# Patient Record
Sex: FEMALE | Race: WHITE | NOT HISPANIC OR LATINO | Employment: OTHER | URBAN - METROPOLITAN AREA
[De-identification: names, ages, dates, MRNs, and addresses within clinical notes are randomized per-mention and may not be internally consistent; named-entity substitution may affect disease eponyms.]

---

## 2017-01-27 ENCOUNTER — ALLSCRIPTS OFFICE VISIT (OUTPATIENT)
Dept: OTHER | Facility: OTHER | Age: 82
End: 2017-01-27

## 2017-03-16 ENCOUNTER — ALLSCRIPTS OFFICE VISIT (OUTPATIENT)
Dept: OTHER | Facility: OTHER | Age: 82
End: 2017-03-16

## 2017-04-28 ENCOUNTER — ALLSCRIPTS OFFICE VISIT (OUTPATIENT)
Dept: OTHER | Facility: OTHER | Age: 82
End: 2017-04-28

## 2017-07-28 ENCOUNTER — ALLSCRIPTS OFFICE VISIT (OUTPATIENT)
Dept: OTHER | Facility: OTHER | Age: 82
End: 2017-07-28

## 2017-10-04 ENCOUNTER — APPOINTMENT (EMERGENCY)
Dept: RADIOLOGY | Facility: HOSPITAL | Age: 82
DRG: 281 | End: 2017-10-04
Payer: MEDICARE

## 2017-10-04 ENCOUNTER — GENERIC CONVERSION - ENCOUNTER (OUTPATIENT)
Dept: OTHER | Facility: OTHER | Age: 82
End: 2017-10-04

## 2017-10-04 ENCOUNTER — HOSPITAL ENCOUNTER (INPATIENT)
Facility: HOSPITAL | Age: 82
LOS: 5 days | Discharge: NURSING, REHAB OR LONG-TERM CARE | DRG: 281 | End: 2017-10-09
Attending: EMERGENCY MEDICINE | Admitting: INTERNAL MEDICINE
Payer: MEDICARE

## 2017-10-04 ENCOUNTER — APPOINTMENT (INPATIENT)
Dept: ULTRASOUND IMAGING | Facility: HOSPITAL | Age: 82
DRG: 281 | End: 2017-10-04
Payer: MEDICARE

## 2017-10-04 DIAGNOSIS — R74.01 TRANSAMINITIS: ICD-10-CM

## 2017-10-04 DIAGNOSIS — R10.11 RUQ PAIN: ICD-10-CM

## 2017-10-04 DIAGNOSIS — R77.8 ELEVATED TROPONIN: ICD-10-CM

## 2017-10-04 DIAGNOSIS — R79.89 ELEVATED BRAIN NATRIURETIC PEPTIDE (BNP) LEVEL: ICD-10-CM

## 2017-10-04 DIAGNOSIS — R10.9 ABDOMINAL PAIN: Primary | ICD-10-CM

## 2017-10-04 DIAGNOSIS — R53.1 WEAKNESS: ICD-10-CM

## 2017-10-04 DIAGNOSIS — I21.4 NSTEMI (NON-ST ELEVATED MYOCARDIAL INFARCTION) (HCC): ICD-10-CM

## 2017-10-04 PROBLEM — N18.4 CKD (CHRONIC KIDNEY DISEASE) STAGE 4, GFR 15-29 ML/MIN (HCC): Status: ACTIVE | Noted: 2017-10-04

## 2017-10-04 LAB
ALBUMIN SERPL BCP-MCNC: 3.8 G/DL (ref 3.5–5)
ALP SERPL-CCNC: 136 U/L (ref 46–116)
ALT SERPL W P-5'-P-CCNC: 558 U/L (ref 12–78)
ANION GAP SERPL CALCULATED.3IONS-SCNC: 7 MMOL/L (ref 4–13)
ANION GAP SERPL CALCULATED.3IONS-SCNC: 8 MMOL/L (ref 4–13)
AST SERPL W P-5'-P-CCNC: 826 U/L (ref 5–45)
BASOPHILS # BLD AUTO: 0.01 THOUSANDS/ΜL (ref 0–0.1)
BASOPHILS NFR BLD AUTO: 0 % (ref 0–1)
BILIRUB SERPL-MCNC: 0.8 MG/DL (ref 0.2–1)
BUN SERPL-MCNC: 19 MG/DL (ref 5–25)
BUN SERPL-MCNC: 21 MG/DL (ref 5–25)
CALCIUM SERPL-MCNC: 9.5 MG/DL (ref 8.3–10.1)
CALCIUM SERPL-MCNC: 9.7 MG/DL (ref 8.3–10.1)
CHLORIDE SERPL-SCNC: 104 MMOL/L (ref 100–108)
CHLORIDE SERPL-SCNC: 105 MMOL/L (ref 100–108)
CHOLEST SERPL-MCNC: 184 MG/DL (ref 50–200)
CO2 SERPL-SCNC: 28 MMOL/L (ref 21–32)
CO2 SERPL-SCNC: 28 MMOL/L (ref 21–32)
CREAT SERPL-MCNC: 1.53 MG/DL (ref 0.6–1.3)
CREAT SERPL-MCNC: 1.53 MG/DL (ref 0.6–1.3)
EOSINOPHIL # BLD AUTO: 0.02 THOUSAND/ΜL (ref 0–0.61)
EOSINOPHIL NFR BLD AUTO: 1 % (ref 0–6)
ERYTHROCYTE [DISTWIDTH] IN BLOOD BY AUTOMATED COUNT: 13.3 % (ref 11.6–15.1)
GFR SERPL CREATININE-BSD FRML MDRD: 28 ML/MIN/1.73SQ M
GFR SERPL CREATININE-BSD FRML MDRD: 28 ML/MIN/1.73SQ M
GLUCOSE SERPL-MCNC: 123 MG/DL (ref 65–140)
GLUCOSE SERPL-MCNC: 124 MG/DL (ref 65–140)
HCT VFR BLD AUTO: 35.6 % (ref 34.8–46.1)
HDLC SERPL-MCNC: 63 MG/DL (ref 40–60)
HGB BLD-MCNC: 11.3 G/DL (ref 11.5–15.4)
LDLC SERPL CALC-MCNC: 109 MG/DL (ref 0–100)
LIPASE SERPL-CCNC: 325 U/L (ref 73–393)
LYMPHOCYTES # BLD AUTO: 0.42 THOUSANDS/ΜL (ref 0.6–4.47)
LYMPHOCYTES NFR BLD AUTO: 10 % (ref 14–44)
MCH RBC QN AUTO: 30.2 PG (ref 26.8–34.3)
MCHC RBC AUTO-ENTMCNC: 31.7 G/DL (ref 31.4–37.4)
MCV RBC AUTO: 95 FL (ref 82–98)
MONOCYTES # BLD AUTO: 0.38 THOUSAND/ΜL (ref 0.17–1.22)
MONOCYTES NFR BLD AUTO: 9 % (ref 4–12)
NEUTROPHILS # BLD AUTO: 3.44 THOUSANDS/ΜL (ref 1.85–7.62)
NEUTS SEG NFR BLD AUTO: 80 % (ref 43–75)
NT-PROBNP SERPL-MCNC: 1452 PG/ML
PLATELET # BLD AUTO: 204 THOUSANDS/UL (ref 149–390)
PMV BLD AUTO: 9.1 FL (ref 8.9–12.7)
POTASSIUM SERPL-SCNC: 4.3 MMOL/L (ref 3.5–5.3)
POTASSIUM SERPL-SCNC: 4.3 MMOL/L (ref 3.5–5.3)
PROT SERPL-MCNC: 7.1 G/DL (ref 6.4–8.2)
RBC # BLD AUTO: 3.74 MILLION/UL (ref 3.81–5.12)
SODIUM SERPL-SCNC: 140 MMOL/L (ref 136–145)
SODIUM SERPL-SCNC: 140 MMOL/L (ref 136–145)
TRIGL SERPL-MCNC: 62 MG/DL
TROPONIN I SERPL-MCNC: 0.06 NG/ML
TROPONIN I SERPL-MCNC: 0.22 NG/ML
WBC # BLD AUTO: 4.27 THOUSAND/UL (ref 4.31–10.16)

## 2017-10-04 PROCEDURE — 76705 ECHO EXAM OF ABDOMEN: CPT

## 2017-10-04 PROCEDURE — 83036 HEMOGLOBIN GLYCOSYLATED A1C: CPT | Performed by: PHYSICIAN ASSISTANT

## 2017-10-04 PROCEDURE — 93005 ELECTROCARDIOGRAM TRACING: CPT | Performed by: EMERGENCY MEDICINE

## 2017-10-04 PROCEDURE — 83690 ASSAY OF LIPASE: CPT | Performed by: EMERGENCY MEDICINE

## 2017-10-04 PROCEDURE — 84484 ASSAY OF TROPONIN QUANT: CPT | Performed by: PHYSICIAN ASSISTANT

## 2017-10-04 PROCEDURE — 80053 COMPREHEN METABOLIC PANEL: CPT | Performed by: EMERGENCY MEDICINE

## 2017-10-04 PROCEDURE — 99285 EMERGENCY DEPT VISIT HI MDM: CPT

## 2017-10-04 PROCEDURE — 80061 LIPID PANEL: CPT | Performed by: PHYSICIAN ASSISTANT

## 2017-10-04 PROCEDURE — 85025 COMPLETE CBC W/AUTO DIFF WBC: CPT | Performed by: EMERGENCY MEDICINE

## 2017-10-04 PROCEDURE — 83880 ASSAY OF NATRIURETIC PEPTIDE: CPT | Performed by: EMERGENCY MEDICINE

## 2017-10-04 PROCEDURE — 84484 ASSAY OF TROPONIN QUANT: CPT | Performed by: EMERGENCY MEDICINE

## 2017-10-04 PROCEDURE — 80048 BASIC METABOLIC PNL TOTAL CA: CPT | Performed by: EMERGENCY MEDICINE

## 2017-10-04 PROCEDURE — 36415 COLL VENOUS BLD VENIPUNCTURE: CPT | Performed by: EMERGENCY MEDICINE

## 2017-10-04 PROCEDURE — 93005 ELECTROCARDIOGRAM TRACING: CPT | Performed by: PHYSICIAN ASSISTANT

## 2017-10-04 PROCEDURE — 71020 HB CHEST X-RAY 2VW FRONTAL&LATL: CPT

## 2017-10-04 PROCEDURE — 80074 ACUTE HEPATITIS PANEL: CPT | Performed by: PHYSICIAN ASSISTANT

## 2017-10-04 RX ORDER — SENNOSIDES 8.6 MG
1 TABLET ORAL DAILY
Status: DISCONTINUED | OUTPATIENT
Start: 2017-10-05 | End: 2017-10-09 | Stop reason: HOSPADM

## 2017-10-04 RX ORDER — ASPIRIN 81 MG/1
81 TABLET ORAL DAILY
Status: DISCONTINUED | OUTPATIENT
Start: 2017-10-05 | End: 2017-10-09 | Stop reason: HOSPADM

## 2017-10-04 RX ORDER — OMEPRAZOLE 20 MG/1
20 CAPSULE, DELAYED RELEASE ORAL AS NEEDED
COMMUNITY
End: 2017-11-02 | Stop reason: HOSPADM

## 2017-10-04 RX ORDER — ONDANSETRON 2 MG/ML
4 INJECTION INTRAMUSCULAR; INTRAVENOUS EVERY 6 HOURS PRN
Status: DISCONTINUED | OUTPATIENT
Start: 2017-10-04 | End: 2017-10-09 | Stop reason: HOSPADM

## 2017-10-04 RX ORDER — ACETAMINOPHEN 325 MG/1
650 TABLET ORAL EVERY 6 HOURS PRN
Status: ON HOLD | COMMUNITY
End: 2017-10-09

## 2017-10-04 RX ORDER — CALCIUM CARBONATE 200(500)MG
1000 TABLET,CHEWABLE ORAL DAILY PRN
Status: DISCONTINUED | OUTPATIENT
Start: 2017-10-04 | End: 2017-10-09 | Stop reason: HOSPADM

## 2017-10-04 NOTE — ED PROVIDER NOTES
History  Chief Complaint   Patient presents with    Shortness of Breath     Per EMS pt just "isn't feeling well " Pt states that she feels generally week  Pt c/o dizziness and SOB  Pt c/o nausea but denies dizziness  Pt c/o a headache  Patient presents to the emergency department stating that she just does not feel well  She states she has mild belly pain but always has that  She denies bianca chest pain or sob  She denies fever chills cough  Denies headache or neck pain  Denies rash  Denies fall trauma or new activity  Denies back pain denies urinary symptoms  She states she has been moving her bowels normally  None       History reviewed  No pertinent past medical history  Past Surgical History:   Procedure Laterality Date    BACK SURGERY      COLON SURGERY      HIP FRACTURE SURGERY         History reviewed  No pertinent family history  I have reviewed and agree with the history as documented  Social History   Substance Use Topics    Smoking status: Never Smoker    Smokeless tobacco: Never Used    Alcohol use No        Review of Systems   Constitutional: Positive for fatigue  Negative for activity change, appetite change, chills and diaphoresis  HENT: Negative  Negative for ear discharge, ear pain, rhinorrhea, sore throat and trouble swallowing  Eyes: Negative  Negative for photophobia and visual disturbance  Respiratory: Negative  Negative for chest tightness, shortness of breath and wheezing  Cardiovascular: Negative  Negative for chest pain, palpitations and leg swelling  Gastrointestinal: Positive for abdominal pain  Negative for blood in stool, constipation, diarrhea, nausea, rectal pain and vomiting  Endocrine: Negative  Genitourinary: Negative  Negative for dysuria, flank pain, frequency, hematuria, pelvic pain, urgency, vaginal bleeding, vaginal discharge and vaginal pain  Musculoskeletal: Negative    Negative for back pain, neck pain and neck stiffness  Skin: Negative  Allergic/Immunologic: Negative  Neurological: Positive for weakness  Negative for dizziness, syncope, facial asymmetry, speech difficulty, light-headedness, numbness and headaches  Hematological: Negative  Does not bruise/bleed easily  Psychiatric/Behavioral: Negative  Physical Exam  ED Triage Vitals [10/04/17 1850]   Temperature Pulse Respirations Blood Pressure SpO2   99 2 °F (37 3 °C) (!) 116 16 (!) 173/75 96 %      Temp Source Heart Rate Source Patient Position - Orthostatic VS BP Location FiO2 (%)   Oral Monitor Sitting Right arm --      Pain Score       --           Physical Exam   Constitutional: She is oriented to person, place, and time  She appears well-developed and well-nourished  Nontoxic appearance without respiratory distress  Patient looks comfortable sitting upright in the stretcher  HENT:   Head: Normocephalic and atraumatic  Right Ear: External ear normal    Left Ear: External ear normal    Mouth/Throat: Oropharynx is clear and moist    Eyes: Conjunctivae and EOM are normal  Pupils are equal, round, and reactive to light  Neck: Normal range of motion  Neck supple  Cardiovascular: Normal rate, regular rhythm, normal heart sounds and intact distal pulses  Pulmonary/Chest: Effort normal and breath sounds normal  No respiratory distress  She has no wheezes  She has no rales  She exhibits no tenderness  Abdominal: Soft  Bowel sounds are normal  She exhibits no distension and no mass  There is no tenderness  There is no rebound and no guarding  No hernia  No reproducible tenderness  No peritoneal signs  Musculoskeletal: Normal range of motion  She exhibits no edema or deformity  Neurological: She is alert and oriented to person, place, and time  She has normal reflexes  She displays normal reflexes  No cranial nerve deficit or sensory deficit  She exhibits normal muscle tone  Coordination normal    Skin: Skin is warm and dry   No rash noted  No erythema  No pallor  Psychiatric: She has a normal mood and affect  Her behavior is normal  Judgment and thought content normal    Nursing note and vitals reviewed  ED Medications  Medications - No data to display    Diagnostic Studies  Labs Reviewed   CBC AND DIFFERENTIAL - Abnormal        Result Value Ref Range Status    WBC 4 27 (*) 4 31 - 10 16 Thousand/uL Final    RBC 3 74 (*) 3 81 - 5 12 Million/uL Final    Hemoglobin 11 3 (*) 11 5 - 15 4 g/dL Final    Neutrophils Relative 80 (*) 43 - 75 % Final    Lymphocytes Relative 10 (*) 14 - 44 % Final    Lymphocytes Absolute 0 42 (*) 0 60 - 4 47 Thousands/µL Final    Hematocrit 35 6  34 8 - 46 1 % Final    MCV 95  82 - 98 fL Final    MCH 30 2  26 8 - 34 3 pg Final    MCHC 31 7  31 4 - 37 4 g/dL Final    RDW 13 3  11 6 - 15 1 % Final    MPV 9 1  8 9 - 12 7 fL Final    Platelets 007  877 - 390 Thousands/uL Final    Monocytes Relative 9  4 - 12 % Final    Eosinophils Relative 1  0 - 6 % Final    Basophils Relative 0  0 - 1 % Final    Neutrophils Absolute 3 44  1 85 - 7 62 Thousands/µL Final    Monocytes Absolute 0 38  0 17 - 1 22 Thousand/µL Final    Eosinophils Absolute 0 02  0 00 - 0 61 Thousand/µL Final    Basophils Absolute 0 01  0 00 - 0 10 Thousands/µL Final   BASIC METABOLIC PANEL - Abnormal     Creatinine 1 53 (*) 0 60 - 1 30 mg/dL Final    Comment: Standardized to IDMS reference method    Sodium 140  136 - 145 mmol/L Final    Potassium 4 3  3 5 - 5 3 mmol/L Final    Chloride 104  100 - 108 mmol/L Final    CO2 28  21 - 32 mmol/L Final    Anion Gap 8  4 - 13 mmol/L Final    BUN 19  5 - 25 mg/dL Final    Glucose 123  65 - 140 mg/dL Final    Comment:   If the patient is fasting, the ADA then defines impaired fasting glucose as > 100 mg/dL and diabetes as > or equal to 123 mg/dL  Specimen collection should occur prior to Sulfasalazine administration due to the potential for falsely depressed results   Specimen collection should occur prior to Sulfapyridine administration due to the potential for falsely elevated results  Calcium 9 5  8 3 - 10 1 mg/dL Final    eGFR 28  ml/min/1 73sq m Final    Narrative:     National Kidney Disease Education Program recommendations are as follows:  GFR calculation is accurate only with a steady state creatinine  Chronic Kidney disease less than 60 ml/min/1 73 sq  meters  Kidney failure less than 15 ml/min/1 73 sq  meters  TROPONIN I - Abnormal     Troponin I 0 06 (*) <=0 04 ng/mL Final    Comment:  Results indicate test should be repeated on new specimen collected within 4-6 hours of the original     Narrative:     [employer] Chemistry analyzer 99% cutoff is > 0 04 ng/mL in network labs    o cTnI 99% cutoff is useful only when applied to patients in the clinical setting of myocardial ischemia  o cTnI 99% cutoff should be interpreted in the context of clinical history, ECG findings and possibly cardiac imaging to establish correct diagnosis  o cTnI 99% cutoff may be suggestive but clearly not indicative of a coronary event without the clinical setting of myocardial ischemia  NT-BNP PRO (BRAIN NATRIURETIC PEPTIDE) - Abnormal     NT-proBNP 1,452 (*) <450 pg/mL Final   COMPREHENSIVE METABOLIC PANEL - Abnormal     Creatinine 1 53 (*) 0 60 - 1 30 mg/dL Final    Comment: Standardized to IDMS reference method     (*) 5 - 45 U/L Final    Comment:   Specimen collection should occur prior to Sulfasalazine administration due to the potential for falsely depressed results   (*) 12 - 78 U/L Final    Comment:   Specimen collection should occur prior to Sulfasalazine administration due to the potential for falsely depressed results       Alkaline Phosphatase 136 (*) 46 - 116 U/L Final    Sodium 140  136 - 145 mmol/L Final    Potassium 4 3  3 5 - 5 3 mmol/L Final    Chloride 105  100 - 108 mmol/L Final    CO2 28  21 - 32 mmol/L Final    Anion Gap 7  4 - 13 mmol/L Final    BUN 21  5 - 25 mg/dL Final    Glucose 124  65 - 140 mg/dL Final    Comment:   If the patient is fasting, the ADA then defines impaired fasting glucose as > 100 mg/dL and diabetes as > or equal to 123 mg/dL  Specimen collection should occur prior to Sulfasalazine administration due to the potential for falsely depressed results  Specimen collection should occur prior to Sulfapyridine administration due to the potential for falsely elevated results  Calcium 9 7  8 3 - 10 1 mg/dL Final    Total Protein 7 1  6 4 - 8 2 g/dL Final    Albumin 3 8  3 5 - 5 0 g/dL Final    Total Bilirubin 0 80  0 20 - 1 00 mg/dL Final    eGFR 28  ml/min/1 73sq m Final    Narrative:     National Kidney Disease Education Program recommendations are as follows:  GFR calculation is accurate only with a steady state creatinine  Chronic Kidney disease less than 60 ml/min/1 73 sq  meters  Kidney failure less than 15 ml/min/1 73 sq  meters  LIPASE - Normal    Lipase 325  73 - 393 u/L Final       XR chest 2 views   ED Interpretation   NAD          Procedures  ECG 12 Lead Documentation  Date/Time: 10/4/2017 6:58 PM  Performed by: Cecile Mc by: Rosnia Umanzor     ECG reviewed by me, the ED Provider: yes    Patient location:  ED  Previous ECG:     Previous ECG:  Compared to current    Similarity:  No change  Interpretation:     Interpretation: abnormal    Rate:     ECG rate:  115    ECG rate assessment: tachycardic    Rhythm:     Rhythm: sinus tachycardia    Ectopy:     Ectopy: none    QRS:     QRS axis:  Normal    QRS intervals:  Normal  Conduction:     Conduction: abnormal      Abnormal conduction: non-specific intraventricular conduction delay    ST segments:     ST segments:  Non-specific  T waves:     T waves: non-specific            Phone Contacts  ED Phone Contact    ED Course  ED Course as of Oct 04 2122   Wed Oct 04, 2017   2120 Patient to be admitted to the hospitalist service    The case was discussed with mildly who has accepted the patient to the hospitalist service  Her troponins elevated without ischemic change on EKG or chest pain  She has elevated LFTs without right upper quadrant tenderness and normal bilirubin and lipase  She looks comfortable and is hemodynamically stable  Her heart rate has come down closer to 100  MDM  CritCare Time    Disposition  Final diagnoses:   Abdominal pain   Weakness   Elevated troponin   Elevated brain natriuretic peptide (BNP) level     ED Disposition     ED Disposition Condition Comment    Admit  Case was discussed with Gardenia Dahl and the patient's admission status was agreed to be Admission Status: inpatient status to the service of Dr Harjinder Trevino    None       Patient's Medications    No medications on file     No discharge procedures on file      ED Provider  Electronically Signed by       Sabrina Tate MD  10/04/17 4637

## 2017-10-05 ENCOUNTER — GENERIC CONVERSION - ENCOUNTER (OUTPATIENT)
Dept: OTHER | Facility: OTHER | Age: 82
End: 2017-10-05

## 2017-10-05 LAB
ALBUMIN SERPL BCP-MCNC: 3.2 G/DL (ref 3.5–5)
ALP SERPL-CCNC: 108 U/L (ref 46–116)
ALT SERPL W P-5'-P-CCNC: 425 U/L (ref 12–78)
ANION GAP SERPL CALCULATED.3IONS-SCNC: 4 MMOL/L (ref 4–13)
APTT PPP: 28 SECONDS (ref 23–35)
AST SERPL W P-5'-P-CCNC: 353 U/L (ref 5–45)
ATRIAL RATE: 115 BPM
BILIRUB SERPL-MCNC: 0.8 MG/DL (ref 0.2–1)
BUN SERPL-MCNC: 23 MG/DL (ref 5–25)
CALCIUM SERPL-MCNC: 9.2 MG/DL (ref 8.3–10.1)
CHLORIDE SERPL-SCNC: 106 MMOL/L (ref 100–108)
CO2 SERPL-SCNC: 29 MMOL/L (ref 21–32)
CREAT SERPL-MCNC: 1.59 MG/DL (ref 0.6–1.3)
ERYTHROCYTE [DISTWIDTH] IN BLOOD BY AUTOMATED COUNT: 13.5 % (ref 11.6–15.1)
EST. AVERAGE GLUCOSE BLD GHB EST-MCNC: 111 MG/DL
GFR SERPL CREATININE-BSD FRML MDRD: 27 ML/MIN/1.73SQ M
GLUCOSE SERPL-MCNC: 106 MG/DL (ref 65–140)
HAV IGM SER QL: NORMAL
HBA1C MFR BLD: 5.5 % (ref 4.2–6.3)
HBV CORE IGM SER QL: NORMAL
HBV SURFACE AG SER QL: NORMAL
HCT VFR BLD AUTO: 31.4 % (ref 34.8–46.1)
HCV AB SER QL: NORMAL
HGB BLD-MCNC: 9.8 G/DL (ref 11.5–15.4)
INR PPP: 1 (ref 0.86–1.16)
INR PPP: 1 (ref 0.86–1.16)
MCH RBC QN AUTO: 30 PG (ref 26.8–34.3)
MCHC RBC AUTO-ENTMCNC: 31.2 G/DL (ref 31.4–37.4)
MCV RBC AUTO: 96 FL (ref 82–98)
P AXIS: 60 DEGREES
PLATELET # BLD AUTO: 184 THOUSANDS/UL (ref 149–390)
PLATELET # BLD AUTO: 197 THOUSANDS/UL (ref 149–390)
PMV BLD AUTO: 10 FL (ref 8.9–12.7)
PMV BLD AUTO: 9.6 FL (ref 8.9–12.7)
POTASSIUM SERPL-SCNC: 4.1 MMOL/L (ref 3.5–5.3)
PR INTERVAL: 144 MS
PROT SERPL-MCNC: 6.5 G/DL (ref 6.4–8.2)
PROTHROMBIN TIME: 13.5 SECONDS (ref 12.1–14.4)
PROTHROMBIN TIME: 13.5 SECONDS (ref 12.1–14.4)
QRS AXIS: -1 DEGREES
QRSD INTERVAL: 136 MS
QT INTERVAL: 362 MS
QTC INTERVAL: 500 MS
RBC # BLD AUTO: 3.27 MILLION/UL (ref 3.81–5.12)
SODIUM SERPL-SCNC: 139 MMOL/L (ref 136–145)
T WAVE AXIS: 121 DEGREES
TROPONIN I SERPL-MCNC: 0.28 NG/ML
TROPONIN I SERPL-MCNC: 0.3 NG/ML
VENTRICULAR RATE: 115 BPM
WBC # BLD AUTO: 5.14 THOUSAND/UL (ref 4.31–10.16)

## 2017-10-05 PROCEDURE — 84484 ASSAY OF TROPONIN QUANT: CPT | Performed by: PHYSICIAN ASSISTANT

## 2017-10-05 PROCEDURE — 80053 COMPREHEN METABOLIC PANEL: CPT | Performed by: PHYSICIAN ASSISTANT

## 2017-10-05 PROCEDURE — 85610 PROTHROMBIN TIME: CPT | Performed by: PHYSICIAN ASSISTANT

## 2017-10-05 PROCEDURE — 85049 AUTOMATED PLATELET COUNT: CPT | Performed by: PHYSICIAN ASSISTANT

## 2017-10-05 PROCEDURE — 85730 THROMBOPLASTIN TIME PARTIAL: CPT | Performed by: PHYSICIAN ASSISTANT

## 2017-10-05 PROCEDURE — 93005 ELECTROCARDIOGRAM TRACING: CPT

## 2017-10-05 PROCEDURE — 85027 COMPLETE CBC AUTOMATED: CPT | Performed by: PHYSICIAN ASSISTANT

## 2017-10-05 PROCEDURE — 93005 ELECTROCARDIOGRAM TRACING: CPT | Performed by: PHYSICIAN ASSISTANT

## 2017-10-05 RX ORDER — HEPARIN SODIUM 10000 [USP'U]/100ML
3-20 INJECTION, SOLUTION INTRAVENOUS
Status: DISCONTINUED | OUTPATIENT
Start: 2017-10-05 | End: 2017-10-05

## 2017-10-05 RX ADMIN — HEPARIN SODIUM AND DEXTROSE 12 UNITS/KG/HR: 10000; 5 INJECTION INTRAVENOUS at 09:15

## 2017-10-05 RX ADMIN — ASPIRIN 81 MG: 81 TABLET, COATED ORAL at 08:29

## 2017-10-05 NOTE — H&P
History and Physical - [facility]    Patient Information: Ophelia Grimaldo 80 y o  female MRN: [id number]  Unit/Bed#: ED 16 Encounter: [id number]  Admitting Physician: Noé Sanders PA-C  PCP: Shirley Delvalle MD  Date of Admission:  10/04/17    Assessment/Plan:    Hospital Problem List:     Principal Problem:    Transaminitis  Active Problems:    RUQ pain    CKD (chronic kidney disease) stage 4, GFR 15-29 ml/min ([facility])    NSTEMI (non-ST elevated myocardial infarction) ([facility] [address] )      Plan for the Primary Problem(s):  · Transaminitis   · Elevated LFTs that were previously normal approximately 1 year ago; denies acute viral illness/ETOH consumption; has complained of intermittent RUQ pain that has been ongoing for a few months; has hx of cholelithiasis w/o cholecystitis   · Admit  · Check RUQ ultrasound  · Check hepatitis panel  · Trend CMP  · GI consult, appreciate input  · May need surgical consult pending RUQ ultrasound  · NSTEMI, type I vs II  · Trop minimally elevated at 0 06; ST wave depression in lateral leads (undeterminate age), denies chest pain  · Has h/o CKD  · Maintain telemetry  · Trend troponins  · EKG PRN  · Check lipid panel  · Cardiology consult, appreciate input   · Elevated BNP  · BNP 1 5K on admission  · No rales, pitting edema, JVD-- patient appears euvolemic  · Related to CKD potentially  Plan for Additional Problems:   · CKD stage IV  · Cr has been stable at 1 5 x3 years  · Continue to monitor     VTE Prophylaxis: Enoxaparin (Lovenox)  / sequential compression device   Code Status: Level 1-- Full Code (discussed with patient at bedside)  POLST: POLST form is not discussed and not completed at this time  Anticipated Length of Stay:  Patient will be admitted on an Inpatient basis with an anticipated length of stay of  > 2 midnights  Justification for Hospital Stay: GI and cardiology evaluations     Total Time for Visit, including Counseling / Coordination of Care: 30 minutes  Greater than 50% of this total time spent on direct patient counseling and coordination of care  Chief Complaint:   "I just don't feel well"    History of Present Illness: Linda Delacruz is a 80 y o  female with no significant PMH presents to the ED for evaluation of generally feeling ill for the last few days  Patient states that her biggest concern is some RUQ abdominal pain  She states that she has had this ongoing for a few years, but it has bothered her slightly more this week  State the pain is intermittent, usually related to eating heavy meals  She denies any N/V  She denies any constipation/diarrha  She denies any CP/SOB  She reports that she has had gallstones in the past, but no h/o cholecystitis and still has gall bladder  Patient denies any fevers/chills  Per patient's son, he states that his mother often complains of vague symptoms, so he is unsure how much is 'real' or not  Review of Systems:    Review of Systems   Constitutional: Negative  HENT: Negative  Eyes: Negative  Respiratory: Negative  Cardiovascular: Negative  Gastrointestinal: Positive for abdominal pain  Genitourinary: Positive for urgency  Musculoskeletal: Negative  Skin: Negative  Neurological: Negative  Hematological: Negative  Psychiatric/Behavioral: Negative  Past Medical and Surgical History:     History reviewed  No pertinent past medical history  Past Surgical History:   Procedure Laterality Date    BACK SURGERY      COLON SURGERY      HIP FRACTURE SURGERY         Meds/Allergies:    Prior to Admission medications    Not on File     I have reviewed home medications with patient personally  Allergies: No Known Allergies    Social History:     Marital Status:     Occupation: retired  Patient Pre-hospital Living Situation: independent   Patient Pre-hospital Level of Mobility: independent  Patient Pre-hospital Diet Restrictions: none  Substance Use History:   History   Alcohol Use No     History   Smoking Status    Never Smoker   Smokeless Tobacco    Never Used     History   Drug Use No       Family History:    non-contributory    Physical Exam:     Vitals:   Blood Pressure: 142/67 (10/04/17 2143)  Pulse: 102 (10/04/17 2143)  Temperature: 99 2 °F (37 3 °C) (10/04/17 1850)  Temp Source: Oral (10/04/17 1850)  Respirations: 16 (10/04/17 2143)  Weight - Scale: 67 kg (147 lb 11 3 oz) (10/04/17 1850)  SpO2: 95 % (10/04/17 2143)    Physical Exam   Constitutional: She is oriented to person, place, and time  She appears well-developed and well-nourished  No distress  HENT:   Head: Normocephalic and atraumatic  Mouth/Throat: No oropharyngeal exudate  Eyes: Conjunctivae and EOM are normal  Pupils are equal, round, and reactive to light  No scleral icterus  Neck: No JVD present  Cardiovascular: Regular rhythm  Exam reveals no gallop and no friction rub  No murmur heard  Tachycardic       Pulmonary/Chest: Effort normal and breath sounds normal  No respiratory distress  She has no wheezes  She has no rales  Abdominal: Soft  Bowel sounds are normal  She exhibits no distension  There is tenderness (RUQ, RLQ, mild; negative davis's sign)  There is no rebound and no guarding  Musculoskeletal: She exhibits no edema or tenderness  Neurological: She is alert and oriented to person, place, and time  She displays normal reflexes  No cranial nerve deficit  She exhibits normal muscle tone  Skin: Skin is warm and dry  No rash noted  No erythema  Skin is dry   Psychiatric: She has a normal mood and affect  Her behavior is normal        Additional Data:     Lab Results: I have personally reviewed pertinent reports          Results from last 7 days  Lab Units 10/04/17  1901   WBC Thousand/uL 4 27*   HEMOGLOBIN g/dL 11 3*   HEMATOCRIT % 35 6   PLATELETS Thousands/uL 204   NEUTROS PCT % 80*   LYMPHS PCT % 10*   MONOS PCT % 9   EOS PCT % 1       Results from last 7 days  Lab Units 10/04/17  1952   SODIUM mmol/L 140   POTASSIUM mmol/L 4 3   CHLORIDE mmol/L 105   CO2 mmol/L 28   BUN mg/dL 21   CREATININE mg/dL 1 53*   CALCIUM mg/dL 9 7   TOTAL PROTEIN g/dL 7 1   BILIRUBIN TOTAL mg/dL 0 80   ALK PHOS U/L 136*   ALT U/L 558*   AST U/L 826*   GLUCOSE RANDOM mg/dL 124           Imaging: I have personally reviewed pertinent reports  No results found  EKG, Pathology, and Other Studies Reviewed on Admission:   · EKG: Sinus tach, rate 107; ST depression in lateral leads     Allscripts Records Reviewed: Yes     ** Please Note: Dragon 360 Dictation voice to text software may have been used in the creation of this document   **

## 2017-10-05 NOTE — CONSULTS
in the stool  She has a history of gallstones in the past but denies any history of cholecystitis  She denies any fevers or chills at home  She is not on any hepatotoxic medications at home  REVIEW OF SYSTEMS:    CONSTITUTIONAL: Denies any fever, chills, or rigors  Good appetite, and no recent weight loss  HEENT: No earache or tinnitus  Denies hearing loss or visual disturbances  CARDIOVASCULAR: No chest pain or palpitations  RESPIRATORY: Denies any cough, hemoptysis, shortness of breath or dyspnea on exertion  GASTROINTESTINAL: As noted in the History of Present Illness  GENITOURINARY: No problems with urination  Denies any hematuria or dysuria  NEUROLOGIC: No dizziness or vertigo, denies headaches  MUSCULOSKELETAL: Denies any muscle or joint pain  +leg pain  SKIN: Denies skin rashes or itching  ENDOCRINE: Denies excessive thirst  Denies intolerance to heat or cold  PSYCHOSOCIAL: Denies depression or anxiety  Denies any recent memory loss  Historical Information   Past Medical History:   Diagnosis Date    Cancer [facility])     "some kind of cancer when I was 80"     Past Surgical History:   Procedure Laterality Date    BACK SURGERY      COLON SURGERY      HIP FRACTURE SURGERY       Social History   History   Alcohol Use No     History   Drug Use No     History   Smoking Status    Never Smoker   Smokeless Tobacco    Never Used     History reviewed  No pertinent family history      Meds/Allergies     Prescriptions Prior to Admission   Medication    acetaminophen (TYLENOL) 325 mg tablet    omeprazole (PriLOSEC) 20 mg delayed release capsule     Current Facility-Administered Medications   Medication Dose Route Frequency    aspirin (ECOTRIN LOW STRENGTH) EC tablet 81 mg  81 mg Oral Daily    calcium carbonate (TUMS) chewable tablet 1,000 mg  1,000 mg Oral Daily PRN    ondansetron (ZOFRAN) injection 4 mg  4 mg Intravenous Q6H PRN    senna (SENOKOT) tablet 8 6 mg  1 tablet Oral Daily No Known Allergies        Objective     Blood pressure 117/54, pulse 75, temperature 98 5 °F (36 9 °C), temperature source Oral, resp  rate 18, height 5' 3 3" (1 608 m), weight 67 kg (147 lb 11 3 oz), SpO2 98 %  Intake/Output Summary (Last 24 hours) at 10/05/17 1144  Last data filed at 10/05/17 0900   Gross per 24 hour   Intake              420 ml   Output                0 ml   Net              420 ml         PHYSICAL EXAM:      General Appearance:   Alert, cooperative, no distress, appears stated age    HEENT:   Normocephalic, atraumatic, anicteric, no oropharyngeal thrush present      Neck:  Supple, symmetrical, trachea midline, no adenopathy;    thyroid: no enlargement/tenderness/nodules; no carotid  bruit or JVD    Lungs:   Clear to auscultation bilaterally; no rales, rhonchi or wheezing; respirations unlabored    Heart[de-identified]   S1 and S2 normal; regular rate and rhythm; no murmur, rub, or gallop  Abdomen:   Soft, non-tender, non-distended; normal bowel sounds; no masses, no organomegaly    Genitalia:   Deferred    Rectal:   Deferred    Extremities:  No cyanosis, clubbing or edema    Pulses:  2+ and symmetric all extremities    Skin:  Skin color, texture, turgor normal, no rashes or lesions    Lymph nodes:  No palpable cervical, axillary or inguinal lymphadenopathy        Lab Results:     Results from last 7 days  Lab Units 10/05/17  0819  10/04/17  1901   WBC Thousand/uL 5 14  --  4 27*   HEMOGLOBIN g/dL 9 8*  --  11 3*   HEMATOCRIT % 31 4*  --  35 6   PLATELETS Thousands/uL 184  < > 204   NEUTROS PCT %  --   --  80*   LYMPHS PCT %  --   --  10*   MONOS PCT %  --   --  9   EOS PCT %  --   --  1   < > = values in this interval not displayed      Results from last 7 days  Lab Units 10/05/17  0524   SODIUM mmol/L 139   POTASSIUM mmol/L 4 1   CHLORIDE mmol/L 106   CO2 mmol/L 29   BUN mg/dL 23   CREATININE mg/dL 1 59*   CALCIUM mg/dL 9 2   TOTAL PROTEIN g/dL 6 5   BILIRUBIN TOTAL mg/dL 0 80   ALK PHOS U/L 108 ALT U/L 425*   AST U/L 353*   GLUCOSE RANDOM mg/dL 106       Results from last 7 days  Lab Units 10/05/17  0819   INR  1 00       Results from last 7 days  Lab Units 10/04/17  1952   LIPASE u/L 325       Imaging Studies: I have personally reviewed pertinent imaging studies  Xr Chest 2 Views    Result Date: 10/5/2017  Impression: No active pulmonary disease  Workstation performed: [id number]     Us Abdomen Limited    Result Date: 10/5/2017  Impression: Large gallstone, present previously  Workstation performed: [id number]           Patient was seen and examined by Dr Kalli Gee  All key medical decisions were made by Dr Kalli Gee  Thank you for allowing us to participate in the care of this present patient  We will follow-up with you closely

## 2017-10-05 NOTE — PROGRESS NOTES
Pt asleep in bed  No visibile signs of distress noted at this time  Bed low and locked; side rails up; call bell within reach  Will continue to monitor

## 2017-10-05 NOTE — CONSULTS
Consultation - Cardiology   Neri Levy 80 y o  female MRN: [id number]  Unit/Bed#: -01 Encounter: [id number]    Consults      Physician Requesting Consult: April Allison MD  Reason for Consult / Principal Problem:  Elevated troponin    History of Present Illness   HPI: Neri Levy is a 80y o  year old female who overall is a poor historian but states that she has had no significant medical problems presents with a chief complaint of lower extremity weakness   She says that she has neuropathy and had numbness and tingling in both of her legs and bilateral lower extremity weakness  She denied any chest pain or discomfort shortness of breath lightheadedness dizziness or syncope  She states she has been eating well and has been in her normal state of health  She lives at home alone but has family that is close by and helps her with meals and taking care of the house  She has had no falls  She denies any vomiting but has had some nausea and right upper quadrant discomfort which seems to be a chronic issue  She was admitted to the hospital secondary to elevated liver function and transaminitis  Troponins were minimally elevated up 0 3            Review of Systems   Constitution: Positive for weakness  HENT: Negative  Eyes: Negative  Cardiovascular: Negative  Respiratory: Negative  Endocrine: Negative  Hematologic/Lymphatic: Negative  Skin: Negative  Musculoskeletal: Positive for muscle weakness  Gastrointestinal: Negative  Genitourinary: Negative  Psychiatric/Behavioral: Negative  Allergic/Immunologic: Negative        Historical Information   Past Medical History:   Diagnosis Date    Cancer [facility])     "some kind of cancer when I was 80"     Past Surgical History:   Procedure Laterality Date    BACK SURGERY      COLON SURGERY      HIP FRACTURE SURGERY       History   Alcohol Use No     History   Drug Use No     History   Smoking Status    Never Smoker   Smokeless Tobacco    Never Used     Family History: non-contributory    Meds/Allergies   all current active meds have been reviewed    heparin (porcine) 3-20 Units/kg/hr (Order-Specific) Last Rate: 12 Units/kg/hr (10/05/17 0915)       No Known Allergies    Objective   Vitals: Blood pressure 117/54, pulse 75, temperature 98 5 °F (36 9 °C), temperature source Oral, resp  rate 18, height 5' 3 3" (1 608 m), weight 67 kg (147 lb 11 3 oz), SpO2 98 %  , Body mass index is 25 92 kg/m² , Orthostatic Blood Pressures    Flowsheet Row Most Recent Value   Blood Pressure  117/54 filed at 10/05/2017 0740   Patient Position - Orthostatic VS  Lying filed at 10/05/2017 5650        Systolic (47YKA), URK:811 , Min:117 , OCQ:715     Diastolic (24OHY), XUO:67, Min:54, Max:75    No intake or output data in the 24 hours ending 10/05/17 0954    Weight (last 2 days)     Date/Time   Weight    10/04/17 1850  67 (147 71)              Invasive Devices     Peripheral Intravenous Line            Peripheral IV 10/04/17 Left Antecubital less than 1 day                  Physical Exam   Constitutional: She is oriented to person, place, and time  She appears well-nourished  No distress  HENT:   Head: Normocephalic and atraumatic  Eyes: Conjunctivae are normal  Pupils are equal, round, and reactive to light  Neck: Neck supple  Cardiovascular: Normal rate  Exam reveals no gallop and no friction rub  Murmur heard  Pulmonary/Chest: Effort normal and breath sounds normal  No respiratory distress  She has no wheezes  She has no rales  Abdominal: Soft  Bowel sounds are normal  She exhibits no distension  There is no tenderness  There is no rebound  Musculoskeletal: She exhibits no edema  Neurological: She is alert and oriented to person, place, and time  No cranial nerve deficit  Skin: Skin is warm and dry  No erythema             Laboratory Results:    Results from last 7 days  Lab Units 10/05/17  0524 10/05/17  0203 10/04/17  2311   TROPONIN I ng/mL 0 30* 0 28* 0 22*       CBC with diff:   Results from last 7 days  Lab Units 10/05/17  0819 10/05/17  0534 10/04/17  1901   WBC Thousand/uL 5 14  --  4 27*   HEMOGLOBIN g/dL 9 8*  --  11 3*   HEMATOCRIT % 31 4*  --  35 6   MCV fL 96  --  95   PLATELETS Thousands/uL 184 197 204   MCH pg 30 0  --  30 2   MCHC g/dL 31 2*  --  31 7   RDW % 13 5  --  13 3   MPV fL 9 6 10 0 9 1         CMP:  Results from last 7 days  Lab Units 10/05/17  0524 10/04/17  1952 10/04/17  1901   SODIUM mmol/L 139 140 140   POTASSIUM mmol/L 4 1 4 3 4 3   CHLORIDE mmol/L 106 105 104   CO2 mmol/L 29 28 28   ANION GAP mmol/L 4 7 8   BUN mg/dL 23 21 19   CREATININE mg/dL 1 59* 1 53* 1 53*   GLUCOSE RANDOM mg/dL 106 124 123   CALCIUM mg/dL 9 2 9 7 9 5   AST U/L 353* 826*  --    ALT U/L 425* 558*  --    ALK PHOS U/L 108 136*  --    TOTAL PROTEIN g/dL 6 5 7 1  --    ALBUMIN g/dL 3 2* 3 8  --    BILIRUBIN TOTAL mg/dL 0 80 0 80  --    EGFR ml/min/1 73sq m 27 28 28         BMP:  Results from last 7 days  Lab Units 10/05/17  0524 10/04/17  1952 10/04/17  1901   SODIUM mmol/L 139 140 140   POTASSIUM mmol/L 4 1 4 3 4 3   CHLORIDE mmol/L 106 105 104   CO2 mmol/L 29 28 28   BUN mg/dL 23 21 19   CREATININE mg/dL 1 59* 1 53* 1 53*   GLUCOSE RANDOM mg/dL 106 124 123   CALCIUM mg/dL 9 2 9 7 9 5       BNP:  No results for input(s): BNP in the last 72 hours  Magnesium:       Coags:   Results from last 7 days  Lab Units 10/05/17  0819 10/05/17  0524   PTT seconds  --  28   INR  1 00 1 00       TSH:       Invalid input(s): TSH    Hemoglobin A1C   Results from last 7 days  Lab Units 10/04/17  2311   HEMOGLOBIN A1C % 5 5       Lipid Profile:     Results from last 7 days  Lab Units 10/04/17  1952   CHOLESTEROL mg/dL 184   TRIGLYCERIDES mg/dL 62   HDL mg/dL 63*       Cardiac testing:   No results found for this or any previous visit  No results found for this or any previous visit  No results found for this or any previous visit    No results found for this or any previous visit  Imaging: I have personally reviewed pertinent reports  Xr Chest 2 Views    Result Date: 10/5/2017  Narrative: CHEST INDICATION:  Shortness of breath  Per EMS patient just isn't feeling well  Feels generally weak  Complaining of dizziness and shortness of breath  Headache  COMPARISON:  December 2, 2013 VIEWS:  Frontal and lateral projections IMAGES:  2 FINDINGS:     Heart shadow appears unremarkable  Atherosclerotic vascular calcifications are noted  The lungs are clear  No pneumothorax or pleural effusion  Visualized osseous structures appear within normal limits for the patient's age  Impression: No active pulmonary disease  Workstation performed: [id number]       EKG reviewed personally:  Sinus tach left bundle  Telemetry reviewed personally: no events    Assessment:  Principal Problem:    Transaminitis  Active Problems:    RUQ pain    CKD (chronic kidney disease) stage 4, GFR 15-29 ml/min ([facility])    NSTEMI (non-ST elevated myocardial infarction) ([facility] [address] )      Assesment/ Plan:  1  Transaminitis likely secondary to viral syndrome or GI pathology  2  Sinus tachycardia suspect dehydration  3  Left bundle branch block which is chronic  4  Non ST-elevation myocardial infarction type 2 secondary to tachycardia  5  CKD    Recommendations: The patient has no cardiac symptoms  Given advanced age would proceed with conservative medical therapy  She does not need IV heparin from a cardiac standpoint the drip can be discontinued  Continue aspirin  Watch blood pressure and heart rate may have room for low-dose of metoprolol tomorrow    Check echocardiogram             Code Status: Level 1 - Full Code

## 2017-10-05 NOTE — PLAN OF CARE
CARDIOVASCULAR - ADULT     Maintains optimal cardiac output and hemodynamic stability Progressing     Absence of cardiac dysrhythmias or at baseline rhythm Progressing        Knowledge Deficit     Patient/family/caregiver demonstrates understanding of disease process, treatment plan, medications, and discharge instructions Progressing        Potential for Falls     Patient will remain free of falls Progressing        Prexisting or High Potential for Compromised Skin Integrity     Skin integrity is maintained or improved Progressing

## 2017-10-05 NOTE — CASE MANAGEMENT
Initial Clinical Review    Admission: Date/Time/Statement: 10/4/17 @ 2122     Orders Placed This Encounter   Procedures    Inpatient Admission (expected length of stay for this patient is greater than two midnights)     Standing Status:   Standing     Number of Occurrences:   1     Order Specific Question:   Admitting Physician     Answer:   Mark Yu [[id number]]     Order Specific Question:   Level of Care     Answer:   Med Surg [[id number]]     Order Specific Question:   Estimated length of stay     Answer:   More than 2 Midnights     Order Specific Question:   Certification     Answer:   I certify that inpatient services are medically necessary for this patient for a duration of greater than two midnights  See H&P and MD Progress Notes for additional information about the patient's course of treatment  ED: Date/Time/Mode of Arrival:   ED Arrival Information     Expected Arrival Acuity Means of Arrival Escorted By Service Admission Type    - 10/4/2017 18:41 Urgent Ambulance [address] Urgent    Arrival Complaint    Abdominal Pain          Chief Complaint:   Chief Complaint   Patient presents with    Shortness of Breath     Per EMS pt just "isn't feeling well " Pt states that she feels generally week  Pt c/o dizziness and SOB  Pt c/o nausea but denies dizziness  Pt c/o a headache  History of Illness:  80 y o  female with no significant PMH presents to the ED for evaluation of generally feeling ill for the last few days  Patient states that her biggest concern is some RUQ abdominal pain  She states that she has had this ongoing for a few years, but it has bothered her slightly more this week  State the pain is intermittent, usually related to eating heavy meals  She denies any N/V  She denies any constipation/diarrha  She denies any CP/SOB  She reports that she has had gallstones in the past, but no h/o cholecystitis and still has gall bladder  Patient denies any fevers/chills   Per patient's son, he states that his mother often complains of vague symptoms, so he is unsure how much is 'real' or not  ED Vital Signs:   ED Triage Vitals [10/04/17 1850]   Temperature Pulse Respirations Blood Pressure SpO2   99 2 °F (37 3 °C) (!) 116 16 (!) 173/75 96 %      Temp Source Heart Rate Source Patient Position - Orthostatic VS BP Location FiO2 (%)   Oral Monitor Sitting Right arm --      Pain Score       --        Wt Readings from Last 1 Encounters:   10/04/17 67 kg (147 lb 11 3 oz)     Abnormal Labs/Diagnostic Test Results: Creat 1 53, , , Alk Phos 136, NT-pro BNP 1,452, Troponin 0 06, WBC 4 27, Hgb 11 3, Neutrophils PCT 80    EKG: Sinus tachycardia, LBBB    ED Treatment:   Medication Administration from 10/04/2017 1841 to 10/04/2017 2159     None        Physical Exam   Constitutional: She is oriented to person, place, and time  She appears well-developed and well-nourished  No distress  Cardiovascular: Regular rhythm  Exam reveals no gallop and no friction rub  No murmur heard  Tachycardic  Pulmonary/Chest: Effort normal and breath sounds normal  No respiratory distress  She has no wheezes  She has no rales  Abdominal: Soft  Bowel sounds are normal  She exhibits no distension  There is tenderness (RUQ, RLQ, mild; negative davis's sign)  There is no rebound and no guarding  Skin: Skin is warm and dry  No rash noted  No erythema  Skin is dry     Past Medical/Surgical History:    Active Ambulatory Problems     Diagnosis Date Noted    No Active Ambulatory Problems     Resolved Ambulatory Problems     Diagnosis Date Noted    No Resolved Ambulatory Problems     Past Medical History:   Diagnosis Date    Cancer [facility])        Admitting Diagnosis: Shortness of breath [R06 02]  Weakness [R53 1]  Abdominal pain [R10 9]  RUQ pain [R10 11]  Transaminitis [R74 0]  Elevated troponin [R74 8]  NSTEMI (non-ST elevated myocardial infarction) ([facility] [address] ) [I21 4]  Elevated brain natriuretic peptide (BNP) level [R79 86]    Age/Sex: 80 y o  female    Assessment/Plan:     Hospital Problem List:      Principal Problem:    Transaminitis  Active Problems:    RUQ pain    CKD (chronic kidney disease) stage 4, GFR 15-29 ml/min ([facility])    [facility] (non[facility]ST elevated [facility]        Plan for the Primary Problem(s):  · Transaminitis   ? Elevated LFTs that were previously normal approximately 1 year ago; denies acute viral illness/ETOH consumption; has complained of intermittent RUQ pain that has been ongoing for a few months; has hx of cholelithiasis w/o cholecystitis   ? Admit  ? Check RUQ ultrasound  ? Check hepatitis panel  ? Trend CMP  ? GI consult, appreciate input  ? May need surgical consult pending RUQ ultrasound  · NSTEMI, type I vs II  ? Trop minimally elevated at 0 06; ST wave depression in lateral leads (undeterminate age), denies chest pain  ? Has h/o CKD  ? Maintain telemetry  ? Trend troponins  ? EKG PRN  ? Check lipid panel  ? Cardiology consult, appreciate input   · Elevated BNP  ? BNP 1 5K on admission  ? No rales, pitting edema, JVD-- patient appears euvolemic  ? Related to CKD potentially  Plan for Additional Problems:   · CKD stage IV  ? Cr has been stable at 1 5 x3 years  ? Continue to monitor      VTE Prophylaxis: Enoxaparin (Lovenox)  / sequential compression device   Code Status: Level 1-- Full Code (discussed with patient at bedside)  POLST: POLST form is not discussed and not completed at this time      Anticipated Length of Stay:  Patient will be admitted on an Inpatient basis with an anticipated length of stay of  > 2 midnights     Justification for Hospital Stay: GI and cardiology evaluations      Admission Orders:  Inpatient/Tele  Continuous Cardiac Monitoring  Serial Cardiac Enzymes q6h x 3  Consult Cardiology r/e elevated troponin  Consult GI r/e RUQ pain, transaminitis   Bilateral Sequential Compression Device  Echocardiogram  IV Heparin Drip    Scheduled Meds: aspirin 81 mg Oral Daily   senna 1 tablet Oral Daily

## 2017-10-05 NOTE — PLAN OF CARE
Problem: Potential for Falls  Goal: Patient will remain free of falls  INTERVENTIONS:  - Assess patient frequently for physical needs  -  Identify cognitive and physical deficits and behaviors that affect risk of falls  -  [place] fall precautions as indicated by assessment   - Educate patient/family on patient safety including physical limitations  - Instruct patient to call for assistance with activity based on assessment  - Modify environment to reduce risk of injury  - Consider OT/PT consult to assist with strengthening/mobility   Outcome: Progressing      Problem: Prexisting or High Potential for Compromised Skin Integrity  Goal: Skin integrity is maintained or improved  INTERVENTIONS:  - Identify patients at risk for skin breakdown  - Assess and monitor skin integrity  - Assess and monitor nutrition and hydration status  - Monitor labs (i e  albumin)  - Assess for incontinence   - Turn and reposition patient  - Assist with mobility/ambulation  - Relieve pressure over bony prominences  - Avoid friction and shearing  - Provide appropriate hygiene as needed including keeping skin clean and dry  - Evaluate need for skin moisturizer/barrier cream  - Collaborate with interdisciplinary team (i e  Nutrition, Rehabilitation, etc )   - Patient/family teaching   Outcome: Progressing      Problem: Knowledge Deficit  Goal: Patient/family/caregiver demonstrates understanding of disease process, treatment plan, medications, and discharge instructions  Complete learning assessment and assess knowledge base    Interventions:  - Provide teaching at level of understanding  - Provide teaching via preferred learning methods  Outcome: Progressing      Problem: CARDIOVASCULAR - ADULT  Goal: Maintains optimal cardiac output and hemodynamic stability  INTERVENTIONS:  - Monitor I/O, vital signs and rhythm  - Monitor for S/S and trends of decreased cardiac output i e  bleeding, hypotension  - Administer and titrate ordered vasoactive medications to optimize hemodynamic stability  - Assess quality of pulses, skin color and temperature  - Assess for signs of decreased coronary artery perfusion - ex   Angina  - Instruct patient to report change in severity of symptoms  Outcome: Progressing    Goal: Absence of cardiac dysrhythmias or at baseline rhythm  INTERVENTIONS:  - Continuous cardiac monitoring, monitor vital signs, obtain 12 lead EKG if indicated  - Administer antiarrhythmic and heart rate control medications as ordered  - Monitor electrolytes and administer replacement therapy as ordered  Outcome: Progressing

## 2017-10-05 NOTE — PLAN OF CARE
Potential for Falls     Patient will remain free of falls Progressing        Prexisting or High Potential for Compromised Skin Integrity     Skin integrity is maintained or improved Progressing

## 2017-10-06 ENCOUNTER — GENERIC CONVERSION - ENCOUNTER (OUTPATIENT)
Dept: CARDIOLOGY CLINIC | Facility: CLINIC | Age: 82
End: 2017-10-06

## 2017-10-06 ENCOUNTER — APPOINTMENT (INPATIENT)
Dept: NON INVASIVE DIAGNOSTICS | Facility: HOSPITAL | Age: 82
DRG: 281 | End: 2017-10-06
Payer: MEDICARE

## 2017-10-06 PROBLEM — R53.1 WEAKNESS: Status: ACTIVE | Noted: 2017-10-06

## 2017-10-06 LAB
ALBUMIN SERPL BCP-MCNC: 3.2 G/DL (ref 3.5–5)
ALP SERPL-CCNC: 96 U/L (ref 46–116)
ALT SERPL W P-5'-P-CCNC: 260 U/L (ref 12–78)
ANION GAP SERPL CALCULATED.3IONS-SCNC: 8 MMOL/L (ref 4–13)
AST SERPL W P-5'-P-CCNC: 130 U/L (ref 5–45)
ATRIAL RATE: 49 BPM
ATRIAL RATE: 83 BPM
ATRIAL RATE: 83 BPM
ATRIAL RATE: 95 BPM
BILIRUB SERPL-MCNC: 0.6 MG/DL (ref 0.2–1)
BUN SERPL-MCNC: 32 MG/DL (ref 5–25)
CALCIUM SERPL-MCNC: 9.2 MG/DL (ref 8.3–10.1)
CHLORIDE SERPL-SCNC: 106 MMOL/L (ref 100–108)
CO2 SERPL-SCNC: 26 MMOL/L (ref 21–32)
CREAT SERPL-MCNC: 1.44 MG/DL (ref 0.6–1.3)
ERYTHROCYTE [DISTWIDTH] IN BLOOD BY AUTOMATED COUNT: 13.6 % (ref 11.6–15.1)
GFR SERPL CREATININE-BSD FRML MDRD: 30 ML/MIN/1.73SQ M
GLUCOSE SERPL-MCNC: 98 MG/DL (ref 65–140)
HCT VFR BLD AUTO: 31.9 % (ref 34.8–46.1)
HGB BLD-MCNC: 9.8 G/DL (ref 11.5–15.4)
MCH RBC QN AUTO: 29.4 PG (ref 26.8–34.3)
MCHC RBC AUTO-ENTMCNC: 30.7 G/DL (ref 31.4–37.4)
MCV RBC AUTO: 96 FL (ref 82–98)
P AXIS: 69 DEGREES
P AXIS: 72 DEGREES
P AXIS: 76 DEGREES
P AXIS: 81 DEGREES
PLATELET # BLD AUTO: 186 THOUSANDS/UL (ref 149–390)
PMV BLD AUTO: 10 FL (ref 8.9–12.7)
POTASSIUM SERPL-SCNC: 4.1 MMOL/L (ref 3.5–5.3)
PR INTERVAL: 148 MS
PR INTERVAL: 152 MS
PR INTERVAL: 154 MS
PR INTERVAL: 164 MS
PROT SERPL-MCNC: 6.5 G/DL (ref 6.4–8.2)
QRS AXIS: 10 DEGREES
QRS AXIS: 16 DEGREES
QRS AXIS: 2 DEGREES
QRS AXIS: 4 DEGREES
QRSD INTERVAL: 132 MS
QRSD INTERVAL: 136 MS
QRSD INTERVAL: 138 MS
QRSD INTERVAL: 138 MS
QT INTERVAL: 414 MS
QT INTERVAL: 434 MS
QT INTERVAL: 438 MS
QT INTERVAL: 452 MS
QTC INTERVAL: 408 MS
QTC INTERVAL: 509 MS
QTC INTERVAL: 514 MS
QTC INTERVAL: 520 MS
RBC # BLD AUTO: 3.33 MILLION/UL (ref 3.81–5.12)
SODIUM SERPL-SCNC: 140 MMOL/L (ref 136–145)
T WAVE AXIS: 133 DEGREES
T WAVE AXIS: 134 DEGREES
T WAVE AXIS: 137 DEGREES
T WAVE AXIS: 150 DEGREES
VENTRICULAR RATE: 49 BPM
VENTRICULAR RATE: 83 BPM
VENTRICULAR RATE: 83 BPM
VENTRICULAR RATE: 95 BPM
WBC # BLD AUTO: 5.19 THOUSAND/UL (ref 4.31–10.16)

## 2017-10-06 PROCEDURE — 92610 EVALUATE SWALLOWING FUNCTION: CPT

## 2017-10-06 PROCEDURE — G8987 SELF CARE CURRENT STATUS: HCPCS

## 2017-10-06 PROCEDURE — 97166 OT EVAL MOD COMPLEX 45 MIN: CPT

## 2017-10-06 PROCEDURE — G8988 SELF CARE GOAL STATUS: HCPCS

## 2017-10-06 PROCEDURE — 97163 PT EVAL HIGH COMPLEX 45 MIN: CPT

## 2017-10-06 PROCEDURE — 85027 COMPLETE CBC AUTOMATED: CPT | Performed by: INTERNAL MEDICINE

## 2017-10-06 PROCEDURE — 93306 TTE W/DOPPLER COMPLETE: CPT

## 2017-10-06 PROCEDURE — 97530 THERAPEUTIC ACTIVITIES: CPT

## 2017-10-06 PROCEDURE — 80053 COMPREHEN METABOLIC PANEL: CPT | Performed by: INTERNAL MEDICINE

## 2017-10-06 PROCEDURE — G8979 MOBILITY GOAL STATUS: HCPCS

## 2017-10-06 PROCEDURE — G8978 MOBILITY CURRENT STATUS: HCPCS

## 2017-10-06 RX ORDER — HEPARIN SODIUM 5000 [USP'U]/ML
5000 INJECTION, SOLUTION INTRAVENOUS; SUBCUTANEOUS EVERY 8 HOURS SCHEDULED
Status: DISCONTINUED | OUTPATIENT
Start: 2017-10-06 | End: 2017-10-09 | Stop reason: HOSPADM

## 2017-10-06 RX ADMIN — ASPIRIN 81 MG: 81 TABLET, COATED ORAL at 10:51

## 2017-10-06 RX ADMIN — HEPARIN SODIUM 5000 UNITS: 5000 INJECTION, SOLUTION INTRAVENOUS; SUBCUTANEOUS at 14:14

## 2017-10-06 RX ADMIN — HEPARIN SODIUM 5000 UNITS: 5000 INJECTION, SOLUTION INTRAVENOUS; SUBCUTANEOUS at 21:08

## 2017-10-06 NOTE — PROGRESS NOTES
Progress Note - Debbie Jose 80 y o  female MRN: [id number]    Unit/Bed#: -01 Encounter: [id number]        * Transaminitis   Assessment & Plan    · Elevated LFTs possilbe due to viral syndrome vs passed stone, sludge  · RUQ pain resolved, patient denies abdominal pain  · LFTs improving  · GI following, appreciate input  · Acute hepatitis panel negative  · Repeat CMP in AM   · Tolerating diet without difficulty  NSTEMI (non-ST elevated myocardial infarction) [facility])   Assessment & Plan    · NSTEMI, type 2 likely secondary to tachycardia  · Cariology consulted, appreciate input  · No need for IV heparin per cardiology  Continue aspirin  · Heart rate improved  Continue to monitor heart rate and BP  · Echo results pending  RUQ pain   Assessment & Plan    · Patient denies abdominal pain  · RUQ ultrasound showed a large gallstone  CKD (chronic kidney disease) stage 4, GFR 15-29 ml/min ([facility])   Assessment & Plan    · Cr stable at 1 44 today  · Repeat BMP in AM         Weakness   Assessment & Plan    · Ambulatory dysfunction secondary to generalized weakness  · PT/OT evaluation pending  · Patient was residing at home alone, ambulates with a walker  · May need placement  VTE Pharmacologic Prophylaxis:   Pharmacologic: Heparin  Mechanical VTE Prophylaxis in Place: Yes    Patient Centered Rounds: I have performed bedside rounds with nursing staff today  Discussions with Specialists or Other Care Team Provider: Nursing    Education and Discussions with Family / Patient: Patient and pt's son via phone  Time Spent for Care: 20 minutes  More than 50% of total time spent on counseling and coordination of care as described above  Current Length of Stay: 2 day(s)    Current Patient Status: Inpatient   Certification Statement: The patient will continue to require additional inpatient hospital stay due to PT/OT eval pending; need to monitor lab work closely      Discharge Plan: Likely d/c in 24-48 hours pending continued improvement in LFTs and pending PT/ OT eval     Code Status: Level 1 - Full Code      Subjective:   Patient sitting up in chair, no complaints at this time  Appetite good, denies abdominal pain or n/v/d  She also denies chest pain and SOB  Admits to some lightheadedness with movement as well as generalized weakness and difficulty ambulating due to her weakness  She also has noticed some numbness in her b/l lower extremities  She notes that she has a history of neuropathy  Objective:     Vitals:   Temp (24hrs), Av 2 °F (36 8 °C), Min:98 °F (36 7 °C), Max:98 3 °F (36 8 °C)    HR:  [] 66  Resp:  [18] 18  BP: (127-147)/(61-73) 147/61  SpO2:  [97 %-99 %] 97 %  Body mass index is 25 92 kg/m²  Input and Output Summary (last 24 hours): Intake/Output Summary (Last 24 hours) at 10/06/17 1243  Last data filed at 10/05/17 1825   Gross per 24 hour   Intake              740 ml   Output                0 ml   Net              740 ml       Physical Exam:     Physical Exam   Constitutional: No distress  HENT:   Head: Normocephalic and atraumatic  Eyes: Conjunctivae are normal    Neck: Neck supple  Cardiovascular: Normal rate and regular rhythm  Pulmonary/Chest: Effort normal and breath sounds normal  No respiratory distress  Abdominal: Soft  Bowel sounds are normal  There is no tenderness  Musculoskeletal: She exhibits no edema  Neurological: She is alert  No cranial nerve deficit  Oriented to person, place and month; unable to state correct year  Skin: Skin is warm and dry  She is not diaphoretic  No erythema  Psychiatric: She has a normal mood and affect  Nursing note and vitals reviewed        Additional Data:     Labs:      Results from last 7 days  Lab Units 10/06/17  0508  10/04/17  1901   WBC Thousand/uL 5 19  < > 4 27*   HEMOGLOBIN g/dL 9 8*  < > 11 3*   HEMATOCRIT % 31 9*  < > 35 6   PLATELETS Thousands/uL 186  < > 204   NEUTROS PCT %  --   --  80*   LYMPHS PCT %  --   --  10*   MONOS PCT %  --   --  9   EOS PCT %  --   --  1   < > = values in this interval not displayed  Results from last 7 days  Lab Units 10/06/17  0508   SODIUM mmol/L 140   POTASSIUM mmol/L 4 1   CHLORIDE mmol/L 106   CO2 mmol/L 26   BUN mg/dL 32*   CREATININE mg/dL 1 44*   CALCIUM mg/dL 9 2   TOTAL PROTEIN g/dL 6 5   BILIRUBIN TOTAL mg/dL 0 60   ALK PHOS U/L 96   ALT U/L 260*   AST U/L 130*   GLUCOSE RANDOM mg/dL 98       Results from last 7 days  Lab Units 10/05/17  0819   INR  1 00       * I Have Reviewed All Lab Data Listed Above  * Additional Pertinent Lab Tests Reviewed: All Labs Within Last 24 Hours Reviewed    Imaging:    Imaging Reports Reviewed Today Include: None  Imaging Personally Reviewed by Myself Includes:  None    Recent Cultures (last 7 days):           Last 24 Hours Medication List:     aspirin 81 mg Oral Daily   heparin (porcine) 5,000 Units Subcutaneous Q8H [facility]   senna 1 tablet Oral Daily        Today, Patient Was Seen By: Marlyn Reynolds PA-C    ** Please Note: Dictation voice to text software may have been used in the creation of this document   **

## 2017-10-06 NOTE — OCCUPATIONAL THERAPY NOTE
Occupational Therapy Evaluation      Katie Mohs    10/6/2017    Patient Active Problem List   Diagnosis    Transaminitis    RUQ pain    CKD (chronic kidney disease) stage 4, GFR 15-29 ml/min ([facility])    NSTEMI (non-ST elevated myocardial infarction) ([facility] [address] )    Weakness       Past Medical History:   Diagnosis Date    Cancer [facility])     "some kind of cancer when I was 80"       Past Surgical History:   Procedure Laterality Date    BACK SURGERY      COLON SURGERY      HIP FRACTURE SURGERY        10/06/17 1406   Note Type   Note type Eval only   Pain Assessment   Pain Assessment No/denies pain   Pain Score No Pain   Pain Type Acute pain;Chronic pain   Home Living   Type of Home House   Home Layout Multi-level; Able to live on main level with bedroom/bathroom;Stairs to enter with rails   Prior Function   Level of [place] Independent with ADLs and functional mobility   Lives With Alone   Receives Help From Family   ADL Assistance Independent   IADLs Independent   Vocational Retired   Lifestyle   Autonomy Patient reports I with ADLs and IADLs   Reciprocal Relationships Patient is , has family in local area    Service to Others retired     Intrinsic Gratification Patient reports gradual loss of interest in activity lately, states she spends most of her time watching TV and laying on her bed    Psychosocial   Psychosocial (WDL) WDL   Subjective   Subjective "I can't feel anything with my hands"    ADL   Eating Assistance 5  Supervision/Setup  (Mild coughing on bread )   Grooming Assistance 5  Supervision/Setup   UB Bathing Assistance 4  Minimal Assistance   LB Bathing Assistance 3  Moderate Assistance   [address] 4  Minimal Assistance   LB Dressing Assistance 4  [address]  4  [address] to Stand 4  Minimal assistance   Additional items Assist x 1   Stand to Sit 4  Minimal assistance   Additional items Assist x 1   Balance Static Sitting Good   Dynamic Sitting Fair +   Static Standing Fair   Dynamic Standing Fair -  (posterior lean )   Activity Tolerance   Activity Tolerance Patient limited by fatigue  (fears falling )   Medical Staff Made Aware OT spoke with Yo Rome PT and Nic Hence, RN    Nurse Made Aware OT spoke with nursing re: patient cough while eating/swallowing bread, ? need for ST eval    RUE Assessment   RUE Assessment WFL   RUE Strength   RUE Overall Strength (3-/5)   LUE Assessment   LUE Assessment (3-/5)   Hand Function   Gross Motor Coordination Impaired   Fine Motor Coordination Impaired   Sensation   Light Touch Partial deficits in the RUE;Partial deficits in the LUE   Vision-Basic Assessment   Current Vision (decreased acuity )   Visual History Cataracts  (had cateracts removed but reports ongoing poor vision )   Patient Visual Report (patient reports )   Cognition   Overall Cognitive Status WFL   Arousal/Participation Alert   Attention Within functional limits   Orientation Level Oriented to person;Oriented to place;Oriented to situation;Oriented to time   Memory Decreased short term memory   Following Commands Follows all commands and directions without difficulty   Comments reports decline in memory skiills    Score (immediate recall 3/5, delayed recall 2/5 )   Assessment   Limitation Decreased ADL status; Decreased Safe judgement during ADL;Decreased cognition;Decreased high-level ADLs; Decreased self-care trans;Decreased fine motor control   Prognosis Good   Assessment Patient is a 79 y/o female admitted with c/o generalized weakness , SOB and LE weakness  Patient has hx of neuropathy, reports decreased sensation in b/l UEs and LBs along with muscle weakenss  Patient is alert and oriented, with mild memory decline noted in [facility] however reports fully I with ADLs and manages lt homemaking    Patient curently presents with general weakness, decreased muscle strength, decreased activity tolerance, decreased balance impacting patient's occupational performance in areas of bathing, dressing, transfers and  functional mobility  From OT standpoint, recommend in-patient rehab before discharge home alone  Will continue to follow for OT services to progress ADL skill to highest/safest level of functiion with rehabiltiative and compensatory strategies  Goals   [facility] Time Frame 7-10   Long Term Goal #1 1  Increase UB ADL skills to Mod I level in stance at sink; 2  Patient will increase LB ADL skills to Mod I level with or without AE; 3   Patient will increase toileting to Mod I level; 4  Patient will increse ADL transfers to Mod I level with G safety awarness; 5  Patient will increase functional mobility to Mod I level with G safety awareness    Plan   Treatment Interventions ADL retraining;Functional transfer training;Patient/family training;Fine motor coordination activities; Compensatory technique education   Goal Expiration Date 10/16/17   OT Frequency 3-5x/wk   Recommendation   Recommendation PT consult   Discharge Recommendation Short Term Rehab   OT - OK to Discharge Yes   Barthel Index   Feeding 5   Bathing 0   Grooming Score 0   Dressing Score 5   Bladder Score 10   Bowels Score 10   Toilet Use Score 5   Transfers (Bed/Chair) Score 5   Mobility (Level Surface) Score 0   Stairs Score 0   Barthel Index Score 40   Modified Gem Scale   Modified Gem Scale 4   Adriana Concepcion, OT

## 2017-10-06 NOTE — PLAN OF CARE
Problem: Potential for Falls  Goal: Patient will remain free of falls  INTERVENTIONS:  - Assess patient frequently for physical needs  -  Identify cognitive and physical deficits and behaviors that affect risk of falls  -  [place] fall precautions as indicated by assessment   - Educate patient/family on patient safety including physical limitations  - Instruct patient to call for assistance with activity based on assessment  - Modify environment to reduce risk of injury  - Consider OT/PT consult to assist with strengthening/mobility   Outcome: Progressing      Problem: Prexisting or High Potential for Compromised Skin Integrity  Goal: Skin integrity is maintained or improved  INTERVENTIONS:  - Identify patients at risk for skin breakdown  - Assess and monitor skin integrity  - Assess and monitor nutrition and hydration status  - Monitor labs (i e  albumin)  - Assess for incontinence   - Turn and reposition patient  - Assist with mobility/ambulation  - Relieve pressure over bony prominences  - Avoid friction and shearing  - Provide appropriate hygiene as needed including keeping skin clean and dry  - Evaluate need for skin moisturizer/barrier cream  - Collaborate with interdisciplinary team (i e  Nutrition, Rehabilitation, etc )   - Patient/family teaching   Outcome: Progressing      Problem: Knowledge Deficit  Goal: Patient/family/caregiver demonstrates understanding of disease process, treatment plan, medications, and discharge instructions  Complete learning assessment and assess knowledge base    Interventions:  - Provide teaching at level of understanding  - Provide teaching via preferred learning methods   Outcome: Progressing      Problem: CARDIOVASCULAR - ADULT  Goal: Maintains optimal cardiac output and hemodynamic stability  INTERVENTIONS:  - Monitor I/O, vital signs and rhythm  - Monitor for S/S and trends of decreased cardiac output i e  bleeding, hypotension  - Administer and titrate ordered vasoactive medications to optimize hemodynamic stability  - Assess quality of pulses, skin color and temperature  - Assess for signs of decreased coronary artery perfusion - ex   Angina  - Instruct patient to report change in severity of symptoms   Outcome: Progressing    Goal: Absence of cardiac dysrhythmias or at baseline rhythm  INTERVENTIONS:  - Continuous cardiac monitoring, monitor vital signs, obtain 12 lead EKG if indicated  - Administer antiarrhythmic and heart rate control medications as ordered  - Monitor electrolytes and administer replacement therapy as ordered   Outcome: Progressing

## 2017-10-06 NOTE — ASSESSMENT & PLAN NOTE
· Elevated LFTs, RUQ pain possilby due to viral syndrome vs passed stone, sludge  · RUQ pain resolved, patient denies abdominal pain  · LFTs improving  · GI following, appreciate input  · Acute hepatitis panel negative  · Repeat CMP in AM   · Tolerating diet without difficulty

## 2017-10-06 NOTE — PLAN OF CARE
Problem: PHYSICAL THERAPY ADULT  Goal: Performs mobility at highest level of function for planned discharge setting  See evaluation for individualized goals  Treatment/Interventions: Elevations, LE strengthening/ROM, Functional transfer training, Therapeutic exercise, Equipment eval/education, Bed mobility, Gait training, Cognitive reorientation, Endurance training, Patient/family training, Spoke to nursing, OT  Equipment Recommended: Fariba George       See flowsheet documentation for full assessment, interventions and recommendations  Prognosis: Fair  Problem List: Decreased strength, Decreased range of motion, Decreased endurance, Impaired balance, Decreased mobility, Decreased cognition, Impaired judgement, Decreased safety awareness, Impaired vision, Pain  Assessment: Pt is a 80 y o  female seen for PT evaluation s/p admit to [facility] on 10/4/2017 w/ Transaminitis  Order placed for PT  Prior to admission, pt was lives in one floor environment, has "a coupled"  OSVALDO, used rolling walker for mobility and lives alone  Upon evaluation: Pt requires min A for transfers and A of 2 for ambulation (including chair follow)  Impairments and limitations also listed above, especially due to  weakness, impaired balance, decreased endurance, gait deviations, pain, decreased activity tolerance, decreased functional mobility tolerance, fall risk and decreased cognition  The following objective measures performed on IE also reveal limitations: Barthel Index 45/100  Pt's clinical presentation is currently unstable/unpredictable seen in pt's presentation of limited mobility compared to baseline, coupled with fall risk due to limited cognition, limited sensation and vision, plus abnormal labs and advance age   Pt to benefit from continued skilled PT tx while in hospital and upon DC to address deficits as defined above and maximize level of functional mobility   From PT/mobility standpoint, recommendation at time of d/c would be IP rehab and with walker pending progress in order to maximize pt's functional independence and consistency w/ mobility in order to facilitate return to PLOF  Recommend continued trials with walker and WC over next few sessions, plus ther ex including balance activities  Barriers to Discharge: Decreased caregiver support, Inaccessible home environment     Recommendation: Short-term skilled PT, Long-term skilled PT          See flowsheet documentation for full assessment

## 2017-10-06 NOTE — PROGRESS NOTES
Progress Note - Josy Jernigan 80 y o  female MRN: [id number]    Unit/Bed#: -Aline Encounter: [id number]    Assessment and Plan:   Principal Problem:    Transaminitis  Active Problems:    RUQ pain    CKD (chronic kidney disease) stage 4, GFR 15-29 ml/min ([facility])    NSTEMI (non-ST elevated myocardial infarction) ([facility] [address] )    #1  Elevated LFTs with RUQ pain: improving  AST improved from 353 to 130 and ALT improved from 425 to 260  No pain currently  US normal aside from large gallstone  Possibly from a passed stone or sludge    -Continue to monitor LFTs  -If they continue to be elevated or pain returns, consider MRI/MRCP for further evaluation  -Diet as tolerated    ----------------------------------------------------------------------------------------------------------------    Subjective:     Patient doing well  Eating well  No abdominal pain, nausea, or vomiting  Objective:     Vitals: Blood pressure 147/61, pulse 66, temperature 98 °F (36 7 °C), temperature source Oral, resp  rate 18, height 5' 3 3" (1 608 m), weight 67 kg (147 lb 11 3 oz), SpO2 97 %  ,Body mass index is 25 92 kg/m²        Intake/Output Summary (Last 24 hours) at 10/06/17 1016  Last data filed at 10/05/17 1825   Gross per 24 hour   Intake              740 ml   Output                0 ml   Net              740 ml       Physical Exam:     General Appearance: Alert, appears stated age and cooperative  Lungs: Clear to auscultation bilaterally, no rales or rhonchi, no labored breathing/accessory muscle use  Heart: Regular rate and rhythm, S1, S2 normal, no murmur, click, rub or gallop  Abdomen: Soft, non-tender, non-distended; bowel sounds normal; no masses or no organomegaly  Extremities: No cyanosis, clubbing, or edema    Invasive Devices     Peripheral Intravenous Line            Peripheral IV 10/04/17 Left Antecubital 1 day                Lab Results:    Results from last 7 days  Lab Units 10/06/17  0508  10/04/17  1901   WBC Thousand/uL 5 19  < > 4 27*   HEMOGLOBIN g/dL 9 8*  < > 11 3*   HEMATOCRIT % 31 9*  < > 35 6   PLATELETS Thousands/uL 186  < > 204   NEUTROS PCT %  --   --  80*   LYMPHS PCT %  --   --  10*   MONOS PCT %  --   --  9   EOS PCT %  --   --  1   < > = values in this interval not displayed  Results from last 7 days  Lab Units 10/06/17  0508   SODIUM mmol/L 140   POTASSIUM mmol/L 4 1   CHLORIDE mmol/L 106   CO2 mmol/L 26   BUN mg/dL 32*   CREATININE mg/dL 1 44*   CALCIUM mg/dL 9 2   TOTAL PROTEIN g/dL 6 5   BILIRUBIN TOTAL mg/dL 0 60   ALK PHOS U/L 96   ALT U/L 260*   AST U/L 130*   GLUCOSE RANDOM mg/dL 98       Results from last 7 days  Lab Units 10/05/17  0819   INR  1 00       Results from last 7 days  Lab Units 10/04/17  1952   LIPASE u/L 325       Imaging Studies: I have personally reviewed pertinent imaging studies  Xr Chest 2 Views    Result Date: 10/5/2017  Impression: No active pulmonary disease  Workstation performed: [id number]     Us Abdomen Limited    Result Date: 10/5/2017  Impression: Large gallstone, present previously   Workstation performed: [id number]

## 2017-10-06 NOTE — PROGRESS NOTES
Cardiology Progress Note - Josy Jernigan 80 y o  female MRN: [id number]    Unit/Bed#: -01 Encounter: [id number]        Subjective:    No significant events overnight  Feels weak  Echo demonstrates EF 40-45%, no significant valvular abnormalities  No SOB/CP  Review of Systems   Constitution: Positive for malaise/fatigue  Cardiovascular: Negative for chest pain, leg swelling and palpitations  Respiratory: Negative for shortness of breath  Objective:   Vitals: Blood pressure 134/60, pulse 93, temperature 99 1 °F (37 3 °C), temperature source Oral, resp  rate 18, height 5' 3 3" (1 608 m), weight 67 kg (147 lb 11 3 oz), SpO2 97 %  , Body mass index is 25 92 kg/m² , Orthostatic Blood Pressures    Flowsheet Row Most Recent Value   Blood Pressure  134/60 filed at 10/06/2017 1514   Patient Position - Orthostatic VS  Sitting filed at 10/06/2017 0160         Systolic (85CKK), LES:229 , Min:134 , EUB:243     Diastolic (74HJT), MMA:02, Min:60, Max:73      Intake/Output Summary (Last 24 hours) at 10/06/17 1517  Last data filed at 10/05/17 1825   Gross per 24 hour   Intake              320 ml   Output                0 ml   Net              320 ml     Weight (last 2 days)     Date/Time   Weight    10/04/17 1850  67 (147 71)                  Telemetry Review: NSR    Physical Exam   Cardiovascular: Normal rate, regular rhythm and normal heart sounds  Exam reveals no gallop and no friction rub  No murmur heard  Pulmonary/Chest: Breath sounds normal  She has no wheezes  She has no rales  Musculoskeletal: She exhibits no edema           Laboratory Results:    Results from last 7 days  Lab Units 10/05/17  0524 10/05/17  0203 10/04/17  2311   TROPONIN I ng/mL 0 30* 0 28* 0 22*       CBC with diff:   Results from last 7 days  Lab Units 10/06/17  0508 10/05/17  0819 10/05/17  0534 10/04/17  1901   WBC Thousand/uL 5 19 5 14  --  4 27*   HEMOGLOBIN g/dL 9 8* 9 8*  --  11 3*   HEMATOCRIT % 31 9* 31 4*  --  35 6   MCV fL 96 96  --  95   PLATELETS Thousands/uL 186 184 197 204   MCH pg 29 4 30 0  --  30 2   MCHC g/dL 30 7* 31 2*  --  31 7   RDW % 13 6 13 5  --  13 3   MPV fL 10 0 9 6 10 0 9 1         CMP:  Results from last 7 days  Lab Units 10/06/17  0508 10/05/17  0524 10/04/17  1952 10/04/17  1901   SODIUM mmol/L 140 139 140 140   POTASSIUM mmol/L 4 1 4 1 4 3 4 3   CHLORIDE mmol/L 106 106 105 104   CO2 mmol/L 26 29 28 28   ANION GAP mmol/L 8 4 7 8   BUN mg/dL 32* 23 21 19   CREATININE mg/dL 1 44* 1 59* 1 53* 1 53*   GLUCOSE RANDOM mg/dL 98 106 124 123   CALCIUM mg/dL 9 2 9 2 9 7 9 5   AST U/L 130* 353* 826*  --    ALT U/L 260* 425* 558*  --    ALK PHOS U/L 96 108 136*  --    TOTAL PROTEIN g/dL 6 5 6 5 7 1  --    ALBUMIN g/dL 3 2* 3 2* 3 8  --    BILIRUBIN TOTAL mg/dL 0 60 0 80 0 80  --    EGFR ml/min/1 73sq m 30 27 28 28         BMP:  Results from last 7 days  Lab Units 10/06/17  0508 10/05/17  0524 10/04/17  1952 10/04/17  1901   SODIUM mmol/L 140 139 140 140   POTASSIUM mmol/L 4 1 4 1 4 3 4 3   CHLORIDE mmol/L 106 106 105 104   CO2 mmol/L 26 29 28 28   BUN mg/dL 32* 23 21 19   CREATININE mg/dL 1 44* 1 59* 1 53* 1 53*   GLUCOSE RANDOM mg/dL 98 106 124 123   CALCIUM mg/dL 9 2 9 2 9 7 9 5     Coags:   Results from last 7 days  Lab Units 10/05/17  0819 10/05/17  0524   PTT seconds  --  28   INR  1 00 1 00     Lipid Profile:     Results from last 7 days  Lab Units 10/04/17  1952   CHOLESTEROL mg/dL 184   TRIGLYCERIDES mg/dL 62   HDL mg/dL 63*         Meds/Allergies     aspirin 81 mg Oral Daily   heparin (porcine) 5,000 Units Subcutaneous Q8H [address]   senna 1 tablet Oral Daily        Prescriptions Prior to Admission   Medication    acetaminophen (TYLENOL) 325 mg tablet    omeprazole (PriLOSEC) 20 mg delayed release capsule       Assessment:  Principal Problem:    Transaminitis  Active Problems:    RUQ pain    CKD (chronic kidney disease) stage 4, GFR 15-29 ml/min ([facility])    NSTEMI (non-ST elevated myocardial infarction) ([facility]) Weakness      Impression:  1  NSTEMI Type II - likely due to GI etiology  No suggestion of ACS  2  CMP - patient with LBBB and paradoxical septal motion - EF 40-45%, but no significant valvular abnormalities  Plan:  1  No further cardiac w/u at this time  Would not start b-blockers unless tachycardic, as may contribute to feeling of fatigue  2  Will sign off for now  Please call with questions

## 2017-10-06 NOTE — PHYSICAL THERAPY NOTE
PHYSICAL THERAPY NOTE    Patient Name: Mickey Newman  [id number] Date: 10/6/2017     10/06/17 1437   Restrictions/Precautions   Weight Bearing Precautions Per Order No   Other Precautions Cognitive; Chair Alarm; Bed Alarm; Fall Risk;Pain;O2   General   Chart Reviewed Yes   Response to Previous Treatment Patient with no complaints from previous session  Family/Caregiver Present No   Cognition   Overall Cognitive Status Impaired   Attention Within functional limits   Orientation Level Oriented X4   Memory Decreased short term memory   Following Commands Follows one step commands with increased time or repetition  (with MMT)   Subjective   Subjective Pt agreeable for stretching and ther ex   Endurance Deficit   Endurance Deficit Yes   Activity Tolerance   Activity Tolerance Patient limited by pain; Patient limited by fatigue   Medical Staff Made Aware spoke to OT   Nurse Made Aware spoke to [facility]   Exercises   Hamstring Stretch Sitting;Bilateral;PROM  (followed by ITB and piriformis stretch)   Knee AROM Long Arc Quad Sitting;10 reps;AROM; Bilateral  (followed by knee flexion w/ stretch)   Ankle Pumps Sitting;10 reps;AROM; Bilateral   Marching Sitting;5 reps;AROM; Bilateral   Assessment   Prognosis Fair   Problem List Decreased strength;Decreased range of motion;Decreased endurance; Impaired balance;Decreased mobility; Decreased cognition; Impaired judgement;Decreased safety awareness; Impaired vision;Pain   Assessment Pt reports feeling more of a stretch than pain with ther ex and stretching @ L hip where she had ORIF vs rodding several years ago, however pt reports that L hip has "pain " different than stretch during weight bearing and ambulation  Recommend further w/u in future   Barriers to Discharge Decreased caregiver support; Inaccessible home environment   Goals   Patient Goals less pain with walking   STG Expiration Date 10/16/17   Treatment Day 1   Plan   Treatment/Interventions Elevations;LE strengthening/ROM; Functional transfer training; Therapeutic exercise;Equipment eval/education; Bed mobility;Gait training;Cognitive reorientation; Endurance training;Patient/family training;Spoke to nursing;OT   PT Frequency 5x/wk   Recommendation   Recommendation Short-term skilled PT;Long-term skilled PT   Equipment Recommended Walker   Skilled PT recommended while in hospital and upon DC to progress pt toward treatment goals     Tobi Reyes, PT

## 2017-10-06 NOTE — PLAN OF CARE
Problem: OCCUPATIONAL THERAPY ADULT  Goal: Performs self-care activities at highest level of function for planned discharge setting  See evaluation for individualized goals  Treatment Interventions: ADL retraining, Functional transfer training, Patient/family training, Fine motor coordination activities, Compensatory technique education          See flowsheet documentation for full assessment, interventions and recommendations  Limitation: Decreased ADL status, Decreased Safe judgement during ADL, Decreased cognition, Decreased high-level ADLs, Decreased self-care trans, Decreased fine motor control  Prognosis: Good  Assessment: Patient is a 81 y/o female admitted with c/o generalized weakness , SOB and LE weakness  Patient has hx of neuropathy, reports decreased sensation in b/l UEs and LBs along with muscle weakenss  Patient is alert and oriented, with mild memory decline noted in [facility] however reports fully I with ADLs and manages lt homemaking  Patient curently presents with general weakness, decreased muscle strength, decreased activity tolerance, decreased balance impacting patient's occupational performance in areas of bathing, dressing, transfers and  functional mobility  From OT standpoint, recommend in-patient rehab before discharge home alone  Will continue to follow for OT services to progress ADL skill to highest/safest level of functiion with rehabiltiative and compensatory strategies  Recommendation: PT consult  Discharge Recommendation: Short Term Rehab  OT - OK to Discharge:  Yes

## 2017-10-06 NOTE — PHYSICAL THERAPY NOTE
PHYSICAL THERAPY EVALUATION  NAME:  Noe Giordano  DATE: 10/06/17    AGE:   80 y o   Mrn:   [id number]  ADMIT DX:  Shortness of breath [R06 02]  Weakness [R53 1]  Abdominal pain [R10 9]  RUQ pain [R10 11]  Transaminitis [R74 0]  Elevated troponin [R74 8]  NSTEMI (non-ST elevated myocardial infarction) ([facility] [address] ) [I21 4]  Elevated brain natriuretic peptide (BNP) level [R79 89]    Past Medical History:   Diagnosis Date    Cancer [facility])     "some kind of cancer when I was 89"     Length Of Stay: 2  PHYSICAL THERAPY EVALUATION :    10/06/17 1407   Note Type   Note type Eval/Treat   Pain Assessment   Pain Assessment 0-10   Pain Score No Pain  (@ rest ; 5/10 with actvity)   Pain Type Acute pain;Chronic pain   Pain Location Leg   Pain Orientation Left   Effect of Pain on Daily Activities limits ambulation and weight bearing   Patient's Stated Pain Goal No pain   Hospital Pain Intervention(s) Ambulation/increased activity; Emotional support;Repositioned   Home Living   Type of Home House   Home Layout Multi-level; Able to live on main level with bedroom/bathroom  (a couple stairs)   [address]   Prior Function   Level of [place] Independent with ADLs and functional mobility   Lives With Alone   ADL Assistance Independent   IADLs Independent   Vocational Retired   Restrictions/Precautions   [place] Bearing Precautions Per Order No   Other Precautions Fall Risk;O2;Bed Alarm; Chair Alarm;Cognitive;Pain   General   Additional Pertinent History Per H&P: 80 y o  female with no significant PMH presents to the ED for evaluation of generally feeling ill for the last few days  Patient states that her biggest concern is some RUQ abdominal pain  She states that she has had this ongoing for a few years, but it has bothered her slightly more this week  State the pain is intermittent, usually related to eating heavy meals      Family/Caregiver Present No   Cognition   Overall Cognitive Status Impaired   Arousal/Participation Alert Attention Within functional limits   Orientation Level Oriented X4   Memory Decreased short term memory   Following Commands Follows one step commands with increased time or repetition  (with MMT)   Comments Repeats stories during session  RUE Strength   RUE Overall Strength Deficits;Due to pain   LUE Strength   LUE Overall Strength Due to  precautions; Deficits   RLE Assessment   RLE Assessment (limited flexibility with hip Ir/ER, heel cords, hamstrings)   Strength RLE   R Hip Flexion 4-/5   R Knee Extension 4+/5   R Ankle Dorsiflexion 4+/5   LLE Assessment   LLE Assessment (limited flexibility with hip Ir/ER, heel cords, hamstrings)   Strength LLE   L Hip Flexion 4/5   L Knee Extension 4+/5   L Ankle Dorsiflexion 4+/5   Coordination   Movements are Fluid and Coordinated 0   Coordination and Movement Description bradykinesia with ambulation    Sensation (lmited vision per pt, "getting bad lately   cannot read news")   Light Touch   RLE Light Touch Impaired   RLE Light Touch Comments feet/toes   LLE Light Touch Impaired   LLE Light Touch Comments feet/toes   Transfers   Sit to Stand 4  Minimal assistance   Additional items Assist x 1; Increased time required;Verbal cues;Armrests   Stand to Sit 4  Minimal assistance   Additional items Assist x 1; Increased time required;Verbal cues;Armrests   Ambulation/Elevation   Gait pattern Step to;Excessively slow; Short stride;Decreased foot clearance;Shuffling;Retropulsion   Gait Assistance 4  Minimal assist   Additional items Assist x 1  (and Second A for chair follow  )   Assistive Device Rolling walker   Distance 10'   Balance   Static Sitting Good   Static Standing Poor +   Ambulatory Poor +   Endurance Deficit   Endurance Deficit Yes   Endurance Deficit Description limited amb distance   Activity Tolerance   Activity Tolerance Patient limited by pain; Patient limited by fatigue   Medical Staff Made Aware spoke to OT   Nurse Made Aware spoke to [facility] Prognosis Fair   Problem List Decreased strength;Decreased range of motion;Decreased endurance; Impaired balance;Decreased mobility; Decreased cognition; Impaired judgement;Decreased safety awareness; Impaired vision;Pain   Barriers to Discharge Decreased caregiver support; Inaccessible home environment   Goals   Patient Goals less pain with walking   STG Expiration Date 10/16/17   Short Term Goal #1 Pt will perform transfers modified I, amb w/rolling walker for >100 w/o uncorrected losses of balance around and over obsticles, perform 2 OSVALDO with S and railing, Indep bed mobility   Treatment Day 0   Plan   Treatment/Interventions Elevations;LE strengthening/ROM; Functional transfer training; Therapeutic exercise;Equipment eval/education; Bed mobility;Gait training;Cognitive reorientation; Endurance training;Patient/family training;Spoke to nursing;OT   PT Frequency 5x/wk   Recommendation   Recommendation Short-term skilled PT;Long-term skilled PT   Equipment Recommended Walker   Barthel Index   Feeding 5   Bathing 0   Grooming Score 0   Dressing Score 5   Bladder Score 10   Bowels Score 10   Toilet Use Score 5   Transfers (Bed/Chair) Score 10   Mobility (Level Surface) Score 0   Stairs Score 0   Barthel Index Score 45   (Please find full objective findings from PT assessment regarding body systems outlined above)  Assessment: Pt is a 80 y o  female seen for PT evaluation s/p admit to [facility] on 10/4/2017 w/ Transaminitis  Order placed for PT  Prior to admission, pt was lives in one floor environment, has "a coupled"  OSVALDO, used rolling walker for mobility and lives alone  Upon evaluation: Pt requires min A for transfers and A of 2 for ambulation (including chair follow)     Impairments and limitations also listed above, especially due to  weakness, impaired balance, decreased endurance, gait deviations, pain, decreased activity tolerance, decreased functional mobility tolerance, fall risk and decreased cognition  The following objective measures performed on IE also reveal limitations: Barthel Index 45/100  Pt's clinical presentation is currently unstable/unpredictable seen in pt's presentation of limited mobility compared to baseline, coupled with fall risk due to limited cognition, limited sensation and vision, plus abnormal labs and advance age   Pt to benefit from continued skilled PT tx while in hospital and upon DC to address deficits as defined above and maximize level of functional mobility  From PT/mobility standpoint, recommendation at time of d/c would be IP rehab and with walker pending progress in order to maximize pt's functional independence and consistency w/ mobility in order to facilitate return to PLOF  Recommend continued trials with walker and WC over next few sessions, plus ther ex including balance activities  Comorbidities affecting pt's physical performance at time of assessment include: limited vision, limited cognition and neuropathy  Personal factors affecting pt at time of IE include: steps to enter environment, environment, limited home support, advanced age, inability to perform current job functions, inability to perform IADLs, inability to perform ADLs and limited insight into impairments      Yordan Hightower, PT, DPT

## 2017-10-06 NOTE — PLAN OF CARE
Problem: SLP ADULT - SWALLOWING, IMPAIRED  Goal: Initial SLP swallow eval performed  Outcome: Completed Date Met: 10/06/17

## 2017-10-06 NOTE — ASSESSMENT & PLAN NOTE
· Ambulatory dysfunction secondary to generalized weakness  · PT/OT evaluation pending  · Patient was residing at home alone, ambulates with a walker  · May need placement

## 2017-10-06 NOTE — SPEECH THERAPY NOTE
Speech Language/Pathology  Speech/Language Pathology  Assessment    Patient Name: Noe Giordano  [id number] Date: 10/6/2017     Problem List  Patient Active Problem List   Diagnosis    Transaminitis    RUQ pain    CKD (chronic kidney disease) stage 4, GFR 15-29 ml/min ([facility])    NSTEMI (non-ST elevated myocardial infarction) ([facility] [address] )    Weakness     Past Medical History  Past Medical History:   Diagnosis Date    Cancer [facility])     "some kind of cancer when I was 80"     Past Surgical History  Past Surgical History:   Procedure Laterality Date    BACK SURGERY      COLON SURGERY      HIP FRACTURE SURGERY           10/06/17 7691   Patient Information   Current Medical Pt is a 81 y/o male admitted to [facility] on 10/4/2017 with transaminitis and overall weakness  OT was working with pt who reported she observed pt coughing during lunch meal and having difficulty chewing her egg salad sandwich  MD then requested swallowing evaluation  Special Studies CXR: No active disease   Social/Educational/Vocational Hx Home   Swallow Information   Current Risks for Dysphagia & Aspiration General debilitation   Current Symptoms/Concerns Cough;During meals   Current Diet Regular; Thin liquid   Baseline Diet Regular; Thin liquids   Baseline Assessment   Behavior/Cognition Alert; Cooperative;Confused   Speech/Language Status Mild hoarse vocal quality   Patient Positioning Upright in chair   Swallow Mechanism Exam   Labial Symmetry WFL   Labial Strength WFL   Labial ROM WFL   Facial Symmetry WFL   Facial Strength WFL   Facial ROM WFL   Lingual Symmetry WFL   Lingual Strength WFL   Lingual ROM WFL   Velum WFL   Mandible WFL   Dentition Dentures top;Dentures bottom   Volitional Cough Weak   Consistencies Assessed and Performance   Materials Adminstered Comment Trialed  4 oz applesauce, jesus manuel cracker, hard cookie, hard pretzel, toast with butter (1/2 slice), 4 oz thin water by straw   Oral Stage Mild impaired   Oral Stage Comment Bolus retrieval was [facility]  Mastication was slow with inconsistent rotary chew pattern  Breakdown was [facility]  Pt would intermittently say "This is hard" and then take a drink to help breakdown the foods (with hard cookie, toast, pretzel)  She did this independently  She expressed she felt that the pretzel was too hard to chew  She stated she also felt the lettuce on her sandwich was too much to chew  Phargngeal Stage Mild impaired   Pharyngeal Stage Comment Swallows were mildly delayed with mildly reduced hyolaryngeal rise  No s/s of pharyngeal dysphagia or aspiration were noted  Voice was clear after all swallows  Swallow Mechanics Mild delayed;Weak larygneal rise   Esophageal Concerns No s/s reported   Summary   Swallow Summary Pt presents with mild oropharyngeal dysphagia with today's assessment  Mastication was slow with solid textures requiring increased time for bolus breakdown  Pt independently took drinks while chewing to help break down food items  She expressed she felt that the pretezel was too hard to chew as well as the lettuce she got on her sandwich today  Swallows were mildly delayed and mildly weak  No s/s of pharyngeal dysphagia or aspiration noted with solids or thin liquids  At this time, SLP recommends a dysphagia level 3 diet with thin liquids (avoid hard dry foods, thick breads, lettuce/tomato on sandwiches)  Speech to f/u to assure diet tolerance  Recommendations   Risk for Aspiration Mild   Recommendations Consider oral diet; Dysphagia treatment   Diet Solid Recommendation Level 3 Dysphagia/ advanced/ soft to chew  (Avoid thick breads, lettuce/tomato on sandwiches, dry foods)   Diet Liquid Recommendation Thin liquid   Recommended Form of Meds As desired; As tolerated   General Precautions Aspiration precautions; Feed only when alert;Minimize distractions;Upright as possible for all oral intake;Remain upright for 45 mins after meals; Supervision with meals;Assist with feeding   Compensatory Swallowing Strategies Alternate solids and liquids; External pacing   Results Reviewed with RN;PT/Family/Caregiver   Treatment Recommendations   Follow up treatments Strategy training;Continue clinical assessment; Assure diet tolerance; Patient/family education   Dysphagia Goals Patient will tolerate recommended diet without observed clinical signs of oral/pharngeal dysphagia; Patient will tolerate advanced diet with no signs of oral or pharngeal dysphagia   Speech Therapy Prognosis   Prognosis Good   Prognosis Considerations Patient Participation Level; Availability of Services; Potential;Previous Level of Function;Severity of Impairments

## 2017-10-06 NOTE — ASSESSMENT & PLAN NOTE
· NSTEMI, type 2 likely secondary to tachycardia  · Cariology consulted, appreciate input  · No need for IV heparin per cardiology  Continue aspirin  · Heart rate improved  Continue to monitor heart rate and BP  · Echo results pending

## 2017-10-07 LAB
ALBUMIN SERPL BCP-MCNC: 3.3 G/DL (ref 3.5–5)
ALP SERPL-CCNC: 91 U/L (ref 46–116)
ALT SERPL W P-5'-P-CCNC: 173 U/L (ref 12–78)
ANION GAP SERPL CALCULATED.3IONS-SCNC: 8 MMOL/L (ref 4–13)
AST SERPL W P-5'-P-CCNC: 58 U/L (ref 5–45)
BILIRUB SERPL-MCNC: 0.6 MG/DL (ref 0.2–1)
BUN SERPL-MCNC: 28 MG/DL (ref 5–25)
CALCIUM SERPL-MCNC: 9.1 MG/DL (ref 8.3–10.1)
CHLORIDE SERPL-SCNC: 104 MMOL/L (ref 100–108)
CO2 SERPL-SCNC: 25 MMOL/L (ref 21–32)
CREAT SERPL-MCNC: 1.49 MG/DL (ref 0.6–1.3)
GFR SERPL CREATININE-BSD FRML MDRD: 29 ML/MIN/1.73SQ M
GLUCOSE SERPL-MCNC: 131 MG/DL (ref 65–140)
POTASSIUM SERPL-SCNC: 3.8 MMOL/L (ref 3.5–5.3)
PROT SERPL-MCNC: 6.9 G/DL (ref 6.4–8.2)
SODIUM SERPL-SCNC: 137 MMOL/L (ref 136–145)

## 2017-10-07 PROCEDURE — 80053 COMPREHEN METABOLIC PANEL: CPT | Performed by: PHYSICIAN ASSISTANT

## 2017-10-07 RX ADMIN — HEPARIN SODIUM 5000 UNITS: 5000 INJECTION, SOLUTION INTRAVENOUS; SUBCUTANEOUS at 14:56

## 2017-10-07 RX ADMIN — HEPARIN SODIUM 5000 UNITS: 5000 INJECTION, SOLUTION INTRAVENOUS; SUBCUTANEOUS at 06:19

## 2017-10-07 RX ADMIN — ASPIRIN 81 MG: 81 TABLET, COATED ORAL at 08:38

## 2017-10-07 RX ADMIN — HEPARIN SODIUM 5000 UNITS: 5000 INJECTION, SOLUTION INTRAVENOUS; SUBCUTANEOUS at 23:02

## 2017-10-07 RX ADMIN — SENNOSIDES 8.6 MG: 8.6 TABLET, FILM COATED ORAL at 08:38

## 2017-10-07 NOTE — PLAN OF CARE
Problem: DISCHARGE PLANNING - CARE MANAGEMENT  Goal: Discharge to post-acute care or home with appropriate resources  INTERVENTIONS:  - Conduct assessment to determine patient/family and health care team treatment goals, and need for post-acute services based on payer coverage, community resources, and patient preferences, and barriers to discharge  - Address psychosocial, clinical, and financial barriers to discharge as identified in assessment in conjunction with the patient/family and health care team  - Arrange appropriate level of post-acute services according to patient's   needs and preference and payer coverage in collaboration with the physician and health care team  - Communicate with and update the patient/family, physician, and health care team regarding progress on the discharge plan  - Arrange appropriate transportation to post-acute venues  Outcome: [address]  AMI bundle; Not a readmit  Cm met with pt as she is in need of rehab  She states she is aware of this and believes she wants to go to [facility], [facility] or [facility]  Referrals have been made  Cm s/w pt's son regarding rehab and choices pt made  He states she has been to [facility] and did not like it there  She has been to [address] [place] [address] [place] in the past and was happy there  He was surprised she wanted a referral made to [facility] as well  Cm explained a list of facilities has been left in his mother's room that he could review with her if he chose to do so  Left contact information for this weekend  First choice is [facility], Second choice is [facility], Third choice is [facility]  Pt lives alone in her home where she is independent and has a RW and shower chair  She does not drive and she does not have a POA  She does not have MH/D&A hx   Cm will continue to follow to assist with needs and discharge planning

## 2017-10-07 NOTE — SOCIAL WORK
[place] [address]  AMI bundle; Not a readmit  Dante met with pt as she is in need of rehab  She states she is aware of this and believes she wants to go to [facility], [facility] or [facility]  Referrals have been made  Cm s/w pt's son regarding rehab and choices pt made  He states she has been to  and did not like it there  She has been to [facility] in the past and was happy there  He was surprised she wanted a referral made to [facility] as well  Cm explained a list of facilities has been left in his mother's room that he could review with her if he chose to do so  Left contact information for this weekend  First choice is [facility], Second choice is [facility], Third choice is [facility]  Pt lives alone in her home where she is independent and has a RW and shower chair  She does not drive and she does not have a POA  She does not have MH/D&A hx   Cm will continue to follow to assist with needs and discharge planning

## 2017-10-07 NOTE — PROGRESS NOTES
Progress Note - Marisa Peterson 80 y o  female MRN: [id number]    Unit/Bed#: -01 Encounter: [id number]        * Transaminitis   Assessment & Plan    · Elevated LFTs, RUQ pain possilby due to viral syndrome vs passed stone, sludge  · RUQ pain resolved, patient denies abdominal pain  · LFTs improving  · GI following, appreciate input  · Acute hepatitis panel negative  · Repeat CMP in AM   · Tolerating diet  NSTEMI (non-ST elevated myocardial infarction) [facility])   Assessment & Plan    · NSTEMI, type 2 likely secondary to tachycardia  · Cariology consulted, appreciate input  · No need for IV heparin per cardiology  Continue aspirin  · Heart rate improved  Continue to monitor heart rate and BP  · Echo: EF 98%, grade 1 diastolic dysfunction  RUQ pain   Assessment & Plan    · Pain resolved  · RUQ ultrasound showed a large gallstone  CKD (chronic kidney disease) stage 4, GFR 15-29 ml/min ([facility])   Assessment & Plan    · Cr stable at 1 49 today  · Repeat BMP in AM         Weakness   Assessment & Plan    · Ambulatory dysfunction secondary to generalized weakness  · PT/OT evaluation recommending rehab; awaiting placement  VTE Pharmacologic Prophylaxis:   Pharmacologic: Heparin  Mechanical VTE Prophylaxis in Place: Yes    Patient Centered Rounds: I have performed bedside rounds with nursing staff today  Discussions with Specialists or Other Care Team Provider:   Nursing    Education and Discussions with Family / Patient:  Patient    Time Spent for Care: 20 minutes  More than 50% of total time spent on counseling and coordination of care as described above  Current Length of Stay: 3 day(s)    Current Patient Status: Inpatient   Certification Statement: The patient will continue to require additional inpatient hospital stay due to Awaiting placement    Discharge Plan: awaiting placement    Code Status: Level 1 - Full Code      Subjective:   Patient sitting up in chair  Continues to feel generally weak and continues to have some numbness in her fingers and feet  She denies chest pain, shortness of breath, abdominal pain, nausea/vomiting or diarrhea  Objective:     Vitals:   Temp (24hrs), Av °F (37 2 °C), Min:98 8 °F (37 1 °C), Max:99 1 °F (37 3 °C)    HR:  [] 80  Resp:  [18] 18  BP: (122-169)/(57-75) 141/65  SpO2:  [95 %-97 %] 95 %  Body mass index is 25 92 kg/m²  Input and Output Summary (last 24 hours): Intake/Output Summary (Last 24 hours) at 10/07/17 1146  Last data filed at 10/06/17 1832   Gross per 24 hour   Intake              340 ml   Output                0 ml   Net              340 ml       Physical Exam:     Physical Exam   Constitutional: She is oriented to person, place, and time  No distress  HENT:   Head: Normocephalic and atraumatic  Eyes: Conjunctivae are normal    Neck: Normal range of motion  Cardiovascular: Normal rate and regular rhythm  Pulmonary/Chest: Effort normal and breath sounds normal  No respiratory distress  Abdominal: Soft  Bowel sounds are normal  There is no tenderness  Musculoskeletal: Normal range of motion  She exhibits no edema  Neurological: She is alert and oriented to person, place, and time  Skin: Skin is warm and dry  She is not diaphoretic  Psychiatric: She has a normal mood and affect  Nursing note and vitals reviewed  Additional Data:     Labs:      Results from last 7 days  Lab Units 10/06/17  0508  10/04/17  1901   WBC Thousand/uL 5 19  < > 4 27*   HEMOGLOBIN g/dL 9 8*  < > 11 3*   HEMATOCRIT % 31 9*  < > 35 6   PLATELETS Thousands/uL 186  < > 204   NEUTROS PCT %  --   --  80*   LYMPHS PCT %  --   --  10*   MONOS PCT %  --   --  9   EOS PCT %  --   --  1   < > = values in this interval not displayed      Results from last 7 days  Lab Units 10/07/17  0917   SODIUM mmol/L 137   POTASSIUM mmol/L 3 8   CHLORIDE mmol/L 104   CO2 mmol/L 25   BUN mg/dL 28*   CREATININE mg/dL 1 49*   CALCIUM mg/dL 9 1   TOTAL PROTEIN g/dL 6 9   BILIRUBIN TOTAL mg/dL 0 60   ALK PHOS U/L 91   ALT U/L 173*   AST U/L 58*   GLUCOSE RANDOM mg/dL 131       Results from last 7 days  Lab Units 10/05/17  0819   INR  1 00       * I Have Reviewed All Lab Data Listed Above  * Additional Pertinent Lab Tests Reviewed: All Labs Within Last 24 Hours Reviewed    Imaging:    Imaging Reports Reviewed Today Include:  Echo  Imaging Personally Reviewed by Myself Includes:  None    Recent Cultures (last 7 days):           Last 24 Hours Medication List:     aspirin 81 mg Oral Daily   heparin (porcine) 5,000 Units Subcutaneous Q8H [facility]   senna 1 tablet Oral Daily        Today, Patient Was Seen By: Raudel Mcclure PA-C    ** Please Note: Dictation voice to text software may have been used in the creation of this document   **

## 2017-10-07 NOTE — ASSESSMENT & PLAN NOTE
· NSTEMI, type 2 likely secondary to tachycardia  · Cariology consulted, appreciate input  · No need for IV heparin per cardiology  Continue aspirin  · Heart rate improved  Continue to monitor heart rate and BP  · Echo: EF 36%, grade 1 diastolic dysfunction

## 2017-10-07 NOTE — ASSESSMENT & PLAN NOTE
· Elevated LFTs, RUQ pain possilby due to viral syndrome vs passed stone, sludge  · RUQ pain resolved, patient denies abdominal pain  · LFTs improving  · GI following, appreciate input  · Acute hepatitis panel negative  · Repeat CMP in AM   · Tolerating diet

## 2017-10-07 NOTE — ASSESSMENT & PLAN NOTE
· Ambulatory dysfunction secondary to generalized weakness  · PT/OT evaluation recommending rehab; awaiting placement

## 2017-10-07 NOTE — PLAN OF CARE
Problem: Potential for Falls  Goal: Patient will remain free of falls  INTERVENTIONS:  - Assess patient frequently for physical needs  -  Identify cognitive and physical deficits and behaviors that affect risk of falls  -  [place] fall precautions as indicated by assessment   - Educate patient/family on patient safety including physical limitations  - Instruct patient to call for assistance with activity based on assessment  - Modify environment to reduce risk of injury  - Consider OT/PT consult to assist with strengthening/mobility   Outcome: Progressing      Problem: Prexisting or High Potential for Compromised Skin Integrity  Goal: Skin integrity is maintained or improved  INTERVENTIONS:  - Identify patients at risk for skin breakdown  - Assess and monitor skin integrity  - Assess and monitor nutrition and hydration status  - Monitor labs (i e  albumin)  - Assess for incontinence   - Turn and reposition patient  - Assist with mobility/ambulation  - Relieve pressure over bony prominences  - Avoid friction and shearing  - Provide appropriate hygiene as needed including keeping skin clean and dry  - Evaluate need for skin moisturizer/barrier cream  - Collaborate with interdisciplinary team (i e  Nutrition, Rehabilitation, etc )   - Patient/family teaching   Outcome: Progressing      Problem: Knowledge Deficit  Goal: Patient/family/caregiver demonstrates understanding of disease process, treatment plan, medications, and discharge instructions  Complete learning assessment and assess knowledge base    Interventions:  - Provide teaching at level of understanding  - Provide teaching via preferred learning methods   Outcome: Progressing      Problem: CARDIOVASCULAR - ADULT  Goal: Maintains optimal cardiac output and hemodynamic stability  INTERVENTIONS:  - Monitor I/O, vital signs and rhythm  - Monitor for S/S and trends of decreased cardiac output i e  bleeding, hypotension  - Administer and titrate ordered vasoactive medications to optimize hemodynamic stability  - Assess quality of pulses, skin color and temperature  - Assess for signs of decreased coronary artery perfusion - ex   Angina  - Instruct patient to report change in severity of symptoms   Outcome: Progressing    Goal: Absence of cardiac dysrhythmias or at baseline rhythm  INTERVENTIONS:  - Continuous cardiac monitoring, monitor vital signs, obtain 12 lead EKG if indicated  - Administer antiarrhythmic and heart rate control medications as ordered  - Monitor electrolytes and administer replacement therapy as ordered   Outcome: Progressing

## 2017-10-08 PROBLEM — R10.11 RUQ PAIN: Status: RESOLVED | Noted: 2017-10-04 | Resolved: 2017-10-08

## 2017-10-08 LAB
ALBUMIN SERPL BCP-MCNC: 3.3 G/DL (ref 3.5–5)
ALP SERPL-CCNC: 89 U/L (ref 46–116)
ALT SERPL W P-5'-P-CCNC: 136 U/L (ref 12–78)
ANION GAP SERPL CALCULATED.3IONS-SCNC: 9 MMOL/L (ref 4–13)
AST SERPL W P-5'-P-CCNC: 41 U/L (ref 5–45)
BASOPHILS # BLD AUTO: 0.02 THOUSANDS/ΜL (ref 0–0.1)
BASOPHILS NFR BLD AUTO: 0 % (ref 0–1)
BILIRUB SERPL-MCNC: 0.5 MG/DL (ref 0.2–1)
BUN SERPL-MCNC: 34 MG/DL (ref 5–25)
CALCIUM SERPL-MCNC: 9.2 MG/DL (ref 8.3–10.1)
CHLORIDE SERPL-SCNC: 104 MMOL/L (ref 100–108)
CO2 SERPL-SCNC: 24 MMOL/L (ref 21–32)
CREAT SERPL-MCNC: 1.35 MG/DL (ref 0.6–1.3)
EOSINOPHIL # BLD AUTO: 0.19 THOUSAND/ΜL (ref 0–0.61)
EOSINOPHIL NFR BLD AUTO: 3 % (ref 0–6)
ERYTHROCYTE [DISTWIDTH] IN BLOOD BY AUTOMATED COUNT: 13.5 % (ref 11.6–15.1)
GFR SERPL CREATININE-BSD FRML MDRD: 32 ML/MIN/1.73SQ M
GLUCOSE SERPL-MCNC: 98 MG/DL (ref 65–140)
HCT VFR BLD AUTO: 32.8 % (ref 34.8–46.1)
HGB BLD-MCNC: 10.3 G/DL (ref 11.5–15.4)
LYMPHOCYTES # BLD AUTO: 1.78 THOUSANDS/ΜL (ref 0.6–4.47)
LYMPHOCYTES NFR BLD AUTO: 28 % (ref 14–44)
MCH RBC QN AUTO: 29.9 PG (ref 26.8–34.3)
MCHC RBC AUTO-ENTMCNC: 31.4 G/DL (ref 31.4–37.4)
MCV RBC AUTO: 95 FL (ref 82–98)
MONOCYTES # BLD AUTO: 0.73 THOUSAND/ΜL (ref 0.17–1.22)
MONOCYTES NFR BLD AUTO: 12 % (ref 4–12)
NEUTROPHILS # BLD AUTO: 3.58 THOUSANDS/ΜL (ref 1.85–7.62)
NEUTS SEG NFR BLD AUTO: 57 % (ref 43–75)
PLATELET # BLD AUTO: 215 THOUSANDS/UL (ref 149–390)
PMV BLD AUTO: 10.3 FL (ref 8.9–12.7)
POTASSIUM SERPL-SCNC: 3.8 MMOL/L (ref 3.5–5.3)
PROT SERPL-MCNC: 7 G/DL (ref 6.4–8.2)
RBC # BLD AUTO: 3.44 MILLION/UL (ref 3.81–5.12)
SODIUM SERPL-SCNC: 137 MMOL/L (ref 136–145)
WBC # BLD AUTO: 6.3 THOUSAND/UL (ref 4.31–10.16)

## 2017-10-08 PROCEDURE — 80053 COMPREHEN METABOLIC PANEL: CPT | Performed by: PHYSICIAN ASSISTANT

## 2017-10-08 PROCEDURE — 85025 COMPLETE CBC W/AUTO DIFF WBC: CPT | Performed by: PHYSICIAN ASSISTANT

## 2017-10-08 RX ADMIN — SENNOSIDES 8.6 MG: 8.6 TABLET, FILM COATED ORAL at 08:54

## 2017-10-08 RX ADMIN — ASPIRIN 81 MG: 81 TABLET, COATED ORAL at 08:54

## 2017-10-08 RX ADMIN — HEPARIN SODIUM 5000 UNITS: 5000 INJECTION, SOLUTION INTRAVENOUS; SUBCUTANEOUS at 21:55

## 2017-10-08 RX ADMIN — HEPARIN SODIUM 5000 UNITS: 5000 INJECTION, SOLUTION INTRAVENOUS; SUBCUTANEOUS at 05:36

## 2017-10-08 RX ADMIN — HEPARIN SODIUM 5000 UNITS: 5000 INJECTION, SOLUTION INTRAVENOUS; SUBCUTANEOUS at 13:25

## 2017-10-08 NOTE — ASSESSMENT & PLAN NOTE
· NSTEMI, type 2 likely secondary to tachycardia  · Cariology consulted, appreciate input  · No need for IV heparin per cardiology  Continue aspirin  · Heart rate improved  Continue to monitor vital signs  · Echo: EF 28%, grade 1 diastolic dysfunction

## 2017-10-08 NOTE — ASSESSMENT & PLAN NOTE
· Ambulatory dysfunction secondary to generalized weakness and neuropathy  · Supportive care  Ambulate with assistance  · PT/OT evaluation recommending rehab; awaiting placement

## 2017-10-08 NOTE — ASSESSMENT & PLAN NOTE
· Elevated LFTs, RUQ pain possilby due to viral syndrome vs passed stone, sludge  · RUQ pain resolved, patient denies abdominal pain  · LFTs improving  AST within normal limits today  · GI consulted, appreciate input  · Acute hepatitis panel negative  · Repeat CMP in AM   · Tolerating diet

## 2017-10-08 NOTE — PLAN OF CARE
Problem: DISCHARGE PLANNING - CARE MANAGEMENT  Goal: Discharge to post-acute care or home with appropriate resources  INTERVENTIONS:  - Conduct assessment to determine patient/family and health care team treatment goals, and need for post-acute services based on payer coverage, community resources, and patient preferences, and barriers to discharge  - Address psychosocial, clinical, and financial barriers to discharge as identified in assessment in conjunction with the patient/family and health care team  - Arrange appropriate level of post-acute services according to patient's   needs and preference and payer coverage in collaboration with the physician and health care team  - Communicate with and update the patient/family, physician, and health care team regarding progress on the discharge plan  - Arrange appropriate transportation to post-acute venues   Outcome: Progressing  CM spoke to Pt's son Zaria Farris who reported his sister was on her way to the hospital to see Pt and he would like me to talk to her  CM met with Pt and Pt's daughter Jaime Camejo at bedside  Per Jaime Camejo she wants Pt to go to [facility] as 1st choice and [facility] as 2nd choice  She does not want Pt to go to [facility] as she pulled Pt out after a week last time  Per Jaime Camejo she would like w/c [employer] transport set up, CM explained cost and Jaime Camejo was agreeable  CM discussed BPCI program with Pt's daughter  CM dept will follow Pt until discharge

## 2017-10-08 NOTE — PROGRESS NOTES
Progress Note - Mago Taveras 80 y o  female MRN: [id number]    Unit/Bed#: -01 Encounter: [id number]        * Transaminitis   Assessment & Plan    · Elevated LFTs, RUQ pain possilby due to viral syndrome vs passed stone, sludge  · RUQ pain resolved, patient denies abdominal pain  · LFTs improving  AST within normal limits today  · GI consulted, appreciate input  · Acute hepatitis panel negative  · Repeat CMP in AM   · Tolerating diet  NSTEMI (non-ST elevated myocardial infarction) [facility])   Assessment & Plan    · NSTEMI, type 2 likely secondary to tachycardia  · Cariology consulted, appreciate input  · No need for IV heparin per cardiology  Continue aspirin  · Heart rate improved  Continue to monitor vital signs  · Echo: EF 10%, grade 1 diastolic dysfunction  Weakness   Assessment & Plan    · Ambulatory dysfunction secondary to generalized weakness and neuropathy  · Supportive care  Ambulate with assistance  · PT/OT evaluation recommending rehab; awaiting placement  CKD (chronic kidney disease) stage 4, GFR 15-29 ml/min ([facility])   Assessment & Plan    · Cr stable at 1 35 today  VTE Pharmacologic Prophylaxis:   Pharmacologic: Heparin  Mechanical VTE Prophylaxis in Place: Yes    Patient Centered Rounds: I have performed bedside rounds with nursing staff today  Discussions with Specialists or Other Care Team Provider: Nursing    Education and Discussions with Family / Patient: Patient; offered to call patient's family, patient declined  Time Spent for Care: 20 minutes  More than 50% of total time spent on counseling and coordination of care as described above      Current Length of Stay: 4 day(s)    Current Patient Status: Inpatient   Certification Statement: The patient will continue to require additional inpatient hospital stay due to awaiting placement    Discharge Plan: Awaiting placement    Code Status: Level 1 - Full Code      Subjective:   Patient sitting up in chair, eating breakfast  No new complaints at this time  She continues to have weakness, discomfort in her feet, legs, arms and hands which she attributes to her neuropathy  She describes her discomfort as a "pulling " She denies chest pain, SOB, abdominal pain or n/v     Objective:     Vitals:   Temp (24hrs), Av 7 °F (37 1 °C), Min:98 1 °F (36 7 °C), Max:99 5 °F (37 5 °C)    HR:  [78-85] 78  Resp:  [17-18] 17  BP: (128-147)/(61-66) 147/66  SpO2:  [95 %-97 %] 95 %  Body mass index is 25 92 kg/m²  Input and Output Summary (last 24 hours): Intake/Output Summary (Last 24 hours) at 10/08/17 0856  Last data filed at 10/07/17 1821   Gross per 24 hour   Intake              580 ml   Output                0 ml   Net              580 ml       Physical Exam:     Physical Exam   Constitutional: She is oriented to person, place, and time  No distress  HENT:   Head: Normocephalic and atraumatic  Eyes: Conjunctivae are normal    Neck: Normal range of motion  Cardiovascular: Normal rate and regular rhythm  Pulmonary/Chest: Effort normal and breath sounds normal  No respiratory distress  Abdominal: Soft  Bowel sounds are normal  There is no tenderness  Musculoskeletal: Normal range of motion  She exhibits no edema  Neurological: She is alert and oriented to person, place, and time  Skin: Skin is warm and dry  She is not diaphoretic  No erythema  Psychiatric: She has a normal mood and affect  Nursing note and vitals reviewed        Additional Data:     Labs:      Results from last 7 days  Lab Units 10/08/17  0542   WBC Thousand/uL 6 30   HEMOGLOBIN g/dL 10 3*   HEMATOCRIT % 32 8*   PLATELETS Thousands/uL 215   NEUTROS PCT % 57   LYMPHS PCT % 28   MONOS PCT % 12   EOS PCT % 3       Results from last 7 days  Lab Units 10/08/17  0542   SODIUM mmol/L 137   POTASSIUM mmol/L 3 8   CHLORIDE mmol/L 104   CO2 mmol/L 24   BUN mg/dL 34*   CREATININE mg/dL 1 35*   CALCIUM mg/dL 9 2   TOTAL PROTEIN g/dL 7 0 BILIRUBIN TOTAL mg/dL 0 50   ALK PHOS U/L 89   ALT U/L 136*   AST U/L 41   GLUCOSE RANDOM mg/dL 98       Results from last 7 days  Lab Units 10/05/17  0819   INR  1 00       * I Have Reviewed All Lab Data Listed Above  * Additional Pertinent Lab Tests Reviewed: All Labs Within Last 24 Hours Reviewed    Imaging:    Imaging Reports Reviewed Today Include: None  Imaging Personally Reviewed by Myself Includes:  None    Recent Cultures (last 7 days):           Last 24 Hours Medication List:     aspirin 81 mg Oral Daily   heparin (porcine) 5,000 Units Subcutaneous Q8H [address]   senna 1 tablet Oral Daily        Today, Patient Was Seen By: Marlyn Reynolds PA-C    ** Please Note: Dictation voice to text software may have been used in the creation of this document   **

## 2017-10-08 NOTE — SOCIAL WORK
CM spoke to Pt's son Elaine Carroll who reported his sister was on her way to the hospital to see Pt and he would like me to talk to her  CM met with Pt and Pt's daughter Mala Bone at bedside  Per Columbia Netter she wants Pt to go to [facility] as 1st choice and [place] as 2nd choice  She does not want Pt to go to [facility] as she pulled Pt out after a week last time  Per Columbia Netter she would like w/c [employer] transport set up, CM explained cost and Columbia Netter was agreeable  CM discussed BPCI program with Pt's daughter  CM dept will follow Pt until discharge

## 2017-10-09 VITALS
WEIGHT: 147.71 LBS | DIASTOLIC BLOOD PRESSURE: 67 MMHG | OXYGEN SATURATION: 97 % | BODY MASS INDEX: 26.17 KG/M2 | TEMPERATURE: 97.9 F | RESPIRATION RATE: 16 BRPM | SYSTOLIC BLOOD PRESSURE: 151 MMHG | HEIGHT: 63 IN | HEART RATE: 73 BPM

## 2017-10-09 LAB
ALBUMIN SERPL BCP-MCNC: 3.1 G/DL (ref 3.5–5)
ALP SERPL-CCNC: 78 U/L (ref 46–116)
ALT SERPL W P-5'-P-CCNC: 94 U/L (ref 12–78)
ANION GAP SERPL CALCULATED.3IONS-SCNC: 9 MMOL/L (ref 4–13)
AST SERPL W P-5'-P-CCNC: 23 U/L (ref 5–45)
BILIRUB SERPL-MCNC: 0.4 MG/DL (ref 0.2–1)
BUN SERPL-MCNC: 28 MG/DL (ref 5–25)
CALCIUM SERPL-MCNC: 9 MG/DL (ref 8.3–10.1)
CHLORIDE SERPL-SCNC: 106 MMOL/L (ref 100–108)
CO2 SERPL-SCNC: 26 MMOL/L (ref 21–32)
CREAT SERPL-MCNC: 1.39 MG/DL (ref 0.6–1.3)
GFR SERPL CREATININE-BSD FRML MDRD: 31 ML/MIN/1.73SQ M
GLUCOSE SERPL-MCNC: 101 MG/DL (ref 65–140)
POTASSIUM SERPL-SCNC: 3.8 MMOL/L (ref 3.5–5.3)
PROT SERPL-MCNC: 6.5 G/DL (ref 6.4–8.2)
SODIUM SERPL-SCNC: 141 MMOL/L (ref 136–145)

## 2017-10-09 PROCEDURE — 80053 COMPREHEN METABOLIC PANEL: CPT | Performed by: PHYSICIAN ASSISTANT

## 2017-10-09 PROCEDURE — 90686 IIV4 VACC NO PRSV 0.5 ML IM: CPT | Performed by: PHYSICIAN ASSISTANT

## 2017-10-09 PROCEDURE — G0008 ADMIN INFLUENZA VIRUS VAC: HCPCS | Performed by: PHYSICIAN ASSISTANT

## 2017-10-09 PROCEDURE — 92526 ORAL FUNCTION THERAPY: CPT

## 2017-10-09 RX ORDER — SENNOSIDES 8.6 MG
1 TABLET ORAL DAILY
Qty: 120 EACH | Refills: 0
Start: 2017-10-10

## 2017-10-09 RX ORDER — ACETAMINOPHEN 325 MG/1
325 TABLET ORAL EVERY 8 HOURS PRN
Qty: 30 TABLET | Refills: 0
Start: 2017-10-09 | End: 2017-11-02 | Stop reason: HOSPADM

## 2017-10-09 RX ORDER — POLYETHYLENE GLYCOL 3350 17 G/17G
17 POWDER, FOR SOLUTION ORAL DAILY
Status: DISCONTINUED | OUTPATIENT
Start: 2017-10-09 | End: 2017-10-09 | Stop reason: HOSPADM

## 2017-10-09 RX ORDER — DOCUSATE SODIUM 100 MG/1
100 CAPSULE, LIQUID FILLED ORAL 2 TIMES DAILY
Status: DISCONTINUED | OUTPATIENT
Start: 2017-10-09 | End: 2017-10-09 | Stop reason: HOSPADM

## 2017-10-09 RX ORDER — ASPIRIN 81 MG/1
81 TABLET ORAL DAILY
Refills: 0
Start: 2017-10-10 | End: 2018-05-14 | Stop reason: SDUPTHER

## 2017-10-09 RX ORDER — DOCUSATE SODIUM 100 MG/1
100 CAPSULE, LIQUID FILLED ORAL 2 TIMES DAILY
Qty: 10 CAPSULE | Refills: 0
Start: 2017-10-09

## 2017-10-09 RX ADMIN — HEPARIN SODIUM 5000 UNITS: 5000 INJECTION, SOLUTION INTRAVENOUS; SUBCUTANEOUS at 05:26

## 2017-10-09 RX ADMIN — POLYETHYLENE GLYCOL 3350 17 G: 17 POWDER, FOR SOLUTION ORAL at 10:12

## 2017-10-09 RX ADMIN — DOCUSATE SODIUM 100 MG: 100 CAPSULE, LIQUID FILLED ORAL at 10:11

## 2017-10-09 RX ADMIN — SENNOSIDES 8.6 MG: 8.6 TABLET, FILM COATED ORAL at 10:11

## 2017-10-09 RX ADMIN — ASPIRIN 81 MG: 81 TABLET, COATED ORAL at 10:11

## 2017-10-09 RX ADMIN — INFLUENZA VIRUS VACCINE 0.5 ML: 15; 15; 15; 15 SUSPENSION INTRAMUSCULAR at 14:38

## 2017-10-09 NOTE — DISCHARGE SUMMARY
Discharge Summary - [address] [facility]    Patient Information: Yunier Ford 80 y o  female MRN: [id number]  Unit/Bed#: -01 Encounter: [id number]    Discharging Physician / Practitioner: Mj Robledo PA-C  PCP: Carlitos Tamez MD  Admission Date: 10/4/2017  Discharge Date: 10/09/17    Reason for Admission:  Right upper quadrant abdominal pain and weakness    Discharge Diagnoses:     Principal Problem:    Transaminitis  Active Problems:    NSTEMI (non-ST elevated myocardial infarction) ([address] )    CKD (chronic kidney disease) stage 4, GFR 15-29 ml/min ([facility])    Weakness  Resolved Problems:    RUQ pain      Consultations During Hospital Stay:  · Gastroenterology  · Cardiology  · PT/OT  Procedures Performed:     · None    Significant Findings / Test Results:     · Echo: EF 40%, grade 1 diastolic dysfunction  · RUQ ultrasound: large gallstone, present previously  · Chest x-ray: no active pulmonary disease  · Troponin: 0 06, 0 22, 0 28, 0 30  · BNP: 1,452  · Acute hepatitis panel: negative  Incidental Findings:   · Elevated LFTs  · Elevated troponin  Test Results Pending at Discharge (will require follow up): · None     Outpatient Tests Requested:  · Outpatient follow-up with PCP  Complications:  None    Hospital Course: Yunier Ford is a 80 y o  female patient with a history of GERD, CKD, stage IV and neuropathy who originally presented to the hospital on 10/4/2017 due to feeling ill for a few days prior to admission  She also reported some right upper quadrant abdominal pain on admission  She noted that her abdominal pain had been an ongoing issue for the past few years but that it was worse the week prior to admission  She reported that her pain worsened after eating heavy meals  She denied any chest pain or shortness of breath on admission  She admitted to feeling generally weak    On arrival to the hospital, she was noted to have elevated LFTs that were previously noted to be normal on prior blood work  Right upper quadrant ultrasound showed a large gallstone which was present previously and acute hepatitis panel was negative  GI consult was obtained and her elevated liver enzymes and abdominal pain were thought to possibly be related to passage of a stone or sludge  Her abdominal pain has since resolved and her LFTs improved during her hospitalization  Patient was also noted to have elevated troponins on admission  Cardiology was consulted and her NSTEMI type 2 was felt to be secondary to sinus tachycardia on admission  Cardiology recommended conservative medical therapy given her advanced age  She was started on aspirin 81 mg daily and echocardiogram was obtained, which showed an EF of 35%, grade 1 diastolic dysfunction  Her heart rate improved without medication and has remained stable  Patient had been residing at home alone prior to admission and ambulates with a walker  PT/OT evaluation was obtained given the patient's ambulatory dysfunction secondary to neuropathy and generalized weakness and short-term rehab was recommended  She is being discharged to [facility] for short-term rehab  Condition at Discharge: stable     Discharge Day Visit / Exam:     * Please refer to separate progress note for these details *    Discussion with Family: Discussed with patient's daughter, John Bueno, via phone  Discharge instructions/Information to patient and family:   See after visit summary for information provided to patient and family  Provisions for Follow-Up Care:  See after visit summary for information related to follow-up care and any pertinent home health orders  Disposition:     Brandyn Romero (see below)    For Discharges to [facility] SNF:   · [facility] - Unable to speak to physician, no message left      Planned Readmission: None    Discharge Statement:  I spent 35 minutes discharging the patient  This time was spent on the day of discharge  I had direct contact with the patient on the day of discharge  Greater than 50% of the total time was spent examining patient, answering all patient questions, arranging and discussing plan of care with patient as well as directly providing post-discharge instructions  Additional time then spent on discharge activities  Discharge Medications:  See after visit summary for reconciled discharge medications provided to patient and family  ** Please Note: This note has been constructed using a voice recognition system   **

## 2017-10-09 NOTE — PROGRESS NOTES
Progress Note - Araseli Waite 80 y o  female MRN: [id number]    Unit/Bed#: -01 Encounter: [id number]        * Transaminitis   Assessment & Plan    · Elevated LFTs, RUQ pain possilby due to viral syndrome vs passed stone, sludge  · RUQ pain resolved, patient denies abdominal pain  · LFTs improving  AST improved, within normal limits  · GI consulted  Acute hepatitis panel negative  · Tolerating diet  NSTEMI (non-ST elevated myocardial infarction) [facility])   Assessment & Plan    · NSTEMI, type 2 likely secondary to tachycardia  · Cariology consulted, appreciate input  · No need for IV heparin per cardiology  Continue aspirin  · Heart rate stable  Continue to monitor vital signs  · Echo: EF 08%, grade 1 diastolic dysfunction  Weakness   Assessment & Plan    · Ambulatory dysfunction secondary to generalized weakness and neuropathy  · Supportive care  Ambulate with assistance  · PT/OT evaluation recommending rehab; awaiting placement  CKD (chronic kidney disease) stage 4, GFR 15-29 ml/min ([facility])   Assessment & Plan    · Cr stable at 1 39  VTE Pharmacologic Prophylaxis:   Pharmacologic: Heparin  Mechanical VTE Prophylaxis in Place: Yes    Patient Centered Rounds: I have performed bedside rounds with nursing staff today  Discussions with Specialists or Other Care Team Provider: Nursing    Education and Discussions with Family / Patient: Patient    Time Spent for Care: 20 minutes  More than 50% of total time spent on counseling and coordination of care as described above  Current Length of Stay: 5 day(s)    Current Patient Status: Inpatient   Certification Statement: The patient will continue to require additional inpatient hospital stay due to awaiting placement    Discharge Plan: Awaiting placement  Code Status: Level 1 - Full Code      Subjective:   Patient sitting up in bed, no new complaints today   She continues to have numbness/ tingling and discomfort in her extremities  She denies abdominal pain, n/v/d, chest pain or SOB  She reports that she has not had a bowel movement since admission but notes that she doesn't usually go every day  Objective:     Vitals:   Temp (24hrs), Av 3 °F (36 8 °C), Min:97 9 °F (36 6 °C), Max:98 6 °F (37 °C)    HR:  [73-90] 73  Resp:  [16-18] 16  BP: (120-151)/(58-67) 151/67  SpO2:  [96 %-97 %] 97 %  Body mass index is 25 92 kg/m²  Input and Output Summary (last 24 hours): Intake/Output Summary (Last 24 hours) at 10/09/17 0824  Last data filed at 10/09/17 0717   Gross per 24 hour   Intake              780 ml   Output                0 ml   Net              780 ml       Physical Exam:     Physical Exam   Constitutional: She is oriented to person, place, and time  No distress  HENT:   Head: Normocephalic and atraumatic  Neck: Neck supple  Cardiovascular: Normal rate and regular rhythm  Pulmonary/Chest: Effort normal and breath sounds normal  No respiratory distress  Abdominal: Soft  Bowel sounds are normal  There is no tenderness  Musculoskeletal: Normal range of motion  She exhibits no edema  Neurological: She is alert and oriented to person, place, and time  No cranial nerve deficit  Skin: Skin is warm and dry  She is not diaphoretic  Psychiatric: She has a normal mood and affect  Nursing note and vitals reviewed        Additional Data:     Labs:      Results from last 7 days  Lab Units 10/08/17  0542   WBC Thousand/uL 6 30   HEMOGLOBIN g/dL 10 3*   HEMATOCRIT % 32 8*   PLATELETS Thousands/uL 215   NEUTROS PCT % 57   LYMPHS PCT % 28   MONOS PCT % 12   EOS PCT % 3       Results from last 7 days  Lab Units 10/09/17  0621   SODIUM mmol/L 141   POTASSIUM mmol/L 3 8   CHLORIDE mmol/L 106   CO2 mmol/L 26   BUN mg/dL 28*   CREATININE mg/dL 1 39*   CALCIUM mg/dL 9 0   TOTAL PROTEIN g/dL 6 5   BILIRUBIN TOTAL mg/dL 0 40   ALK PHOS U/L 78   ALT U/L 94*   AST U/L 23   GLUCOSE RANDOM mg/dL 101       Results from last 7 days  Lab Units 10/05/17  0819   INR  1 00       * I Have Reviewed All Lab Data Listed Above  * Additional Pertinent Lab Tests Reviewed: All Labs Within Last 24 Hours Reviewed    Imaging:    Imaging Reports Reviewed Today Include: n/a  Imaging Personally Reviewed by Myself Includes:  n/a    Recent Cultures (last 7 days):           Last 24 Hours Medication List:     aspirin 81 mg Oral Daily   docusate sodium 100 mg Oral BID   heparin (porcine) 5,000 Units Subcutaneous Q8H [address]   polyethylene glycol 17 g Oral Daily   senna 1 tablet Oral Daily        Today, Patient Was Seen By: Emre Thompson PA-C    ** Please Note: Dictation voice to text software may have been used in the creation of this document   **

## 2017-10-09 NOTE — ASSESSMENT & PLAN NOTE
· Elevated LFTs, RUQ pain possilby due to viral syndrome vs passed stone, sludge  · RUQ pain resolved, patient denies abdominal pain  · LFTs improving  AST improved, within normal limits  · GI consulted  Acute hepatitis panel negative  · Tolerating diet

## 2017-10-09 NOTE — SPEECH THERAPY NOTE
Speech Language/Pathology                             SLP  Note  Patient Name: Boogie Butler  LCUUW'L Date: 10/9/2017       Subjective:  Pt seen for dysphagia tx  Sitting OOB in chair  Objective:  Chart reviewed, pt eating % of meals, pt denied difficulty chewing and swallowing  Observed pt w/ several sips of water  By straw, good oral control, timely swallow initiation  No s/s aspiration  Assessment:  Pt tolerating dysphagia 3 diet w/ thin liquids w/ good po intake  Plan/Recommendations:  No further tx needed

## 2017-10-09 NOTE — SOCIAL WORK
CM made aware that [address] [place] [address] accepted Pt  CM arranged [facility] transport for 3:30pm via  EMS  CM made Pt, Pt's son Ramy Don, nurse MARA McclainS, and LV Patton aware

## 2017-10-09 NOTE — CASE MANAGEMENT
Continued Stay Review    Date: 10/8/2017     Vital Signs: /67   Pulse 73   Temp 97 9 °F (36 6 °C) (Oral)   Resp 16   Ht 5' 3 3" (1 608 m)   Wt 67 kg (147 lb 11 3 oz)   LMP  (LMP Unknown)   SpO2 97%   BMI 25 92 kg/m²     Medications:   Scheduled Meds:   aspirin 81 mg Oral Daily   docusate sodium 100 mg Oral BID   heparin (porcine) 5,000 Units Subcutaneous Q8H [address]   polyethylene glycol 17 g Oral Daily   senna 1 tablet Oral Daily     Continuous Infusions:    PRN Meds: calcium carbonate    influenza vaccine    ondansetron    Abnormal Labs/Diagnostic Results:   hgb 10 3, hct 32 8  Bun 34  Creatinine 1 35  Age/Sex: 80 y o  female     Assessment/Plan:   Transaminitis   Assessment & Plan     · Elevated LFTs, RUQ pain possilby due to viral syndrome vs passed stone, sludge  ? RUQ pain resolved, patient denies abdominal pain  ? LFTs improving  AST within normal limits today  ? GI consulted, appreciate input  ? Acute hepatitis panel negative  ? Repeat CMP in AM   ? Tolerating diet        NSTEMI (non-ST elevated myocardial infarction) [facility])   Assessment & Plan     · NSTEMI, type 2 likely secondary to tachycardia  ? Cariology consulted, appreciate input  § No need for IV heparin per cardiology  Continue aspirin  ? Heart rate improved  Continue to monitor vital signs  ? Echo: EF 86%, grade 1 diastolic dysfunction        Weakness   Assessment & Plan     · Ambulatory dysfunction secondary to generalized weakness and neuropathy  ? Supportive care  Ambulate with assistance    ? PT/OT evaluation recommending rehab; awaiting placement        CKD (chronic kidney disease) stage 4, GFR 15-29 ml/min ([facility])   Assessment & Plan     · Cr stable at 1 35 today              Discharge Plan: rehab

## 2017-10-09 NOTE — PLAN OF CARE
Problem: DISCHARGE PLANNING - CARE MANAGEMENT  Goal: Discharge to post-acute care or home with appropriate resources  INTERVENTIONS:  - Conduct assessment to determine patient/family and health care team treatment goals, and need for post-acute services based on payer coverage, community resources, and patient preferences, and barriers to discharge  - Address psychosocial, clinical, and financial barriers to discharge as identified in assessment in conjunction with the patient/family and health care team  - Arrange appropriate level of post-acute services according to patient's   needs and preference and payer coverage in collaboration with the physician and health care team  - Communicate with and update the patient/family, physician, and health care team regarding progress on the discharge plan  - Arrange appropriate transportation to post-acute venues   Outcome: Completed Date Met: 10/09/17  CM made aware that [facility] [address] accepted Pt  CM arranged [facility] transport for 3:30pm via  EMS  CM made Pt, Pt's son Krys Lazaro, nurse Chrissie Keenan, [facility], and PAPHYLICIA Patton aware

## 2017-10-09 NOTE — PHYSICAL THERAPY NOTE
Physical Therapy Cancellation Note      Spoke c allen brink pt set up for d/c this pm to rehab  cx PT today

## 2017-10-09 NOTE — ASSESSMENT & PLAN NOTE
· NSTEMI, type 2 likely secondary to tachycardia  · Cariology consulted, appreciate input  · No need for IV heparin per cardiology  Continue aspirin  · Heart rate stable  Continue to monitor vital signs  · Echo: EF 95%, grade 1 diastolic dysfunction

## 2017-10-10 ENCOUNTER — GENERIC CONVERSION - ENCOUNTER (OUTPATIENT)
Dept: OTHER | Facility: OTHER | Age: 82
End: 2017-10-10

## 2017-10-25 ENCOUNTER — GENERIC CONVERSION - ENCOUNTER (OUTPATIENT)
Dept: OTHER | Facility: OTHER | Age: 82
End: 2017-10-25

## 2017-10-29 ENCOUNTER — HOSPITAL ENCOUNTER (INPATIENT)
Facility: HOSPITAL | Age: 82
LOS: 3 days | Discharge: NURSING, REHAB OR LONG-TERM CARE | DRG: 683 | End: 2017-11-02
Attending: EMERGENCY MEDICINE | Admitting: INTERNAL MEDICINE
Payer: MEDICARE

## 2017-10-29 ENCOUNTER — GENERIC CONVERSION - ENCOUNTER (OUTPATIENT)
Dept: OTHER | Facility: OTHER | Age: 82
End: 2017-10-29

## 2017-10-29 DIAGNOSIS — R55 SYNCOPE: ICD-10-CM

## 2017-10-29 DIAGNOSIS — N17.9 AKI (ACUTE KIDNEY INJURY) (HCC): Primary | ICD-10-CM

## 2017-10-29 PROBLEM — D64.9 ANEMIA: Status: ACTIVE | Noted: 2017-10-29

## 2017-10-29 PROBLEM — I50.20 SYSTOLIC HEART FAILURE (HCC): Status: ACTIVE | Noted: 2017-10-29

## 2017-10-29 LAB
ALBUMIN SERPL BCP-MCNC: 3.8 G/DL (ref 3.5–5)
ALP SERPL-CCNC: 69 U/L (ref 46–116)
ALT SERPL W P-5'-P-CCNC: 25 U/L (ref 12–78)
ANION GAP SERPL CALCULATED.3IONS-SCNC: 11 MMOL/L (ref 4–13)
APTT PPP: 25 SECONDS (ref 23–35)
AST SERPL W P-5'-P-CCNC: 23 U/L (ref 5–45)
BASOPHILS # BLD AUTO: 0.01 THOUSANDS/ΜL (ref 0–0.1)
BASOPHILS NFR BLD AUTO: 0 % (ref 0–1)
BILIRUB DIRECT SERPL-MCNC: 0.11 MG/DL (ref 0–0.2)
BILIRUB SERPL-MCNC: 0.4 MG/DL (ref 0.2–1)
BUN SERPL-MCNC: 32 MG/DL (ref 5–25)
CALCIUM SERPL-MCNC: 9.6 MG/DL (ref 8.3–10.1)
CHLORIDE SERPL-SCNC: 106 MMOL/L (ref 100–108)
CO2 SERPL-SCNC: 26 MMOL/L (ref 21–32)
CREAT SERPL-MCNC: 2.12 MG/DL (ref 0.6–1.3)
EOSINOPHIL # BLD AUTO: 0.14 THOUSAND/ΜL (ref 0–0.61)
EOSINOPHIL NFR BLD AUTO: 3 % (ref 0–6)
ERYTHROCYTE [DISTWIDTH] IN BLOOD BY AUTOMATED COUNT: 13.6 % (ref 11.6–15.1)
EST. AVERAGE GLUCOSE BLD GHB EST-MCNC: 114 MG/DL
GFR SERPL CREATININE-BSD FRML MDRD: 19 ML/MIN/1.73SQ M
GLUCOSE SERPL-MCNC: 122 MG/DL (ref 65–140)
HBA1C MFR BLD: 5.6 % (ref 4.2–6.3)
HCT VFR BLD AUTO: 35.1 % (ref 34.8–46.1)
HGB BLD-MCNC: 11 G/DL (ref 11.5–15.4)
INR PPP: 0.94 (ref 0.86–1.16)
LYMPHOCYTES # BLD AUTO: 1.04 THOUSANDS/ΜL (ref 0.6–4.47)
LYMPHOCYTES NFR BLD AUTO: 19 % (ref 14–44)
MCH RBC QN AUTO: 30.1 PG (ref 26.8–34.3)
MCHC RBC AUTO-ENTMCNC: 31.3 G/DL (ref 31.4–37.4)
MCV RBC AUTO: 96 FL (ref 82–98)
MONOCYTES # BLD AUTO: 0.49 THOUSAND/ΜL (ref 0.17–1.22)
MONOCYTES NFR BLD AUTO: 9 % (ref 4–12)
NEUTROPHILS # BLD AUTO: 3.92 THOUSANDS/ΜL (ref 1.85–7.62)
NEUTS SEG NFR BLD AUTO: 69 % (ref 43–75)
PLATELET # BLD AUTO: 247 THOUSANDS/UL (ref 149–390)
PMV BLD AUTO: 10.1 FL (ref 8.9–12.7)
POTASSIUM SERPL-SCNC: 4.3 MMOL/L (ref 3.5–5.3)
PROT SERPL-MCNC: 7.7 G/DL (ref 6.4–8.2)
PROTHROMBIN TIME: 12.8 SECONDS (ref 12.1–14.4)
RBC # BLD AUTO: 3.66 MILLION/UL (ref 3.81–5.12)
SODIUM SERPL-SCNC: 143 MMOL/L (ref 136–145)
TROPONIN I SERPL-MCNC: <0.02 NG/ML
TSH SERPL DL<=0.05 MIU/L-ACNC: 2.42 UIU/ML (ref 0.36–3.74)
VIT B12 SERPL-MCNC: 213 PG/ML (ref 100–900)
WBC # BLD AUTO: 5.6 THOUSAND/UL (ref 4.31–10.16)

## 2017-10-29 PROCEDURE — 84443 ASSAY THYROID STIM HORMONE: CPT | Performed by: INTERNAL MEDICINE

## 2017-10-29 PROCEDURE — 99285 EMERGENCY DEPT VISIT HI MDM: CPT

## 2017-10-29 PROCEDURE — 80048 BASIC METABOLIC PNL TOTAL CA: CPT | Performed by: EMERGENCY MEDICINE

## 2017-10-29 PROCEDURE — 85025 COMPLETE CBC W/AUTO DIFF WBC: CPT | Performed by: EMERGENCY MEDICINE

## 2017-10-29 PROCEDURE — 36415 COLL VENOUS BLD VENIPUNCTURE: CPT | Performed by: EMERGENCY MEDICINE

## 2017-10-29 PROCEDURE — 87040 BLOOD CULTURE FOR BACTERIA: CPT | Performed by: INTERNAL MEDICINE

## 2017-10-29 PROCEDURE — 84484 ASSAY OF TROPONIN QUANT: CPT | Performed by: EMERGENCY MEDICINE

## 2017-10-29 PROCEDURE — 83036 HEMOGLOBIN GLYCOSYLATED A1C: CPT | Performed by: INTERNAL MEDICINE

## 2017-10-29 PROCEDURE — 93005 ELECTROCARDIOGRAM TRACING: CPT | Performed by: EMERGENCY MEDICINE

## 2017-10-29 PROCEDURE — 80076 HEPATIC FUNCTION PANEL: CPT | Performed by: EMERGENCY MEDICINE

## 2017-10-29 PROCEDURE — 82607 VITAMIN B-12: CPT | Performed by: INTERNAL MEDICINE

## 2017-10-29 PROCEDURE — 85610 PROTHROMBIN TIME: CPT | Performed by: EMERGENCY MEDICINE

## 2017-10-29 PROCEDURE — 85730 THROMBOPLASTIN TIME PARTIAL: CPT | Performed by: EMERGENCY MEDICINE

## 2017-10-29 RX ORDER — PANTOPRAZOLE SODIUM 40 MG/1
40 TABLET, DELAYED RELEASE ORAL DAILY
COMMUNITY
Start: 2016-08-17 | End: 2018-10-26

## 2017-10-29 RX ORDER — MULTIVIT-MIN/IRON/FOLIC ACID/K 18-600-40
2000 CAPSULE ORAL DAILY
COMMUNITY

## 2017-10-29 RX ORDER — DOCUSATE SODIUM 100 MG/1
100 CAPSULE, LIQUID FILLED ORAL 2 TIMES DAILY
Status: DISCONTINUED | OUTPATIENT
Start: 2017-10-29 | End: 2017-11-02 | Stop reason: HOSPADM

## 2017-10-29 RX ORDER — TRAVOPROST OPHTHALMIC SOLUTION 0.04 MG/ML
1 SOLUTION OPHTHALMIC DAILY
COMMUNITY
Start: 2014-10-30 | End: 2018-10-26

## 2017-10-29 RX ORDER — HEPARIN SODIUM 5000 [USP'U]/ML
5000 INJECTION, SOLUTION INTRAVENOUS; SUBCUTANEOUS EVERY 8 HOURS SCHEDULED
Status: DISCONTINUED | OUTPATIENT
Start: 2017-10-29 | End: 2017-11-02 | Stop reason: HOSPADM

## 2017-10-29 RX ORDER — SUCRALFATE 1 G/1
1 TABLET ORAL EVERY 12 HOURS
COMMUNITY
Start: 2016-08-17 | End: 2018-05-14

## 2017-10-29 RX ORDER — ASPIRIN 81 MG/1
81 TABLET ORAL DAILY
Status: DISCONTINUED | OUTPATIENT
Start: 2017-10-29 | End: 2017-11-02 | Stop reason: HOSPADM

## 2017-10-29 RX ORDER — SODIUM CHLORIDE 9 MG/ML
50 INJECTION, SOLUTION INTRAVENOUS CONTINUOUS
Status: DISCONTINUED | OUTPATIENT
Start: 2017-10-29 | End: 2017-10-29

## 2017-10-29 RX ORDER — SODIUM CHLORIDE 9 MG/ML
50 INJECTION, SOLUTION INTRAVENOUS CONTINUOUS
Status: DISCONTINUED | OUTPATIENT
Start: 2017-10-29 | End: 2017-10-30

## 2017-10-29 RX ORDER — ACETAMINOPHEN 325 MG/1
650 TABLET ORAL EVERY 6 HOURS PRN
Status: DISCONTINUED | OUTPATIENT
Start: 2017-10-29 | End: 2017-11-02 | Stop reason: HOSPADM

## 2017-10-29 RX ORDER — OMEPRAZOLE 40 MG/1
40 CAPSULE, DELAYED RELEASE ORAL DAILY
COMMUNITY
End: 2017-11-02 | Stop reason: HOSPADM

## 2017-10-29 RX ORDER — SENNOSIDES 8.6 MG
1 TABLET ORAL DAILY
Status: DISCONTINUED | OUTPATIENT
Start: 2017-10-29 | End: 2017-11-02 | Stop reason: HOSPADM

## 2017-10-29 RX ADMIN — SODIUM CHLORIDE 50 ML/HR: 0.9 INJECTION, SOLUTION INTRAVENOUS at 14:38

## 2017-10-29 RX ADMIN — HEPARIN SODIUM 5000 UNITS: 5000 INJECTION, SOLUTION INTRAVENOUS; SUBCUTANEOUS at 14:38

## 2017-10-29 RX ADMIN — SODIUM CHLORIDE 125 ML/HR: 0.9 INJECTION, SOLUTION INTRAVENOUS at 18:33

## 2017-10-29 RX ADMIN — ACETAMINOPHEN 650 MG: 325 TABLET ORAL at 22:58

## 2017-10-29 RX ADMIN — DOCUSATE SODIUM 100 MG: 100 CAPSULE, LIQUID FILLED ORAL at 18:32

## 2017-10-29 RX ADMIN — HEPARIN SODIUM 5000 UNITS: 5000 INJECTION, SOLUTION INTRAVENOUS; SUBCUTANEOUS at 22:54

## 2017-10-29 RX ADMIN — SODIUM CHLORIDE 500 ML: 0.9 INJECTION, SOLUTION INTRAVENOUS at 12:36

## 2017-10-29 NOTE — ED PROVIDER NOTES
History  Chief Complaint   Patient presents with    Syncope     Pt presents to the ED for witnessed syncopal episode  Event witnessed by personal care aide and family  Pt started to note that she started to feel weak and dizzy, they lowered her down to the ground and then she passed out momentarily  After coming to note pt notes dizziness  History provided by:  Patient and relative  Syncope   Episode history:  Single  Duration:  15 seconds  Timing:  Constant  Progression:  Partially resolved  Chronicity:  New  Context comment:  Patient was at home actually had a home health aide it only as well as her daughter said she felt weak, tired, dizzy vision faded to black, daughter and home health [de-identified] to the ground she was unconscious for 15 seconds and woke up, since then feels weak  Witnessed: yes    Relieved by:  None tried  Worsened by:  Nothing  Ineffective treatments:  None tried  Associated symptoms: dizziness, visual change and weakness    Associated symptoms: no anxiety, no chest pain, no diaphoresis, no fever, no headaches, no nausea, no palpitations, no shortness of breath and no vomiting        Prior to Admission Medications   Prescriptions Last Dose Informant Patient Reported?  Taking?   acetaminophen (TYLENOL) 325 mg tablet   No No   Sig: Take 1 tablet by mouth every 8 (eight) hours as needed for mild pain   aspirin (ECOTRIN LOW STRENGTH) 81 mg EC tablet   No No   Sig: Take 1 tablet by mouth daily   docusate sodium (COLACE) 100 mg capsule   No No   Sig: Take 1 capsule by mouth 2 (two) times a day   omeprazole (PriLOSEC) 20 mg delayed release capsule   Yes No   Sig: Take 20 mg by mouth as needed (Once every few months)   senna (SENOKOT) 8 6 mg   No No   Sig: Take 1 tablet by mouth daily      Facility-Administered Medications: None       Past Medical History:   Diagnosis Date    Cancer [facility])     "some kind of cancer when I was 80"       Past Surgical History:   Procedure Laterality Date    BACK SURGERY      COLON SURGERY      HIP FRACTURE SURGERY         History reviewed  No pertinent family history  I have reviewed and agree with the history as documented  Social History   Substance Use Topics    Smoking status: Never Smoker    Smokeless tobacco: Never Used    Alcohol use No        Review of Systems   Constitutional: Negative for activity change, chills, diaphoresis and fever  HENT: Negative for congestion, sinus pressure and sore throat  Eyes: Negative for pain and visual disturbance  Respiratory: Negative for cough, chest tightness, shortness of breath, wheezing and stridor  Cardiovascular: Positive for syncope  Negative for chest pain and palpitations  Gastrointestinal: Negative for abdominal distention, abdominal pain, constipation, diarrhea, nausea and vomiting  Genitourinary: Negative for dysuria and frequency  Musculoskeletal: Negative for neck pain and neck stiffness  Skin: Negative for rash  Neurological: Positive for dizziness and weakness  Negative for speech difficulty, light-headedness, numbness and headaches  Physical Exam  ED Triage Vitals [10/29/17 1200]   Temperature Pulse Respirations Blood Pressure SpO2   97 7 °F (36 5 °C) 81 16 132/60 98 %      Temp Source Heart Rate Source Patient Position - Orthostatic VS BP Location FiO2 (%)   Oral Monitor Sitting Right arm --      Pain Score       No Pain           Orthostatic Vital Signs  Vitals:    10/29/17 1200   BP: 132/60   Pulse: 81   Patient Position - Orthostatic VS: Sitting       Physical Exam   Constitutional: She is oriented to person, place, and time  She appears well-developed  No distress  HENT:   Head: Normocephalic and atraumatic  Eyes: Pupils are equal, round, and reactive to light  Neck: Normal range of motion  Neck supple  No tracheal deviation present  Cardiovascular: Normal rate, regular rhythm and intact distal pulses  Murmur heard    Pulmonary/Chest: Effort normal and breath sounds normal  No stridor  No respiratory distress  Abdominal: Soft  She exhibits no distension  There is no tenderness  There is no rebound and no guarding  Musculoskeletal: Normal range of motion  Neurological: She is alert and oriented to person, place, and time  Skin: Skin is warm and dry  She is not diaphoretic  No erythema  No pallor  Psychiatric: She has a normal mood and affect  Vitals reviewed  ED Medications  Medications   sodium chloride 0 9 % bolus 500 mL (500 mL Intravenous New Bag 10/29/17 1236)       Diagnostic Studies  Results Reviewed     Procedure Component Value Units Date/Time    Troponin I [[id number]] Collected:  10/29/17 1236    Lab Status: In process Specimen:  Blood from Arm, Left Updated:  10/29/17 0413    Basic metabolic panel [[id number]]  (Abnormal) Collected:  10/29/17 1202    Lab Status:  Final result Specimen:  Blood from Arm, Right Updated:  10/29/17 1226     Sodium 143 mmol/L      Potassium 4 3 mmol/L      Chloride 106 mmol/L      CO2 26 mmol/L      Anion Gap 11 mmol/L      BUN 32 (H) mg/dL      Creatinine 2 12 (H) mg/dL      Glucose 122 mg/dL      Calcium 9 6 mg/dL      eGFR 19 ml/min/1 73sq m     Narrative:         National Kidney Disease Education Program recommendations are as follows:  GFR calculation is accurate only with a steady state creatinine  Chronic Kidney disease less than 60 ml/min/1 73 sq  meters  Kidney failure less than 15 ml/min/1 73 sq  meters      Hepatic function panel [[id number]]  (Normal) Collected:  10/29/17 1202    Lab Status:  Final result Specimen:  Blood from Arm, Right Updated:  10/29/17 1226     Total Bilirubin 0 40 mg/dL      Bilirubin, Direct 0 11 mg/dL      Alkaline Phosphatase 69 U/L      AST 23 U/L      ALT 25 U/L      Total Protein 7 7 g/dL      Albumin 3 8 g/dL     Protime-INR [[id number]]  (Normal) Collected:  10/29/17 1202    Lab Status:  Final result Specimen:  Blood from Arm, Right Updated:  10/29/17 1219     Protime 12 8 seconds      INR 0  94    APTT [[id number]]  (Normal) Collected:  10/29/17 1202    Lab Status:  Final result Specimen:  Blood from Arm, Right Updated:  10/29/17 1219     PTT 25 seconds     Narrative: Therapeutic Heparin Range = 60-90 seconds    CBC and differential [[id number]]  (Abnormal) Collected:  10/29/17 1202    Lab Status:  Final result Specimen:  Blood from Arm, Right Updated:  10/29/17 1209     WBC 5 60 Thousand/uL      RBC 3 66 (L) Million/uL      Hemoglobin 11 0 (L) g/dL      Hematocrit 35 1 %      MCV 96 fL      MCH 30 1 pg      MCHC 31 3 (L) g/dL      RDW 13 6 %      MPV 10 1 fL      Platelets 449 Thousands/uL      Neutrophils Relative 69 %      Lymphocytes Relative 19 %      Monocytes Relative 9 %      Eosinophils Relative 3 %      Basophils Relative 0 %      Neutrophils Absolute 3 92 Thousands/µL      Lymphocytes Absolute 1 04 Thousands/µL      Monocytes Absolute 0 49 Thousand/µL      Eosinophils Absolute 0 14 Thousand/µL      Basophils Absolute 0 01 Thousands/µL                  No orders to display              Procedures  ECG 12 Lead Documentation  Date/Time: 10/29/2017 12:02 PM  Performed by: Eleno Echols by: Thomas Howard     ECG reviewed by me, the ED Provider: yes    Patient location:  ED  Previous ECG:     Previous ECG:  Compared to current    Comparison ECG info:  10 29 2017    Similarity:  No change    Comparison to cardiac monitor: Yes    Interpretation:     Interpretation: abnormal    Rate:     ECG rate:  49    ECG rate assessment: bradycardic    Rhythm:     Rhythm: sinus bradycardia    Ectopy:     Ectopy: none    QRS:     QRS axis:  Left    QRS intervals:   Wide  Conduction:     Conduction: abnormal      Abnormal conduction: complete LBBB    ST segments:     ST segments:  Non-specific  T waves:     T waves: non-specific             Phone Contacts  ED Phone Contact    ED Course  ED Course       patient with syncopal episode, acute kidney injury on blood work, patient states she still does not feel back to 100%, lives alone, is 89, patient unsafe for discharge  MDM  Number of Diagnoses or Management Options  RALPH (acute kidney injury) [facility]): new and requires workup  Syncope: new and requires workup     Amount and/or Complexity of Data Reviewed  Clinical lab tests: ordered and reviewed  Decide to obtain previous medical records or to obtain history from someone other than the patient: yes  Obtain history from someone other than the patient: yes  Review and summarize past medical records: yes  Discuss the patient with other providers: yes  Independent visualization of images, tracings, or specimens: yes      CritCare Time    Disposition  Final diagnoses:   RALPH (acute kidney injury) ([facility] )   Syncope     Time reflects when diagnosis was documented in both MDM as applicable and the Disposition within this note     Time User Action Codes Description Comment    10/29/2017 12:40 PM Marco CAST Add [N17 9] RALPH (acute kidney injury) ([facility] )     10/29/2017 12:40 PM Igor Horn Add [R55] Syncope       ED Disposition     ED Disposition Condition Comment    Admit  Case was discussed with Dr Jose Jorgensen and the patient's admission status was agreed to be Admission Status: observation status to the service of Dr Jose Jorgensen   Follow-up Information    None       Patient's Medications   Discharge Prescriptions    No medications on file     No discharge procedures on file      ED Provider  Electronically Signed by           Noa Ramirez DO  10/29/17 0340

## 2017-10-29 NOTE — CASE MANAGEMENT
Initial Clinical Review    Admission: Date/Time/Statement: 10/29/2017 @ 1242  Orders Placed This Encounter   Procedures    Place in Observation (expected length of stay for this patient is less than two midnights)     Standing Status:   Standing     Number of Occurrences:   1     Order Specific Question:   Admitting Physician     Answer:   Jameson Sargent     Order Specific Question:   Level of Care     Answer:   Med Surg [16]     ED: Date/Time/Mode of Arrival:   ED Arrival Information     Expected Arrival Acuity Means of Arrival Escorted By Service Admission Type    - 10/29/2017 11:27 Urgent Ambulance [address] Urgent    Arrival Complaint    Syncope        Chief Complaint:   Chief Complaint   Patient presents with    Syncope     Pt presents to the ED for witnessed syncopal episode  Event witnessed by personal care aide and family  Pt started to note that she started to feel weak and dizzy, they lowered her down to the ground and then she passed out momentarily  After coming to note pt notes dizziness  History of Illness:      Josy Jernigan is a 80 y o  female who presents with after a syncopal episode earlier today  She was being helped into bed by her visiting nurse and daughter, because she was feeling her legs were weak and then subsequently collapsed onto the floor  Her daughter and nurse held her up so she did not hit her head       She notes that her legs have been weak for "quite a while"  She was just recently in [facility] and came home to her own home on Wednesday (4 days ago)  Since then she has been eating poorly and not really had an appetite  She has been feeling "weak" and tired   Visiting nurses were supposed to be in place, however they were not until today       ED Vital Signs:   ED Triage Vitals [10/29/17 1200]   Temperature Pulse Respirations Blood Pressure SpO2   97 7 °F (36 5 °C) 81 16 132/60 98 %      Temp Source Heart Rate Source Patient Position - Orthostatic VS BP Location FiO2 (%)   Oral Monitor Sitting Right arm --      Pain Score       No Pain        Wt Readings from Last 1 Encounters:   10/29/17 66 kg (145 lb 8 1 oz)     Abnormal Labs/Diagnostic Test Results:   WBC Thousand/uL 5 60   HEMOGLOBIN g/dL 11 0*   HEMATOCRIT % 35 1   PLATELETS Thousands/uL 247     SODIUM mmol/L 143   POTASSIUM mmol/L 4 3   CHLORIDE mmol/L 106   CO2 mmol/L 26   BUN mg/dL 32*   CREATININE mg/dL 2 12*   CALCIUM mg/dL 9 6   TOTAL PROTEIN g/dL 7 7   BILIRUBIN TOTAL mg/dL 0 40   ALK PHOS U/L 69   ALT U/L 25   AST U/L 23   GLUCOSE RANDOM mg/dL 122     Chest X: no active pulmonary disease    US ABD:  Large gallstone, present previously    ECG:   sinus rhythm  LBBB, No acute ishcemia  ED Treatment:   Medication Administration from 10/29/2017 1127 to 10/29/2017 1348    Date/Time Order Dose Route Action Action by Comments   10/29/2017 1236 sodium chloride 0 9 % bolus 500 mL 500 mL Intravenous Given Katey Ty RN         Past Medical/Surgical History: Active Ambulatory Problems     Diagnosis Date Noted    Transaminitis 10/04/2017    CKD (chronic kidney disease) stage 4, GFR 15-29 ml/min ([facility]) 10/04/2017    NSTEMI (non-ST elevated myocardial infarction) ([facility] ) 10/04/2017    Weakness 10/06/2017     Resolved Ambulatory Problems     Diagnosis Date Noted    RUQ pain 10/04/2017     Past Medical History:   Diagnosis Date    Cancer [facility])        Admitting Diagnosis: Syncope [R55]  RALPH (acute kidney injury) ([facility] ) [N17 9]    Age/Sex: 80 y o  female    Assessment/Plan:   Syncope  Active Problems:    RALPH (acute kidney injury) ([facility] )    Anemia    Systolic heart failure ([facility] )        Plan for the Primary Problem(s):  Syncope  Check orthostatics  Echo  Monitor on telemetry  Will trend troponins       RALPH POA on CKD  Baseline creatinine is 1 3  Will give IVF       Anemia  Last hemoglobin was 10 3  Today is 11     Normocytic anemia       Plan for Additional Problems:   Systolic heart failure  No acute exacerbation  Appears dry  EF of 40 % on most recent Echo       Neuropathy  Patient states she has neuropathy  No diagnosis of diabetes  Will check A1C, B12 and TSH       VTE Prophylaxis: Heparin  / linden sequential compression device   Code Status: Full Code  Amaris Sole POLST: There is no POLST form on file for this patient (pre-hospital)     Anticipated Length of Stay:  Patient will be admitted on an Observation basis with an anticipated length of stay of  Less than 2 midnights  Justification for Hospital Stay: Syncope work up       Admission Orders:  Scheduled Meds:   aspirin 81 mg Oral Daily   docusate sodium 100 mg Oral BID   heparin (porcine) 5,000 Units Subcutaneous Q8H [address]   senna 1 tablet Oral Daily     Continuous Infusions:   sodium chloride 125 mL/hr    sodium chloride 50 mL/hr Last Rate: 50 mL/hr (10/29/17 1438)     PRN Meds:     ECHO:  pend  TELM

## 2017-10-29 NOTE — H&P
History and Physical - [facility] Internal Medicine    Patient Information: Hola Piña 80 y o  female MRN: [id number]  Unit/Bed#: CARMELLA Encounter: [id number]  Admitting Physician: Skinny Restrepo MD  PCP: Kelvin Diehl MD  Date of Admission:  10/29/17    Assessment/Plan:    Hospital Problem List:     Principal Problem:    Syncope  Active Problems:    RALPH (acute kidney injury) ([facility] )    Anemia    Systolic heart failure ([facility] )      Plan for the Primary Problem(s):  Syncope  Check orthostatics  Echo  Monitor on telemetry  Will trend troponins  RALPH POA on CKD  Baseline creatinine is 1 3  Will give IVF  Anemia  Last hemoglobin was 10 3  Today is 11  Normocytic anemia  Plan for Additional Problems:   Systolic heart failure  No acute exacerbation  Appears dry  EF of 40 % on most recent Echo  Neuropathy  Patient states she has neuropathy  No diagnosis of diabetes  Will check A1C, B12 and TSH  VTE Prophylaxis: Heparin  / sequential compression device   Code Status: Full Code  Tabby Arrington POLST: There is no POLST form on file for this patient (pre-hospital)    Anticipated Length of Stay:  Patient will be admitted on an Observation basis with an anticipated length of stay of  Less than 2 midnights  Justification for Hospital Stay: Syncope work up  Total Time for Visit, including Counseling / Coordination of Care: 45 minutes  Greater than 50% of this total time spent on direct patient counseling and coordination of care  Chief Complaint:     "I passed out"    History of Present Illness: Hola Piña is a 80 y o  female who presents with after a syncopal episode earlier today  She was being helped into bed by her visiting nurse and daughter, because she was feeling her legs were weak and then subsequently collapsed onto the floor  Her daughter and nurse held her up so she did not hit her head  She notes that her legs have been weak for "quite a while"   She was just recently in [facility] and came home to her own home on Wednesday (4 days ago)  Since then she has been eating poorly and not really had an appetite  She has been feeling "weak" and tired  Visiting nurses were supposed to be in place, however they were not until today  Prior to rehab she was admitted to [facility] for elevated LFTs and discharged on October 4th to rehab facility  She complains of burning pain in both lower extremities which has been ongoing for weeks, and she says these symptoms have been present for several weeks  She lives alone, but her daughter and sons visit at least once a day  Prior to her syncopal event she did not notice any CP or SOB, she says she felt "dizzy"  No tremor/movement of extremities  No urinary incontinence  Review of Systems:    Review of Systems   Constitutional: Positive for activity change, appetite change and fatigue  Negative for chills, diaphoresis, fever and unexpected weight change  HENT: Negative for congestion, dental problem, drooling, ear discharge, ear pain, facial swelling, hearing loss, mouth sores, nosebleeds, postnasal drip and rhinorrhea  Respiratory: Negative for apnea, cough, choking, chest tightness, shortness of breath, wheezing and stridor  Cardiovascular: Negative for chest pain, palpitations and leg swelling  Gastrointestinal: Negative for abdominal distention, abdominal pain, anal bleeding, constipation, diarrhea, nausea and vomiting  Endocrine: Negative for cold intolerance, heat intolerance, polydipsia, polyphagia and polyuria  Genitourinary: Negative for decreased urine volume, difficulty urinating and dyspareunia  Musculoskeletal: Positive for gait problem  Negative for arthralgias, back pain, joint swelling, myalgias, neck pain and neck stiffness  Neurological: Positive for dizziness, weakness, light-headedness and numbness  Negative for tremors, seizures, syncope, facial asymmetry, speech difficulty and headaches  Psychiatric/Behavioral: Negative for agitation, behavioral problems and confusion  Past Medical and Surgical History:     Past Medical History:   Diagnosis Date    Cancer [facility])     "some kind of cancer when I was 80"       Past Surgical History:   Procedure Laterality Date    BACK SURGERY      COLON SURGERY      HIP FRACTURE SURGERY         Meds/Allergies:    Prior to Admission medications    Medication Sig Start Date End Date Taking? Authorizing Provider   acetaminophen (TYLENOL) 325 mg tablet Take 1 tablet by mouth every 8 (eight) hours as needed for mild pain 10/9/17   Mirlande Linder MD   aspirin (ECOTRIN LOW STRENGTH) 81 mg EC tablet Take 1 tablet by mouth daily 10/10/17   Mirlande Linder MD   docusate sodium (COLACE) 100 mg capsule Take 1 capsule by mouth 2 (two) times a day 10/9/17   Mirlande Linder MD   omeprazole (PriLOSEC) 20 mg delayed release capsule Take 20 mg by mouth as needed (Once every few months)    Historical Provider, MD   senna (SENOKOT) 8 6 mg Take 1 tablet by mouth daily 10/10/17   Don Trammell MD     I have reviewed home medications with patient personally  Allergies: No Known Allergies    Social History:     Marital Status:    Occupation: Used to be a , retired in [address]   Patient Pre-hospital Living Situation: Lives alone  Patient Pre-hospital Level of Mobility: Mobilizes with walker  Patient Pre-hospital Diet Restrictions: none  Substance Use History:   History   Alcohol Use No     History   Smoking Status    Never Smoker   Smokeless Tobacco    Never Used     History   Drug Use No       Family History:    mother MI, sister colon cancer       Physical Exam:     Vitals:   Blood Pressure: 132/60 (10/29/17 1200)  Pulse: 81 (10/29/17 1200)  Temperature: 97 7 °F (36 5 °C) (10/29/17 1200)  Temp Source: Oral (10/29/17 1200)  Respirations: 16 (10/29/17 1200)  Weight - Scale: 66 4 kg (146 lb 6 2 oz) (10/29/17 1200)  SpO2: 98 % (10/29/17 1200)    Physical Exam   Constitutional: She is oriented to person, place, and time  She appears well-developed and well-nourished  HENT:   Head: Normocephalic and atraumatic  Eyes: Right eye exhibits no discharge  Left eye exhibits no discharge  No scleral icterus  Neck: No JVD present  No tracheal deviation present  No thyromegaly present  Cardiovascular: Normal rate  Exam reveals no gallop and no friction rub  Murmur heard  Pulmonary/Chest: No respiratory distress  She has no wheezes  She has no rales  She exhibits no tenderness  Abdominal: She exhibits no distension and no mass  There is no tenderness  There is no rebound and no guarding  Musculoskeletal: Normal range of motion  She exhibits no edema, tenderness or deformity  Lymphadenopathy:     She has no cervical adenopathy  Neurological: She is alert and oriented to person, place, and time  No cranial nerve deficit  Coordination normal    Power is 4/5 on both lower extremities  Skin: No rash noted  No erythema  There is pallor  Psychiatric: She has a normal mood and affect  Additional Data:     Lab Results: I have personally reviewed pertinent reports  Results from last 7 days  Lab Units 10/29/17  1202   WBC Thousand/uL 5 60   HEMOGLOBIN g/dL 11 0*   HEMATOCRIT % 35 1   PLATELETS Thousands/uL 247   NEUTROS PCT % 69   LYMPHS PCT % 19   MONOS PCT % 9   EOS PCT % 3       Results from last 7 days  Lab Units 10/29/17  1202   SODIUM mmol/L 143   POTASSIUM mmol/L 4 3   CHLORIDE mmol/L 106   CO2 mmol/L 26   BUN mg/dL 32*   CREATININE mg/dL 2 12*   CALCIUM mg/dL 9 6   TOTAL PROTEIN g/dL 7 7   BILIRUBIN TOTAL mg/dL 0 40   ALK PHOS U/L 69   ALT U/L 25   AST U/L 23   GLUCOSE RANDOM mg/dL 122       Results from last 7 days  Lab Units 10/29/17  1202   INR  0 94       Imaging: I have personally reviewed pertinent reports        Xr Chest 2 Views    Result Date: 10/5/2017  Narrative: CHEST INDICATION:      Impression: No active pulmonary disease  Workstation performed: [id number]     Us Abdomen Limited    Result Date: 10/5/2017  Narrative: RIGHT UPPER QUADRANT ULTRASOUND INDICATION:  Elevated LFTs ,    Impression: Large gallstone, present previously  Workstation performed: [id number]       EKG, Pathology, and Other Studies Reviewed on Admission:   · EKG: LBBB, sinus rhythm  No acute ishcemia  Allscripts / Epic Records Reviewed: Yes     ** Please Note: This note has been constructed using a voice recognition system   **

## 2017-10-30 ENCOUNTER — APPOINTMENT (OUTPATIENT)
Dept: NON INVASIVE DIAGNOSTICS | Facility: HOSPITAL | Age: 82
DRG: 683 | End: 2017-10-30
Payer: MEDICARE

## 2017-10-30 ENCOUNTER — GENERIC CONVERSION - ENCOUNTER (OUTPATIENT)
Dept: OTHER | Facility: OTHER | Age: 82
End: 2017-10-30

## 2017-10-30 PROBLEM — G62.9 NEUROPATHY: Status: ACTIVE | Noted: 2017-10-30

## 2017-10-30 LAB
ALBUMIN SERPL BCP-MCNC: 3 G/DL (ref 3.5–5)
ALP SERPL-CCNC: 50 U/L (ref 46–116)
ALT SERPL W P-5'-P-CCNC: 19 U/L (ref 12–78)
ANION GAP SERPL CALCULATED.3IONS-SCNC: 8 MMOL/L (ref 4–13)
AST SERPL W P-5'-P-CCNC: 15 U/L (ref 5–45)
ATRIAL RATE: 83 BPM
BILIRUB SERPL-MCNC: 0.4 MG/DL (ref 0.2–1)
BUN SERPL-MCNC: 33 MG/DL (ref 5–25)
CALCIUM SERPL-MCNC: 8.5 MG/DL (ref 8.3–10.1)
CHLORIDE SERPL-SCNC: 110 MMOL/L (ref 100–108)
CO2 SERPL-SCNC: 24 MMOL/L (ref 21–32)
CREAT SERPL-MCNC: 1.55 MG/DL (ref 0.6–1.3)
ERYTHROCYTE [DISTWIDTH] IN BLOOD BY AUTOMATED COUNT: 13.7 % (ref 11.6–15.1)
FERRITIN SERPL-MCNC: 114 NG/ML (ref 8–388)
GFR SERPL CREATININE-BSD FRML MDRD: 27 ML/MIN/1.73SQ M
GLUCOSE P FAST SERPL-MCNC: 99 MG/DL (ref 65–99)
GLUCOSE SERPL-MCNC: 99 MG/DL (ref 65–140)
HCT VFR BLD AUTO: 28.1 % (ref 34.8–46.1)
HCT VFR BLD AUTO: 28.5 % (ref 34.8–46.1)
HGB BLD-MCNC: 8.6 G/DL (ref 11.5–15.4)
HGB BLD-MCNC: 8.8 G/DL (ref 11.5–15.4)
IRON SATN MFR SERPL: 15 %
IRON SERPL-MCNC: 30 UG/DL (ref 50–170)
MAGNESIUM SERPL-MCNC: 2 MG/DL (ref 1.6–2.6)
MCH RBC QN AUTO: 29.5 PG (ref 26.8–34.3)
MCHC RBC AUTO-ENTMCNC: 30.9 G/DL (ref 31.4–37.4)
MCV RBC AUTO: 96 FL (ref 82–98)
P AXIS: 68 DEGREES
PLATELET # BLD AUTO: 187 THOUSANDS/UL (ref 149–390)
PMV BLD AUTO: 9.8 FL (ref 8.9–12.7)
POTASSIUM SERPL-SCNC: 3.9 MMOL/L (ref 3.5–5.3)
PR INTERVAL: 154 MS
PROT SERPL-MCNC: 6.1 G/DL (ref 6.4–8.2)
QRS AXIS: -21 DEGREES
QRSD INTERVAL: 142 MS
QT INTERVAL: 410 MS
QTC INTERVAL: 481 MS
RBC # BLD AUTO: 2.98 MILLION/UL (ref 3.81–5.12)
SODIUM SERPL-SCNC: 142 MMOL/L (ref 136–145)
T WAVE AXIS: 130 DEGREES
TIBC SERPL-MCNC: 200 UG/DL (ref 250–450)
TROPONIN I SERPL-MCNC: 0.02 NG/ML
VENTRICULAR RATE: 83 BPM
WBC # BLD AUTO: 5.25 THOUSAND/UL (ref 4.31–10.16)

## 2017-10-30 PROCEDURE — 80053 COMPREHEN METABOLIC PANEL: CPT | Performed by: INTERNAL MEDICINE

## 2017-10-30 PROCEDURE — 93308 TTE F-UP OR LMTD: CPT

## 2017-10-30 PROCEDURE — 82728 ASSAY OF FERRITIN: CPT | Performed by: PHYSICIAN ASSISTANT

## 2017-10-30 PROCEDURE — 85014 HEMATOCRIT: CPT | Performed by: PHYSICIAN ASSISTANT

## 2017-10-30 PROCEDURE — 83550 IRON BINDING TEST: CPT | Performed by: PHYSICIAN ASSISTANT

## 2017-10-30 PROCEDURE — 85018 HEMOGLOBIN: CPT | Performed by: PHYSICIAN ASSISTANT

## 2017-10-30 PROCEDURE — 85027 COMPLETE CBC AUTOMATED: CPT | Performed by: INTERNAL MEDICINE

## 2017-10-30 PROCEDURE — 84484 ASSAY OF TROPONIN QUANT: CPT | Performed by: PHYSICIAN ASSISTANT

## 2017-10-30 PROCEDURE — 83540 ASSAY OF IRON: CPT | Performed by: PHYSICIAN ASSISTANT

## 2017-10-30 PROCEDURE — 83735 ASSAY OF MAGNESIUM: CPT | Performed by: INTERNAL MEDICINE

## 2017-10-30 RX ORDER — SODIUM CHLORIDE 9 MG/ML
50 INJECTION, SOLUTION INTRAVENOUS ONCE
Status: COMPLETED | OUTPATIENT
Start: 2017-10-30 | End: 2017-10-30

## 2017-10-30 RX ORDER — CYANOCOBALAMIN 1000 UG/ML
1000 INJECTION INTRAMUSCULAR; SUBCUTANEOUS ONCE
Status: COMPLETED | OUTPATIENT
Start: 2017-10-30 | End: 2017-10-30

## 2017-10-30 RX ADMIN — HEPARIN SODIUM 5000 UNITS: 5000 INJECTION, SOLUTION INTRAVENOUS; SUBCUTANEOUS at 06:14

## 2017-10-30 RX ADMIN — SODIUM CHLORIDE 50 ML/HR: 0.9 INJECTION, SOLUTION INTRAVENOUS at 13:08

## 2017-10-30 RX ADMIN — ASPIRIN 81 MG: 81 TABLET, COATED ORAL at 09:01

## 2017-10-30 RX ADMIN — CYANOCOBALAMIN 1000 MCG: 1000 INJECTION, SOLUTION INTRAMUSCULAR at 11:15

## 2017-10-30 RX ADMIN — HEPARIN SODIUM 5000 UNITS: 5000 INJECTION, SOLUTION INTRAVENOUS; SUBCUTANEOUS at 13:54

## 2017-10-30 RX ADMIN — HEPARIN SODIUM 5000 UNITS: 5000 INJECTION, SOLUTION INTRAVENOUS; SUBCUTANEOUS at 21:41

## 2017-10-30 NOTE — ASSESSMENT & PLAN NOTE
· RALPH, POA, on CKD, stage IV  · Cr improved to 1 55 today from 2 12 on admission  · Baseline Cr 1 35-1  55    · Patient appears dry still, will continue IV fluids at 50 mL/ hr for 1 more liter  · Encourage PO intake

## 2017-10-30 NOTE — SOCIAL WORK
OBS   AMI bundle;  Readmit  Pt lives alone in her home where she is independent and has a RW and shower chair  She has been to [facility],  and [facility] in the past for rehab  She does not drive and she does not have a POA  She does not have MH/D&A hx  Pt was currently receiving VNA services from [facility]  She was recently discharged from [facility] 4 days prior to returning to the ED  CM explained we could wait until PT OT saw her to discuss rehab however pt would like referrals made to CM as [facility] is too expensive  She states she believes she will need rehab  Referral has been made  Pt will not need 3 over nights to go to rehab as she is within her 30 day window and Medicare will cover rehab  CM will continue to assist with needs and DCP

## 2017-10-30 NOTE — ASSESSMENT & PLAN NOTE
· No evidence of acute exacerbation; patient appears dry on exam   · Most recent echo earlier this month showed and EF of 40% and grade 1 diastolic dysfunction  · Repeat echo pending

## 2017-10-30 NOTE — ASSESSMENT & PLAN NOTE
· Syncope possibly secondary to dehydration  · Patient cannot recall the details prior to her syncopal episode  · Obtain orthostatic BPs  · Trend troponins  · Continue to monitor on telemetry  · Echo pending  · Last echo 10/6/17 EF 79%, grade 1 diastolic dysfunction  · PT/OT eval pending  Patient may need placement at long term care facility  · TSH within normal limits

## 2017-10-30 NOTE — PLAN OF CARE
Problem: DISCHARGE PLANNING - CARE MANAGEMENT  Goal: Discharge to post-acute care or home with appropriate resources  INTERVENTIONS:  - Conduct assessment to determine patient/family and health care team treatment goals, and need for post-acute services based on payer coverage, community resources, and patient preferences, and barriers to discharge  - Address psychosocial, clinical, and financial barriers to discharge as identified in assessment in conjunction with the patient/family and health care team  - Arrange appropriate level of post-acute services according to patient's   needs and preference and payer coverage in collaboration with the physician and health care team  - Communicate with and update the patient/family, physician, and health care team regarding progress on the discharge plan  - Arrange appropriate transportation to post-acute venues  Outcome: Progressing  OBS  AMI bundle;  Readmit  Pt lives alone in her home where she is independent and has a RW and shower chair  She has been to [address] [place] [address] [facility],  and [facility] in the past for rehab  She does not drive and she does not have a POA  She does not have MH/D&A hx  Pt was currently receiving VNA services from [facility]  She was recently discharged from [facility] 4 days prior to returning to the ED  CM explained we could wait until PT OT saw her to discuss rehab however pt would like referrals made to CM as [facility] is too expensive  She states she believes she will need rehab  Referral has been made  Pt will not need 3 over nights to go to rehab as she is within her 30 day window and Medicare will cover rehab  CM will continue to assist with needs and DCP

## 2017-10-30 NOTE — ASSESSMENT & PLAN NOTE
· Hg dropped from 11 0 on admission to 8 8 today  Possibly related to dehydration, dilutional?  · Obtain repeat CBC this evening and in AM   · Obtain fecal occult blood and iron panel

## 2017-10-30 NOTE — CASE MANAGEMENT
ED: Date/Time/Mode of Arrival:             ED Arrival Information      Expected Arrival Acuity Means of Arrival Escorted By Service Admission Type     - 10/29/2017 11:27 Urgent Ambulance [address] Urgent     Arrival Complaint     Syncope          Chief Complaint:        Chief Complaint   Patient presents with    Syncope       Pt presents to the ED for witnessed syncopal episode  Event witnessed by personal care aide and family  Pt started to note that she started to feel weak and dizzy, they lowered her down to the ground and then she passed out momentarily  After coming to note pt notes dizziness        History of Illness:      Jesus Manuel Vincent is a 80 y o  female who presents with after a syncopal episode earlier today  She was being helped into bed by her visiting nurse and daughter, because she was feeling her legs were weak and then subsequently collapsed onto the floor  Her daughter and nurse held her up so she did not hit her head       She notes that her legs have been weak for "quite a while"  She was just recently in [facility] and came home to her own home on Wednesday (4 days ago)  Since then she has been eating poorly and not really had an appetite  She has been feeling "weak" and tired   Visiting nurses were supposed to be in place, however they were not until today       ED Vital Signs:            ED Triage Vitals [10/29/17 1200]   Temperature Pulse Respirations Blood Pressure SpO2   97 7 °F (36 5 °C) 81 16 132/60 98 %       Temp Source Heart Rate Source Patient Position - Orthostatic VS BP Location FiO2 (%)   Oral Monitor Sitting Right arm --       Pain Score           No Pain                Wt Readings from Last 1 Encounters:   10/29/17 66 kg (145 lb 8 1 oz)      Abnormal Labs/Diagnostic Test Results:   WBC Thousand/uL 5 60   HEMOGLOBIN g/dL 11 0*   HEMATOCRIT % 35 1   PLATELETS Thousands/uL 247      SODIUM mmol/L 143   POTASSIUM mmol/L 4 3   CHLORIDE mmol/L 106   CO2 mmol/L 26   BUN mg/dL 32*   CREATININE mg/dL 2 12*   CALCIUM mg/dL 9 6   TOTAL PROTEIN g/dL 7 7   BILIRUBIN TOTAL mg/dL 0 40   ALK PHOS U/L 69   ALT U/L 25   AST U/L 23   GLUCOSE RANDOM mg/dL 122      Chest X: no active pulmonary disease     US ABD:  Large gallstone, present previously     ECG:   sinus rhythm  LBBB, No acute ishcemia          ED Treatment:            Medication Administration from 10/29/2017 1127 to 10/29/2017 1348    Date/Time Order Dose Route Action Action by Comments   10/29/2017 1236 sodium chloride 0 9 % bolus 500 mL 500 mL Intravenous Given Abby Oseguera RN            Past Medical/Surgical History: Active Ambulatory Problems     Diagnosis Date Noted    Transaminitis 10/04/2017    CKD (chronic kidney disease) stage 4, GFR 15-29 ml/min ([facility]) 10/04/2017    NSTEMI (non-ST elevated myocardial infarction) ([facility] ) 10/04/2017    Weakness 10/06/2017           Resolved Ambulatory Problems     Diagnosis Date Noted    RUQ pain 10/04/2017           Past Medical History:   Diagnosis Date    Cancer [facility])           Admitting Diagnosis: Syncope [R55]  RALPH (acute kidney injury) ([facility] ) [N17 9]     Age/Sex: 80 y o  female     Assessment/Plan:   Syncope  Active Problems:    RALPH (acute kidney injury) ([facility] )    Anemia    Systolic heart failure ([facility] )        Plan for the Primary Problem(s):  Syncope  Check orthostatics  Echo  Monitor on telemetry  Will trend troponins       RALPH POA on CKD  Baseline creatinine is 1 3  Will give IVF       Anemia  Last hemoglobin was 10 3  Today is 11  Normocytic anemia       Plan for Additional Problems:   Systolic heart failure  No acute exacerbation  Appears dry  EF of 40 % on most recent Echo       Neuropathy  Patient states she has neuropathy  No diagnosis of diabetes  Will check A1C, B12 and TSH       VTE Prophylaxis: Heparin  / linden sequential compression device   Code Status: Full Code  Maxine Marie   POLST: There is no POLST form on file for this patient (pre-hospital)     Anticipated Length of Stay: Mayelin Cannon will be admitted on an Observation basis with an anticipated length of stay of  Less than 2 midnights    Justification for Hospital Stay: Syncope work up       Admission Orders:  Scheduled Meds:   aspirin 81 mg Oral Daily   docusate sodium 100 mg Oral BID   heparin (porcine) 5,000 Units Subcutaneous Q8H [address]   senna 1 tablet Oral Daily      Continuous Infusions:   sodium chloride 125 mL/hr     sodium chloride 50 mL/hr Last Rate: 50 mL/hr (10/29/17 1438)      PRN Meds:      ECHO:  pend  TELM

## 2017-10-30 NOTE — PROGRESS NOTES
Progress Note - Sorin Blair 80 y o  female MRN: [id number]    Unit/Bed#: -01 Encounter: [id number]        * Syncope   Assessment & Plan    · Syncope possibly secondary to dehydration  · Patient cannot recall the details prior to her syncopal episode  · Obtain orthostatic BPs  · Trend troponins  · Continue to monitor on telemetry  · Echo pending  · Last echo 10/6/17 EF 82%, grade 1 diastolic dysfunction  · PT/OT eval pending  Patient may need placement at long term care facility  · TSH within normal limits  Neuropathy   Assessment & Plan    · Patient reports ongoing issue with neuropathy in her upper and lower extremities, which limits her ADLs  · B12 level noted to be low at 213  · Give cyanocobalamin 1000 mcg IM today  · HgA1c and TSH within normal limits  Anemia   Assessment & Plan    · Hg dropped from 11 0 on admission to 8 8 today  Possibly related to dehydration, dilutional?  · Obtain repeat CBC this evening and in AM   · Obtain fecal occult blood and iron panel  RALPH (acute kidney injury) ([facility] )   Assessment & Plan    · RALPH, POA, on CKD, stage IV  · Cr improved to 1 55 today from 2 12 on admission  · Baseline Cr 1 35-1  55    · Patient appears dry still, will continue IV fluids at 50 mL/ hr for 1 more liter  · Encourage PO intake  Systolic heart failure (HCC)   Assessment & Plan    · No evidence of acute exacerbation; patient appears dry on exam   · Most recent echo earlier this month showed and EF of 40% and grade 1 diastolic dysfunction  · Repeat echo pending  VTE Pharmacologic Prophylaxis:   Pharmacologic: Heparin  Mechanical VTE Prophylaxis in Place: Yes    Patient Centered Rounds: I have performed bedside rounds with nursing staff today  Discussions with Specialists or Other Care Team Provider: Nursing    Education and Discussions with Family / Patient: Patient    Time Spent for Care: 20 minutes    More than 50% of total time spent on counseling and coordination of care as described above  Current Length of Stay: 0 day(s)    Current Patient Status: Observation   Certification Statement: The patient will continue to require additional inpatient hospital stay due to anemia, need to monitor Hg closely  PT/OT eval pending, possible need for placement    Discharge Plan: Awaiting PT/OT eval, possible long-term placement  Code Status: Level 1 - Full Code      Subjective:   Patient states that she is feeling fine, her only complaint today is her ongoing issue with neuropathy in her upper and lower extremities  She cannot recall how she felt prior to her syncopal episode yesterday  She denies headache, dizziness/ lightheadedness, chest pain or SOB at this time  She has difficulty with some of her ADLs at home due to her neuropathy  Objective:     Vitals:   Temp (24hrs), Av 5 °F (36 9 °C), Min:98 °F (36 7 °C), Max:99 5 °F (37 5 °C)    HR:  [] 72  Resp:  [16-18] 18  BP: (135-143)/(62-63) 135/62  SpO2:  [97 %-99 %] 97 %  Body mass index is 23 48 kg/m²  Input and Output Summary (last 24 hours): Intake/Output Summary (Last 24 hours) at 10/30/17 1243  Last data filed at 10/30/17 0151   Gross per 24 hour   Intake           885 83 ml   Output              400 ml   Net           485 83 ml       Physical Exam:     Physical Exam   Constitutional: She is oriented to person, place, and time  No distress  HENT:   Head: Normocephalic and atraumatic  Mouth/Throat: Mucous membranes are dry  Eyes: Conjunctivae are normal    Neck: Normal range of motion  Cardiovascular: Normal rate and regular rhythm  Pulmonary/Chest: Effort normal and breath sounds normal  No respiratory distress  Abdominal: Soft  Bowel sounds are normal  There is no tenderness  Musculoskeletal: Normal range of motion  She exhibits no edema  Neurological: She is alert and oriented to person, place, and time  No cranial nerve deficit     Skin: Skin is warm and dry  She is not diaphoretic  Psychiatric: She has a normal mood and affect  Nursing note and vitals reviewed  Additional Data:     Labs:      Results from last 7 days  Lab Units 10/30/17  0506 10/29/17  1202   WBC Thousand/uL 5 25 5 60   HEMOGLOBIN g/dL 8 8* 11 0*   HEMATOCRIT % 28 5* 35 1   PLATELETS Thousands/uL 187 247   NEUTROS PCT %  --  69   LYMPHS PCT %  --  19   MONOS PCT %  --  9   EOS PCT %  --  3       Results from last 7 days  Lab Units 10/30/17  0506   SODIUM mmol/L 142   POTASSIUM mmol/L 3 9   CHLORIDE mmol/L 110*   CO2 mmol/L 24   BUN mg/dL 33*   CREATININE mg/dL 1 55*   CALCIUM mg/dL 8 5   TOTAL PROTEIN g/dL 6 1*   BILIRUBIN TOTAL mg/dL 0 40   ALK PHOS U/L 50   ALT U/L 19   AST U/L 15   GLUCOSE RANDOM mg/dL 99       Results from last 7 days  Lab Units 10/29/17  1202   INR  0 94       * I Have Reviewed All Lab Data Listed Above  * Additional Pertinent Lab Tests Reviewed: All Labs Within Last 24 Hours Reviewed    Imaging:    Imaging Reports Reviewed Today Include: Echo from 10/6/17  Imaging Personally Reviewed by Myself Includes:  none    Recent Cultures (last 7 days):           Last 24 Hours Medication List:     aspirin 81 mg Oral Daily   docusate sodium 100 mg Oral BID   heparin (porcine) 5,000 Units Subcutaneous Q8H [address]   senna 1 tablet Oral Daily   sodium chloride 50 mL/hr Intravenous Once        Today, Patient Was Seen By: Nica Tariq PA-C    ** Please Note: Dictation voice to text software may have been used in the creation of this document   **

## 2017-10-30 NOTE — ASSESSMENT & PLAN NOTE
· Patient reports ongoing issue with neuropathy in her upper and lower extremities, which limits her ADLs  · B12 level noted to be low at 213  · Give cyanocobalamin 1000 mcg IM today  · HgA1c and TSH within normal limits

## 2017-10-31 PROBLEM — I50.42 CHRONIC COMBINED SYSTOLIC AND DIASTOLIC HEART FAILURE (HCC): Status: ACTIVE | Noted: 2017-10-29

## 2017-10-31 PROBLEM — N18.2 STAGE 2 CHRONIC KIDNEY DISEASE: Status: ACTIVE | Noted: 2017-10-31

## 2017-10-31 PROBLEM — N18.30 STAGE 3 CHRONIC KIDNEY DISEASE (HCC): Status: ACTIVE | Noted: 2017-10-31

## 2017-10-31 PROBLEM — N17.9 AKI (ACUTE KIDNEY INJURY) (HCC): Status: RESOLVED | Noted: 2017-10-29 | Resolved: 2017-10-31

## 2017-10-31 PROBLEM — I50.22 CHRONIC SYSTOLIC HEART FAILURE (HCC): Status: ACTIVE | Noted: 2017-10-29

## 2017-10-31 PROBLEM — E53.8 VITAMIN B12 DEFICIENCY: Status: ACTIVE | Noted: 2017-10-31

## 2017-10-31 PROBLEM — E61.1 IRON DEFICIENCY: Status: ACTIVE | Noted: 2017-10-31

## 2017-10-31 LAB
ANION GAP SERPL CALCULATED.3IONS-SCNC: 8 MMOL/L (ref 4–13)
BASOPHILS # BLD AUTO: 0.01 THOUSANDS/ΜL (ref 0–0.1)
BASOPHILS NFR BLD AUTO: 0 % (ref 0–1)
BUN SERPL-MCNC: 28 MG/DL (ref 5–25)
CALCIUM SERPL-MCNC: 8.8 MG/DL (ref 8.3–10.1)
CHLORIDE SERPL-SCNC: 108 MMOL/L (ref 100–108)
CO2 SERPL-SCNC: 24 MMOL/L (ref 21–32)
CREAT SERPL-MCNC: 1.19 MG/DL (ref 0.6–1.3)
EOSINOPHIL # BLD AUTO: 0.29 THOUSAND/ΜL (ref 0–0.61)
EOSINOPHIL NFR BLD AUTO: 5 % (ref 0–6)
ERYTHROCYTE [DISTWIDTH] IN BLOOD BY AUTOMATED COUNT: 13.6 % (ref 11.6–15.1)
GFR SERPL CREATININE-BSD FRML MDRD: 38 ML/MIN/1.73SQ M
GLUCOSE SERPL-MCNC: 90 MG/DL (ref 65–140)
HCT VFR BLD AUTO: 29.8 % (ref 34.8–46.1)
HCT VFR BLD AUTO: 30.3 % (ref 34.8–46.1)
HGB BLD-MCNC: 9.3 G/DL (ref 11.5–15.4)
HGB BLD-MCNC: 9.5 G/DL (ref 11.5–15.4)
LYMPHOCYTES # BLD AUTO: 1.32 THOUSANDS/ΜL (ref 0.6–4.47)
LYMPHOCYTES NFR BLD AUTO: 23 % (ref 14–44)
MCH RBC QN AUTO: 29.8 PG (ref 26.8–34.3)
MCHC RBC AUTO-ENTMCNC: 31.4 G/DL (ref 31.4–37.4)
MCV RBC AUTO: 95 FL (ref 82–98)
MONOCYTES # BLD AUTO: 0.42 THOUSAND/ΜL (ref 0.17–1.22)
MONOCYTES NFR BLD AUTO: 7 % (ref 4–12)
NEUTROPHILS # BLD AUTO: 3.8 THOUSANDS/ΜL (ref 1.85–7.62)
NEUTS SEG NFR BLD AUTO: 65 % (ref 43–75)
PLATELET # BLD AUTO: 189 THOUSANDS/UL (ref 149–390)
PMV BLD AUTO: 10.2 FL (ref 8.9–12.7)
POTASSIUM SERPL-SCNC: 4.2 MMOL/L (ref 3.5–5.3)
RBC # BLD AUTO: 3.19 MILLION/UL (ref 3.81–5.12)
SODIUM SERPL-SCNC: 140 MMOL/L (ref 136–145)
WBC # BLD AUTO: 5.84 THOUSAND/UL (ref 4.31–10.16)

## 2017-10-31 PROCEDURE — 85018 HEMOGLOBIN: CPT | Performed by: PHYSICIAN ASSISTANT

## 2017-10-31 PROCEDURE — 85014 HEMATOCRIT: CPT | Performed by: PHYSICIAN ASSISTANT

## 2017-10-31 PROCEDURE — 85025 COMPLETE CBC W/AUTO DIFF WBC: CPT | Performed by: PHYSICIAN ASSISTANT

## 2017-10-31 PROCEDURE — 80048 BASIC METABOLIC PNL TOTAL CA: CPT | Performed by: PHYSICIAN ASSISTANT

## 2017-10-31 RX ORDER — ASCORBIC ACID 500 MG
500 TABLET ORAL
Status: DISCONTINUED | OUTPATIENT
Start: 2017-10-31 | End: 2017-11-02 | Stop reason: HOSPADM

## 2017-10-31 RX ORDER — FERROUS SULFATE 325(65) MG
325 TABLET ORAL
Status: DISCONTINUED | OUTPATIENT
Start: 2017-10-31 | End: 2017-11-02 | Stop reason: HOSPADM

## 2017-10-31 RX ORDER — CYANOCOBALAMIN 1000 UG/ML
1000 INJECTION INTRAMUSCULAR; SUBCUTANEOUS
Status: DISCONTINUED | OUTPATIENT
Start: 2017-10-31 | End: 2017-11-02 | Stop reason: HOSPADM

## 2017-10-31 RX ORDER — CHOLECALCIFEROL (VITAMIN D3) 125 MCG
1000 CAPSULE ORAL DAILY
Status: DISCONTINUED | OUTPATIENT
Start: 2017-11-01 | End: 2017-11-02 | Stop reason: HOSPADM

## 2017-10-31 RX ADMIN — HEPARIN SODIUM 5000 UNITS: 5000 INJECTION, SOLUTION INTRAVENOUS; SUBCUTANEOUS at 21:25

## 2017-10-31 RX ADMIN — HEPARIN SODIUM 5000 UNITS: 5000 INJECTION, SOLUTION INTRAVENOUS; SUBCUTANEOUS at 05:50

## 2017-10-31 RX ADMIN — OXYCODONE HYDROCHLORIDE AND ACETAMINOPHEN 500 MG: 500 TABLET ORAL at 21:25

## 2017-10-31 RX ADMIN — FERROUS SULFATE TAB 325 MG (65 MG ELEMENTAL FE) 325 MG: 325 (65 FE) TAB at 21:25

## 2017-10-31 RX ADMIN — HEPARIN SODIUM 5000 UNITS: 5000 INJECTION, SOLUTION INTRAVENOUS; SUBCUTANEOUS at 13:30

## 2017-10-31 RX ADMIN — ASPIRIN 81 MG: 81 TABLET, COATED ORAL at 08:57

## 2017-10-31 NOTE — ASSESSMENT & PLAN NOTE
· No evidence of acute decompensation  · Echocardiogram demonstrates EF 40% with grade 1 diastolic dysfunction  She has mild-to-moderate mitral regurgitation and mild pulmonary hypertension  All findings stable since earlier this month

## 2017-10-31 NOTE — ASSESSMENT & PLAN NOTE
· Vitamin B12 level:  213  · Will give a 1 time dose of vitamin B12 supplement SC then start on oral supplementation

## 2017-10-31 NOTE — PHYSICIAN ADVISOR
This patient is a 80 y o  y/o female who is admitted to the hospital for Syncope (Pt presents to the ED for witnessed syncopal episode  Event witnessed by personal care aide and family  Pt started to note that she started to feel weak and dizzy, they lowered her down to the ground and then she passed out momentarily  After coming to note pt notes dizziness  )   The patient presented to the ED on 10/29/17 at 1127 and was admitted to the hospital on 10/29/2017 1127  History of Present Illness includes: the patient had a prior admission form 10/4-10/9 for increased lfts  This may have been related to passing of a gallstone  The patient was treated and discharged in stable condition  The patient presented on this admission with loss of consciousness  She denied any dizziness  Vital signs in the ER are as follows   ED Triage Vitals [10/29/17 1200]   Temperature Pulse Respirations Blood Pressure SpO2   97 7 °F (36 5 °C) 81 16 132/60 98 %      Temp Source Heart Rate Source Patient Position - Orthostatic VS BP Location FiO2 (%)   Oral Monitor Sitting Right arm --      Pain Score       No Pain         On exam the lungs are clear and there is no edema  Hgb is 11 and Cr is 2 1    CXR showed no active pulmonary disease  The patient is being admitted with syncope, RALPH on CKD III  The plan of care includes IVF, telemetry monitoring, serial cardiac enzymes, echocardiogram  This patient is appropriate for INPATIENT admission as her length of stay is expected to be at least 2 midnights  Continued hospitalization is necessary to ensure stabilization of her clinical status  This admission is UNRELATED to her prior admission for increaesd LFTS as she was treated and discharged in stable condition

## 2017-10-31 NOTE — ASSESSMENT & PLAN NOTE
· Syncope likely secondary to mild dehydration  · Patient cannot recall the details prior to her syncopal episode  · Orthostatic BPs are negative  · Troponins are negative  · Discontinue telemetry  · Echo stable  · PT/OT eval pending  Patient may need placement at long term care facility  · TSH within normal limits

## 2017-10-31 NOTE — PROGRESS NOTES
Progress Note - Loli Serrano 80 y o  female MRN: [id number]  Unit/Bed#: -01 Encounter: [id number]    * Syncope   Assessment & Plan    · Syncope likely secondary to mild dehydration  · Patient cannot recall the details prior to her syncopal episode  · Orthostatic BPs are negative  · Troponins are negative  · Discontinue telemetry  · Echo stable  · PT/OT eval pending  Patient may need placement at long term care facility  · TSH within normal limits  Vitamin B12 deficiency   Assessment & Plan    · Vitamin B12 level:  213  · Will give a 1 time dose of vitamin B12 supplement SC then start on oral supplementation  Iron deficiency   Assessment & Plan    · Iron:  30, ferritin:  114, iron sat:  15%, TIBC:  200  · Will start patient on vitamin C5 500 mg plus ferrous sulfate at bedtime  Anemia   Assessment & Plan    · Patient has both iron deficiency and vitamin B12 deficiency  · Hemoglobin has remained stable  Discontinue Q 8 hours hemoglobin checks  · No evidence of bleeding  Stage 3 chronic kidney disease   Assessment & Plan    · Baseline creatinine:  1 3-1 5  Currently at baseline  Chronic combined systolic and diastolic heart failure (HCC)   Assessment & Plan    · No evidence of acute decompensation  · Echocardiogram demonstrates EF 40% with grade 1 diastolic dysfunction  She has mild-to-moderate mitral regurgitation and mild pulmonary hypertension  All findings stable since earlier this month  VTE Pharmacologic Prophylaxis:   Pharmacologic: Heparin  Mechanical: Mechanical VTE prophylaxis in place  Patient Centered Rounds: I have performed bedside rounds with nursing staff today  Discussions with Specialists or Other Care Team Provider: NOne  Education and Discussions with Family / Patient: All patient questions answered to the best of my ability  Son is at the bedside  Time Spent for Care: 20 minutes    More than 50% of total time spent on counseling and coordination of care as described above  Current Length of Stay: 1 day(s)  Current Patient Status: Inpatient   Certification Statement: The patient will continue to require additional inpatient hospital stay due to pending PT evaluation  Discharge Plan:  Await PT evaluation for discharge recommendations  Patient likely will need rehab  Code Status: Level 1 - Full Code    Subjective:   Patient is doing well overall with no current complaints  Objective:   Vitals:   Temp (24hrs), Av 7 °F (37 1 °C), Min:98 3 °F (36 8 °C), Max:99 1 °F (37 3 °C)    HR:  [79-91] 91  Resp:  [18-20] 18  BP: (131-164)/(62-74) 150/67  SpO2:  [94 %-97 %] 94 %  Body mass index is 23 48 kg/m²  Input and Output Summary (last 24 hours): Intake/Output Summary (Last 24 hours) at 10/31/17 1348  Last data filed at 10/31/17 1101   Gross per 24 hour   Intake               90 ml   Output             1300 ml   Net            -1210 ml       Physical Exam:     Physical Exam   HENT:   Head: Normocephalic and atraumatic  Mouth/Throat: Oropharynx is clear and moist and mucous membranes are normal    Eyes: No scleral icterus  Cardiovascular: Normal rate and regular rhythm  Murmur heard  Systolic murmur is present with a grade of 3/6   Pulmonary/Chest: Breath sounds normal  She has no wheezes  She has no rales  She exhibits no tenderness  Abdominal: Soft  Bowel sounds are normal  She exhibits no distension  There is no tenderness  Musculoskeletal: Normal range of motion  She exhibits no edema  Skin: Skin is warm and dry  No rash noted  Psychiatric: She has a normal mood and affect  Vitals reviewed      Additional Data:   Labs:    Results from last 7 days  Lab Units 10/31/17  0554   WBC Thousand/uL 5 84   HEMOGLOBIN g/dL 9 5*   HEMATOCRIT % 30 3*   PLATELETS Thousands/uL 189   NEUTROS PCT % 65   LYMPHS PCT % 23   MONOS PCT % 7   EOS PCT % 5       Results from last 7 days  Lab Units 10/31/17  0554 10/30/17  4867 SODIUM mmol/L 140 142   POTASSIUM mmol/L 4 2 3 9   CHLORIDE mmol/L 108 110*   CO2 mmol/L 24 24   BUN mg/dL 28* 33*   CREATININE mg/dL 1 19 1 55*   CALCIUM mg/dL 8 8 8 5   TOTAL PROTEIN g/dL  --  6 1*   BILIRUBIN TOTAL mg/dL  --  0 40   ALK PHOS U/L  --  50   ALT U/L  --  19   AST U/L  --  15   GLUCOSE RANDOM mg/dL 90 99       Results from last 7 days  Lab Units 10/29/17  1202   INR  0 94       * I Have Reviewed All Lab Data Listed Above  * Additional Pertinent Lab Tests Reviewed: All Labs Within Last 24 Hours Reviewed    Imaging:  Imaging Reports Reviewed Today Include:  None new    Cultures:   Blood Culture:   Lab Results   Component Value Date    BLOODCX No Growth at 24 hrs  10/29/2017    BLOODCX No Growth at 24 hrs  10/29/2017     Urine Culture: No results found for: URINECX  Sputum Culture: No components found for: SPUTUMCX  Wound Culture: No results found for: WOUNDCULT    Last 24 Hours Medication List:     aspirin 81 mg Oral Daily   docusate sodium 100 mg Oral BID   heparin (porcine) 5,000 Units Subcutaneous Q8H [facility]   senna 1 tablet Oral Daily        Today, Patient Was Seen By: Sergio Muro PA-C    ** Please Note: Dragon 360 Dictation voice to text software may have been used in the creation of this document   **

## 2017-10-31 NOTE — ASSESSMENT & PLAN NOTE
· Iron:  30, ferritin:  114, iron sat:  15%, TIBC:  200  · Will start patient on vitamin C5 500 mg plus ferrous sulfate at bedtime

## 2017-10-31 NOTE — ASSESSMENT & PLAN NOTE
· Patient has both iron deficiency and vitamin B12 deficiency  · Hemoglobin has remained stable  Discontinue Q 8 hours hemoglobin checks  · No evidence of bleeding

## 2017-11-01 PROCEDURE — 97116 GAIT TRAINING THERAPY: CPT

## 2017-11-01 PROCEDURE — G8987 SELF CARE CURRENT STATUS: HCPCS

## 2017-11-01 PROCEDURE — 97166 OT EVAL MOD COMPLEX 45 MIN: CPT

## 2017-11-01 PROCEDURE — G8978 MOBILITY CURRENT STATUS: HCPCS

## 2017-11-01 PROCEDURE — G8979 MOBILITY GOAL STATUS: HCPCS

## 2017-11-01 PROCEDURE — G8988 SELF CARE GOAL STATUS: HCPCS

## 2017-11-01 PROCEDURE — 97163 PT EVAL HIGH COMPLEX 45 MIN: CPT

## 2017-11-01 RX ORDER — OXYMETAZOLINE HYDROCHLORIDE 0.05 G/100ML
2 SPRAY NASAL EVERY 12 HOURS PRN
Status: DISCONTINUED | OUTPATIENT
Start: 2017-11-01 | End: 2017-11-02 | Stop reason: HOSPADM

## 2017-11-01 RX ADMIN — OXYCODONE HYDROCHLORIDE AND ACETAMINOPHEN 500 MG: 500 TABLET ORAL at 21:58

## 2017-11-01 RX ADMIN — CYANOCOBALAMIN TAB 500 MCG 1000 MCG: 500 TAB at 08:27

## 2017-11-01 RX ADMIN — HEPARIN SODIUM 5000 UNITS: 5000 INJECTION, SOLUTION INTRAVENOUS; SUBCUTANEOUS at 21:58

## 2017-11-01 RX ADMIN — HEPARIN SODIUM 5000 UNITS: 5000 INJECTION, SOLUTION INTRAVENOUS; SUBCUTANEOUS at 05:12

## 2017-11-01 RX ADMIN — HEPARIN SODIUM 5000 UNITS: 5000 INJECTION, SOLUTION INTRAVENOUS; SUBCUTANEOUS at 13:53

## 2017-11-01 RX ADMIN — ASPIRIN 81 MG: 81 TABLET, COATED ORAL at 08:27

## 2017-11-01 RX ADMIN — FERROUS SULFATE TAB 325 MG (65 MG ELEMENTAL FE) 325 MG: 325 (65 FE) TAB at 21:58

## 2017-11-01 RX ADMIN — DOCUSATE SODIUM 100 MG: 100 CAPSULE, LIQUID FILLED ORAL at 17:41

## 2017-11-01 NOTE — ASSESSMENT & PLAN NOTE
· Iron:  30, ferritin:  114, iron sat:  15%, TIBC:  200  · Started patient on vitamin C5 500 mg plus ferrous sulfate at bedtime

## 2017-11-01 NOTE — PLAN OF CARE
Problem: DISCHARGE PLANNING - CARE MANAGEMENT  Goal: Discharge to post-acute care or home with appropriate resources  INTERVENTIONS:  - Conduct assessment to determine patient/family and health care team treatment goals, and need for post-acute services based on payer coverage, community resources, and patient preferences, and barriers to discharge  - Address psychosocial, clinical, and financial barriers to discharge as identified in assessment in conjunction with the patient/family and health care team  - Arrange appropriate level of post-acute services according to patient's   needs and preference and payer coverage in collaboration with the physician and health care team  - Communicate with and update the patient/family, physician, and health care team regarding progress on the discharge plan  - Arrange appropriate transportation to post-acute venues   Outcome: Progressing  LOS 2  AMI bundle;  Readmission  Cm met with pt and family who are requesting to discuss DCP  Cm explained when meeting with pt she requested a referral be made to [facility] as [facility] is too expensive  Per family and pt, they believe pt will be in need of LTC after her rehab whether it be in personal care or assisted living  Daughter s/w pt and it has been determined that since pt and family have lived in [place] all their lives and pt has really enjoyed [facility], no matter what the facility costs after rehab, she would return there if a bed is available  Family requested a referral be made to [facility] as she knows people there as well  CM contacted [facility] Admissions to see if they had any beds available for tomorrow and explained the situation as pt was released from them last Wed and is in need of rehab again  [facility] has a bed available  This information has been relayed to family and Cm explained they would like for family to meet with Admissions at 10:00am tomorrow morning    Transportation was arranged for 10:00am tomorrow for pt to go to [facility] will transport via [facility] as pt does not qualify for stretcher  Family, pt, facility, SL[facility] and nursing aware of discharge arrangements

## 2017-11-01 NOTE — PROGRESS NOTES
Pt had a nosebleed episode, pt encouraged  to apply pressure to the nose and notify nurse if nosebleed continues,SLIM notified,prn afrin ordered,call bell within reach,will continue to monitor

## 2017-11-01 NOTE — PHYSICAL THERAPY NOTE
PHYSICAL THERAPY TREATMENT NOTE    Patient Name: Alaina Boone  WVNVY'F Date: 11/1/2017 11/01/17 0681   Pain Assessment   Pain Assessment No/denies pain   Pain Score No Pain   Restrictions/Precautions   Weight Bearing Precautions Per Order No   Other Precautions Chair Alarm; Bed Alarm; Fall Risk   General   Chart Reviewed Yes   Response to Previous Treatment Patient with no complaints from previous session  Family/Caregiver Present No   Cognition   Overall Cognitive Status WFL   Arousal/Participation Cooperative   Attention Attends with cues to redirect   Subjective   Subjective Pt agrees to PT treatment  Transfers   Sit to Stand 2  Maximal assistance   Additional items Assist x 1; Increased time required;Verbal cues   Stand to Sit 3  Moderate assistance   Additional items Assist x 1; Increased time required;Verbal cues   Stand pivot 3  Moderate assistance   Additional items Assist x 1; Increased time required;Verbal cues   Ambulation/Elevation   Gait pattern Improper Weight shift;Narrow OSCAR; Forward Flexion;Decreased foot clearance;Shuffling; Short stride; Excessively slow   Gait Assistance 3  Moderate assist   Additional items Assist x 1;Verbal cues   Assistive Device Rolling walker   Distance 4` x1   Balance   Static Sitting Fair +   Dynamic Sitting Fair   Static Standing Poor +   Dynamic Standing Poor   Ambulatory Poor   Endurance Deficit   Endurance Deficit Yes   Endurance Deficit Description limited standing/ambulation tolerance   Activity Tolerance   Activity Tolerance Patient limited by fatigue   Exercises   Knee AROM Long Arc Quad Sitting;10 reps;AROM; Bilateral   Ankle Pumps Sitting;10 reps;AROM; Bilateral   Marching Sitting;10 reps;AROM; Bilateral   Assessment   Prognosis Fair   Problem List Decreased strength;Decreased endurance; Impaired balance;Decreased mobility; Impaired judgement;Decreased safety awareness   Assessment Pt tolerated treatment following evaluation well, however limited due to weakness and fatigue  Able to sit EOB and participates well in LE exercises with verbal cues for technique  Ambulates with mod A x1 back to chair with RW  Increased shuffling/difficulty advancing LEs when ambulating backwards  Chair had to be brought up behind pt due to inability to ambulate backwards  x1 (+) knee buckle during static stance prior to ambulation  All needs met and chair alarm intact  Will continue to benefit from ongoing skilled PT to maximize her functional mobility and increase her level of independence  Recommending rehab with possible long term placement when medically appropriate for discharge  Goals   Patient Goals none stated   STG Expiration Date 11/10/17   Treatment Day 1   Plan   Treatment/Interventions Functional transfer training;LE strengthening/ROM; Therapeutic exercise; Endurance training;Patient/family training;Equipment eval/education; Bed mobility;Gait training   Progress Slow progress, decreased activity tolerance   PT Frequency 5x/wk   Recommendation   Recommendation Post acute IP rehab;Long-term skilled nursing home placement   Equipment Recommended Walker   PT - OK to Discharge Yes  (if to rehab when medically appropriate)   Destiny Juan, PT,DPT

## 2017-11-01 NOTE — OCCUPATIONAL THERAPY NOTE
[address]  Evaluation     Patient Name: Meredeth Mohs  PIDJR'Q Date: 11/1/2017  Problem List  Patient Active Problem List   Diagnosis    Transaminitis    NSTEMI (non-ST elevated myocardial infarction) ([facility] [address] [facility]    Weakness    Syncope    Anemia    Chronic combined systolic and diastolic heart failure ([facility] [address] )    Neuropathy    Stage 3 chronic kidney disease    Iron deficiency    Vitamin B12 deficiency     Past Medical History  Past Medical History:   Diagnosis Date    Cancer [facility])     "some kind of cancer when I was 80"    Iron deficiency 10/31/2017    Stage 2 chronic kidney disease 10/31/2017    Vitamin B12 deficiency 10/31/2017     Past Surgical History  Past Surgical History:   Procedure Laterality Date    BACK SURGERY      COLON SURGERY      HIP FRACTURE SURGERY        11/01/17 0921   Note Type   Note type Eval/Treat   Restrictions/Precautions   Other Precautions Bed Alarm; Chair Alarm; Fall Risk;Pain   Pain Assessment   Pain Assessment No/denies pain   Pain Score No Pain   Home Living   Type of [address] [place] [address] Two level; Able to live on main level with bedroom/bathroom; Performs ADLs on one level;Stairs to enter with rails; Other (Comment)  (3 OSVALDO)   Bathroom Shower/Tub Tub/shower unit   Bathroom Toilet Standard   Bathroom Equipment Grab bars in shower;Grab bars around toilet; Other (Comment)  (pt reports sponge bathing standing at sink PTA)   [address] [facility] Walker;Grab bars   Additional Comments Pt reports living alone in [address] [facility] w/ 3 OSVALDO PTA w/ first floor set- up  Pt reports that her daughter assists w/ shopping, meal prep, and laundry  Prior Function   Level of [place] Needs assistance with IADLs   Lives With Alone   Receives Help From Family;Home health; Other (Comment)  (HHA had not started)   ADL Assistance Independent   IADLs Needs assistance   Falls in the last 6 months 1 to 4   Vocational Retired   Comments Pt reports using RW for functional mobility PTA, and I w/ ADL's  Pt required assistance w/ IADL's  Pt reports having someone that helps w/ cleaning, and added that her daughter stops by daily and assists w/ meal prep, laundry, transportation, and shopping  Pt reports that she does not drive   Lifestyle   Autonomy Pt reports I w/ ADL and functional mobility using RW  Pt added that she was having a hard time at home since recent discharge  Pt requires assistance w/ IADL, and reports sponge bathing standing at sink using rab bars and RW PTA  Reciprocal Relationships Pt reports living alone, and supportive / involved daughter that stops by and assists w/ IADL   Service to Others Pt reports retired    Intrinsic Gratification Pt reports limited leisure interests / hobbies  ADL   Eating Assistance 6  Modified independent   Eating Deficit Setup   Grooming Assistance 5  Supervision/Setup   Grooming Deficit Setup; Increased time to complete; Other (Comment)  (seated in chair w/ tray table)   UB Bathing Assistance 3  Moderate Assistance   LB Bathing Assistance 3  Moderate Assistance   [address] 4  Minimal Assistance   UB Dressing Deficit Setup;Verbal cueing; Increased time to complete   LB Dressing Assistance 3  Moderate Assistance   LB Dressing Deficit Setup; Increased time to complete   Bed Mobility   Supine to Sit Unable to assess   Sit to Supine Unable to assess   Additional Comments Pt seated at EOB w/ Nadeen LI upon therapist arrival and in chair post eval w/ call bell in reach and chair alarm activated   Transfers   Sit to Stand 3  Moderate assistance   Additional items Assist x 1; Increased time required;Verbal cues   Stand to Sit 3  Moderate assistance   Additional items Assist x 1; Increased time required;Armrests; Verbal cues   Stand pivot 3  Moderate assistance   Additional items Assist x 1; Increased time required;Verbal cues;Armrests   Balance   Static Sitting Fair   Dynamic Sitting Poor +   Static Standing Poor + Activity Tolerance   Activity Tolerance Patient limited by fatigue   Medical Staff Made Aware spoke to PT, [address]   Nurse Made Aware Per RN, Courtney Alford ok to see   RUE Assessment   RUE Assessment X  (limited AROM at shoulder beyon 100*, able to complete ADL)   RUE Strength   RUE Overall Strength Deficits; Other (Comment)  (grasp 3/5, shoulder 3-/5)   LUE Assessment   LUE Assessment X  (limited AROM AG shoulder beyonf 100*; able to complete ADL)   LUE Strength   LUE Overall Strength Deficits; Other (Comment)  (shoulder 3-/5, grasp 3/5)   Hand Function   Fine Motor Coordination Impaired  (R handed, noted tremors L hand w/ activity)   Sensation   Light Touch No apparent deficits  (R/L UE)   Cognition   Overall Cognitive Status WFL   Arousal/Participation Alert; Cooperative   Attention Attends with cues to redirect   Orientation Level Oriented X4   Memory Decreased short term memory   Following Commands Follows one step commands with increased time or repetition   Comments Pt agreed to participate in OT eval  Pt able to provide limited social history and PLOF  Pt cooperative, and oriented X 4 (off by one day on date)  Pt demonstrated appropraite long term memory, cues for short term recall   Assessment   Limitation Decreased ADL status; Decreased UE strength;Decreased endurance;Decreased fine motor control;Decreased self-care trans;Decreased high-level ADLs   Assessment Pt is a 81 yo female re[facility]ad[facility]  Pt presents w/ syncope  Pt recently discharged on 10/9/2017 to post acute rehab  Pt reports home 4 days  Pt reports living alone in [address] [place] [address] w/ 3 OSVALDO PTA  Pt reprots first floor set- up w/ bed / bath, and using RW for functional mobility  Pt reports I w/ ADL, and requiring assistance w/ IADL's  Pt does not drive, and reports that her daughter stops by daily  Pt reports decline in activity engagement and decreased appetitie  Pt added that her family assists w/ meal prep, but pt has little appetite   Pt completed functional transfer bed <> chair w/ mod A and increased time using RW  Pt benefits from cues for hand placement  Pt requires assistance w/ ADL's  Pt presents w/ decreased standing tolerance, decreased balance, generalized weakness, decreased activity engagement impacting her I w/ dressing, bathing, oral hygiene, clothing mgmt, food prep / clean up, functional mobility, and functional transfers  Pt would benefit from OT while in acute care to address deficits  From an OT perspective, pt would benefit from post acute rehab when medically stable for discharge from acute care to minimze caregiver burden  Anticipate that pt will require consistent supervision and physical assistance to return to PLOF following post acute rehab  Will continue to follow pt in acute care to assist in safe discharge planning  Goals   Patient Goals none stated   Plan   Treatment Interventions ADL retraining;Functional transfer training;UE strengthening/ROM; Equipment evaluation/education;Patient/family training; Compensatory technique education; Activityengagement   Goal Expiration Date 11/08/17   OT Frequency 3-5x/wk   Recommendation   OT Discharge Recommendation Other (Comment)  (post acute rehab)   Equipment Recommended Bedside commode   OT - OK to Discharge Yes  (to post acute rehab when medically stable)   Barthel Index   Feeding 10   Bathing 0   Grooming Score 5   Dressing Score 5   Bladder Score 5   Bowels Score 10   Toilet Use Score 5   Transfers (Bed/Chair) Score 10   Mobility (Level Surface) Score 0   Stairs Score 0   Barthel Index Score 50   Pt goals to be met in 7 days:  1  Pt will complete UBD w/ mod I after set- up while seated in chair  2  Pt will complete LBD w/ mod I after set- up   3  Pt will complete functional transfers to bed, chair, and commode w/ min A x1 using AD  4  Pt will complete grooming w/ mod I after set- up seated in chair  5   Pt will consistently demonstrate good recall and carryover w/ two step directions during ADL performance  6  Pt will demonstrate good attention and recall of compensatory strategies to increase I w/ ADL performance  7  Pt will complete bed mobility supine <> sit w/ S  8  Pt will demonstrate good attention and participation in continued evaluation to assess functional cognitive skills and DME needs upon discharge from acute care    Ok Reyes, OT

## 2017-11-01 NOTE — SOCIAL WORK
LOS 2  AMI bundle;  Readmission  Cm met with pt and family who are requesting to discuss DCP  Cm explained when meeting with pt she requested a referral be made to [facility] as [facility] is too expensive  Per family and pt, they believe pt will be in need of LTC after her rehab whether it be in personal care or assisted living  Daughter s/w pt and it has been determined that since pt and family have lived in [place] all their lives and pt has really enjoyed [facility], no matter what the facility costs after rehab, she would return there if a bed is available  Family requested a referral be made to [facility] as she knows people there as well  CM contacted [facility] Admissions to see if they had any beds available for tomorrow and explained the situation as pt was released from them last Wed and is in need of rehab again  [facility] has a bed available  This information has been relayed to family and Cm explained they would like for family to meet with Admissions at 10:00am tomorrow morning  Transportation was arranged for 10:00am tomorrow for pt to go to [address] transport [facility] as pt does not qualify for stretcher  Family, pt, facility, [facility] and nursing aware of discharge arrangements

## 2017-11-01 NOTE — ASSESSMENT & PLAN NOTE
· Syncope likely secondary to mild dehydration  · Patient cannot recall the details prior to her syncopal episode  · Orthostatic BPs are negative  · Troponins are negative  · Discontinue telemetry  · Echo stable  · PT/OT recommends rehab  Patient will be discharged to [facility] on Thursday (needs to complete 3 midnights)  · TSH within normal limits

## 2017-11-01 NOTE — PLAN OF CARE
Problem: PHYSICAL THERAPY ADULT  Goal: Performs mobility at highest level of function for planned discharge setting  See evaluation for individualized goals  Treatment/Interventions: Functional transfer training, LE strengthening/ROM, Therapeutic exercise, Endurance training, Patient/family training, Equipment eval/education, Bed mobility, Gait training  Equipment Recommended: Clarisa Dunbar       See flowsheet documentation for full assessment, interventions and recommendations  Outcome: Progressing  Prognosis: Fair  Problem List: Decreased strength, Decreased endurance, Impaired balance, Decreased mobility, Impaired judgement, Decreased safety awareness  Assessment: Pt tolerated treatment following evaluation well, however limited due to weakness and fatigue  Able to sit EOB and participates well in LE exercises with verbal cues for technique  Ambulates with mod A x1 back to chair with RW  Increased shuffling/difficulty advancing LEs when ambulating backwards  Chair had to be brought up behind pt due to inability to ambulate backwards  x1 (+) knee buckle during static stance prior to ambulation  All needs met and chair alarm intact  Will continue to benefit from ongoing skilled PT to maximize her functional mobility and increase her level of independence  Recommending rehab with possible long term placement when medically appropriate for discharge  Recommendation: Post acute IP rehab, Long-term skilled nursing home placement     PT - OK to Discharge: Yes (if to rehab when medically appropriate)    See flowsheet documentation for full assessment

## 2017-11-01 NOTE — DISCHARGE SUMMARY
Discharge Summary - [facility] Internal Medicine    Patient Information: Linda Delacruz 80 y o  female MRN: [id number]  Unit/Bed#: -01 Encounter: [id number]    Discharging Physician / Practitioner: Lukasz Norris PA-C  PCP: Benny James MD  Admission Date: 10/29/2017  Discharge Date: 11/02/17    Reason for Admission: Syncope    Discharge Diagnoses:   Principal Problem:    Syncope  Active Problems:    Anemia    Chronic combined systolic and diastolic heart failure (HCC)    Neuropathy    Stage 3 chronic kidney disease    Iron deficiency    Vitamin B12 deficiency  Resolved Problems:    RALPH (acute kidney injury) ([address] )    Consultations During Hospital Stay:  · None    Procedures Performed:   · Echocardiogram:  Ejection fraction 40% with grade 1 diastolic dysfunction  Mild to moderate mitral regurgitation  Mild pulmonary hypertension  Significant Findings:   · RALPH    Incidental Findings:   · Vitamin B12 deficiency  · Iron deficiency    Test Results Pending at Discharge (will require follow up): · None     Outpatient Tests Requested:  · BMP    Complications:  None      Subjective:  Patient says she is feeling fine overall  She says she does not know why she passed out  She offers no specific complaints of pain or other concerns at this time  all vital signs and labs and diagnostic tests were reviewed  Physical Exam   Constitutional: She appears well-developed and well-nourished  No distress  Appears much younger than stated age   HENT:   Head: Normocephalic and atraumatic  Eyes: Conjunctivae are normal  Right eye exhibits no discharge  Left eye exhibits no discharge  No scleral icterus  Neck: Neck supple  Cardiovascular: Normal rate, regular rhythm and normal heart sounds  No murmur heard  Pulmonary/Chest: Effort normal  No respiratory distress  She has no wheezes  Abdominal: Soft  She exhibits no distension  There is no tenderness     Musculoskeletal: She exhibits edema (1+ bilateral lower extremity edema)  She exhibits no tenderness or deformity  Neurological: She is alert  Awake alert and interactive she is a good historian   Skin: Skin is warm and dry  No rash noted  She is not diaphoretic  No erythema  No pallor  Psychiatric: She has a normal mood and affect  Her behavior is normal  Thought content normal    Very pleasant and cooperative   Nursing note and vitals reviewed  Hospital Course: Linda Delacruz is a 80 y o  female patient who originally presented to the hospital on 10/29/2017 due to syncope and collapse  She was at home being helped into her bed by visiting nurses and her daughter when her legs became weak and then she subsequently collapsed to the floor  She was recently at [facility] for rehab but came home only a few days before her hospitalization here  She has significant lower extremity peripheral neuropathy and feels this is progressively worsening  Of note, on admission her creatinine was significantly elevated at 2 12 consistent with acute kidney injury superimposed upon chronic kidney disease  She briefly received IV fluids  She was also noted to have B12 deficiency which was repleted  She was monitored closely on telemetry with no evidence of arrhythmias  She had echocardiogram with mildly low ejection fraction of 40%  She had orthostatic blood pressure checks which were negative  I suspect she needs close monitoring of her fluid balance which certainly will be a challenge at her advanced age  She was seen in consultation by Physical therapy who recommended once again returning to rehab  She will return to [facility]  She is feeling better today  Condition at Discharge: stable     Discharge Day Visit / Exam:   * Please refer to separate progress for these details *    Discharge instructions/Information to patient and family:   See after visit summary for information provided to patient and family        Provisions for Follow-Up Care:  See after visit summary for information related to follow-up care and any pertinent home health orders  Disposition:   Brandyn Romero (see below)    For Discharges to   [address] SNF:   · [facility] [place] - Spoke with the physician listed here  Planned Readmission:  No    Discharge Statement:  I spent 45 minutes discharging the patient  This time was spent on the day of discharge  I had direct contact with the patient on the day of discharge  Greater than 50% of the total time was spent examining patient, answering all patient questions, arranging and discussing plan of care with patient as well as directly providing post-discharge instructions  Additional time then spent on discharge activities  Discharge Medications:  See after visit summary for reconciled discharge medications provided to patient and family  ** Please Note: Dragon 360 Dictation voice to text software may have been used in the creation of this document   **

## 2017-11-01 NOTE — PLAN OF CARE
Problem: PHYSICAL THERAPY ADULT  Goal: Performs mobility at highest level of function for planned discharge setting  See evaluation for individualized goals  Treatment/Interventions: Functional transfer training, LE strengthening/ROM, Therapeutic exercise, Endurance training, Patient/family training, Equipment eval/education, Bed mobility, Gait training  Equipment Recommended: Grayson Friedman       See flowsheet documentation for full assessment, interventions and recommendations  Prognosis: Fair  Problem List: Decreased strength, Decreased endurance, Impaired balance, Decreased mobility, Impaired judgement, Decreased safety awareness  Assessment: Pt is a 80 y o  female seen for PT evaluation s/p admit to [facility] on 10/29/2017 w/ Syncope  Order placed for PT  Comorbidities affecting pt's physical performance listed above  Personal factors affecting pt at time of IE include: steps to enter environment, limited home support, advanced age, past experience, behavioral pattern, inability to perform IADLs, inability to perform ADLs and inability to ambulate household distances  Prior to admission, pt was independent w/ all functional mobility w/ RW, lives in one floor environment, has 3 OSVALDO and lives alone  Pt reports that in the 4 days she was home post rehab, she was not managing very well  She reports not eating enough and not moving enough  Upon evaluation: Pt requires max A x1 for sit to stand, mod A x1 for SPT, and mod A x1 for ambulation with RW  Significant shuffling gait noted  (Please find full objective findings from PT assessment regarding body systems outlined above)  Impairments and limitations also listed above, especially due to  weakness, impaired balance, decreased endurance, gait deviations, decreased activity tolerance, decreased safety awareness and fall risk  The following objective measures performed on IE also reveal limitations: Barthel Index 50/100   Pt's clinical presentation is currently unstable/unpredictable seen in pt's presentation of decreased safety awareness, significant decline in functional mobility, abnormal lab values, and fall risk  Pt to benefit from continued skilled PT tx while in hospital and upon DC to address deficits as defined above and maximize level of functional mobility  From PT/mobility standpoint, recommendation at time of d/c would be Short term rehab and possible long term placement pending progress in order to maximize pt's functional independence and consistency w/ mobility in order to facilitate return to PLOF  Recommend progression of ambulation, initiation of HEP, and dynamic balance activities as appropriate  Recommendation: Post acute IP rehab, Long-term skilled nursing home placement     PT - OK to Discharge: Yes (if to rehab when medically appropriate)    See flowsheet documentation for full assessment

## 2017-11-01 NOTE — PROGRESS NOTES
Progress Note - Boogie Butler 80 y o  female MRN: [id number]  Unit/Bed#: -01 Encounter: [id number]    * Syncope   Assessment & Plan    · Syncope likely secondary to mild dehydration  · Patient cannot recall the details prior to her syncopal episode  · Orthostatic BPs are negative  · Troponins are negative  · Discontinue telemetry  · Echo stable  · PT/OT recommends rehab  Patient will be discharged to [address] [place] [address] on Thursday (needs to complete 3 midnights)  · TSH within normal limits  Vitamin B12 deficiency   Assessment & Plan    · Vitamin B12 level:  213  · Started on oral supplementation  Iron deficiency   Assessment & Plan    · Iron:  30, ferritin:  114, iron sat:  15%, TIBC:  200  · Started patient on vitamin C5 500 mg plus ferrous sulfate at bedtime  Anemia   Assessment & Plan    · Patient has both iron deficiency and vitamin B12 deficiency  · Hemoglobin has remained stable  Discontinue Q 8 hours hemoglobin checks  · No evidence of bleeding  Stage 3 chronic kidney disease   Assessment & Plan    · Baseline creatinine:  1 3-1 5  Currently at baseline  Neuropathy   Assessment & Plan    · Patient reports ongoing issue with neuropathy in her upper and lower extremities, which limits her ADLs  · B12 level noted to be low at 213  · Give cyanocobalamin 1000 mcg IM today  · HgA1c and TSH within normal limits  Chronic combined systolic and diastolic heart failure (HCC)   Assessment & Plan    · No evidence of acute decompensation  · Echocardiogram demonstrates EF 40% with grade 1 diastolic dysfunction  She has mild-to-moderate mitral regurgitation and mild pulmonary hypertension  All findings stable since earlier this month  VTE Pharmacologic Prophylaxis:   Pharmacologic: Heparin  Mechanical: Mechanical VTE prophylaxis in place  Patient Centered Rounds: I have performed bedside rounds with nursing staff today    Discussions with Specialists or Other Care Team Provider: None  Education and Discussions with Family / Patient: All patient questions answered to the best of my ability  Time Spent for Care: 20 minutes  More than 50% of total time spent on counseling and coordination of care as described above  Current Length of Stay: 2 day(s)  Current Patient Status: Inpatient   Certification Statement: The patient will continue to require additional inpatient hospital stay due to completion of 3 midnights  Discharge Plan: Discharge patient to [facility] tomorrow  Code Status: Level 1 - Full Code    Subjective:   Patient feels tired and weak  She is complaining that her feet hurt and her "legs don't work"  Objective:   Vitals:   Temp (24hrs), Av 4 °F (36 9 °C), Min:97 8 °F (36 6 °C), Max:98 8 °F (37 1 °C)    HR:  [70-91] 70  Resp:  [18] 18  BP: (140-177)/(60-76) 140/60  SpO2:  [94 %-98 %] 94 %  Body mass index is 23 48 kg/m²  Input and Output Summary (last 24 hours): Intake/Output Summary (Last 24 hours) at 17 1113  Last data filed at 17 0913   Gross per 24 hour   Intake              680 ml   Output              150 ml   Net              530 ml       Physical Exam:     Physical Exam   HENT:   Head: Normocephalic and atraumatic  Mouth/Throat: Oropharynx is clear and moist and mucous membranes are normal    Eyes: No scleral icterus  Cardiovascular: Normal rate and regular rhythm  Murmur heard  Systolic murmur is present with a grade of 3/6   Pulmonary/Chest: Breath sounds normal  She has no wheezes  She has no rales  She exhibits no tenderness  Abdominal: Soft  Bowel sounds are normal  She exhibits no distension  There is no tenderness  Musculoskeletal: Normal range of motion  She exhibits no edema  Skin: Skin is warm and dry  No rash noted  Psychiatric: She has a normal mood and affect  Vitals reviewed      Additional Data:   Labs:    Results from last 7 days  Lab Units 10/31/17  0554   WBC Thousand/uL 5 84   HEMOGLOBIN g/dL 9 5*   HEMATOCRIT % 30 3*   PLATELETS Thousands/uL 189   NEUTROS PCT % 65   LYMPHS PCT % 23   MONOS PCT % 7   EOS PCT % 5       Results from last 7 days  Lab Units 10/31/17  0554 10/30/17  0506   SODIUM mmol/L 140 142   POTASSIUM mmol/L 4 2 3 9   CHLORIDE mmol/L 108 110*   CO2 mmol/L 24 24   BUN mg/dL 28* 33*   CREATININE mg/dL 1 19 1 55*   CALCIUM mg/dL 8 8 8 5   TOTAL PROTEIN g/dL  --  6 1*   BILIRUBIN TOTAL mg/dL  --  0 40   ALK PHOS U/L  --  50   ALT U/L  --  19   AST U/L  --  15   GLUCOSE RANDOM mg/dL 90 99       Results from last 7 days  Lab Units 10/29/17  1202   INR  0 94       * I Have Reviewed All Lab Data Listed Above  * Additional Pertinent Lab Tests Reviewed: No New Labs Available For Today    Imaging:    Imaging Reports Reviewed Today Include: None    Cultures:   Blood Culture:   Lab Results   Component Value Date    BLOODCX No Growth at 48 hrs  10/29/2017    BLOODCX No Growth at 48 hrs  10/29/2017     Urine Culture: No results found for: URINECX  Sputum Culture: No components found for: SPUTUMCX  Wound Culture: No results found for: WOUNDCULT    Last 24 Hours Medication List:     ascorbic acid 500 mg Oral HS   aspirin 81 mg Oral Daily   cyanocobalamin 1,000 mcg Oral Daily   cyanocobalamin 1,000 mcg Intramuscular Q30 Days   docusate sodium 100 mg Oral BID   ferrous sulfate 325 mg Oral HS   heparin (porcine) 5,000 Units Subcutaneous Q8H [address]   senna 1 tablet Oral Daily        Today, Patient Was Seen By: Ronit Richards PA-C    ** Please Note: Dragon 360 Dictation voice to text software may have been used in the creation of this document   **

## 2017-11-01 NOTE — PLAN OF CARE
Problem: OCCUPATIONAL THERAPY ADULT  Goal: Performs self-care activities at highest level of function for planned discharge setting  See evaluation for individualized goals  Treatment Interventions: ADL retraining, Functional transfer training, UE strengthening/ROM, Equipment evaluation/education, Patient/family training, Compensatory technique education, Activityengagement  Equipment Recommended: Bedside commode       See flowsheet documentation for full assessment, interventions and recommendations  Limitation: Decreased ADL status, Decreased UE strength, Decreased endurance, Decreased fine motor control, Decreased self-care trans, Decreased high-level ADLs     Assessment: Pt is a 81 yo female re[facility]ad[facility] 10/29/2017  Pt presents w/ syncope  Pt recently discharged on 10/9/2017 to post acute rehab  Pt reports home 4 days  Pt reports living alone in [address] [place] [address] w/ 3 OSVALDO PTA  Pt reprots first floor set- up w/ bed / bath, and using RW for functional mobility  Pt reports I w/ ADL, and requiring assistance w/ IADL's  Pt does not drive, and reports that her daughter stops by daily  Pt reports decline in activity engagement and decreased appetitie  Pt added that her family assists w/ meal prep, but pt has little appetite  Pt completed functional transfer bed <> chair w/ mod A and increased time using RW  Pt benefits from cues for hand placement  Pt requires assistance w/ ADL's  Pt presents w/ decreased standing tolerance, decreased balance, generalized weakness, decreased activity engagement impacting her I w/ dressing, bathing, oral hygiene, clothing mgmt, food prep / clean up, functional mobility, and functional transfers  Pt would benefit from OT while in acute care to address deficits  From an OT perspective, pt would benefit from post acute rehab when medically stable for discharge from acute care to minimze caregiver burden   Anticipate that pt will require consistent supervision and physical assistance to return to PLOF following post acute rehab  Will continue to follow pt in acute care to assist in safe discharge planning       OT Discharge Recommendation: Other (Comment) (post acute rehab)  OT - OK to Discharge: Yes (to post acute rehab when medically stable)

## 2017-11-01 NOTE — PHYSICAL THERAPY NOTE
PHYSICAL THERAPY EVALUATION  NAME: Chele Solis  AGE:   80 y o  MRN:  [id number]  ADMIT DX: Syncope [R55]  RALPH (acute kidney injury) ([facility] [address] ) [N17 [id number]]    PMH:   Past Medical History:   Diagnosis Date    Cancer [facility])     "some kind of cancer when I was 80"    Iron deficiency 10/31/2017    Stage 2 chronic kidney disease 10/31/2017    Vitamin B12 deficiency 10/31/2017     LENGTH OF STAY: 2     11/01/17 0900   Pain Assessment   Pain Assessment No/denies pain   Pain Score No Pain   Home Living   Type of [address] [place] [address] Two level; Able to live on main level with bedroom/bathroom;Stairs to enter with rails  (3 OSVALDO)   Bathroom Shower/Tub Tub/shower unit  (pt reports she sponge bathes at baseline )   Home Equipment Walker   Additional Comments Ambulates with mod I and RW at baseline  Prior Function   Level of [place] Independent with ADLs and functional mobility   Lives With Alone   Receives Help From Family  (daughter usually visits daily)   ADL Assistance Independent   IADLs Needs assistance  (daughter assists with meals)   Falls in the last 6 months (syncopal assisted fall prior to admission)   Vocational Retired   Comments Pt was recently discharged home from [facility] rehab  She was home 4 days when syncopal episode occurred  Restrictions/Precautions   Weight Bearing Precautions Per Order No   Other Precautions Chair Alarm; Bed Alarm; Fall Risk   General   Family/Caregiver Present No   Cognition   Overall Cognitive Status WFL   Arousal/Participation Cooperative   Orientation Level Oriented X4   Memory Decreased short term memory;Decreased recall of recent events   Following Commands Follows one step commands with increased time or repetition   RLE Assessment   RLE Assessment X   Strength RLE   RLE Overall Strength 3+/5  (grossly)   LLE Assessment   LLE Assessment X   Strength LLE   LLE Overall Strength 3+/5  (grossly)   Bed Mobility   Supine to Sit Unable to assess  (OOB in chair pre/post evaluation with alarm intact)   Transfers   Sit to Stand 2  Maximal assistance   Additional items Assist x 1; Increased time required;Verbal cues   Stand to Sit 3  Moderate assistance   Additional items Assist x 1; Increased time required;Verbal cues   Stand pivot 3  Moderate assistance   Additional items Assist x 1; Increased time required;Verbal cues  (with RW)   Ambulation/Elevation   Gait pattern Improper Weight shift;Narrow OSCAR; Forward Flexion;Decreased foot clearance;Shuffling; Short stride; Excessively slow   Gait Assistance 3  Moderate assist   Additional items Assist x 1;Verbal cues   Assistive Device Rolling walker   Distance 6` x1    Balance   Static Sitting Fair +   Dynamic Sitting Fair   Static Standing Poor +   Dynamic Standing Poor   Ambulatory Poor   Endurance Deficit   Endurance Deficit Yes   Endurance Deficit Description limited standing/ambulation distance   Activity Tolerance   Activity Tolerance Patient limited by fatigue   Assessment   Prognosis Fair   Problem List Decreased strength;Decreased endurance; Impaired balance;Decreased mobility; Impaired judgement;Decreased safety awareness   Goals   Patient Goals none stated   STG Expiration Date 11/10/17   Short Term Goal #1 Pt will be able to: (1) perform bed mobility with min A (2) perform sit to stand with min A (3) ambulate 100` with SBA and RW (4) assess stair negotiation as appropriate (5)initiate HEP (6) increase standing balance by 1 grade    Treatment Day 0   Plan   Treatment/Interventions Functional transfer training;LE strengthening/ROM; Therapeutic exercise; Endurance training;Patient/family training;Equipment eval/education; Bed mobility;Gait training   PT Frequency 5x/wk   Recommendation   Recommendation Post acute IP rehab;Long-term skilled nursing home placement   Equipment Recommended Walker   PT - OK to Discharge Yes  (if to rehab when medically appropriate)   Barthel Index   Feeding 10   Bathing 0   Grooming Score 5   Dressing Score 5   Bladder Score 5 Bowels Score 10   Toilet Use Score 5   Transfers (Bed/Chair) Score 10   Mobility (Level Surface) Score 0   Stairs Score 0   Barthel Index Score 50       Assessment: Pt is a 80 y o  female seen for PT evaluation s/p admit to [facility] on 10/29/2017 w/ Syncope  Order placed for PT  Comorbidities affecting pt's physical performance listed above  Personal factors affecting pt at time of IE include: steps to enter environment, limited home support, advanced age, past experience, behavioral pattern, inability to perform IADLs, inability to perform ADLs and inability to ambulate household distances  Prior to admission, pt was independent w/ all functional mobility w/ RW, lives in one floor environment, has 3 OSVALDO and lives alone  Pt reports that in the 4 days she was home post rehab, she was not managing very well  She reports not eating enough and not moving enough  Upon evaluation: Pt requires max A x1 for sit to stand, mod A x1 for SPT, and mod A x1 for ambulation with RW  Significant shuffling gait noted  (Please find full objective findings from PT assessment regarding body systems outlined above)  Impairments and limitations also listed above, especially due to  weakness, impaired balance, decreased endurance, gait deviations, decreased activity tolerance, decreased safety awareness and fall risk  The following objective measures performed on IE also reveal limitations: Barthel Index 50/100  Pt's clinical presentation is currently unstable/unpredictable seen in pt's presentation of decreased safety awareness, significant decline in functional mobility, abnormal lab values, and fall risk  Pt to benefit from continued skilled PT tx while in hospital and upon DC to address deficits as defined above and maximize level of functional mobility   From PT/mobility standpoint, recommendation at time of d/c would be Short term rehab and possible long term placement pending progress in order to maximize pt's functional independence and consistency w/ mobility in order to facilitate return to PLOF  Recommend progression of ambulation, initiation of HEP, and dynamic balance activities as appropriate        Kiersten Stratton, PT,DPT

## 2017-11-02 VITALS
HEART RATE: 68 BPM | DIASTOLIC BLOOD PRESSURE: 71 MMHG | TEMPERATURE: 98.4 F | BODY MASS INDEX: 23.38 KG/M2 | SYSTOLIC BLOOD PRESSURE: 156 MMHG | RESPIRATION RATE: 18 BRPM | HEIGHT: 66 IN | WEIGHT: 145.5 LBS | OXYGEN SATURATION: 98 %

## 2017-11-02 RX ORDER — FERROUS SULFATE 325(65) MG
325 TABLET ORAL 2 TIMES DAILY WITH MEALS
Refills: 0
Start: 2017-11-02 | End: 2018-05-14

## 2017-11-02 RX ORDER — CYANOCOBALAMIN 1000 UG/ML
1000 INJECTION INTRAMUSCULAR; SUBCUTANEOUS
Qty: 1 ML | Refills: 0
Start: 2017-11-30 | End: 2017-11-15

## 2017-11-02 RX ORDER — ACETAMINOPHEN 325 MG/1
650 TABLET ORAL EVERY 6 HOURS PRN
Qty: 30 TABLET | Refills: 0
Start: 2017-11-02 | End: 2018-05-14 | Stop reason: ALTCHOICE

## 2017-11-02 RX ORDER — ASCORBIC ACID 500 MG
250 TABLET ORAL 2 TIMES DAILY
Refills: 0
Start: 2017-11-02

## 2017-11-02 RX ADMIN — HEPARIN SODIUM 5000 UNITS: 5000 INJECTION, SOLUTION INTRAVENOUS; SUBCUTANEOUS at 05:41

## 2017-11-02 RX ADMIN — ASPIRIN 81 MG: 81 TABLET, COATED ORAL at 09:01

## 2017-11-02 RX ADMIN — DOCUSATE SODIUM 100 MG: 100 CAPSULE, LIQUID FILLED ORAL at 09:01

## 2017-11-02 RX ADMIN — SENNOSIDES 8.6 MG: 8.6 TABLET, FILM COATED ORAL at 09:01

## 2017-11-02 RX ADMIN — CYANOCOBALAMIN TAB 500 MCG 1000 MCG: 500 TAB at 09:01

## 2017-11-02 NOTE — PLAN OF CARE
CARDIOVASCULAR - ADULT     Maintains optimal cardiac output and hemodynamic stability Progressing        DISCHARGE PLANNING     Discharge to home or other facility with appropriate resources Progressing        DISCHARGE PLANNING - CARE MANAGEMENT     Discharge to post-acute care or home with appropriate resources Progressing        Knowledge Deficit     Patient/family/caregiver demonstrates understanding of disease process, treatment plan, medications, and discharge instructions Progressing        METABOLIC, FLUID AND ELECTROLYTES - ADULT     Electrolytes maintained within normal limits Progressing     Fluid balance maintained Progressing        Potential for Falls     Patient will remain free of falls Progressing        Prexisting or High Potential for Compromised Skin Integrity     Skin integrity is maintained or improved Progressing        SAFETY ADULT     Maintain or return to baseline ADL function Progressing     Maintain or return mobility status to optimal level Progressing

## 2017-11-02 NOTE — PLAN OF CARE
CARDIOVASCULAR - ADULT     Maintains optimal cardiac output and hemodynamic stability Adequate for Discharge     Absence of cardiac dysrhythmias or at baseline rhythm Adequate for Discharge        DISCHARGE PLANNING     Discharge to home or other facility with appropriate resources Adequate for Discharge        DISCHARGE PLANNING - CARE MANAGEMENT     Discharge to post-acute care or home with appropriate resources Adequate for Discharge        Knowledge Deficit     Patient/family/caregiver demonstrates understanding of disease process, treatment plan, medications, and discharge instructions Adequate for Discharge        METABOLIC, FLUID AND ELECTROLYTES - ADULT     Electrolytes maintained within normal limits Adequate for Discharge     Fluid balance maintained Adequate for Discharge        Potential for Falls     Patient will remain free of falls Adequate for Discharge        Prexisting or High Potential for Compromised Skin Integrity     Skin integrity is maintained or improved Adequate for Discharge        SAFETY ADULT     Maintain or return to baseline ADL function Adequate for Discharge     Maintain or return mobility status to optimal level Adequate for Discharge

## 2017-11-03 ENCOUNTER — GENERIC CONVERSION - ENCOUNTER (OUTPATIENT)
Dept: OTHER | Facility: OTHER | Age: 82
End: 2017-11-03

## 2017-11-04 LAB
BACTERIA BLD CULT: NORMAL
BACTERIA BLD CULT: NORMAL

## 2017-11-15 ENCOUNTER — APPOINTMENT (EMERGENCY)
Dept: CT IMAGING | Facility: HOSPITAL | Age: 82
DRG: 871 | End: 2017-11-15
Payer: MEDICARE

## 2017-11-15 ENCOUNTER — HOSPITAL ENCOUNTER (INPATIENT)
Facility: HOSPITAL | Age: 82
LOS: 9 days | Discharge: NURSING, REHAB OR LONG-TERM CARE | DRG: 871 | End: 2017-11-24
Attending: EMERGENCY MEDICINE | Admitting: FAMILY MEDICINE
Payer: MEDICARE

## 2017-11-15 ENCOUNTER — APPOINTMENT (INPATIENT)
Dept: ULTRASOUND IMAGING | Facility: HOSPITAL | Age: 82
DRG: 871 | End: 2017-11-15
Payer: MEDICARE

## 2017-11-15 ENCOUNTER — APPOINTMENT (INPATIENT)
Dept: RADIOLOGY | Facility: HOSPITAL | Age: 82
DRG: 871 | End: 2017-11-15
Payer: MEDICARE

## 2017-11-15 ENCOUNTER — GENERIC CONVERSION - ENCOUNTER (OUTPATIENT)
Dept: OTHER | Facility: OTHER | Age: 82
End: 2017-11-15

## 2017-11-15 DIAGNOSIS — A49.9 POLYMICROBIAL BACTERIAL INFECTION: ICD-10-CM

## 2017-11-15 DIAGNOSIS — N39.0 URINARY TRACT INFECTION: Primary | ICD-10-CM

## 2017-11-15 DIAGNOSIS — K83.09 CHOLANGITIS: ICD-10-CM

## 2017-11-15 DIAGNOSIS — R50.9 FEVER: ICD-10-CM

## 2017-11-15 DIAGNOSIS — A41.9 SEPSIS (HCC): ICD-10-CM

## 2017-11-15 DIAGNOSIS — R74.01 TRANSAMINITIS: ICD-10-CM

## 2017-11-15 DIAGNOSIS — K80.50 CHOLEDOCHOLITHIASIS: ICD-10-CM

## 2017-11-15 PROBLEM — M25.559 HIP PAIN: Status: ACTIVE | Noted: 2017-11-15

## 2017-11-15 PROBLEM — I47.2 NON-SUSTAINED VENTRICULAR TACHYCARDIA (HCC): Status: ACTIVE | Noted: 2017-11-15

## 2017-11-15 PROBLEM — G93.40 ENCEPHALOPATHY: Status: ACTIVE | Noted: 2017-11-15

## 2017-11-15 PROBLEM — E86.0 DEHYDRATION: Status: ACTIVE | Noted: 2017-11-15

## 2017-11-15 LAB
ALBUMIN SERPL BCP-MCNC: 2.9 G/DL (ref 3.5–5)
ALP SERPL-CCNC: 558 U/L (ref 46–116)
ALT SERPL W P-5'-P-CCNC: 431 U/L (ref 12–78)
ANION GAP SERPL CALCULATED.3IONS-SCNC: 9 MMOL/L (ref 4–13)
APTT PPP: 28 SECONDS (ref 23–35)
AST SERPL W P-5'-P-CCNC: 308 U/L (ref 5–45)
BACTERIA UR QL AUTO: ABNORMAL /HPF
BASOPHILS # BLD MANUAL: 0 THOUSAND/UL (ref 0–0.1)
BASOPHILS NFR MAR MANUAL: 0 % (ref 0–1)
BILIRUB DIRECT SERPL-MCNC: 1.47 MG/DL (ref 0–0.2)
BILIRUB SERPL-MCNC: 1.8 MG/DL (ref 0.2–1)
BILIRUB UR QL STRIP: NEGATIVE
BUN SERPL-MCNC: 29 MG/DL (ref 5–25)
CALCIUM SERPL-MCNC: 9 MG/DL (ref 8.3–10.1)
CHLORIDE SERPL-SCNC: 107 MMOL/L (ref 100–108)
CLARITY UR: CLEAR
CO2 SERPL-SCNC: 26 MMOL/L (ref 21–32)
COLOR UR: YELLOW
CREAT SERPL-MCNC: 1.43 MG/DL (ref 0.6–1.3)
EOSINOPHIL # BLD MANUAL: 0 THOUSAND/UL (ref 0–0.4)
EOSINOPHIL NFR BLD MANUAL: 0 % (ref 0–6)
ERYTHROCYTE [DISTWIDTH] IN BLOOD BY AUTOMATED COUNT: 14.1 % (ref 11.6–15.1)
FLUAV AG SPEC QL IA: NEGATIVE
FLUBV AG SPEC QL IA: NEGATIVE
GFR SERPL CREATININE-BSD FRML MDRD: 30 ML/MIN/1.73SQ M
GLUCOSE SERPL-MCNC: 108 MG/DL (ref 65–140)
GLUCOSE UR STRIP-MCNC: NEGATIVE MG/DL
HCT VFR BLD AUTO: 29.6 % (ref 34.8–46.1)
HGB BLD-MCNC: 9.4 G/DL (ref 11.5–15.4)
HGB UR QL STRIP.AUTO: ABNORMAL
INR PPP: 0.94 (ref 0.86–1.16)
KETONES UR STRIP-MCNC: NEGATIVE MG/DL
LACTATE SERPL-SCNC: 1.4 MMOL/L (ref 0.5–2)
LEUKOCYTE ESTERASE UR QL STRIP: ABNORMAL
LIPASE SERPL-CCNC: 686 U/L (ref 73–393)
LYMPHOCYTES # BLD AUTO: 0.48 THOUSAND/UL (ref 0.6–4.47)
LYMPHOCYTES # BLD AUTO: 5 % (ref 14–44)
MCH RBC QN AUTO: 30.2 PG (ref 26.8–34.3)
MCHC RBC AUTO-ENTMCNC: 31.8 G/DL (ref 31.4–37.4)
MCV RBC AUTO: 95 FL (ref 82–98)
MONOCYTES # BLD AUTO: 0.39 THOUSAND/UL (ref 0–1.22)
MONOCYTES NFR BLD: 4 % (ref 4–12)
NEUTROPHILS # BLD MANUAL: 8.67 THOUSAND/UL (ref 1.85–7.62)
NEUTS SEG NFR BLD AUTO: 90 % (ref 43–75)
NITRITE UR QL STRIP: NEGATIVE
NON-SQ EPI CELLS URNS QL MICRO: ABNORMAL /HPF
OTHER STN SPEC: ABNORMAL
PH UR STRIP.AUTO: 8 [PH] (ref 4.5–8)
PLATELET # BLD AUTO: 301 THOUSANDS/UL (ref 149–390)
PLATELET BLD QL SMEAR: ADEQUATE
PMV BLD AUTO: 9.7 FL (ref 8.9–12.7)
POTASSIUM SERPL-SCNC: 4.9 MMOL/L (ref 3.5–5.3)
PROT SERPL-MCNC: 6.8 G/DL (ref 6.4–8.2)
PROT UR STRIP-MCNC: ABNORMAL MG/DL
PROTHROMBIN TIME: 12.9 SECONDS (ref 12.1–14.4)
RBC # BLD AUTO: 3.11 MILLION/UL (ref 3.81–5.12)
RBC #/AREA URNS AUTO: ABNORMAL /HPF
SODIUM SERPL-SCNC: 142 MMOL/L (ref 136–145)
SP GR UR STRIP.AUTO: 1.01 (ref 1–1.03)
TOTAL CELLS COUNTED SPEC: 100
TROPONIN I SERPL-MCNC: 0.03 NG/ML
UROBILINOGEN UR QL STRIP.AUTO: 1 E.U./DL
VARIANT LYMPHS # BLD AUTO: 1 %
WBC # BLD AUTO: 9.63 THOUSAND/UL (ref 4.31–10.16)
WBC #/AREA URNS AUTO: ABNORMAL /HPF

## 2017-11-15 PROCEDURE — 96365 THER/PROPH/DIAG IV INF INIT: CPT

## 2017-11-15 PROCEDURE — 87400 INFLUENZA A/B EACH AG IA: CPT | Performed by: PHYSICIAN ASSISTANT

## 2017-11-15 PROCEDURE — 83605 ASSAY OF LACTIC ACID: CPT

## 2017-11-15 PROCEDURE — 84484 ASSAY OF TROPONIN QUANT: CPT | Performed by: PHYSICIAN ASSISTANT

## 2017-11-15 PROCEDURE — 87040 BLOOD CULTURE FOR BACTERIA: CPT

## 2017-11-15 PROCEDURE — 81001 URINALYSIS AUTO W/SCOPE: CPT

## 2017-11-15 PROCEDURE — 85007 BL SMEAR W/DIFF WBC COUNT: CPT

## 2017-11-15 PROCEDURE — 87186 SC STD MICRODIL/AGAR DIL: CPT

## 2017-11-15 PROCEDURE — 74176 CT ABD & PELVIS W/O CONTRAST: CPT

## 2017-11-15 PROCEDURE — 87086 URINE CULTURE/COLONY COUNT: CPT

## 2017-11-15 PROCEDURE — 82248 BILIRUBIN DIRECT: CPT | Performed by: PHYSICIAN ASSISTANT

## 2017-11-15 PROCEDURE — 85610 PROTHROMBIN TIME: CPT

## 2017-11-15 PROCEDURE — 76705 ECHO EXAM OF ABDOMEN: CPT

## 2017-11-15 PROCEDURE — 87077 CULTURE AEROBIC IDENTIFY: CPT

## 2017-11-15 PROCEDURE — 70450 CT HEAD/BRAIN W/O DYE: CPT

## 2017-11-15 PROCEDURE — 85027 COMPLETE CBC AUTOMATED: CPT

## 2017-11-15 PROCEDURE — 83690 ASSAY OF LIPASE: CPT | Performed by: PHYSICIAN ASSISTANT

## 2017-11-15 PROCEDURE — 73502 X-RAY EXAM HIP UNI 2-3 VIEWS: CPT

## 2017-11-15 PROCEDURE — 80053 COMPREHEN METABOLIC PANEL: CPT

## 2017-11-15 PROCEDURE — 71250 CT THORAX DX C-: CPT

## 2017-11-15 PROCEDURE — 87798 DETECT AGENT NOS DNA AMP: CPT | Performed by: PHYSICIAN ASSISTANT

## 2017-11-15 PROCEDURE — 36415 COLL VENOUS BLD VENIPUNCTURE: CPT

## 2017-11-15 PROCEDURE — 99285 EMERGENCY DEPT VISIT HI MDM: CPT

## 2017-11-15 PROCEDURE — 85730 THROMBOPLASTIN TIME PARTIAL: CPT

## 2017-11-15 PROCEDURE — 93005 ELECTROCARDIOGRAM TRACING: CPT

## 2017-11-15 PROCEDURE — 96361 HYDRATE IV INFUSION ADD-ON: CPT

## 2017-11-15 RX ORDER — CALCIUM CARBONATE 200(500)MG
1000 TABLET,CHEWABLE ORAL DAILY PRN
Status: DISCONTINUED | OUTPATIENT
Start: 2017-11-15 | End: 2017-11-24 | Stop reason: HOSPADM

## 2017-11-15 RX ORDER — MELATONIN
1000 DAILY
Status: DISCONTINUED | OUTPATIENT
Start: 2017-11-16 | End: 2017-11-24 | Stop reason: HOSPADM

## 2017-11-15 RX ORDER — MINERAL OIL 100 G/100G
1 OIL RECTAL ONCE
Status: COMPLETED | OUTPATIENT
Start: 2017-11-15 | End: 2017-11-15

## 2017-11-15 RX ORDER — ACETAMINOPHEN 325 MG/1
650 TABLET ORAL EVERY 6 HOURS PRN
Status: DISCONTINUED | OUTPATIENT
Start: 2017-11-15 | End: 2017-11-24 | Stop reason: HOSPADM

## 2017-11-15 RX ORDER — GABAPENTIN 100 MG/1
100 CAPSULE ORAL 3 TIMES DAILY
Status: DISCONTINUED | OUTPATIENT
Start: 2017-11-15 | End: 2017-11-16

## 2017-11-15 RX ORDER — CYCLOSPORINE 0.5 MG/ML
1 EMULSION OPHTHALMIC 2 TIMES DAILY
COMMUNITY
End: 2018-10-26

## 2017-11-15 RX ORDER — ONDANSETRON 2 MG/ML
4 INJECTION INTRAMUSCULAR; INTRAVENOUS EVERY 6 HOURS PRN
Status: DISCONTINUED | OUTPATIENT
Start: 2017-11-15 | End: 2017-11-24 | Stop reason: HOSPADM

## 2017-11-15 RX ORDER — ASPIRIN 81 MG/1
81 TABLET ORAL DAILY
Status: DISCONTINUED | OUTPATIENT
Start: 2017-11-16 | End: 2017-11-16

## 2017-11-15 RX ORDER — LATANOPROST 50 UG/ML
1 SOLUTION/ DROPS OPHTHALMIC
COMMUNITY

## 2017-11-15 RX ORDER — MINERAL OIL 100 G/100G
1 OIL RECTAL ONCE
COMMUNITY
End: 2017-11-24 | Stop reason: HOSPADM

## 2017-11-15 RX ORDER — LATANOPROST 50 UG/ML
1 SOLUTION/ DROPS OPHTHALMIC
Status: DISCONTINUED | OUTPATIENT
Start: 2017-11-15 | End: 2017-11-15 | Stop reason: ALTCHOICE

## 2017-11-15 RX ORDER — PANTOPRAZOLE SODIUM 40 MG/1
40 TABLET, DELAYED RELEASE ORAL
Status: DISCONTINUED | OUTPATIENT
Start: 2017-11-16 | End: 2017-11-24 | Stop reason: HOSPADM

## 2017-11-15 RX ORDER — SENNOSIDES 8.6 MG
1 TABLET ORAL DAILY
Status: DISCONTINUED | OUTPATIENT
Start: 2017-11-16 | End: 2017-11-21

## 2017-11-15 RX ORDER — FERROUS SULFATE 325(65) MG
325 TABLET ORAL 2 TIMES DAILY WITH MEALS
Status: DISCONTINUED | OUTPATIENT
Start: 2017-11-16 | End: 2017-11-24 | Stop reason: HOSPADM

## 2017-11-15 RX ORDER — SUCRALFATE 1 G/1
1 TABLET ORAL EVERY 12 HOURS
Status: DISCONTINUED | OUTPATIENT
Start: 2017-11-15 | End: 2017-11-24 | Stop reason: HOSPADM

## 2017-11-15 RX ORDER — SODIUM CHLORIDE 9 MG/ML
75 INJECTION, SOLUTION INTRAVENOUS CONTINUOUS
Status: DISCONTINUED | OUTPATIENT
Start: 2017-11-15 | End: 2017-11-18

## 2017-11-15 RX ORDER — HEPARIN SODIUM 5000 [USP'U]/ML
5000 INJECTION, SOLUTION INTRAVENOUS; SUBCUTANEOUS EVERY 8 HOURS SCHEDULED
Status: DISCONTINUED | OUTPATIENT
Start: 2017-11-15 | End: 2017-11-24 | Stop reason: HOSPADM

## 2017-11-15 RX ORDER — CYCLOSPORINE 0.5 MG/ML
1 EMULSION OPHTHALMIC 2 TIMES DAILY
Status: DISCONTINUED | OUTPATIENT
Start: 2017-11-15 | End: 2017-11-22

## 2017-11-15 RX ORDER — ASCORBIC ACID 500 MG
250 TABLET ORAL DAILY
Status: DISCONTINUED | OUTPATIENT
Start: 2017-11-16 | End: 2017-11-24 | Stop reason: HOSPADM

## 2017-11-15 RX ORDER — DOCUSATE SODIUM 100 MG/1
100 CAPSULE, LIQUID FILLED ORAL 2 TIMES DAILY
Status: DISCONTINUED | OUTPATIENT
Start: 2017-11-15 | End: 2017-11-24 | Stop reason: HOSPADM

## 2017-11-15 RX ORDER — TRAVOPROST OPHTHALMIC SOLUTION 0.04 MG/ML
1 SOLUTION OPHTHALMIC
Status: DISCONTINUED | OUTPATIENT
Start: 2017-11-15 | End: 2017-11-24 | Stop reason: HOSPADM

## 2017-11-15 RX ORDER — IBUPROFEN 400 MG/1
400 TABLET ORAL ONCE
Status: COMPLETED | OUTPATIENT
Start: 2017-11-15 | End: 2017-11-15

## 2017-11-15 RX ORDER — GABAPENTIN 300 MG/1
300 CAPSULE ORAL 3 TIMES DAILY
COMMUNITY
End: 2018-05-14 | Stop reason: ALTCHOICE

## 2017-11-15 RX ADMIN — HEPARIN SODIUM 5000 UNITS: 5000 INJECTION, SOLUTION INTRAVENOUS; SUBCUTANEOUS at 21:40

## 2017-11-15 RX ADMIN — IBUPROFEN 400 MG: 400 TABLET, FILM COATED ORAL at 15:26

## 2017-11-15 RX ADMIN — MINERAL OIL 1 ENEMA: 100 ENEMA RECTAL at 22:29

## 2017-11-15 RX ADMIN — TRAVOPROST 1 DROP: 0.04 SOLUTION/ DROPS OPHTHALMIC at 21:41

## 2017-11-15 RX ADMIN — CEFTRIAXONE SODIUM 1000 MG: 10 INJECTION, POWDER, FOR SOLUTION INTRAVENOUS at 18:10

## 2017-11-15 RX ADMIN — SODIUM CHLORIDE 1000 ML: 0.9 INJECTION, SOLUTION INTRAVENOUS at 16:02

## 2017-11-15 RX ADMIN — SODIUM CHLORIDE 75 ML/HR: 0.9 INJECTION, SOLUTION INTRAVENOUS at 21:30

## 2017-11-15 NOTE — Clinical Note
Case was discussed with MILAD and the patient's admission status was agreed to be Admission Status: inpatient status to the service of Dr Neo Taylor

## 2017-11-15 NOTE — SEPSIS NOTE
Sepsis Note   Sorin Blair 80 y o  female MRN: [id number]  Unit/Bed#: ED 19 Encounter: [id number]            Initial Sepsis Screening     [address] Name 11/15/17 0140                Is the patient's history suggestive of a new or worsening infection? (!)  Yes (Proceed)  -GR        Suspected source of infection suspect infection, source unknown  -GR        Are two or more of the following signs & symptoms of infection both present and new to the patient? (!)  Yes (Proceed)  -GR        Indicate SIRS criteria Hyperthemia > 38 3C (100 9F); Tachycardia > 90 bpm  -GR        If the answer is yes to both questions, suspicion of sepsis is present          If severe sepsis is present AND tissue hypoperfusion perists in the hour after fluid resuscitation or lactate > 4, the patient meets criteria for SEPTIC SHOCK          Are any of the following organ dysfunction criteria present within 6 hours of suspected infection and SIRS criteria that are NOT considered to be chronic conditions?         Organ dysfunction          Date of presentation of severe sepsis          Time of presentation of severe sepsis          Tissue hypoperfusion persists in the hour after crystalloid fluid administration, evidenced, by either:          Was hypotension present within one hour of the conclusion of crystalloid fluid administration?           Date of presentation of septic shock          Time of presentation of septic shock            User Key  (r) = Recorded By, (t) = Taken By, (c) = Cosigned By    Initials Name Provider Type    Darci Lorenz PA-C Physician Assistant

## 2017-11-15 NOTE — ED PROVIDER NOTES
History  Chief Complaint   Patient presents with    Fever - 76 years or older     Pt presents to the ED for evaluation of Nausea, vomitting and fever that started at 12pm  Per staff at nursing facility pt had temp of 104, did get rectal tylenol suppository prior to arrival     49-year-old female presents to the emergency department with complaints of fever and a change in mental status  Nursing home staff states that she started to develop a fever earlier today and had 1 episode of vomiting  States that she was given rectal Tylenol prior to arrival to the emergency department  Presently patient denies any recent upper respiratory symptoms  She denies nausea or abdominal pain presently  No history of similar symptoms  History provided by:  Patient and nursing home   used: No        Prior to Admission Medications   Prescriptions Last Dose Informant Patient Reported? Taking?    Cholecalciferol (VITAMIN D) 2000 units CAPS   Yes Yes   Sig: Take 2,000 Units by mouth daily   Magnesium Hydroxide (MILK OF MAGNESIA PO)   Yes Yes   Sig: Take 30 mL by mouth as needed   Multiple Vitamins-Minerals (PRESERVISION AREDS 2 PO)   Yes Yes   Sig: Take by mouth 2 (two) times a day   acetaminophen (TYLENOL) 325 mg tablet   No Yes   Sig: Take 2 tablets by mouth every 6 (six) hours as needed for mild pain, headaches or fever   Patient taking differently: Take 650 mg by mouth every 4 (four) hours as needed for mild pain, headaches or fever     ascorbic acid (VITAMIN C) 500 MG tablet   No Yes   Sig: Take 0 5 tablets by mouth 2 (two) times a day   aspirin (ECOTRIN LOW STRENGTH) 81 mg EC tablet   No Yes   Sig: Take 1 tablet by mouth daily   cycloSPORINE (RESTASIS) 0 05 % ophthalmic emulsion   Yes Yes   Sig: Administer 1 drop to both eyes 2 (two) times a day   docusate sodium (COLACE) 100 mg capsule   No Yes   Sig: Take 1 capsule by mouth 2 (two) times a day   ferrous sulfate 325 (65 Fe) mg tablet   No Yes   Sig: Take 1 tablet by mouth 2 (two) times a day with meals   gabapentin (NEURONTIN) 300 mg capsule   Yes Yes   Sig: Take 100 mg by mouth 3 (three) times a day   latanoprost (XALATAN) 0 005 % ophthalmic solution   Yes Yes   Si drop daily at bedtime   mineral oil enema   Yes Yes   Sig: Insert 1 enema into the rectum once   pantoprazole (PROTONIX) 40 mg tablet   Yes Yes   Sig: Take 40 mg by mouth daily   senna (SENOKOT) 8 6 mg   No Yes   Sig: Take 1 tablet by mouth daily   sucralfate (CARAFATE) 1 g tablet   Yes Yes   Sig: Take 1 g by mouth every 12 (twelve) hours   travoprost (TRAVATAN Z) 0 004 % ophthalmic solution   Yes No   Sig: Apply 1 drop to eye daily      Facility-Administered Medications: None       Past Medical History:   Diagnosis Date    Cancer [facility])     "some kind of cancer when I was 80"    Iron deficiency 10/31/2017    Stage 2 chronic kidney disease 10/31/2017    Vitamin B12 deficiency 10/31/2017       Past Surgical History:   Procedure Laterality Date    BACK SURGERY      COLON SURGERY      HIP FRACTURE SURGERY         History reviewed  No pertinent family history  I have reviewed and agree with the history as documented  Social History   Substance Use Topics    Smoking status: Never Smoker    Smokeless tobacco: Never Used    Alcohol use No        Review of Systems   Constitutional: Positive for fever  Negative for activity change, appetite change and chills  HENT: Negative for congestion, dental problem, drooling, ear discharge, ear pain, mouth sores, nosebleeds, rhinorrhea, sore throat and trouble swallowing  Eyes: Negative for pain, discharge and itching  Respiratory: Negative for cough, chest tightness, shortness of breath and wheezing  Cardiovascular: Negative for chest pain and palpitations  Gastrointestinal: Positive for nausea and vomiting  Negative for abdominal pain, blood in stool, constipation and diarrhea     Endocrine: Negative for cold intolerance and heat intolerance  Genitourinary: Negative for difficulty urinating, dysuria, flank pain, frequency and urgency  Skin: Negative for rash and wound  Allergic/Immunologic: Negative for food allergies and immunocompromised state  Neurological: Negative for dizziness, seizures, syncope, weakness, numbness and headaches  Psychiatric/Behavioral: Negative for agitation, behavioral problems and confusion  Physical Exam  ED Triage Vitals   Temperature Pulse Respirations Blood Pressure SpO2   11/15/17 1448 11/15/17 1448 11/15/17 1448 11/15/17 1448 11/15/17 1448   (!) 102 8 °F (39 3 °C) (!) 114 20 122/58 91 %      Temp Source Heart Rate Source Patient Position - Orthostatic VS BP Location FiO2 (%)   11/15/17 1448 11/15/17 1448 11/15/17 1448 11/15/17 1448 --   Oral Monitor Lying Left arm       Pain Score       11/15/17 2153       No Pain           Orthostatic Vital Signs  Vitals:    11/15/17 1745 11/15/17 1800 11/15/17 1846 11/15/17 2042   BP: 99/51 97/52 106/80 90/53   Pulse: 100 96 89 75   Patient Position - Orthostatic VS: Lying Sitting Lying Lying       Physical Exam   Constitutional: She is oriented to person, place, and time  She appears well-developed and well-nourished  No distress  HENT:   Head: Normocephalic and atraumatic  Right Ear: External ear normal    Left Ear: External ear normal    Mouth/Throat: Oropharynx is clear and moist  No oropharyngeal exudate  Eyes: Conjunctivae are normal    Neck: No JVD present  No tracheal deviation present  Cardiovascular: Normal rate and regular rhythm  Exam reveals no gallop and no friction rub  Murmur heard  Pulmonary/Chest: Effort normal and breath sounds normal  No respiratory distress  She has no wheezes  She has no rales  She exhibits no tenderness  Abdominal: Soft  Bowel sounds are normal  She exhibits no distension  There is no tenderness  There is no guarding  Previous surgical scar from colon resection     Musculoskeletal: Normal range of motion  She exhibits no edema, tenderness or deformity  Lymphadenopathy:     She has no cervical adenopathy  Neurological: She is alert and oriented to person, place, and time  Skin: Skin is warm and dry  No rash noted  She is not diaphoretic  No erythema  There is pallor  Psychiatric: She has a normal mood and affect  Her behavior is normal    Nursing note and vitals reviewed        ED Medications  Medications   aspirin (ECOTRIN LOW STRENGTH) EC tablet 81 mg (not administered)   docusate sodium (COLACE) capsule 100 mg (100 mg Oral Not Given 11/15/17 2140)   senna (SENOKOT) tablet 8 6 mg (not administered)   cholecalciferol (VITAMIN D3) tablet 1,000 Units (not administered)   travoprost (TRAVATAN-Z) 0 004 % ophthalmic solution 1 drop (1 drop Both Eyes Given 11/15/17 2141)   sucralfate (CARAFATE) tablet 1 g (1 g Oral Not Given 11/15/17 2140)   pantoprazole (PROTONIX) EC tablet 40 mg (not administered)   acetaminophen (TYLENOL) tablet 650 mg (not administered)   ascorbic acid (VITAMIN C) tablet 250 mg (not administered)   ferrous sulfate tablet 325 mg (not administered)   gabapentin (NEURONTIN) capsule 100 mg (100 mg Oral Not Given 11/15/17 2140)   cycloSPORINE (RESTASIS) 0 05 % ophthalmic emulsion 1 drop (1 drop Both Eyes Not Given 11/15/17 2140)   multivitamin-minerals (CENTRUM) tablet 1 tablet (not administered)   sodium chloride 0 9 % infusion (75 mL/hr Intravenous New Bag 11/15/17 2130)   ondansetron (ZOFRAN) injection 4 mg (not administered)   calcium carbonate (TUMS) chewable tablet 1,000 mg (not administered)   heparin (porcine) subcutaneous injection 5,000 Units (5,000 Units Subcutaneous Given 11/15/17 2140)   cefTRIAXone (ROCEPHIN) 1,000 mg in dextrose 5 % 50 mL IVPB (not administered)    EMS REPLENISHMENT MED ( Does not apply Given to EMS 11/15/17 1555)   ibuprofen (MOTRIN) tablet 400 mg (400 mg Oral Given 11/15/17 1526)   sodium chloride 0 9 % bolus 1,000 mL (0 mL Intravenous Stopped 11/15/17 1810) ceftriaxone (ROCEPHIN) 1 g/50 mL in dextrose IVPB (0 mg Intravenous Stopped 11/15/17 1844)   mineral oil enema 1 enema (1 enema Rectal Given 11/15/17 2229)       Diagnostic Studies  Results Reviewed     Procedure Component Value Units Date/Time    Lipase [[id number]]  (Abnormal) Collected:  11/15/17 1508    Lab Status:  Final result Specimen:  Blood from Arm, Left Updated:  11/15/17 1618     Lipase 686 (H) u/L     Bilirubin, direct [[id number]]  (Abnormal) Collected:  11/15/17 1508    Lab Status:  Final result Specimen:  Blood from Arm, Left Updated:  11/15/17 1618     Bilirubin, Direct 1 47 (H) mg/dL     Urine Microscopic [[id number]]  (Abnormal) Collected:  11/15/17 1532    Lab Status:  Final result Specimen:  Urine from Urine, Straight Cath Updated:  11/15/17 1613     RBC, UA 1-2 (A) /hpf      WBC, UA Innumerable (A) /hpf      Epithelial Cells Occasional /hpf      Bacteria, UA Innumerable (A) /hpf      OTHER OBSERVATIONS Transitional Epithelial Cells    Urine culture [[id number]] Collected:  11/15/17 1532    Lab Status: In process Specimen:  Urine from Urine, Straight Cath Updated:  11/15/17 1613    CBC and differential [[id number]]  (Abnormal) Collected:  11/15/17 1508    Lab Status:  Final result Specimen:  Blood from Arm, Left Updated:  11/15/17 1605     WBC 9 63 Thousand/uL      RBC 3 11 (L) Million/uL      Hemoglobin 9 4 (L) g/dL      Hematocrit 29 6 (L) %      MCV 95 fL      MCH 30 2 pg      MCHC 31 8 g/dL      RDW 14 1 %      MPV 9 7 fL      Platelets 506 Thousands/uL     Narrative: This is an appended report  These results have been appended to a previously verified report      Rapid Influenza Screen with Reflex PCR (indicated for patients <2 mo of age) [[id number]]  (Normal) Collected:  11/15/17 1524    Lab Status:  Final result Specimen:  Nasopharyngeal from Nasopharyngeal Swab Updated:  11/15/17 1559     Rapid Influenza A Ag Negative     Rapid Influenza B Ag Negative    Influenza A/B and RSV by PCR (Indicated for patients > 2 mo of age) [[id number]] Collected:  11/15/17 1524    Lab Status: In process Specimen:  Nasopharyngeal from Nasopharyngeal Swab Updated:  11/15/17 1559    Troponin I [[id number]]  (Normal) Collected:  11/15/17 1524    Lab Status:  Final result Specimen:  Blood from Arm, Right Updated:  11/15/17 1558     Troponin I 0 03 ng/mL     Narrative:         [employer] Chemistry analyzer 99% cutoff is > 0 04 ng/mL in network labs    o cTnI 99% cutoff is useful only when applied to patients in the clinical setting of myocardial ischemia  o cTnI 99% cutoff should be interpreted in the context of clinical history, ECG findings and possibly cardiac imaging to establish correct diagnosis  o cTnI 99% cutoff may be suggestive but clearly not indicative of a coronary event without the clinical setting of myocardial ischemia  UA w Reflex to Microscopic w Reflex to Culture [[id number]]  (Abnormal) Collected:  11/15/17 1532    Lab Status:  Final result Specimen:  Urine from Urine, Straight Cath Updated:  11/15/17 1544     Color, UA Yellow     Clarity, UA Clear     Specific Gravity, UA 1 015     pH, UA 8 0     Leukocytes, UA Moderate (A)     Nitrite, UA Negative     Protein, UA Trace (A) mg/dl      Glucose, UA Negative mg/dl      Ketones, UA Negative mg/dl      Urobilinogen, UA 1 0 E U /dl      Bilirubin, UA Negative     Blood, UA Trace-Intact (A)    Lactic Acid x2 [[id number]]  (Normal) Collected:  11/15/17 1508    Lab Status:  Final result Specimen:  Blood from Arm, Left Updated:  11/15/17 1540     LACTIC ACID 1 4 mmol/L     Narrative:         Result may be elevated if tourniquet was used during collection      Comprehensive metabolic panel [[id number]]  (Abnormal) Collected:  11/15/17 1508    Lab Status:  Final result Specimen:  Blood from Arm, Left Updated:  11/15/17 1535     Sodium 142 mmol/L      Potassium 4 9 mmol/L      Chloride 107 mmol/L      CO2 26 mmol/L      Anion Gap 9 mmol/L      BUN 29 (H) mg/dL Creatinine 1 43 (H) mg/dL      Glucose 108 mg/dL      Calcium 9 0 mg/dL       (H) U/L       (H) U/L      Alkaline Phosphatase 558 (H) U/L      Total Protein 6 8 g/dL      Albumin 2 9 (L) g/dL      Total Bilirubin 1 80 (H) mg/dL      eGFR 30 ml/min/1 73sq m     Narrative:         National Kidney Disease Education Program recommendations are as follows:  GFR calculation is accurate only with a steady state creatinine  Chronic Kidney disease less than 60 ml/min/1 73 sq  meters  Kidney failure less than 15 ml/min/1 73 sq  meters  APTT [[id number]]  (Normal) Collected:  11/15/17 1508    Lab Status:  Final result Specimen:  Blood from Arm, Left Updated:  11/15/17 1529     PTT 28 seconds     Narrative: Therapeutic Heparin Range = 60-90 seconds    Protime-INR [[id number]]  (Normal) Collected:  11/15/17 1508    Lab Status:  Final result Specimen:  Blood from Arm, Left Updated:  11/15/17 1529     Protime 12 9 seconds      INR 0 94    Blood culture #1 [[id number]] Collected:  11/15/17 1508    Lab Status: In process Specimen:  Blood from Arm, Left Updated:  11/15/17 1520    Blood culture #2 [[id number]] Collected:  11/15/17 1508    Lab Status: In process Specimen:  Blood from Arm, Right Updated:  11/15/17 1520    Lactic Acid x2 [[id number]]     Lab Status:  No result Specimen:  Blood                  US abdomen limited   Final Result by Nilam Taylor MD (11/15 2139)   Distended gallbladder containing a large 3 x 2 cm gallstone in the gallbladder neck  No pericholecystic fluid or significant gallbladder wall thickening  Negative Childers sign  Workstation performed: [id number]         CT chest abdomen pelvis wo contrast   Final Result by Jonathon Timmons MD (11/15 1737)      1  No acute thoracic or abdominopelvic findings  2   Mild anasarca  Diffuse strandy appearance of the mesentery without bianca ascites  3   Cholelithiasis without evidence for cholecystitis        4   Moderate sized excess stool burden  Workstation performed: [id number]         CT head without contrast   Final Result by Carine Marks MD (11/15 6479)      No acute intracranial abnormality  Workstation performed: [id number]         XR hip/pelv 2-3 vws left if performed    (Results Pending)              Procedures  ECG 12 Lead Documentation  Date/Time: 11/15/2017 3:15 PM  Performed by: Velma Caban  Authorized by: Odilon Gabriel     Indications / Diagnosis:  Altered MS  ECG reviewed by me, the ED Provider: yes    Patient location:  ED  Previous ECG:     Previous ECG:  Compared to current    Comparison ECG info:  10/29/2017    Similarity:  No change  Interpretation:     Interpretation: abnormal    Rate:     ECG rate:  108    ECG rate assessment: tachycardic    Rhythm:     Rhythm: sinus tachycardia    Ectopy:     Ectopy: none    QRS:     QRS axis:  Normal    QRS intervals: Wide  Conduction:     Conduction: abnormal      Abnormal conduction: complete LBBB    T waves:     T waves: normal               Phone Contacts  ED Phone Contact    ED Course  ED Course                          Initial Sepsis Screening     [address] Name 11/15/17 8144                Is the patient's history suggestive of a new or worsening infection? (!)  Yes (Proceed)  -GR        Suspected source of infection suspect infection, source unknown  -GR        Are two or more of the following signs & symptoms of infection both present and new to the patient? (!)  Yes (Proceed)  -GR        Indicate SIRS criteria Hyperthemia > 38 3C (100 9F); Tachycardia > 90 bpm  -GR        If the answer is yes to both questions, suspicion of sepsis is present          If severe sepsis is present AND tissue hypoperfusion perists in the hour after fluid resuscitation or lactate > 4, the patient meets criteria for SEPTIC SHOCK          Are any of the following organ dysfunction criteria present within 6 hours of suspected infection and SIRS criteria that are NOT considered to be chronic conditions?         Organ dysfunction          Date of presentation of severe sepsis          Time of presentation of severe sepsis          Tissue hypoperfusion persists in the hour after crystalloid fluid administration, evidenced, by either:          Was hypotension present within one hour of the conclusion of crystalloid fluid administration?         Date of presentation of septic shock          Time of presentation of septic shock            User Key  (r) = Recorded By, (t) = Taken By, (c) = Cosigned By    Initials Name Provider Type    Shakila Trejo PA-C Physician Assistant                  MDM  Number of Diagnoses or Management Options  Fever:   Transaminitis:   Urinary tract infection:   Diagnosis management comments: Differential diagnosis includes but not limited to:  Cholelithiasis, cholecystitis, gallstone pancreatitis, colitis pneumonia, urinary tract infection, sepsis  Amount and/or Complexity of Data Reviewed  Clinical lab tests: ordered and reviewed  Tests in the radiology section of CPT®: ordered and reviewed  Review and summarize past medical records: yes  Independent visualization of images, tracings, or specimens: yes      CritCare Time    Disposition  Final diagnoses:   Urinary tract infection   Fever   Transaminitis     Time reflects when diagnosis was documented in both MDM as applicable and the Disposition within this note     Time User Action Codes Description Comment    11/15/2017  6:50 PM Fredis Gobble Add [N39 0] Urinary tract infection     11/15/2017  6:50 PM Fredis Gobble Add [R50 9] Fever     11/15/2017  6:50 PM Fredis Gobble Add [R74 0] Transaminitis       ED Disposition     ED Disposition Condition Comment    Admit  Case was discussed with MILAD and the patient's admission status was agreed to be Admission Status: inpatient status to the service of Dr Anthony Officer           Follow-up Information    None       Current Discharge Medication List      CONTINUE these medications which have NOT CHANGED    Details   acetaminophen (TYLENOL) 325 mg tablet Take 2 tablets by mouth every 6 (six) hours as needed for mild pain, headaches or fever  Qty: 30 tablet, Refills: 0      ascorbic acid (VITAMIN C) 500 MG tablet Take 0 5 tablets by mouth 2 (two) times a day  Refills: 0      aspirin (ECOTRIN LOW STRENGTH) 81 mg EC tablet Take 1 tablet by mouth daily  Refills: 0      Cholecalciferol (VITAMIN D) 2000 units CAPS Take 2,000 Units by mouth daily      cycloSPORINE (RESTASIS) 0 05 % ophthalmic emulsion Administer 1 drop to both eyes 2 (two) times a day      docusate sodium (COLACE) 100 mg capsule Take 1 capsule by mouth 2 (two) times a day  Qty: 10 capsule, Refills: 0      ferrous sulfate 325 (65 Fe) mg tablet Take 1 tablet by mouth 2 (two) times a day with meals  Refills: 0      gabapentin (NEURONTIN) 300 mg capsule Take 100 mg by mouth 3 (three) times a day      latanoprost (XALATAN) 0 005 % ophthalmic solution 1 drop daily at bedtime      Magnesium Hydroxide (MILK OF MAGNESIA PO) Take 30 mL by mouth as needed      mineral oil enema Insert 1 enema into the rectum once      Multiple Vitamins-Minerals (PRESERVISION AREDS 2 PO) Take by mouth 2 (two) times a day      pantoprazole (PROTONIX) 40 mg tablet Take 40 mg by mouth daily      senna (SENOKOT) 8 6 mg Take 1 tablet by mouth daily  Qty: 120 each, Refills: 0      sucralfate (CARAFATE) 1 g tablet Take 1 g by mouth every 12 (twelve) hours      travoprost (TRAVATAN Z) 0 004 % ophthalmic solution Apply 1 drop to eye daily           No discharge procedures on file      ED Provider  Electronically Signed by           Sandhya Graves PA-C  11/15/17 0614

## 2017-11-16 ENCOUNTER — GENERIC CONVERSION - ENCOUNTER (OUTPATIENT)
Dept: OTHER | Facility: OTHER | Age: 82
End: 2017-11-16

## 2017-11-16 LAB
ALBUMIN SERPL BCP-MCNC: 2.5 G/DL (ref 3.5–5)
ALP SERPL-CCNC: 469 U/L (ref 46–116)
ALT SERPL W P-5'-P-CCNC: 343 U/L (ref 12–78)
ANION GAP SERPL CALCULATED.3IONS-SCNC: 7 MMOL/L (ref 4–13)
AST SERPL W P-5'-P-CCNC: 206 U/L (ref 5–45)
ATRIAL RATE: 107 BPM
BILIRUB SERPL-MCNC: 2 MG/DL (ref 0.2–1)
BUN SERPL-MCNC: 29 MG/DL (ref 5–25)
CALCIUM SERPL-MCNC: 8.8 MG/DL (ref 8.3–10.1)
CHLORIDE SERPL-SCNC: 108 MMOL/L (ref 100–108)
CO2 SERPL-SCNC: 27 MMOL/L (ref 21–32)
CREAT SERPL-MCNC: 1.56 MG/DL (ref 0.6–1.3)
ERYTHROCYTE [DISTWIDTH] IN BLOOD BY AUTOMATED COUNT: 14.3 % (ref 11.6–15.1)
FLUAV AG SPEC QL: NORMAL
FLUBV AG SPEC QL: NORMAL
GFR SERPL CREATININE-BSD FRML MDRD: 27 ML/MIN/1.73SQ M
GLUCOSE SERPL-MCNC: 86 MG/DL (ref 65–140)
HCT VFR BLD AUTO: 27.4 % (ref 34.8–46.1)
HGB BLD-MCNC: 8.3 G/DL (ref 11.5–15.4)
INR PPP: 1.01 (ref 0.86–1.16)
MAGNESIUM SERPL-MCNC: 2.1 MG/DL (ref 1.6–2.6)
MCH RBC QN AUTO: 29.4 PG (ref 26.8–34.3)
MCHC RBC AUTO-ENTMCNC: 30.3 G/DL (ref 31.4–37.4)
MCV RBC AUTO: 97 FL (ref 82–98)
P AXIS: 70 DEGREES
PLATELET # BLD AUTO: 271 THOUSANDS/UL (ref 149–390)
PMV BLD AUTO: 9.3 FL (ref 8.9–12.7)
POTASSIUM SERPL-SCNC: 4.9 MMOL/L (ref 3.5–5.3)
PR INTERVAL: 148 MS
PROT SERPL-MCNC: 6.1 G/DL (ref 6.4–8.2)
PROTHROMBIN TIME: 13.6 SECONDS (ref 12.1–14.4)
QRS AXIS: 11 DEGREES
QRSD INTERVAL: 134 MS
QT INTERVAL: 358 MS
QTC INTERVAL: 477 MS
RBC # BLD AUTO: 2.82 MILLION/UL (ref 3.81–5.12)
RSV B RNA SPEC QL NAA+PROBE: NORMAL
SODIUM SERPL-SCNC: 142 MMOL/L (ref 136–145)
T WAVE AXIS: 153 DEGREES
VENTRICULAR RATE: 107 BPM
WBC # BLD AUTO: 9.35 THOUSAND/UL (ref 4.31–10.16)

## 2017-11-16 PROCEDURE — 83735 ASSAY OF MAGNESIUM: CPT | Performed by: INTERNAL MEDICINE

## 2017-11-16 PROCEDURE — 93005 ELECTROCARDIOGRAM TRACING: CPT | Performed by: FAMILY MEDICINE

## 2017-11-16 PROCEDURE — 80053 COMPREHEN METABOLIC PANEL: CPT | Performed by: INTERNAL MEDICINE

## 2017-11-16 PROCEDURE — 85610 PROTHROMBIN TIME: CPT | Performed by: INTERNAL MEDICINE

## 2017-11-16 PROCEDURE — 85027 COMPLETE CBC AUTOMATED: CPT | Performed by: INTERNAL MEDICINE

## 2017-11-16 RX ORDER — GABAPENTIN 100 MG/1
100 CAPSULE ORAL 2 TIMES DAILY
Status: DISCONTINUED | OUTPATIENT
Start: 2017-11-17 | End: 2017-11-16

## 2017-11-16 RX ORDER — GABAPENTIN 100 MG/1
100 CAPSULE ORAL
Status: DISCONTINUED | OUTPATIENT
Start: 2017-11-16 | End: 2017-11-24 | Stop reason: HOSPADM

## 2017-11-16 RX ADMIN — FERROUS SULFATE TAB 325 MG (65 MG ELEMENTAL FE) 325 MG: 325 (65 FE) TAB at 09:03

## 2017-11-16 RX ADMIN — ONDANSETRON 4 MG: 2 INJECTION INTRAMUSCULAR; INTRAVENOUS at 14:51

## 2017-11-16 RX ADMIN — DOCUSATE SODIUM 100 MG: 100 CAPSULE, LIQUID FILLED ORAL at 09:03

## 2017-11-16 RX ADMIN — HEPARIN SODIUM 5000 UNITS: 5000 INJECTION, SOLUTION INTRAVENOUS; SUBCUTANEOUS at 21:23

## 2017-11-16 RX ADMIN — GABAPENTIN 100 MG: 100 CAPSULE ORAL at 09:02

## 2017-11-16 RX ADMIN — TRAVOPROST 1 DROP: 0.04 SOLUTION/ DROPS OPHTHALMIC at 22:53

## 2017-11-16 RX ADMIN — ANTACID TABLETS 1000 MG: 500 TABLET, CHEWABLE ORAL at 13:35

## 2017-11-16 RX ADMIN — GABAPENTIN 100 MG: 100 CAPSULE ORAL at 21:23

## 2017-11-16 RX ADMIN — PANTOPRAZOLE SODIUM 40 MG: 40 TABLET, DELAYED RELEASE ORAL at 09:03

## 2017-11-16 RX ADMIN — CEFTRIAXONE SODIUM 1000 MG: 10 INJECTION, POWDER, FOR SOLUTION INTRAVENOUS at 18:09

## 2017-11-16 RX ADMIN — HEPARIN SODIUM 5000 UNITS: 5000 INJECTION, SOLUTION INTRAVENOUS; SUBCUTANEOUS at 05:25

## 2017-11-16 RX ADMIN — SUCRALFATE 1 G: 1 TABLET ORAL at 09:02

## 2017-11-16 RX ADMIN — OXYCODONE HYDROCHLORIDE AND ACETAMINOPHEN 250 MG: 500 TABLET ORAL at 09:03

## 2017-11-16 RX ADMIN — ASPIRIN 81 MG: 81 TABLET, COATED ORAL at 09:03

## 2017-11-16 RX ADMIN — Medication 1 TABLET: at 09:02

## 2017-11-16 RX ADMIN — SENNOSIDES 8.6 MG: 8.6 TABLET, FILM COATED ORAL at 09:02

## 2017-11-16 RX ADMIN — CHOLECALCIFEROL TAB 25 MCG (1000 UNIT) 1000 UNITS: 25 TAB at 09:03

## 2017-11-16 RX ADMIN — HEPARIN SODIUM 5000 UNITS: 5000 INJECTION, SOLUTION INTRAVENOUS; SUBCUTANEOUS at 13:28

## 2017-11-16 RX ADMIN — SUCRALFATE 1 G: 1 TABLET ORAL at 21:23

## 2017-11-16 NOTE — ED NOTES
Pts daughter arrived, reports pts left hip has "kaylen and two screws" that are now pressing and causing pain       Primo Arroyo RN  11/15/17 1942

## 2017-11-16 NOTE — H&P
History and Physical - [facility] Internal Medicine    Patient Information: Mirella Carter 80 y o  female MRN: [id number]  Unit/Bed#: CARMELLA Encounter: [id number]  Admitting Physician: Desmond Aparicio MD  PCP: Carlos Manuel Stephenson MD  Date of Admission:  11/15/17    Assessment/Plan:    Hospital Problem List:     Principal Problem:    Sepsis [facility])  Active Problems:    Transaminitis    Weakness    Acute kidney injury ([address] )    Chronic combined systolic and diastolic heart failure (HCC)    Neuropathy    Stage 3 chronic kidney disease    Dehydration    Non-sustained ventricular tachycardia (HCC)    Encephalopathy    Hip pain      Plan for the Primary Problem(s):    · Sepsis given her temperature and tachycardia, source of sepsis is likely urinary, follow up on urine cultures, blood cultures sent from the ED, continue IV ceftriaxone  · Transaminitis will check right upper quadrant ultrasound, her liver functions may be elevated due to sepsis, monitor LFTs  · Encephalopathy likely toxin mediated/sepsis related continue to monitor  · Nonsustained ventricular tachycardia noted in the ED monitor  · Left hip pain follow-up on hip x-ray  · Acute kidney injury likely pre renal continue IV fluids and monitor kidney function closely avoid nephrotoxins, monitor postvoid residual      Plan for Additional Problems:     · History of chronic systolic and diastolic congestive heart failure monitor  · Generalized weakness asthenia  · Likely cognitive impairment monitor for delirium        Called and updated son over phone, he is agreeable with ongoing management as outlined        VTE Prophylaxis: Heparin  / sequential compression device   Code Status:  Full code discussed with son  POLST: There is no POLST form on file for this patient (pre-hospital)    Anticipated Length of Stay:  Patient will be admitted on an Inpatient basis with an anticipated length of stay of  More than 2 midnights     Justification for Hospital Stay:  Sepsis, dehydration requiring IV medications close monitoring as outlined      Chief Complaint:       Confusion  Not feeling well  Fever  Nausea vomiting    History of Present Illness: Tanya Saleh is a 80 y o  female who presents with confusion, not feeling well fever nausea and vomiting  Patient is a poor historian due to her confusion, history is obtained from the son over phone as well as the ED physician and review of medical records  The son reports that patient was not doing well she had fever abdominal pain nausea and vomiting as well as poor p o  intake  She also was noticed to have confusion and does not being herself  Patient reports lethargic generalized weakness and fatigue  She also reports left hip pain  Review of Systems:    Review of Systems   Unable to perform ROS: Mental status change       Past Medical and Surgical History:     Past Medical History:   Diagnosis Date    Cancer [facility])     "some kind of cancer when I was 80"    Iron deficiency 10/31/2017    Stage 2 chronic kidney disease 10/31/2017    Vitamin B12 deficiency 10/31/2017       Past Surgical History:   Procedure Laterality Date    BACK SURGERY      COLON SURGERY      HIP FRACTURE SURGERY         Meds/Allergies:    Prior to Admission medications    Medication Sig Start Date End Date Taking?  Authorizing Provider   acetaminophen (TYLENOL) 325 mg tablet Take 2 tablets by mouth every 6 (six) hours as needed for mild pain, headaches or fever  Patient taking differently: Take 650 mg by mouth every 4 (four) hours as needed for mild pain, headaches or fever   11/2/17  Yes Sue Felix MD   ascorbic acid (VITAMIN C) 500 MG tablet Take 0 5 tablets by mouth 2 (two) times a day 11/2/17  Yes Sue Felix MD   aspirin (ECOTRIN LOW STRENGTH) 81 mg EC tablet Take 1 tablet by mouth daily 10/10/17  Yes Krys Smith MD   Cholecalciferol (VITAMIN D) 2000 units CAPS Take 2,000 Units by mouth daily   Yes Historical Provider, MD   cycloSPORINE (RESTASIS) 0 05 % ophthalmic emulsion Administer 1 drop to both eyes 2 (two) times a day   Yes Historical Provider, MD   docusate sodium (COLACE) 100 mg capsule Take 1 capsule by mouth 2 (two) times a day 10/9/17  Yes Derek De Leon MD   ferrous sulfate 325 (65 Fe) mg tablet Take 1 tablet by mouth 2 (two) times a day with meals 17  Yes Kayla Colmenares MD   gabapentin (NEURONTIN) 300 mg capsule Take 100 mg by mouth 3 (three) times a day   Yes Historical Provider, MD   latanoprost (XALATAN) 0 005 % ophthalmic solution 1 drop daily at bedtime   Yes Historical Provider, MD   Magnesium Hydroxide (MILK OF MAGNESIA PO) Take 30 mL by mouth as needed   Yes Historical Provider, MD   mineral oil enema Insert 1 enema into the rectum once   Yes Historical Provider, MD   Multiple Vitamins-Minerals (PRESERVISION AREDS 2 PO) Take by mouth 2 (two) times a day   Yes Historical Provider, MD   pantoprazole (PROTONIX) 40 mg tablet Take 40 mg by mouth daily 16  Yes Historical Provider, MD   senna (SENOKOT) 8 6 mg Take 1 tablet by mouth daily 10/10/17  Yes Derek De Leon MD   sucralfate (CARAFATE) 1 g tablet Take 1 g by mouth every 12 (twelve) hours 16  Yes Historical Provider, MD   travoprost (TRAVATAN Z) 0 004 % ophthalmic solution Apply 1 drop to eye daily 10/30/14   Historical Provider, MD   cyanocobalamin 1,000 mcg/mL Inject 1 mL into the shoulder, thigh, or buttocks every 30 (thirty) days 11/30/17 11/15/17  Kayla Colmenares MD   cyanocobalamin 1000 MCG tablet Take 1 tablet by mouth daily 11/2/17 11/15/17  Kayla Colmenares MD     I have reveiwed home medications using records provided by [facility]  Allergies: No Known Allergies    Social History:     Marital Status:     Occupation:   Patient Pre-hospital Living Situation:   Patient Pre-hospital Level of Mobility:  Ambulatory dysfunction  Patient Pre-hospital Diet Restrictions: no  Substance Use History:   History   Alcohol Use No     History   Smoking Status    Never Smoker   Smokeless Tobacco    Never Used     History   Drug Use No       Family History:    History reviewed  No pertinent family history  Physical Exam:     Vitals:   Blood Pressure: 106/80 (11/15/17 1846)  Pulse: 89 (11/15/17 1846)  Temperature: 98 4 °F (36 9 °C) (11/15/17 1648)  Temp Source: Oral (11/15/17 1648)  Respirations: 18 (11/15/17 1846)  Weight - Scale: 69 5 kg (153 lb 3 5 oz) (11/15/17 1457)  SpO2: 91 % (11/15/17 1846)    Physical Exam   Constitutional:   Awake  Confused  Mucous membranes are dry   HENT:   Head: Normocephalic  Mouth/Throat: No oropharyngeal exudate  Eyes: Pupils are equal, round, and reactive to light  Right eye exhibits no discharge  Left eye exhibits no discharge  Neck: Normal range of motion  No JVD present  No tracheal deviation present  No thyromegaly present  Cardiovascular:   Heart sounds are distant  Tachycardia noted  No murmurs appreciable   Pulmonary/Chest: Effort normal  She exhibits no tenderness  Diminished breath sounds bilaterally  No additional sounds   Abdominal: Soft  There is no tenderness  There is no rebound and no guarding  Musculoskeletal: Normal range of motion  She exhibits no edema  Neurological:   Confused  Awake   Skin: Skin is warm  No rash noted  She is not diaphoretic  Vitals reviewed  Additional Data:     Lab Results: I have personally reviewed pertinent reports          Results from last 7 days  Lab Units 11/15/17  1508   WBC Thousand/uL 9 63   HEMOGLOBIN g/dL 9 4*   HEMATOCRIT % 29 6*   PLATELETS Thousands/uL 301   LYMPHO PCT % 5*   MONO PCT MAN % 4   EOSINO PCT MANUAL % 0       Results from last 7 days  Lab Units 11/15/17  1508   SODIUM mmol/L 142   POTASSIUM mmol/L 4 9   CHLORIDE mmol/L 107   CO2 mmol/L 26   BUN mg/dL 29*   CREATININE mg/dL 1 43*   CALCIUM mg/dL 9 0   TOTAL PROTEIN g/dL 6 8   BILIRUBIN TOTAL mg/dL 1 80*   ALK PHOS U/L 558*   ALT U/L 431*   AST U/L 308*   GLUCOSE RANDOM mg/dL 108       Results from last 7 days  Lab Units 11/15/17  1508   INR  0 94       Imaging: I have personally reviewed pertinent reports  Ct Chest Abdomen Pelvis Wo Contrast    Result Date: 11/15/2017  Narrative: CT CHEST, ABDOMEN AND PELVIS WITHOUT IV CONTRAST INDICATION:  Fever and vomiting  COMPARISON: Abdomen/pelvis CT dated 8/1/2013  TECHNIQUE: CT examination of the chest, abdomen and pelvis was performed without intravenous contrast   Reformatted images were created in axial, sagittal, and coronal planes  Radiation dose length product (DLP) for this visit:  643 mGy-cm   This examination, like all CT scans performed in the [facility], was performed utilizing techniques to minimize radiation dose exposure, including the use of iterative reconstruction and automated exposure control  Enteric contrast was not administered  FINDINGS: CHEST LUNGS:  No acute consolidation  No interstitial thickening  Minimal scarring or atelectasis at both lung bases  No suspicious pulmonary nodules  No endobronchial lesions  PLEURA:  Unremarkable  HEART/GREAT VESSELS:  Heart size normal   Valvular and coronary calcifications  No pericardial effusion or thickening  Thoracic aortic atherosclerosis without aneurysm  MEDIASTINUM AND VIRGIL:  Unremarkable  CHEST WALL AND LOWER NECK:   Thyroid heterogeneity without discrete nodules warranting further imaging  Mild anasarca  No axillary lymphadenopathy  ABDOMEN LIVER/BILIARY TREE:  Unremarkable  GALLBLADDER:  29 mm lamellated stone in the gallbladder body  No gallbladder wall thickening  SPLEEN:  Unremarkable  PANCREAS:  Unremarkable  ADRENAL GLANDS:  Unremarkable  KIDNEYS/URETERS:  Kidneys appear mildly atrophic  No suspicious contour distorting masses  No urolithiasis  Collecting systems are decompressed  STOMACH AND BOWEL:  Stomach is normal for level nondistention  No dilated loops of small bowel    Moderate excess stool burden in the colon  No colonic wall thickening  APPENDIX:  A normal appendix was visualized  ABDOMINOPELVIC CAVITY:  Generalized haziness of the mesentery without bianca ascites  No encapsulated fluid collections  No free air  No mesenteric, retroperitoneal, or pelvic sidewall lymphadenopathy  VESSELS:  Atherosclerotic changes are present  No evidence of aneurysm  PELVIS REPRODUCTIVE ORGANS:  Unremarkable for patient's age  URINARY BLADDER:  Normal for level of nondistention  ABDOMINAL WALL/INGUINAL REGIONS:  Post surgical changes in the ventral subcutaneous tissues  Mild anasarca  OSSEOUS STRUCTURES:  Diffuse osteopenia  Prior left femoral neck fixation without evidence for hardware malfunction  No acute fractures or focally destructive osseous lesions  Impression: 1  No acute thoracic or abdominopelvic findings  2   Mild anasarca  Diffuse strandy appearance of the mesentery without bianca ascites  3   Cholelithiasis without evidence for cholecystitis  4   Moderate sized excess stool burden  Workstation performed: [id number]     Ct Head Without Contrast    Result Date: 11/15/2017  Narrative: CT BRAIN - WITHOUT CONTRAST INDICATION:  Altered mental status  COMPARISON:  None  TECHNIQUE:  CT examination of the brain was performed  In addition to axial images, coronal reformatted images were created and submitted for interpretation  Radiation dose length product (DLP) for this visit:  1273 mGy-cm   This examination, like all CT scans performed in the [facility], was performed utilizing techniques to minimize radiation dose exposure, including the use of iterative reconstruction and automated exposure control  IMAGE QUALITY:  Diagnostic  FINDINGS:  PARENCHYMA:  Decreased attenuation is noted in the supratentorial white matter demonstrating an appearance most consistent with mild microangiopathic change  No intracranial mass, mass effect or midline shift    No CT signs of acute infarction  There is no parenchymal hemorrhage  VENTRICLES AND EXTRA-AXIAL SPACES:  Mild involutional volume loss  No hydrocephalus, herniation, or mass effect  No extra-axial hemorrhage  VISUALIZED ORBITS AND PARANASAL SINUSES:  Bilateral cataract surgeries  Orbits are, otherwise, unremarkable  Paranasal sinuses are clear  CALVARIUM AND EXTRACRANIAL SOFT TISSUES:  Angulation of the nasal bones is chronic  No acute fractures demonstrated  Moderate cavernous carotid atherosclerosis  No acute scalp swelling  Impression: No acute intracranial abnormality  Workstation performed: [id number]       EKG, Pathology, and Other Studies Reviewed on Admission:   · EKG:  Nonsustained ventricular tachycardia noted on tele, sinus tachycardia, no ST T wave changes    Allscripts / Epic Records Reviewed: Yes     ** Please Note: This note has been constructed using a voice recognition system   **

## 2017-11-16 NOTE — ASSESSMENT & PLAN NOTE
recently diagnose  Patient has an increase of dose which it causing her more side effect like numbness and sleepy    We will decrease the dose to prevent withdrawal

## 2017-11-16 NOTE — CASE MANAGEMENT
Initial Clinical Review    Admission: Date/Time/Statement: 11/15/17 @ 1852   Admit 10/29/2017 - 11/2/2017  Syncope  Admit 10/4/2017- 10/9/2017 Transaminitis    Orders Placed This Encounter   Procedures    Inpatient Admission (expected length of stay for this patient is greater than two midnights)     Standing Status:   Standing     Number of Occurrences:   1     Order Specific Question:   Admitting Physician     Answer:   August Rasheed [[id number]]     Order Specific Question:   Level of Care     Answer:   Med Surg [[id number]]     Order Specific Question:   Estimated length of stay     Answer:   More than 2 Midnights     Order Specific Question:   Certification     Answer:   I certify that inpatient services are medically necessary for this patient for a duration of greater than two midnights  See H&P and MD Progress Notes for additional information about the patient's course of treatment  ED: Date/Time/Mode of Arrival:   ED Arrival Information     Expected Arrival Acuity Means of Arrival Escorted By Service Admission Type    - 11/15/2017 14:44 Emergent Ambulance [address] Emergency    Arrival Complaint    -          Chief Complaint:   Chief Complaint   Patient presents with    Fever - 76 years or older     Pt presents to the ED for evaluation of Nausea, vomitting and fever that started at 12pm  Per staff at nursing facility pt had temp of 104, did get rectal tylenol suppository prior to arrival       History of Illness: 80 y o  female who presents with confusion, not feeling well fever nausea and vomiting  Patient is a poor historian due to her confusion, history is obtained from the son over phone as well as the ED physician and review of medical records  The son reports that patient was not doing well she had fever abdominal pain nausea and vomiting as well as poor p o  intake  She also was noticed to have confusion and does not being herself    Patient reports lethargic generalized weakness and fatigue  She also reports left hip pain  ED Vital Signs:   ED Triage Vitals   Temperature Pulse Respirations Blood Pressure SpO2   11/15/17 1448 11/15/17 1448 11/15/17 1448 11/15/17 1448 11/15/17 1448   (!) 102 8 °F (39 3 °C) (!) 114 20 122/58 91 %      Temp Source Heart Rate Source Patient Position - Orthostatic VS BP Location FiO2 (%)   11/15/17 1448 11/15/17 1448 11/15/17 1448 11/15/17 1448 --   Oral Monitor Lying Left arm       Pain Score       11/15/17 2042       No Pain        Wt Readings from Last 1 Encounters:   11/15/17 69 5 kg (153 lb 3 5 oz)       Vital Signs (abnormal):  Sustained pulse 100 - 114  Maximum temperature 102 8  Low sat of 91%    Abnormal Labs/Diagnostic Test Results:   Blood cultures- gram stain - gram negative rods  Lipase 686  Direct bilirubin 1 47    hgb 9 4, hct 29 6  UA moderate leukocytes  Trace protein  Trace blood  Bun 29  Creatinine 1 43   ast 308  Alt 431  Alkaline phosphatase 558  Albumin 2 9  Total bilirubin 1 80  Xray left hip - Intact appearing left hip ORIF  Degenerative arthritis  No acute osseous abnormality  Ct chest/abdomen/pelvis -    No acute thoracic or abdominopelvic findings  2   Mild anasarca   Diffuse strandy appearance of the mesentery without bianca ascites  3   Cholelithiasis without evidence for cholecystitis  4   Moderate sized excess stool burden      ED Treatment:   Medication Administration from 11/15/2017 1444 to 11/15/2017 2020       Date/Time Order Dose Route Action Action by Comments     11/15/2017 1555  EMS REPLENISHMENT MED 0  Does not apply Given to EMS Shanta Arroyo RN      11/15/2017 1526 ibuprofen (MOTRIN) tablet 400 mg 400 mg Oral Given Shanta Arroyo RN      11/15/2017 1810 sodium chloride 0 9 % bolus 1,000 mL 0 mL Intravenous Stopped Shanta Arroyo RN      11/15/2017 1602 sodium chloride 0 9 % bolus 1,000 mL 1,000 mL Intravenous New Bag Olesya Arroyo RN      11/15/2017 8398 ceftriaxone (ROCEPHIN) 1 g/50 mL in dextrose IVPB 0 mg Intravenous Stopped Shanta Arroyo RN      11/15/2017 1810 ceftriaxone (ROCEPHIN) 1 g/50 mL in dextrose IVPB 1,000 mg Intravenous New Bag Shanta Arroyo RN           Past Medical/Surgical History:    Active Ambulatory Problems     Diagnosis Date Noted    Transaminitis 10/04/2017    NSTEMI (non-ST elevated myocardial infarction) ([facility] ) 10/04/2017    Weakness 10/06/2017    Syncope 10/29/2017    Acute kidney injury ([facility] ) 10/29/2017    Anemia 10/29/2017    Chronic combined systolic and diastolic heart failure (HCC) 10/29/2017    Neuropathy 10/30/2017    Stage 3 chronic kidney disease 10/31/2017    Iron deficiency 10/31/2017    Vitamin B12 deficiency 10/31/2017     Resolved Ambulatory Problems     Diagnosis Date Noted    RUQ pain 10/04/2017     Past Medical History:   Diagnosis Date    Cancer ([place] [id number] )     Iron deficiency 10/31/2017    Stage 2 chronic kidney disease 10/31/2017    Vitamin B12 deficiency 10/31/2017       Admitting Diagnosis: Urinary tract infection [N39 0]  Transaminitis [R74 0]  Fever [R50 9]    Age/Sex: 80 y o  female  Assessment/Plan: Sepsis given her temperature and tachycardia, source of sepsis is likely urinary, follow up on urine cultures, blood cultures sent from the ED, continue IV ceftriaxone  · Transaminitis will check right upper quadrant ultrasound, her liver functions may be elevated due to sepsis, monitor LFTs  · Encephalopathy likely toxin mediated/sepsis related continue to monitor  · Nonsustained ventricular tachycardia noted in the ED monitor  · Left hip pain follow-up on hip x-ray  · Acute kidney injury likely pre renal continue IV fluids and monitor kidney function closely avoid nephrotoxins, monitor postvoid residual        Plan for Additional Problems:      · History of chronic systolic and diastolic congestive heart failure monitor  · Generalized weakness asthenia  Likely cognitive impairment monitor for delirium    Admission Orders:  11/15/2017  1852 INPATIENT   Scheduled Meds:   ascorbic acid 250 mg Oral Daily   aspirin 81 mg Oral Daily   cefTRIAXone 1,000 mg Intravenous Q24H   cholecalciferol 1,000 Units Oral Daily   cycloSPORINE 1 drop Both Eyes BID   docusate sodium 100 mg Oral BID   ferrous sulfate 325 mg Oral BID With Meals   gabapentin 100 mg Oral TID   heparin (porcine) 5,000 Units Subcutaneous Q8H [address]   multivitamin-minerals 1 tablet Oral Daily   pantoprazole 40 mg Oral Daily Before Breakfast   senna 1 tablet Oral Daily   sucralfate 1 g Oral Q12H   travoprost 1 drop Both Eyes HS     Continuous Infusions:   sodium chloride 75 mL/hr Last Rate: 75 mL/hr (11/15/17 2130)     PRN Meds: not used:    acetaminophen    calcium carbonate    ondansetron    OTHER ORDERS: scds  Cardiac monitoring   Urine culture  PT

## 2017-11-16 NOTE — SUBJECTIVE & OBJECTIVE
VTE Pharmacologic Prophylaxis:   Pharmacologic: Heparin  Mechanical: Mechanical VTE prophylaxis in place  Patient Centered Rounds: I have performed bedside rounds with nursing staff today  Discussions with Specialists or Other Care Team Provider:   Education and Discussions with Family / Patient: daughter   Time Spent for Care: 20 minutes  More than 50% of total time spent on counseling and coordination of care as described above  Current Length of Stay: 1 day(s)  Current Patient Status: Inpatient   Certification Statement: The patient will continue to require additional inpatient hospital stay due to sepsis secondary to UTI    Discharge Plan: DEPEND ON CLINICAL COURSE  Code Status: Level 1 - Full Code    Subjective:   She reported being tired, she feel sick, reported some nausea     Objective:   Vitals:   Temp (24hrs), Av 1 °F (36 7 °C), Min:97 4 °F (36 3 °C), Max:98 8 °F (37 1 °C)    HR:  [] 76  Resp:  [16-20] 16  BP: ()/(51-82) 120/66  SpO2:  [91 %-100 %] 96 %  Body mass index is 24 73 kg/m²  Input and Output Summary (last 24 hours):        Intake/Output Summary (Last 24 hours) at 17 1606  Last data filed at 17 1407   Gross per 24 hour   Intake             1890 ml   Output              575 ml   Net             1315 ml       Physical Exam:     Physical Exam    Additional Data:   Labs:    Results from last 7 days  Lab Units 17  0438 11/15/17  1508   WBC Thousand/uL 9 35 9 63   HEMOGLOBIN g/dL 8 3* 9 4*   HEMATOCRIT % 27 4* 29 6*   PLATELETS Thousands/uL 271 301   LYMPHO PCT %  --  5*   MONO PCT MAN %  --  4   EOSINO PCT MANUAL %  --  0       Results from last 7 days  Lab Units 17  0438   SODIUM mmol/L 142   POTASSIUM mmol/L 4 9   CHLORIDE mmol/L 108   CO2 mmol/L 27   BUN mg/dL 29*   CREATININE mg/dL 1 56*   CALCIUM mg/dL 8 8   TOTAL PROTEIN g/dL 6 1*   BILIRUBIN TOTAL mg/dL 2 00*   ALK PHOS U/L 469*   ALT U/L 343*   AST U/L 206*   GLUCOSE RANDOM mg/dL 86 Results from last 7 days  Lab Units 11/16/17  0438   INR  1 01       * I Have Reviewed All Lab Data Listed Above  * Additional Pertinent Lab Tests Reviewed: All Labs Within Last 24 Hours Reviewed    Imaging:    Imaging Reports Reviewed Today Include:     Cultures:   Blood Culture:   Lab Results   Component Value Date    BLOODCX No Growth After 5 Days  10/29/2017    BLOODCX No Growth After 5 Days  10/29/2017     Urine Culture:   Lab Results   Component Value Date    URINECX (A) 11/15/2017     >100,000 cfu/ml Non lactose fermenting gram negative kaylen     Sputum Culture: No components found for: SPUTUMCX  Wound Culture: No results found for: WOUNDCULT    Last 24 Hours Medication List:     ascorbic acid 250 mg Oral Daily   aspirin 81 mg Oral Daily   cefTRIAXone 1,000 mg Intravenous Q24H   cholecalciferol 1,000 Units Oral Daily   cycloSPORINE 1 drop Both Eyes BID   docusate sodium 100 mg Oral BID   ferrous sulfate 325 mg Oral BID With Meals   gabapentin 100 mg Oral TID   heparin (porcine) 5,000 Units Subcutaneous Q8H [facility]   multivitamin-minerals 1 tablet Oral Daily   pantoprazole 40 mg Oral Daily Before Breakfast   senna 1 tablet Oral Daily   sucralfate 1 g Oral Q12H   travoprost 1 drop Both Eyes HS        Today, Patient Was Seen By: Deann Rosa MD    ** Please Note: Dragon 360 Dictation voice to text software may have been used in the creation of this document   **

## 2017-11-16 NOTE — PROGRESS NOTES
Progress Note - Lynsey London 80 y o  female MRN: [id number]    Unit/Bed#: -01 Encounter: [id number]        * Sepsis [facility])   Assessment & Plan    Most likely secondary to urinary tract infection  Fever, tachycardia has been improved  U/C >100,000 cfu/ml Non lactose fermenting gram negative kaylen   -keep on IV antibiotic  -Pending urine culture sensitivity  -Closely monitor        Stage 3 chronic kidney disease   Assessment & Plan    Stable  Non sign of worsening or overload  F/u bmp am        Acute kidney injury ([address] )   Assessment & Plan    Mildly elevated over CKD  Likely secondary to acute infection  -keep on carefully IVF  Will follow tomorrow with BMP        Neuropathy   Assessment & Plan    recently diagnose  Patient has an increase of dose which it causing her more side effect like numbness and sleepy  We will decrease the dose to prevent withdrawal         Weakness   Assessment & Plan    Most likely related to acute infection  We will closely monitor        Chronic combined systolic and diastolic heart failure (HCC)   Assessment & Plan    Stable  VTE Pharmacologic Prophylaxis:   Pharmacologic: Heparin  Mechanical: Mechanical VTE prophylaxis in place  Patient Centered Rounds: I have performed bedside rounds with nursing staff today  Discussions with Specialists or Other Care Team Provider:   Education and Discussions with Family / Patient: daughter   Time Spent for Care: 20 minutes  More than 50% of total time spent on counseling and coordination of care as described above      Current Length of Stay: 1 day(s)  Current Patient Status: Inpatient   Certification Statement: The patient will continue to require additional inpatient hospital stay due to sepsis secondary to UTI    Discharge Plan: DEPEND ON CLINICAL COURSE  Code Status: Level 1 - Full Code    Subjective:   She reported being tired, she feel sick, reported some nausea     Objective:   Vitals:   Temp (24hrs), Av 1 °F (36 7 °C), Min:97 4 °F (36 3 °C), Max:98 8 °F (37 1 °C)    HR:  [] 76  Resp:  [16-20] 16  BP: ()/(51-82) 120/66  SpO2:  [91 %-100 %] 96 %  Body mass index is 24 73 kg/m²  Input and Output Summary (last 24 hours): Intake/Output Summary (Last 24 hours) at 11/16/17 1606  Last data filed at 11/16/17 1407   Gross per 24 hour   Intake             1890 ml   Output              575 ml   Net             1315 ml       Physical Exam:     Physical Exam    Additional Data:   Labs:    Results from last 7 days  Lab Units 11/16/17  0438 11/15/17  1508   WBC Thousand/uL 9 35 9 63   HEMOGLOBIN g/dL 8 3* 9 4*   HEMATOCRIT % 27 4* 29 6*   PLATELETS Thousands/uL 271 301   LYMPHO PCT %  --  5*   MONO PCT MAN %  --  4   EOSINO PCT MANUAL %  --  0       Results from last 7 days  Lab Units 11/16/17  0438   SODIUM mmol/L 142   POTASSIUM mmol/L 4 9   CHLORIDE mmol/L 108   CO2 mmol/L 27   BUN mg/dL 29*   CREATININE mg/dL 1 56*   CALCIUM mg/dL 8 8   TOTAL PROTEIN g/dL 6 1*   BILIRUBIN TOTAL mg/dL 2 00*   ALK PHOS U/L 469*   ALT U/L 343*   AST U/L 206*   GLUCOSE RANDOM mg/dL 86       Results from last 7 days  Lab Units 11/16/17  0438   INR  1 01       * I Have Reviewed All Lab Data Listed Above  * Additional Pertinent Lab Tests Reviewed: All Labs Within Last 24 Hours Reviewed    Imaging:    Imaging Reports Reviewed Today Include:     Cultures:   Blood Culture:   Lab Results   Component Value Date    BLOODCX No Growth After 5 Days  10/29/2017    BLOODCX No Growth After 5 Days   10/29/2017     Urine Culture:   Lab Results   Component Value Date    URINECX (A) 11/15/2017     >100,000 cfu/ml Non lactose fermenting gram negative kaylen     Sputum Culture: No components found for: SPUTUMCX  Wound Culture: No results found for: WOUNDCULT    Last 24 Hours Medication List:     ascorbic acid 250 mg Oral Daily   aspirin 81 mg Oral Daily   cefTRIAXone 1,000 mg Intravenous Q24H   cholecalciferol 1,000 Units Oral Daily   cycloSPORINE 1 drop Both Eyes BID   docusate sodium 100 mg Oral BID   ferrous sulfate 325 mg Oral BID With Meals   gabapentin 100 mg Oral TID   heparin (porcine) 5,000 Units Subcutaneous Q8H [address]   multivitamin-minerals 1 tablet Oral Daily   pantoprazole 40 mg Oral Daily Before Breakfast   senna 1 tablet Oral Daily   sucralfate 1 g Oral Q12H   travoprost 1 drop Both Eyes HS        Today, Patient Was Seen By: Mike Grigsby MD    ** Please Note: Dragon 360 Dictation voice to text software may have been used in the creation of this document   **

## 2017-11-16 NOTE — ASSESSMENT & PLAN NOTE
Mildly elevated over CKD    Likely secondary to acute infection  -keep on carefully IVF  Will follow tomorrow with BMP

## 2017-11-16 NOTE — ASSESSMENT & PLAN NOTE
Most likely secondary to urinary tract infection  Fever, tachycardia has been improved   U/C >100,000 cfu/ml Non lactose fermenting gram negative kaylen   -keep on IV antibiotic  -Pending urine culture sensitivity  -Closely monitor

## 2017-11-17 PROBLEM — B96.1 BACTEREMIA DUE TO KLEBSIELLA PNEUMONIAE: Status: ACTIVE | Noted: 2017-11-17

## 2017-11-17 PROBLEM — R78.81 BACTEREMIA DUE TO KLEBSIELLA PNEUMONIAE: Status: ACTIVE | Noted: 2017-11-17

## 2017-11-17 LAB
ANION GAP SERPL CALCULATED.3IONS-SCNC: 9 MMOL/L (ref 4–13)
ATRIAL RATE: 88 BPM
BACTERIA BLD CULT: ABNORMAL
BACTERIA UR CULT: ABNORMAL
BASOPHILS # BLD AUTO: 0.01 THOUSANDS/ΜL (ref 0–0.1)
BASOPHILS NFR BLD AUTO: 0 % (ref 0–1)
BUN SERPL-MCNC: 25 MG/DL (ref 5–25)
CALCIUM SERPL-MCNC: 8.8 MG/DL (ref 8.3–10.1)
CHLORIDE SERPL-SCNC: 109 MMOL/L (ref 100–108)
CO2 SERPL-SCNC: 23 MMOL/L (ref 21–32)
CREAT SERPL-MCNC: 1.28 MG/DL (ref 0.6–1.3)
EOSINOPHIL # BLD AUTO: 0.02 THOUSAND/ΜL (ref 0–0.61)
EOSINOPHIL NFR BLD AUTO: 0 % (ref 0–6)
ERYTHROCYTE [DISTWIDTH] IN BLOOD BY AUTOMATED COUNT: 14.3 % (ref 11.6–15.1)
GFR SERPL CREATININE-BSD FRML MDRD: 35 ML/MIN/1.73SQ M
GLUCOSE SERPL-MCNC: 108 MG/DL (ref 65–140)
GRAM STN SPEC: ABNORMAL
HCT VFR BLD AUTO: 25.3 % (ref 34.8–46.1)
HGB BLD-MCNC: 8.1 G/DL (ref 11.5–15.4)
LYMPHOCYTES # BLD AUTO: 1.01 THOUSANDS/ΜL (ref 0.6–4.47)
LYMPHOCYTES NFR BLD AUTO: 14 % (ref 14–44)
MCH RBC QN AUTO: 30.6 PG (ref 26.8–34.3)
MCHC RBC AUTO-ENTMCNC: 32 G/DL (ref 31.4–37.4)
MCV RBC AUTO: 96 FL (ref 82–98)
MONOCYTES # BLD AUTO: 0.91 THOUSAND/ΜL (ref 0.17–1.22)
MONOCYTES NFR BLD AUTO: 13 % (ref 4–12)
NEUTROPHILS # BLD AUTO: 5.18 THOUSANDS/ΜL (ref 1.85–7.62)
NEUTS SEG NFR BLD AUTO: 73 % (ref 43–75)
P AXIS: 79 DEGREES
PLATELET # BLD AUTO: 261 THOUSANDS/UL (ref 149–390)
PMV BLD AUTO: 10.3 FL (ref 8.9–12.7)
POTASSIUM SERPL-SCNC: 4.1 MMOL/L (ref 3.5–5.3)
PR INTERVAL: 176 MS
QRS AXIS: -5 DEGREES
QRSD INTERVAL: 142 MS
QT INTERVAL: 420 MS
QTC INTERVAL: 508 MS
RBC # BLD AUTO: 2.65 MILLION/UL (ref 3.81–5.12)
SODIUM SERPL-SCNC: 141 MMOL/L (ref 136–145)
T WAVE AXIS: 127 DEGREES
VENTRICULAR RATE: 88 BPM
WBC # BLD AUTO: 7.13 THOUSAND/UL (ref 4.31–10.16)

## 2017-11-17 PROCEDURE — G8979 MOBILITY GOAL STATUS: HCPCS

## 2017-11-17 PROCEDURE — 97110 THERAPEUTIC EXERCISES: CPT

## 2017-11-17 PROCEDURE — G8978 MOBILITY CURRENT STATUS: HCPCS

## 2017-11-17 PROCEDURE — 97163 PT EVAL HIGH COMPLEX 45 MIN: CPT

## 2017-11-17 PROCEDURE — 85025 COMPLETE CBC W/AUTO DIFF WBC: CPT | Performed by: FAMILY MEDICINE

## 2017-11-17 PROCEDURE — 80048 BASIC METABOLIC PNL TOTAL CA: CPT | Performed by: FAMILY MEDICINE

## 2017-11-17 RX ORDER — METRONIDAZOLE 500 MG/1
500 TABLET ORAL EVERY 8 HOURS SCHEDULED
Status: DISCONTINUED | OUTPATIENT
Start: 2017-11-17 | End: 2017-11-24 | Stop reason: HOSPADM

## 2017-11-17 RX ADMIN — HEPARIN SODIUM 5000 UNITS: 5000 INJECTION, SOLUTION INTRAVENOUS; SUBCUTANEOUS at 21:47

## 2017-11-17 RX ADMIN — METRONIDAZOLE 500 MG: 500 TABLET ORAL at 21:47

## 2017-11-17 RX ADMIN — OXYCODONE HYDROCHLORIDE AND ACETAMINOPHEN 250 MG: 500 TABLET ORAL at 09:00

## 2017-11-17 RX ADMIN — Medication 1 TABLET: at 08:59

## 2017-11-17 RX ADMIN — FERROUS SULFATE TAB 325 MG (65 MG ELEMENTAL FE) 325 MG: 325 (65 FE) TAB at 17:04

## 2017-11-17 RX ADMIN — CHOLECALCIFEROL TAB 25 MCG (1000 UNIT) 1000 UNITS: 25 TAB at 09:00

## 2017-11-17 RX ADMIN — METRONIDAZOLE 500 MG: 500 TABLET ORAL at 17:04

## 2017-11-17 RX ADMIN — SUCRALFATE 1 G: 1 TABLET ORAL at 21:47

## 2017-11-17 RX ADMIN — PANTOPRAZOLE SODIUM 40 MG: 40 TABLET, DELAYED RELEASE ORAL at 06:14

## 2017-11-17 RX ADMIN — TRAVOPROST 1 DROP: 0.04 SOLUTION/ DROPS OPHTHALMIC at 21:51

## 2017-11-17 RX ADMIN — DOCUSATE SODIUM 100 MG: 100 CAPSULE, LIQUID FILLED ORAL at 17:04

## 2017-11-17 RX ADMIN — SENNOSIDES 8.6 MG: 8.6 TABLET, FILM COATED ORAL at 08:59

## 2017-11-17 RX ADMIN — DOCUSATE SODIUM 100 MG: 100 CAPSULE, LIQUID FILLED ORAL at 08:59

## 2017-11-17 RX ADMIN — FERROUS SULFATE TAB 325 MG (65 MG ELEMENTAL FE) 325 MG: 325 (65 FE) TAB at 08:59

## 2017-11-17 RX ADMIN — CEFTRIAXONE SODIUM 1000 MG: 10 INJECTION, POWDER, FOR SOLUTION INTRAVENOUS at 17:06

## 2017-11-17 RX ADMIN — HEPARIN SODIUM 5000 UNITS: 5000 INJECTION, SOLUTION INTRAVENOUS; SUBCUTANEOUS at 06:13

## 2017-11-17 RX ADMIN — SUCRALFATE 1 G: 1 TABLET ORAL at 09:01

## 2017-11-17 RX ADMIN — HEPARIN SODIUM 5000 UNITS: 5000 INJECTION, SOLUTION INTRAVENOUS; SUBCUTANEOUS at 14:05

## 2017-11-17 RX ADMIN — GABAPENTIN 100 MG: 100 CAPSULE ORAL at 21:47

## 2017-11-17 NOTE — CONSULTS
Consultation - Infectious Disease   Kat Carter 80 y o  female MRN: [id number]  Unit/Bed#: -01 Encounter: [id number]      IMPRESSION & RECOMMENDATIONS:   Impression/Recommendations: This is a 80 y o  female, who otherwise healthy, with history of cholelithiasis and colon cancer, admitted 2 days ago with sepsis  She is clinically much improved on IV antibiotic  Blood cultures are growing Klebsiella and a different GNR  1   Sepsis, POA, presented with fever and tachycardia  Source of sepsis is unclear  Consider biliary source  Consider colonic source  Patient's abdominal exam is benign  She is clinically much improved on IV antibiotic  Antibiotic plan as in above  Monitor temperature/WBC  Monitor hemodynamics  2   Polymicrobic bacteremia  Thus far, blood cultures are growing Klebsiella and a different GNR  Doubt urinary source, given a different GNR in the urine culture and patient not having urinary symptoms  Consider biliary source, given elevated LFTs and presence of cholelithiasis  However, patient has no abdominal pain  Consider colonic lesion with translocation from gut as source of bacteremia  Continue ceftriaxone for now  Follow up on ID and susceptibility of the 2nd GNR  Add Flagyl for anaerobic coverage  Recheck blood cultures in a m  3   Elevated LFTs, including bilirubin  This may be related to sepsis/bacteremia  Consider biliary source of bacteremia/sepsis also, although, as stated above, patient has no abdominal pain  Consider MRCP  Consider GI evaluation  MRCP, when creatinine is improved  Consider GI evaluation  4   Cholelithiasis  If patient's sepsis/bacteremia is off biliary source, given recurrent problem, she may need cholecystectomy  Consider surgery evaluation for elective cholecystectomy  5   History of colon cancer, status post colon resection  Consider colonic lesion as source of bacteremia above    If workup of biliary tract is negative, consider colonoscopy  Consider colonoscopy, especially if biliary workup negative for source of bacteremia  6   RALPH, superimposed on CKD  This is most likely secondary to sepsis  Creatinine is improved  Antibiotic dosages adjusted accordingly  Monitor creatinine  7   Asymptomatic bacteriuria  This is most likely colonization  No antibiotic needed for this  Previous hospitalization records reviewed in detail  Discussed with the patient and her family in detail regarding the above plan  Discussed with Olena Estrada PA-C from [facility] service  Thank you for this consultation  We will follow along with you  HISTORY OF PRESENT ILLNESS:  Reason for Consult:  Sepsis  Bacteremia  HPI: Dl Newell is a 80 y o  female, relatively healthy for age, was brought to the ER 2 days ago by her family for fever, confusion, malaise and nausea/vomiting  Patient may have had abdominal pain also, although she denies now  On presentation, patient had low-grade fever with normal WBC  LFTs were elevated  UA was abnormal   Patient was admitted  She was started on ceftriaxone for sepsis of presumed urinary source  Today, her blood cultures from admission are becoming positive  For these reasons, we asked to evaluate the patient  At present, patient feels well  She has no confusion but does not recall events around her admission  She denies abdominal or flank pain  She denies further nausea/vomiting  She denies urinary symptoms  In early October this year, patient was admitted with abdominal pain and malaise  She had elevated LFTs and gallstones on ultrasound  Her LFTs did improve  She was felt to have passed a gallstone  Also, patient has history of colon cancer  She is status post colon resection 8 years ago  She was not treated with chemotherapy or XRT  She had a colonoscopy since but does not recall the results      REVIEW OF SYSTEMS:  A complete 12 point system-based review of systems is otherwise negative  PAST MEDICAL HISTORY:  Past Medical History:   Diagnosis Date    Cancer [facility])     "some kind of cancer when I was 80"    Iron deficiency 10/31/2017    Stage 2 chronic kidney disease 10/31/2017    Vitamin B12 deficiency 10/31/2017     Past Surgical History:   Procedure Laterality Date    BACK SURGERY      COLON SURGERY      HIP FRACTURE SURGERY       Problem list reviewed  FAMILY HISTORY:  Non-contributory    SOCIAL HISTORY:  History   Alcohol Use No     History   Drug Use No     History   Smoking Status    Never Smoker   Smokeless Tobacco    Never Used       ALLERGIES:  No Known Allergies    MEDICATIONS:  All current active medications have been reviewed  Patient is currently on ceftriaxone  PHYSICAL EXAM:  Vitals:  HR:  [71-85] 71  Resp:  [16-18] 18  BP: (128-141)/(61-64) 128/61  SpO2:  [92 %-98 %] 98 %  Temp (24hrs), Av 8 °F (37 1 °C), Min:98 1 °F (36 7 °C), Max:99 4 °F (37 4 °C)  Current: Temperature: 98 1 °F (36 7 °C)     Physical Exam:  General:  Well-nourished, well-developed, in no acute distress  Awake, alert and oriented x 3  Patient appear extremely good for her age  Eyes:  Conjunctive clear with no hemorrhages or effusions  Oropharynx:  No ulcers, no lesions, pharynx benign, no tonsillitis  Neck:  Supple, no lymphadenopathy, no mass, nontender  Lungs:  Expansion symmetric, no rales, no wheezing, no accessory muscle use  Cardiac:  Regular rate and rhythm, normal S1, normal S2, no murmurs  Abdomen:  Soft, nondistended, non-tender, no HSM  Extremities:  Trace edema, no erythema, nontender  No ulcers  Skin:  No rashes, no ulcers  Neurological:  Moves all four extremities spontaneously, sensation grossly intact    LABS, IMAGING, & OTHER STUDIES:  Lab Results:  I have personally reviewed pertinent labs      Results from last 7 days  Lab Units 17  0440 17  0438 11/15/17  1508   SODIUM mmol/L 141 142 142   POTASSIUM mmol/L 4 1 4 9 4 9   CHLORIDE mmol/L 109* 108 107   CO2 mmol/L 23 27 26   ANION GAP mmol/L 9 7 9   BUN mg/dL 25 29* 29*   CREATININE mg/dL 1 28 1 56* 1 43*   EGFR ml/min/1 73sq m 35 27 30   GLUCOSE RANDOM mg/dL 108 86 108   CALCIUM mg/dL 8 8 8 8 9 0   AST U/L  --  206* 308*   ALT U/L  --  343* 431*   ALK PHOS U/L  --  469* 558*   TOTAL PROTEIN g/dL  --  6 1* 6 8   ALBUMIN g/dL  --  2 5* 2 9*   BILIRUBIN TOTAL mg/dL  --  2 00* 1 80*       Results from last 7 days  Lab Units 11/17/17  0440 11/16/17  0438 11/15/17  1508   WBC Thousand/uL 7 13 9 35 9 63   HEMOGLOBIN g/dL 8 1* 8 3* 9 4*   PLATELETS Thousands/uL 261 271 301       Results from last 7 days  Lab Units 11/15/17  1532 11/15/17  1524 11/15/17  1508   BLOOD CULTURE   --   --    Gram Negative Herrera resembling Escherichia coli*  Klebsiella pneumoniae*  Klebsiella pneumoniae*   GRAM STAIN RESULT   --   --  Gram negative rods  Gram negative rods   URINE CULTURE  >100,000 cfu/ml Proteus mirabilis*  --   --    INFLUENZA A PCR   --  None Detected  --    INFLUENZA B PCR   --  None Detected  --    RSV PCR   --  None Detected  --        Imaging Studies:   I have personally reviewed pertinent imaging study reports and images in PACS  Abdomen/pelvis CT reviewed personally  There is cholelithiasis without cholecystitis  CBD not dilated  Abdominal ultrasound reviewed  There is again cholelithiasis without cholecystitis  Head CT reviewed personally  No acute changes  EKG, Pathology, and Other Studies:   I have personally reviewed pertinent reports

## 2017-11-17 NOTE — ASSESSMENT & PLAN NOTE
· Encephalopathy likely related to sepsis  · CT head on admission: no acute findings  · Monitor neuro checks  · Patient alert and oriented today, but does have some confusion

## 2017-11-17 NOTE — PROGRESS NOTES
Progress Note - Kat Carter 80 y o  female MRN: [id number]    Unit/Bed#: -01 Encounter: [id number]        * Sepsis [facility])   Assessment & Plan    Sepsis, POA, as evidenced by fever, tachycardia, likely secondary to UTI  · Patient afebrile since admission  No leukocytosis  · ID consulted given results of blood cultures and urine culture  · Blood cultures: 1/2 positive for klebsiella pneumoniae and 2/2 positive for klebsiella pneumonia and gram negative kaylen resembling E coli  · Urine culture (preliminary): > 100, 000 cfu/ml proteus mirabilis  Bacteremia due to Klebsiella pneumoniae   Assessment & Plan    · Blood culture 1/2 positive for Klebsiella pneumoniae and 2/2 positive for Klebsiella pneumoniae  · On IV ceftriaxone  · ID consulted, appreciate input  Case discussed with ID, possible colon etiology to bacteremia  · Patient with history of colon cancer and reports having had surgery at the age of 80  · Consider colonoscopy as an outpatient; patient's son would like to discuss with his siblings first    · CT chest, abdomen and pelvis on admission showed no acute thoracic or abdominopelvic findings  Encephalopathy   Assessment & Plan    · Encephalopathy likely related to sepsis  · CT head on admission: no acute findings  · Monitor neuro checks  · Patient alert and oriented today, but does have some confusion  Neuropathy   Assessment & Plan    · Patient recently started on gabapentin, reportedly developed side effects with increased dose but patient does not recall side effects  · Dose decreased to gabapentin 100 mg daily from 100 mg TID yesterday  Taper off  Acute kidney injury [facility])   Assessment & Plan    RALPH, POA, on CKD  · Cr improved to 1 28, at baseline  · Discontinue IV fluids  Encourage PO intake  Transaminitis   Assessment & Plan    · Likely secondary to sepsis    · Obtain repeat CMP in AM          Chronic combined systolic and diastolic heart failure [facility])   Assessment & Plan    · Patient will mild bilateral lower extremity edema  · Discontinue IV fluids  · Monitor daily weights and intake/output  Weakness   Assessment & Plan    · Likely multifactorial given infection and age  · Obtain PT/OT evaluation  VTE Pharmacologic Prophylaxis:   Pharmacologic: Heparin  Mechanical VTE Prophylaxis in Place: Yes    Patient Centered Rounds: I have performed bedside rounds with nursing staff today  Discussions with Specialists or Other Care Team Provider: Nursing and ID    Education and Discussions with Family / Patient: Patient and patient's son, Ed, at bedside    Time Spent for Care: 20 minutes  More than 50% of total time spent on counseling and coordination of care as described above  Current Length of Stay: 2 day(s)    Current Patient Status: Inpatient   Certification Statement: The patient will continue to require additional inpatient hospital stay due to sepsis, bacteremia, need for IV antibiotics    Discharge Plan: When medically stable  Code Status: Level 1 - Full Code      Subjective:   Patient in bed, admits to weakness in her legs  She denies chest pain, SOB, urinary symptoms  Patient afebrile  Objective:     Vitals:   Temp (24hrs), Av 8 °F (37 1 °C), Min:98 1 °F (36 7 °C), Max:99 4 °F (37 4 °C)    HR:  [71-85] 71  Resp:  [16-18] 18  BP: (128-141)/(61-64) 128/61  SpO2:  [92 %-98 %] 98 %  Body mass index is 24 73 kg/m²  Input and Output Summary (last 24 hours): Intake/Output Summary (Last 24 hours) at 17 1525  Last data filed at 17 1300   Gross per 24 hour   Intake             1880 ml   Output              225 ml   Net             1655 ml       Physical Exam:     Physical Exam   Constitutional: No distress  HENT:   Head: Normocephalic and atraumatic  Eyes: Conjunctivae are normal    Neck: Neck supple  Cardiovascular: Normal rate and regular rhythm      Pulmonary/Chest: Effort normal and breath sounds normal  No respiratory distress  Abdominal: Soft  Bowel sounds are normal  There is no tenderness  Neurological: She is alert  Oriented to person and place  Skin: Skin is warm and dry  She is not diaphoretic  No erythema  There is pallor  Psychiatric: She has a normal mood and affect  Nursing note and vitals reviewed  Additional Data:     Labs:      Results from last 7 days  Lab Units 11/17/17  0440   WBC Thousand/uL 7 13   HEMOGLOBIN g/dL 8 1*   HEMATOCRIT % 25 3*   PLATELETS Thousands/uL 261   NEUTROS PCT % 73   LYMPHS PCT % 14   MONOS PCT % 13*   EOS PCT % 0       Results from last 7 days  Lab Units 11/17/17  0440 11/16/17  0438   SODIUM mmol/L 141 142   POTASSIUM mmol/L 4 1 4 9   CHLORIDE mmol/L 109* 108   CO2 mmol/L 23 27   BUN mg/dL 25 29*   CREATININE mg/dL 1 28 1 56*   CALCIUM mg/dL 8 8 8 8   TOTAL PROTEIN g/dL  --  6 1*   BILIRUBIN TOTAL mg/dL  --  2 00*   ALK PHOS U/L  --  469*   ALT U/L  --  343*   AST U/L  --  206*   GLUCOSE RANDOM mg/dL 108 86       Results from last 7 days  Lab Units 11/16/17  0438   INR  1 01       * I Have Reviewed All Lab Data Listed Above  * Additional Pertinent Lab Tests Reviewed:  All Labs Within Last 24 Hours Reviewed    Imaging:    Imaging Reports Reviewed Today Include: CT C/A/P  Imaging Personally Reviewed by Myself Includes:  none    Recent Cultures (last 7 days):       Results from last 7 days  Lab Units 11/15/17  1532 11/15/17  1524 11/15/17  1508   BLOOD CULTURE   --   --    Gram Negative Herrera resembling Escherichia coli*  Klebsiella pneumoniae*  Klebsiella pneumoniae*   GRAM STAIN RESULT   --   --  Gram negative rods  Gram negative rods   URINE CULTURE  >100,000 cfu/ml Proteus mirabilis*  --   --    INFLUENZA A PCR   --  None Detected  --    INFLUENZA B PCR   --  None Detected  --    RSV PCR   --  None Detected  --        Last 24 Hours Medication List:     ascorbic acid 250 mg Oral Daily   cefTRIAXone 1,000 mg Intravenous Q24H cholecalciferol 1,000 Units Oral Daily   cycloSPORINE 1 drop Both Eyes BID   docusate sodium 100 mg Oral BID   ferrous sulfate 325 mg Oral BID With Meals   gabapentin 100 mg Oral HS   heparin (porcine) 5,000 Units Subcutaneous Q8H [facility]   multivitamin-minerals 1 tablet Oral Daily   pantoprazole 40 mg Oral Daily Before Breakfast   senna 1 tablet Oral Daily   sucralfate 1 g Oral Q12H   travoprost 1 drop Both Eyes HS        Today, Patient Was Seen By: Audelia Chew PA-C    ** Please Note: Dictation voice to text software may have been used in the creation of this document   **

## 2017-11-17 NOTE — PHYSICAL THERAPY NOTE
PHYSICAL THERAPY NOTE          Patient Name: Mago Taveras  CBQSX'L Date: 11/17/2017 11/17/17 7149   Pain Assessment   Pain Assessment No/denies pain   Pain Score No Pain   Restrictions/Precautions   Other Precautions Bed Alarm; Chair Alarm;Telemetry; Fall Risk;Visual impairment   General   Chart Reviewed Yes   Family/Caregiver Present No   Cognition   Overall Cognitive Status Impaired   Orientation Level Oriented to person;Oriented to place   Memory Decreased short term memory;Decreased recall of recent events   Following Commands Follows one step commands inconsistently   Subjective   Subjective Pt agreeable to PT    Balance   Static Sitting Fair -   Dynamic Sitting Poor +   Endurance Deficit   Endurance Deficit Yes   Endurance Deficit Description Required rest breaks    Activity Tolerance   Activity Tolerance Patient limited by fatigue   Nurse Made Aware Spoke to [facility]     Exercises   Hip Abduction Sitting;10 reps;AROM; Bilateral   Knee AROM Long Arc Quad Sitting;10 reps;Bilateral;AROM   Ankle Pumps Sitting;10 reps;Bilateral;AROM   Assessment   Prognosis Fair   Problem List Decreased strength;Decreased range of motion;Decreased endurance; Impaired balance;Decreased mobility; Decreased coordination;Decreased cognition; Impaired judgement;Decreased safety awareness; Impaired vision  (gait deviations )   Barriers to Discharge Inaccessible home environment;Decreased caregiver support   Goals   Patient Goals to go home   STG Expiration Date 11/24/17   Short Term Goal #1 Pt with slow and inconsistent progress toward goals  Performed LE therapeutic exercise for LE strengthening required redirection and verbal/tactile cueing for technique  Provided handout  Pt recommended for PT to improve bed mobility and transfers to promote increased mobility and progress with ambulation with rolling walker   Next visit, continue with transfer training and LE therapeutic exercise  Progress ambulation as tolerated  Treatment Day 1   Plan   Treatment/Interventions Functional transfer training;LE strengthening/ROM; Therapeutic exercise;Elevations; Endurance training;Gait training;Spoke to nursing;Equipment eval/education; Bed mobility   Progress Slow progress, decreased activity tolerance   PT Frequency 5x/wk   Recommendation   Recommendation Post acute IP rehab   Equipment Recommended Alireza MOORE

## 2017-11-17 NOTE — PLAN OF CARE
Problem: DISCHARGE PLANNING - CARE MANAGEMENT  Goal: Discharge to post-acute care or home with appropriate resources  INTERVENTIONS:  - Conduct assessment to determine patient/family and health care team treatment goals, and need for post-acute services based on payer coverage, community resources, and patient preferences, and barriers to discharge  - Address psychosocial, clinical, and financial barriers to discharge as identified in assessment in conjunction with the patient/family and health care team  - Arrange appropriate level of post-acute services according to patients   needs and preference and payer coverage in collaboration with the physician and health care team  - Communicate with and update the patient/family, physician, and health care team regarding progress on the discharge plan  - Arrange appropriate transportation to post-acute venues  Outcome: Progressing  CM met with Pt and Pt's son Ed and Ed's wife at bedside  Pt came from [employer] at [address] and would like to return at d/c from hospital  Pt's family is holding her bed there and have an application in for the apartments there when pt finishes STR  CM dept will follow Pt until discharge

## 2017-11-17 NOTE — ASSESSMENT & PLAN NOTE
· Patient recently started on gabapentin, reportedly developed side effects with increased dose but patient does not recall side effects  · Dose decreased to gabapentin 100 mg daily from 100 mg TID yesterday  Taper off

## 2017-11-17 NOTE — ASSESSMENT & PLAN NOTE
· Patient will mild bilateral lower extremity edema  · Discontinue IV fluids  · Monitor daily weights and intake/output

## 2017-11-17 NOTE — ASSESSMENT & PLAN NOTE
RALPH, POA, on CKD  · Cr improved to 1 28, at baseline  · Discontinue IV fluids  Encourage PO intake

## 2017-11-17 NOTE — PHYSICAL THERAPY NOTE
PHYSICAL THERAPY NOTE          Patient Name: Dl Newell  [id number] Date: 11/17/2017 11/17/17 1328   Note Type   Note type Eval/Treat   Pain Assessment   Pain Assessment No/denies pain   Pain Score No Pain   Home Living   Type of [address] Two level   Home Equipment Walker   Additional Comments Prior to admission pt was at [facility] for rehab  Prior to rehab pt lived in a two story home with 1st floor setup, 3 OSVALDO, lives alone  Prior Function   Level of [place] Independent with ADLs and functional mobility   Lives With Alone   Receives Help From Family   ADL Assistance Independent   IADLs Independent   Falls in the last 6 months 1 to 4   Comments Pt reports I with ADL's and functional mobility with rolling walker  Pt reports no falls, per documentation on 11/1/17 pt had 1 fall  Restrictions/Precautions   Other Precautions Bed Alarm; Chair Alarm;Telemetry; Fall Risk;Visual impairment   General   Additional Pertinent History Per documentation: pt with history of cancer, iron deficiency 10/31, Stage 2 chronic kidney disease 10/31, NSTEMI 10/4, L hip fracture surgery, and back surgery  Labs: Hemoglobin 8 1 and hemtocrit 25 3 % on 11/17/17 at 0440  Family/Caregiver Present No   Cognition   Overall Cognitive Status Impaired   Arousal/Participation Cooperative   Orientation Level Oriented to person;Oriented to place   Memory Decreased short term memory;Decreased recall of recent events   Following Commands Follows one step commands inconsistently   Comments Repetitive speech, delayed reponse time with questioning      RUE Assessment   RUE Assessment X  (Grossly 4-/5)   LUE Assessment   LUE Assessment X  (Grossly 4-/5)   RLE Assessment   RLE Assessment X   RLE Overall AROM   R Hip Flexion 4/5   R Knee Flexion 4-/5   R Knee Extension 4-/5   R Ankle Dorsiflexion 4+/5   R Ankle Plantar Flexion 4+/5   LLE Overall AROM   L Hip Flexion 4-/5   L Knee Flexion 4-/5   L Ankle Dorsiflexion 4/5   L Knee Extension 4-/5   L Ankle Plantar Flexion 4/5   Coordination   Movements are Fluid and Coordinated 0   Coordination and Movement Description decreased speed with movement  Sensation X  (intact hearing, vision impaired )   Light Touch   RLE Light Touch Grossly intact   LLE Light Touch Grossly intact   Bed Mobility   Rolling R 3  Moderate assistance   Additional items Assist x 1;Bedrails; Increased time required;Verbal cues;LE management  (instructed to reach for rail for A )   Supine to Sit 2  Maximal assistance   Additional items Assist x 1;Verbal cues;LE management; Increased time required   Additional Comments Pt able to intiate leg movement to EOB  Required placement off side of bed and A for handplacement on bed rail for rolling  Transfers   Sit to Stand 2  Maximal assistance  (2 trials  First max A x1  Second mod A x 2 )   Additional items Assist x 1;Assist x 2; Increased time required;Verbal cues;Armrests   Stand to Sit 3  Moderate assistance   Additional items Assist x 1; Increased time required;Verbal cues;Armrests  (Chair placement behind pt  Unable to navigate to chair )   Ambulation/Elevation   Gait pattern Poor UE support;Narrow OSCAR; Antalgic;Decreased foot clearance;Shuffling; Inconsistent mango; Foward flexed; Short stride; Excessively slow; Step to  (PT assist required for advancement of RW )   Gait Assistance 3  Moderate assist   Additional items Assist x 2;Verbal cues; Tactile cues  (cues for sequence )   Assistive Device Rolling walker   Distance 4 ft    Stair Management Assistance Not tested   Balance   Static Sitting Fair -   Dynamic Sitting Poor +   Static Standing Poor   Dynamic Standing Poor -   Ambulatory Poor -   Endurance Deficit   Endurance Deficit Yes   Endurance Deficit Description Decreased ambulation distance  Post ambulation SpO2: 87 %  30 second return to 94%      Activity Tolerance   Activity Tolerance Patient limited by fatigue   Nurse Made Aware Spoke to [facility]     Assessment   Prognosis Fair   Problem List Decreased strength;Decreased range of motion;Decreased endurance; Impaired balance;Decreased mobility; Decreased coordination;Decreased cognition; Impaired judgement;Decreased safety awareness; Impaired vision  (gait deviations )   Assessment Pt is a 80year old female admitted for Sepsis on 11/15/17  PT ordered for eval and treat with activity restrictions of up with A  Prior to admission pt was at [facility] for rehab however live alone in a 2  with a 1st floor setup and 3 OSVALDO prior to rehab  Pt reports she was I with ADL's and mod I with ambulation, transfers, and stair negotiation with rolling walker  Pt is currently mod to max A for bed mobility, transfers, and ambulation with rolling walker  Pt presents as unstable/unpredictable given the following functional mobility deficits of impaired balance, decreased mobility, decrease strength, decreased endurance with physical activity assessed by a SpO2 of 87% post ambulation, with the addition of cognitive deficits, impaired vision, abnormal labs,  and fall risk due to recent history of falls  Pt recommended for PT to improve the above listed deficits and return to baseline functional mobility  Recommend post acute rehab upon discharge  Comorbidities adding to the complexity of the pt presentation consist of anemia and  stage 2 chronic kidney injury  Personal factors consist of steps to enter environment, limited home support, advanced age, inability to perform IADLs and inability to perform ADLs  Barriers to Discharge Inaccessible home environment;Decreased caregiver support   Goals   Patient Goals to go home    STG Expiration Date 11/24/17   Short Term Goal #1 Pt will be min A for bed mobility and transfers  Pt will be min A for ambulation x 50 ft with rolling walker  Perform and assess 3 steps and set goals  Pt will improve barthel index to > 50/100      Treatment Day 0   Plan Treatment/Interventions Functional transfer training;LE strengthening/ROM; Therapeutic exercise;Elevations; Endurance training;Gait training;Spoke to nursing;Equipment eval/education; Bed mobility   PT Frequency 5x/wk   Recommendation   Recommendation Post acute IP rehab   Equipment Recommended Walker   Barthel Index   Feeding 10   Bathing 0   Grooming Score 0   Dressing Score 5   Bladder Score 0   Bowels Score 10   Toilet Use Score 5   Transfers (Bed/Chair) Score 5   Mobility (Level Surface) Score 0   Stairs Score 0   Barthel Index Score 35   Fernandez MOORE

## 2017-11-17 NOTE — SOCIAL WORK
CM met with Pt and Pt's son Ed and Ed's wife at bedside  Pt came from [facility] at [address] and would like to return at d/c from hospital  Pt's family is holding her bed there and have an application in for the apartments there when pt finishes STR  CM dept will follow Pt until discharge

## 2017-11-17 NOTE — PLAN OF CARE
Problem: PHYSICAL THERAPY ADULT  Goal: Performs mobility at highest level of function for planned discharge setting  See evaluation for individualized goals  Treatment/Interventions: Functional transfer training, LE strengthening/ROM, Therapeutic exercise, Elevations, Endurance training, Gait training, Spoke to nursing, Equipment eval/education, Bed mobility  Equipment Recommended: Elizabeth Mix       See flowsheet documentation for full assessment, interventions and recommendations  Prognosis: Fair  Problem List: Decreased strength, Decreased range of motion, Decreased endurance, Impaired balance, Decreased mobility, Decreased coordination, Decreased cognition, Impaired judgement, Decreased safety awareness, Impaired vision (gait deviations )  Barriers to Discharge: Inaccessible home environment, Decreased caregiver support     Recommendation: Post acute IP rehab          See flowsheet documentation for full assessment

## 2017-11-17 NOTE — ASSESSMENT & PLAN NOTE
Sepsis, POA, as evidenced by fever, tachycardia, likely secondary to UTI  · Patient afebrile since admission  No leukocytosis  · ID consulted given results of blood cultures and urine culture  · Blood cultures: 1/2 positive for klebsiella pneumoniae and 2/2 positive for klebsiella pneumonia and gram negative kaylen resembling E coli  · Urine culture (preliminary): > 100, 000 cfu/ml proteus mirabilis

## 2017-11-17 NOTE — ASSESSMENT & PLAN NOTE
· Blood culture 1/2 positive for Klebsiella pneumoniae and 2/2 positive for Klebsiella pneumoniae  · On IV ceftriaxone  · ID consulted, appreciate input  Case discussed with ID, possible colon etiology to bacteremia  · Patient with history of colon cancer and reports having had surgery at the age of 80  · Consider colonoscopy as an outpatient; patient's son would like to discuss with his siblings first    · CT chest, abdomen and pelvis on admission showed no acute thoracic or abdominopelvic findings

## 2017-11-17 NOTE — PLAN OF CARE
Problem: PHYSICAL THERAPY ADULT  Goal: Performs mobility at highest level of function for planned discharge setting  See evaluation for individualized goals  Treatment/Interventions: Functional transfer training, LE strengthening/ROM, Therapeutic exercise, Elevations, Endurance training, Gait training, Spoke to nursing, Equipment eval/education, Bed mobility  Equipment Recommended: Mary Breath       See flowsheet documentation for full assessment, interventions and recommendations  Prognosis: Fair  Problem List: Decreased strength, Decreased range of motion, Decreased endurance, Impaired balance, Decreased mobility, Decreased coordination, Decreased cognition, Impaired judgement, Decreased safety awareness, Impaired vision (gait deviations )  Assessment: Pt is a 80year old female admitted for Sepsis on 11/15/17  PT ordered for eval and treat with activity restrictions of up with A  Prior to admission pt was at [facility] for rehab however live alone in a 2  with a 1st floor setup and 3 OSVALDO prior to rehab  Pt reports she was I with ADL's and mod I with ambulation, transfers, and stair negotiation with rolling walker  Pt is currently mod to max A for bed mobility, transfers, and ambulation with rolling walker  Pt presents as unstable/unpredictable given the following functional mobility deficits of impaired balance, decreased mobility, decrease strength, decreased endurance with physical activity assessed by a SpO2 of 87% post ambulation, with the addition of cognitive deficits, impaired vision, abnormal labs,  and fall risk due to recent history of falls  Pt recommended for PT to improve the above listed deficits and return to baseline functional mobility  Recommend post acute rehab upon discharge  Comorbidities adding to the complexity of the pt presentation consist of anemia and  stage 2 chronic kidney injury   Personal factors consist of steps to enter environment, limited home support, advanced age, inability to perform IADLs and inability to perform ADLs  Barriers to Discharge: Inaccessible home environment, Decreased caregiver support     Recommendation: Post acute IP rehab          See flowsheet documentation for full assessment

## 2017-11-18 PROBLEM — N17.9 ACUTE KIDNEY INJURY (HCC): Status: RESOLVED | Noted: 2017-10-29 | Resolved: 2017-11-18

## 2017-11-18 PROBLEM — Z85.038 HISTORY OF COLON CANCER: Status: ACTIVE | Noted: 2017-11-18

## 2017-11-18 PROBLEM — R82.71 ASYMPTOMATIC BACTERIURIA: Status: ACTIVE | Noted: 2017-11-18

## 2017-11-18 PROBLEM — A49.9 POLYMICROBIAL BACTERIAL INFECTION: Status: ACTIVE | Noted: 2017-11-17

## 2017-11-18 LAB
ALBUMIN SERPL BCP-MCNC: 2.4 G/DL (ref 3.5–5)
ALP SERPL-CCNC: 374 U/L (ref 46–116)
ALT SERPL W P-5'-P-CCNC: 225 U/L (ref 12–78)
ANION GAP SERPL CALCULATED.3IONS-SCNC: 9 MMOL/L (ref 4–13)
AST SERPL W P-5'-P-CCNC: 109 U/L (ref 5–45)
BACTERIA BLD CULT: ABNORMAL
BACTERIA BLD CULT: ABNORMAL
BASOPHILS # BLD AUTO: 0.02 THOUSANDS/ΜL (ref 0–0.1)
BASOPHILS NFR BLD AUTO: 0 % (ref 0–1)
BILIRUB SERPL-MCNC: 2.7 MG/DL (ref 0.2–1)
BUN SERPL-MCNC: 20 MG/DL (ref 5–25)
CALCIUM SERPL-MCNC: 8.9 MG/DL (ref 8.3–10.1)
CHLORIDE SERPL-SCNC: 109 MMOL/L (ref 100–108)
CO2 SERPL-SCNC: 24 MMOL/L (ref 21–32)
CREAT SERPL-MCNC: 1.27 MG/DL (ref 0.6–1.3)
EOSINOPHIL # BLD AUTO: 0.16 THOUSAND/ΜL (ref 0–0.61)
EOSINOPHIL NFR BLD AUTO: 3 % (ref 0–6)
ERYTHROCYTE [DISTWIDTH] IN BLOOD BY AUTOMATED COUNT: 14.4 % (ref 11.6–15.1)
GFR SERPL CREATININE-BSD FRML MDRD: 35 ML/MIN/1.73SQ M
GLUCOSE SERPL-MCNC: 93 MG/DL (ref 65–140)
GRAM STN SPEC: ABNORMAL
HCT VFR BLD AUTO: 25.3 % (ref 34.8–46.1)
HGB BLD-MCNC: 7.8 G/DL (ref 11.5–15.4)
LYMPHOCYTES # BLD AUTO: 1.15 THOUSANDS/ΜL (ref 0.6–4.47)
LYMPHOCYTES NFR BLD AUTO: 24 % (ref 14–44)
MCH RBC QN AUTO: 29.3 PG (ref 26.8–34.3)
MCHC RBC AUTO-ENTMCNC: 30.8 G/DL (ref 31.4–37.4)
MCV RBC AUTO: 95 FL (ref 82–98)
MONOCYTES # BLD AUTO: 0.61 THOUSAND/ΜL (ref 0.17–1.22)
MONOCYTES NFR BLD AUTO: 13 % (ref 4–12)
NEUTROPHILS # BLD AUTO: 2.93 THOUSANDS/ΜL (ref 1.85–7.62)
NEUTS SEG NFR BLD AUTO: 60 % (ref 43–75)
PLATELET # BLD AUTO: 272 THOUSANDS/UL (ref 149–390)
PMV BLD AUTO: 10 FL (ref 8.9–12.7)
POTASSIUM SERPL-SCNC: 3.9 MMOL/L (ref 3.5–5.3)
PROT SERPL-MCNC: 6 G/DL (ref 6.4–8.2)
RBC # BLD AUTO: 2.66 MILLION/UL (ref 3.81–5.12)
SODIUM SERPL-SCNC: 142 MMOL/L (ref 136–145)
WBC # BLD AUTO: 4.87 THOUSAND/UL (ref 4.31–10.16)

## 2017-11-18 PROCEDURE — 87040 BLOOD CULTURE FOR BACTERIA: CPT | Performed by: INTERNAL MEDICINE

## 2017-11-18 PROCEDURE — 85025 COMPLETE CBC W/AUTO DIFF WBC: CPT | Performed by: PHYSICIAN ASSISTANT

## 2017-11-18 PROCEDURE — 80053 COMPREHEN METABOLIC PANEL: CPT | Performed by: PHYSICIAN ASSISTANT

## 2017-11-18 RX ORDER — MAGNESIUM HYDROXIDE/ALUMINUM HYDROXICE/SIMETHICONE 120; 1200; 1200 MG/30ML; MG/30ML; MG/30ML
30 SUSPENSION ORAL EVERY 4 HOURS PRN
Status: DISCONTINUED | OUTPATIENT
Start: 2017-11-18 | End: 2017-11-24 | Stop reason: HOSPADM

## 2017-11-18 RX ORDER — POLYETHYLENE GLYCOL 3350 17 G/17G
17 POWDER, FOR SOLUTION ORAL DAILY
Status: DISCONTINUED | OUTPATIENT
Start: 2017-11-18 | End: 2017-11-24 | Stop reason: HOSPADM

## 2017-11-18 RX ADMIN — FERROUS SULFATE TAB 325 MG (65 MG ELEMENTAL FE) 325 MG: 325 (65 FE) TAB at 15:51

## 2017-11-18 RX ADMIN — DOCUSATE SODIUM 100 MG: 100 CAPSULE, LIQUID FILLED ORAL at 09:10

## 2017-11-18 RX ADMIN — HEPARIN SODIUM 5000 UNITS: 5000 INJECTION, SOLUTION INTRAVENOUS; SUBCUTANEOUS at 06:21

## 2017-11-18 RX ADMIN — OXYCODONE HYDROCHLORIDE AND ACETAMINOPHEN 250 MG: 500 TABLET ORAL at 09:10

## 2017-11-18 RX ADMIN — HEPARIN SODIUM 5000 UNITS: 5000 INJECTION, SOLUTION INTRAVENOUS; SUBCUTANEOUS at 21:06

## 2017-11-18 RX ADMIN — CEFAZOLIN SODIUM 1000 MG: 1 SOLUTION INTRAVENOUS at 15:51

## 2017-11-18 RX ADMIN — TRAVOPROST 1 DROP: 0.04 SOLUTION/ DROPS OPHTHALMIC at 21:06

## 2017-11-18 RX ADMIN — SUCRALFATE 1 G: 1 TABLET ORAL at 21:05

## 2017-11-18 RX ADMIN — CHOLECALCIFEROL TAB 25 MCG (1000 UNIT) 1000 UNITS: 25 TAB at 09:10

## 2017-11-18 RX ADMIN — DOCUSATE SODIUM 100 MG: 100 CAPSULE, LIQUID FILLED ORAL at 18:14

## 2017-11-18 RX ADMIN — FERROUS SULFATE TAB 325 MG (65 MG ELEMENTAL FE) 325 MG: 325 (65 FE) TAB at 09:10

## 2017-11-18 RX ADMIN — POLYETHYLENE GLYCOL 3350 17 G: 17 POWDER, FOR SOLUTION ORAL at 12:25

## 2017-11-18 RX ADMIN — METRONIDAZOLE 500 MG: 500 TABLET ORAL at 13:42

## 2017-11-18 RX ADMIN — HEPARIN SODIUM 5000 UNITS: 5000 INJECTION, SOLUTION INTRAVENOUS; SUBCUTANEOUS at 13:42

## 2017-11-18 RX ADMIN — GABAPENTIN 100 MG: 100 CAPSULE ORAL at 21:06

## 2017-11-18 RX ADMIN — SUCRALFATE 1 G: 1 TABLET ORAL at 09:10

## 2017-11-18 RX ADMIN — ALUMINUM HYDROXIDE, MAGNESIUM HYDROXIDE, AND SIMETHICONE 30 ML: 200; 200; 20 SUSPENSION ORAL at 12:28

## 2017-11-18 RX ADMIN — METRONIDAZOLE 500 MG: 500 TABLET ORAL at 06:21

## 2017-11-18 RX ADMIN — METRONIDAZOLE 500 MG: 500 TABLET ORAL at 21:06

## 2017-11-18 RX ADMIN — SENNOSIDES 8.6 MG: 8.6 TABLET, FILM COATED ORAL at 09:10

## 2017-11-18 RX ADMIN — PANTOPRAZOLE SODIUM 40 MG: 40 TABLET, DELAYED RELEASE ORAL at 06:21

## 2017-11-18 RX ADMIN — Medication 1 TABLET: at 09:11

## 2017-11-18 NOTE — PROGRESS NOTES
Progress Note - Neptali Corcoran 80 y o  female MRN: [id number]    Unit/Bed#: -01 Encounter: [id number]        * Sepsis [facility])   Assessment & Plan    Sepsis, POA, as evidenced by fever, tachycardia, uncertain source, possible biliary or colonic source  · Patient afebrile since admission  No leukocytosis  · ID consulted given results of blood cultures and urine culture  · Blood cultures: 1/2 positive for klebsiella pneumoniae and 2/2 positive for klebsiella pneumonia and E coli  · Urine culture (preliminary): > 100, 000 cfu/ml proteus mirabilis  Likely asymptomatic bacteruria per ID  · Continue IV ceftriaxone and IV Flagyl per ID's recommendation  · Follow-up on results of repeat blood cultures obtained this AM         Asymptomatic bacteriuria   Assessment & Plan    · Patient denies urinary symptoms  · Urine culture: proteus mirabilis  Likely colonization per ID  Polymicrobial bacterial infection   Assessment & Plan    · Bacteremia, uncertain source, possible biliary source vs colonic source  · Blood culture 1/2 positive for Klebsiella pneumoniae and 2/2 positive for Klebsiella pneumoniae and E  coli  · ID consulted, appreciate input  Continue IV ceftriaxone and IV Flagyl  · CT chest, abdomen and pelvis on admission showed no acute thoracic or abdominopelvic findings  Cholelithiasis without cholecystitis  · US abdomen on admission: Distended gallbladder containing a large 3 x 2 cm gallstone in the gallbladder neck  No pericholecystic fluid or significant gallbladder wall thickening  · GI consulted, appreciate input  Consider colonoscopy if biliary source is negative  · Follow-up on repeat blood cultures obtained this AM          Encephalopathy   Assessment & Plan    · Encephalopathy likely toxic metabolic, due to sepsis/ bacteremia: Improved  · CT head on admission: no acute findings  · Monitor neuro checks  · Patient alert and oriented to person and place today           Neuropathy Assessment & Plan    · Patient recently started on gabapentin, reportedly developed side effects with increased dose but patient does not recall side effects  · Dose decreased to gabapentin 100 mg daily from 100 mg TID yesterday  Taper off  Anemia   Assessment & Plan    · Iron deficiency anemia  · Hg dropped to 7 8 today  · Obtain stool occult blood  · Continue iron supplement  · Repeat CBC in AM  Transfuse for Hg < 7 0  Transaminitis   Assessment & Plan    · Elevated LFTs and elevated bilirubin possibly secondary to sepsis/ bacteremia  Possible biliary source of bacteremia  · US abdomen on 11/15/17: Distended gallbladder containing a large 3 x 2 cm gallstone in the gallbladder neck  No pericholecystic fluid or significant gallbladder wall thickening  · LFTs trending down  Total bilirubin increased to 2 80 today  · Patient admits to developing right-sided abdominal pain after breakfast this AM  Change to clear liquid diet until seen by GI    · GI consulted  Consider MRCP  Cr stable at 1 27 today, appears to be at baseline  · If bacteremia is suspected due to biliary source, obtain surgery consult for consideration of cholecystectomy  · Obtain repeat CMP in AM          Acute kidney injury (HCC)resolved as of 11/18/2017   Assessment & Plan    RALPH, POA, on CKD  · Cr improved to 1 28, at baseline  · Discontinue IV fluids  Encourage PO intake  Stage 3 chronic kidney disease   Assessment & Plan    · RALPH, POA, on CKD  · RALPH resolved after IV fluids  · Cr stable at 1 27, appears to be at baseline  · Continue to monitor  Chronic combined systolic and diastolic heart failure ([facility] [address] )   Assessment & Plan    · Patient will mild bilateral pedal edema  Continue to monitor, denies SOB  · Monitor daily weights and intake/output  Weakness   Assessment & Plan    · Likely multifactorial given infection and age  · Obtain PT/OT evaluation           History of colon cancer Assessment & Plan    · Patient with history of colon CA at the age of 80 and underwent resection  She did not have chemotherapy or radiation therapy  · If biliary source of bacteremia is ruled out, consider colonic source and consider colonoscopy  VTE Pharmacologic Prophylaxis:   Pharmacologic: Heparin  Mechanical VTE Prophylaxis in Place: Yes    Patient Centered Rounds: I have performed bedside rounds with nursing staff today  Discussions with Specialists or Other Care Team Provider: Nursing    Education and Discussions with Family / Patient: Patient    Time Spent for Care: 20 minutes  More than 50% of total time spent on counseling and coordination of care as described above  Current Length of Stay: 3 day(s)    Current Patient Status: Inpatient   Certification Statement: The patient will continue to require additional inpatient hospital stay due to bacteremia, uncertain source, need for GI evaluation  Discharge Plan: when medically stable  Code Status: Level 1 - Full Code      Subjective:   Patient asleep upon entering room, awoke to hearing her name called  She admits to some pain on the right side of her abdomen since eating eggs this morning  She also admits to some nausea this AM  She denies chest pain or SOB  She remains afebrile  Objective:     Vitals:   Temp (24hrs), Av 5 °F (36 9 °C), Min:98 1 °F (36 7 °C), Max:98 9 °F (37 2 °C)    HR:  [71-74] 71  Resp:  [18] 18  BP: (128-166)/(61-75) 166/75  SpO2:  [95 %-98 %] 95 %  Body mass index is 24 73 kg/m²  Input and Output Summary (last 24 hours): Intake/Output Summary (Last 24 hours) at 17 1410  Last data filed at 17 1240   Gross per 24 hour   Intake              300 ml   Output              900 ml   Net             -600 ml       Physical Exam:     Physical Exam   Constitutional: No distress  HENT:   Head: Normocephalic and atraumatic  Eyes: Conjunctivae are normal    Neck: Normal range of motion  Neck supple  Cardiovascular: Normal rate and regular rhythm  Pulmonary/Chest: Effort normal and breath sounds normal  No respiratory distress  Abdominal: Soft  Bowel sounds are normal  There is tenderness in the right upper quadrant and right lower quadrant  Musculoskeletal: She exhibits edema (mild b/l pedal edema)  Neurological: She is alert  Oriented to place and person  Skin: Skin is warm and dry  Bruising (b/l upper extremities) noted  She is not diaphoretic  No erythema  Psychiatric: She has a normal mood and affect  Nursing note and vitals reviewed  Additional Data:     Labs:      Results from last 7 days  Lab Units 11/18/17  0511   WBC Thousand/uL 4 87   HEMOGLOBIN g/dL 7 8*   HEMATOCRIT % 25 3*   PLATELETS Thousands/uL 272   NEUTROS PCT % 60   LYMPHS PCT % 24   MONOS PCT % 13*   EOS PCT % 3       Results from last 7 days  Lab Units 11/18/17  0511   SODIUM mmol/L 142   POTASSIUM mmol/L 3 9   CHLORIDE mmol/L 109*   CO2 mmol/L 24   BUN mg/dL 20   CREATININE mg/dL 1 27   CALCIUM mg/dL 8 9   TOTAL PROTEIN g/dL 6 0*   BILIRUBIN TOTAL mg/dL 2 70*   ALK PHOS U/L 374*   ALT U/L 225*   AST U/L 109*   GLUCOSE RANDOM mg/dL 93       Results from last 7 days  Lab Units 11/16/17  0438   INR  1 01       * I Have Reviewed All Lab Data Listed Above  * Additional Pertinent Lab Tests Reviewed:  All Labs Within Last 24 Hours Reviewed    Imaging:    Imaging Reports Reviewed Today Include: US abdomen and CT chest/ abdomen and pelvis  Imaging Personally Reviewed by Myself Includes: None    Recent Cultures (last 7 days):       Results from last 7 days  Lab Units 11/15/17  1532 11/15/17  1524 11/15/17  1508   BLOOD CULTURE   --   --    Escherichia coli*  Klebsiella pneumoniae*  Klebsiella pneumoniae*   GRAM STAIN RESULT   --   --  Gram negative rods  Gram negative rods   URINE CULTURE  >100,000 cfu/ml Proteus mirabilis*  --   --    INFLUENZA A PCR   --  None Detected  --    INFLUENZA B PCR   --  None Detected  --    RSV PCR   --  None Detected  --        Last 24 Hours Medication List:     ascorbic acid 250 mg Oral Daily   cefTRIAXone 1,000 mg Intravenous Q24H   cholecalciferol 1,000 Units Oral Daily   cycloSPORINE 1 drop Both Eyes BID   docusate sodium 100 mg Oral BID   ferrous sulfate 325 mg Oral BID With Meals   gabapentin 100 mg Oral HS   heparin (porcine) 5,000 Units Subcutaneous Q8H [address]   metroNIDAZOLE 500 mg Oral Q8H [address]   multivitamin-minerals 1 tablet Oral Daily   pantoprazole 40 mg Oral Daily Before Breakfast   polyethylene glycol 17 g Oral Daily   senna 1 tablet Oral Daily   sucralfate 1 g Oral Q12H   travoprost 1 drop Both Eyes HS        Today, Patient Was Seen By: Peyman Baker PA-C    ** Please Note: Dictation voice to text software may have been used in the creation of this document   **

## 2017-11-18 NOTE — ASSESSMENT & PLAN NOTE
· Encephalopathy likely toxic metabolic, due to sepsis/ bacteremia: Improved  · CT head on admission: no acute findings  · Monitor neuro checks  · Patient alert and oriented to person and place today

## 2017-11-18 NOTE — PLAN OF CARE
DISCHARGE PLANNING - CARE MANAGEMENT     Discharge to post-acute care or home with appropriate resources Progressing        GENITOURINARY - ADULT     Maintains or returns to baseline urinary function Progressing     Absence of urinary retention Progressing        METABOLIC, FLUID AND ELECTROLYTES - ADULT     Electrolytes maintained within normal limits Progressing     Fluid balance maintained Progressing        Potential for Falls     Patient will remain free of falls Progressing        Prexisting or High Potential for Compromised Skin Integrity     Skin integrity is maintained or improved Progressing

## 2017-11-18 NOTE — ASSESSMENT & PLAN NOTE
· Iron deficiency anemia  · Hg dropped to 7 8 today  · Obtain stool occult blood  · Continue iron supplement  · Repeat CBC in AM  Transfuse for Hg < 7 0

## 2017-11-18 NOTE — ASSESSMENT & PLAN NOTE
· Patient with history of colon CA at the age of 80 and underwent resection  She did not have chemotherapy or radiation therapy  · If biliary source of bacteremia is ruled out, consider colonic source and consider colonoscopy

## 2017-11-18 NOTE — ASSESSMENT & PLAN NOTE
Sepsis, POA, as evidenced by fever, tachycardia, uncertain source, possible biliary or colonic source  · Patient afebrile since admission  No leukocytosis  · ID consulted given results of blood cultures and urine culture  · Blood cultures: 1/2 positive for klebsiella pneumoniae and 2/2 positive for klebsiella pneumonia and E coli  · Urine culture (preliminary): > 100, 000 cfu/ml proteus mirabilis  Likely asymptomatic bacteruria per ID  · Continue IV ceftriaxone and IV Flagyl per ID's recommendation    · Follow-up on results of repeat blood cultures obtained this AM

## 2017-11-18 NOTE — PROGRESS NOTES
Progress Note - Infectious Disease   Patricia Schmitz 80 y o  female MRN: [id number]  Unit/Bed#: -01 Encounter: [id number]      Impression/Recommendations:  1  Sepsis, POA: fever and tachycardia  Source of sepsis is unclear  Consider biliary vs colonic source  Patient's abdominal exam is benign  She is clinically much improved on IV antibiotic  Antibiotic plan as below  Monitor temperature/WBC  Monitor hemodynamics      2   Polymicrobic bacteremia  Klebsiella and E  coli  Doubt urinary source, given a different GNR in the urine culture and no urinary symptoms  Consider biliary source, given elevated LFTs and presence of cholelithiasis  Consider colonic lesion with translocation from gut as source of bacteremia  Change ceftriaxone to cefazolin  Continue Flagyl for now  Follow up repeat blood cultures  Follow temperatures and white blood cell count closely  Supportive care as per the primary service     3   Elevated LFTs, including bilirubin  Consider due to sepsis versus passed gallstone  No clinical or radiographic evidence of acute cholecystitis  MRCP, when creatinine is improved  GI evaluation pending     4  Cholelithiasis  May need elective cholecystectomy if sepsis determined to be biliary source     5  History of colon cancer, status post colon resection  Consider colonoscopy if biliary workup negative for source of bacteremia      6  RALPH, superimposed on CKD  This is most likely secondary to sepsis  Creatinine is improved  Follow creatinine closely and dose-adjust antibiotics as indicated     7  Asymptomatic bacteriuria  This is most likely colonization  No antibiotic needed for this  Antibiotics   Ceftriaxone #4  Flagyl #2    Subjective:  Patient seen on AM rounds  Feels cold and asks for heat to be turned up  No abdominal pain  Denies fevers, chills, or sweats  Denies nausea, vomiting, or diarrhea        Objective:  Vitals:  HR:  [71-74] 71  Resp:  [18] 18  BP: (163-166)/(71-75) 166/75  SpO2:  [95 %] 95 %  Temp (24hrs), Av 8 °F (37 1 °C), Min:98 6 °F (37 °C), Max:98 9 °F (37 2 °C)  Current: Temperature: 98 9 °F (37 2 °C)    Physical Exam:   General:  No acute distress  Eyes:  Normal lids and conjunctivae  ENT:  Normal external ears and nose  Neck:  Neck symmetric with midline trachea  Pulmonary:  Normal respiratory effort without accessory muscle use  Cardiovascular:  Regular rate and rhythm; no peripheral edema  Gastrointestinal:  No tenderness or distention  Musculoskeletal:  No digital clubbing or cyanosis  Skin:  No visible rashes; No palpable nodules  Neurologic:  Sensation grossly intact to light touch  Psychiatric:  Alert and oriented; Normal mood    Lab Results:  I have personally reviewed pertinent labs      Results from last 7 days  Lab Units 17  0517  0438 11/15/17  1508   SODIUM mmol/L 142 141 142 142   POTASSIUM mmol/L 3 9 4 1 4 9 4 9   CHLORIDE mmol/L 109* 109* 108 107   CO2 mmol/L 24 23 27 26   ANION GAP mmol/L 9 9 7 9   BUN mg/dL 20 25 29* 29*   CREATININE mg/dL 1 27 1 28 1 56* 1 43*   EGFR ml/min/1 73sq m 35 35 27 30   GLUCOSE RANDOM mg/dL 93 108 86 108   CALCIUM mg/dL 8 9 8 8 8 8 9 0   AST U/L 109*  --  206* 308*   ALT U/L 225*  --  343* 431*   ALK PHOS U/L 374*  --  469* 558*   TOTAL PROTEIN g/dL 6 0*  --  6 1* 6 8   ALBUMIN g/dL 2 4*  --  2 5* 2 9*   BILIRUBIN TOTAL mg/dL 2 70*  --  2 00* 1 80*       Results from last 7 days  Lab Units 17  0511 17  0440 17  0438   WBC Thousand/uL 4 87 7 13 9 35   HEMOGLOBIN g/dL 7 8* 8 1* 8 3*   PLATELETS Thousands/uL 272 261 271       Results from last 7 days  Lab Units 11/15/17  1532 11/15/17  1524 11/15/17  1508   BLOOD CULTURE   --   --    Escherichia coli*  Klebsiella pneumoniae*  Klebsiella pneumoniae*   GRAM STAIN RESULT   --   --  Gram negative rods  Gram negative rods   URINE CULTURE  >100,000 cfu/ml Proteus mirabilis*  --   --    INFLUENZA A PCR   --  None Detected  --    INFLUENZA B PCR   --  None Detected  --    RSV PCR   --  None Detected  --        Imaging Studies:   I have personally reviewed pertinent imaging study reports and images in PACS  RUQ U/S distended GB with large stone at neck  No fluid or wall thickening    EKG, Pathology, and Other Studies:   I have personally reviewed pertinent reports

## 2017-11-18 NOTE — ASSESSMENT & PLAN NOTE
· Elevated LFTs and elevated bilirubin possibly secondary to sepsis/ bacteremia  Possible biliary source of bacteremia  · US abdomen on 11/15/17: Distended gallbladder containing a large 3 x 2 cm gallstone in the gallbladder neck  No pericholecystic fluid or significant gallbladder wall thickening  · LFTs trending down  Total bilirubin increased to 2 80 today  · Patient admits to developing right-sided abdominal pain after breakfast this AM  Change to clear liquid diet until seen by GI    · GI consulted  Consider MRCP  Cr stable at 1 27 today, appears to be at baseline  · If bacteremia is suspected due to biliary source, obtain surgery consult for consideration of cholecystectomy     · Obtain repeat CMP in AM

## 2017-11-18 NOTE — CONSULTS
Consultation - [facility]) [facility]  Amarilis Damon 80 y o  female MRN: [id number]  Unit/Bed#: -01 Encounter: [id number]        Consults    ASSESSMENT/PLAN:     Pleasant 43-year-old female admitted with polymicrobial sepsis and bacteremia of unclear source, found have elevated LFTs on admission and history of colon cancer in the past   CT scan also demonstrates some possible fecal impaction  1   Sepsis and bacteremia:  Unclear etiology, possibly biliary source, her CBD was measured at 6 8 mm, she has a large gallstone, she does not appear to have acute cholecystitis  Another source could be bacterial translocation through colon, with her history of colon cancer in the past as well as what appears to be a fecal impaction on her CT scan she may have had bacterial translocation  -at this point I recommend checking an MRCP  -recommend tap water enema she may have again fecal impaction/stercoral ulceration leading to bacterial translocation  -patient is very reasonable, I had a long discussion with her with regards to how invasive she would want to be with procedures with regards to possible cholecystectomy, colonoscopy, at her age it may be best to defer interventional and invasive procedures unless acutely necessary  -certainly if the source needs to be identified, if the patient and the family would consider surgical options for such a process as malignancy then we can consider colonoscopy  -we will evaluate biliary tree with an MRCP    2  Elevated LFTs:  Likely secondary to sepsis in my opinion, biliary process is on the differential though appears to be resolving  -we will check MRCP to evaluate further    3    Fecal impaction:  I suspect that this may actually be the true source of her bacteremia  -we will start with tap water enema  -consider flexible sigmoidoscopy or colonoscopy based on patient's clinical condition, overall life expectancy and goals of care            Reason for Consult / Principal Problem: Sepsis ([facility] [address] )    HPI: Mago Taveras is a 80y o  year old female who presents with Sepsis ([facility] [address] ) we have been consulted for elevated LFTs, and for possible colonic source of polymicrobial bacteremia  Patient presented with sepsis, found to have Klebsiella and E coli bacteremia, not due to urinary source  She was found to have elevated LFTs on admission, her AST and ALT have improved over the past several days but her bilirubin continues to worsen  Also notable on CT scan was what appears to be some component of fecal impaction  The patient is a poor historian  Unclear when her last bowel movement was  She denies any abdominal pain, nausea, vomiting  She reports presenting with chills  Patient reports a history of colon cancer status post resection, it is unclear when her last colonoscopy was, however her colon cancer and partial colectomy was about 8 years ago  From an infection standpoint sepsis standpoint she has improved clinically  Review of Systems: The remainder of the review of systems was negative except for the pertinent positives noted in HP, within limits of a patient's mental status  Historical Information   Past Medical History:   Diagnosis Date    Cancer [facility])     "some kind of cancer when I was 80"    Iron deficiency 10/31/2017    Stage 2 chronic kidney disease 10/31/2017    Vitamin B12 deficiency 10/31/2017     Past Surgical History:   Procedure Laterality Date    BACK SURGERY      COLON SURGERY      HIP FRACTURE SURGERY       Social History   History   Alcohol Use No     History   Drug Use No     History   Smoking Status    Never Smoker   Smokeless Tobacco    Never Used     History reviewed  No pertinent family history      Meds/Allergies     Prescriptions Prior to Admission   Medication    acetaminophen (TYLENOL) 325 mg tablet    ascorbic acid (VITAMIN C) 500 MG tablet    aspirin (ECOTRIN LOW STRENGTH) 81 mg EC tablet    Cholecalciferol (VITAMIN D) 2000 units CAPS    cycloSPORINE (RESTASIS) 0 05 % ophthalmic emulsion    docusate sodium (COLACE) 100 mg capsule    ferrous sulfate 325 (65 Fe) mg tablet    gabapentin (NEURONTIN) 300 mg capsule    latanoprost (XALATAN) 0 005 % ophthalmic solution    Magnesium Hydroxide (MILK OF MAGNESIA PO)    mineral oil enema    Multiple Vitamins-Minerals (PRESERVISION AREDS 2 PO)    pantoprazole (PROTONIX) 40 mg tablet    senna (SENOKOT) 8 6 mg    sucralfate (CARAFATE) 1 g tablet    travoprost (TRAVATAN Z) 0 004 % ophthalmic solution     Current Facility-Administered Medications   Medication Dose Route Frequency    acetaminophen (TYLENOL) tablet 650 mg  650 mg Oral Q6H PRN    aluminum-magnesium hydroxide-simethicone (MYLANTA) 200-200-20 mg/5 mL oral suspension 30 mL  30 mL Oral Q4H PRN    ascorbic acid (VITAMIN C) tablet 250 mg  250 mg Oral Daily    calcium carbonate (TUMS) chewable tablet 1,000 mg  1,000 mg Oral Daily PRN    ceFAZolin (ANCEF) IVPB (premix) 1,000 mg  1,000 mg Intravenous Q12H    cholecalciferol (VITAMIN D3) tablet 1,000 Units  1,000 Units Oral Daily    cycloSPORINE (RESTASIS) 0 05 % ophthalmic emulsion 1 drop  1 drop Both Eyes BID    docusate sodium (COLACE) capsule 100 mg  100 mg Oral BID    ferrous sulfate tablet 325 mg  325 mg Oral BID With Meals    gabapentin (NEURONTIN) capsule 100 mg  100 mg Oral HS    heparin (porcine) subcutaneous injection 5,000 Units  5,000 Units Subcutaneous Q8H [address]    metroNIDAZOLE (FLAGYL) tablet 500 mg  500 mg Oral Q8H [address]    multivitamin-minerals (CENTRUM) tablet 1 tablet  1 tablet Oral Daily    ondansetron (ZOFRAN) injection 4 mg  4 mg Intravenous Q6H PRN    pantoprazole (PROTONIX) EC tablet 40 mg  40 mg Oral Daily Before Breakfast    polyethylene glycol (MIRALAX) packet 17 g  17 g Oral Daily    senna (SENOKOT) tablet 8 6 mg  1 tablet Oral Daily    sucralfate (CARAFATE) tablet 1 g  1 g Oral Q12H    travoprost (TRAVATAN-Z) 0 004 % ophthalmic solution 1 drop  1 drop Both Eyes HS       No Known Allergies    Objective     Blood pressure 164/75, pulse 84, temperature 98 2 °F (36 8 °C), temperature source Oral, resp  rate 18, weight 69 5 kg (153 lb 3 5 oz), SpO2 96 %        Intake/Output Summary (Last 24 hours) at 11/18/17 1634  Last data filed at 11/18/17 1440   Gross per 24 hour   Intake              180 ml   Output             1150 ml   Net             -970 ml       PHYSICAL EXAM     GEN:  Elderly female in no acute distress   HEENT: anicteric, MMM, no cervical or supraclavicular lymphadenopathy  CV: RRR, no m/r/g  CHEST: CTA b/l, no WRR  ABD: +BS, soft, NT/ND, no hepatosplenomegaly, well-healed surgical scar  EXT: no c/c/e  SKIN: no rashes,  NEURO: aao    Lab Results:   Admission on 11/15/2017   Component Date Value    PTT 11/15/2017 28     Protime 11/15/2017 12 9     INR 11/15/2017 0 94     Blood Culture 11/17/2017 Klebsiella pneumoniae*    Gram Stain Result 11/17/2017 Gram negative rods     Blood Culture 11/18/2017 Escherichia coli*    Blood Culture 11/18/2017 Klebsiella pneumoniae*    Gram Stain Result 11/18/2017 Gram negative rods     WBC 11/15/2017 9 63     RBC 11/15/2017 3 11*    Hemoglobin 11/15/2017 9 4*    Hematocrit 11/15/2017 29 6*    MCV 11/15/2017 95     MCH 11/15/2017 30 2     MCHC 11/15/2017 31 8     RDW 11/15/2017 14 1     MPV 11/15/2017 9 7     Platelets 39/73/1390 301     Sodium 11/15/2017 142     Potassium 11/15/2017 4 9     Chloride 11/15/2017 107     CO2 11/15/2017 26     Anion Gap 11/15/2017 9     BUN 11/15/2017 29*    Creatinine 11/15/2017 1 43*    Glucose 11/15/2017 108     Calcium 11/15/2017 9 0     AST 11/15/2017 308*    ALT 11/15/2017 431*    Alkaline Phosphatase 11/15/2017 558*    Total Protein 11/15/2017 6 8     Albumin 11/15/2017 2 9*    Total Bilirubin 11/15/2017 1 80*    eGFR 11/15/2017 30     LACTIC ACID 11/15/2017 1 4     Color, UA 11/15/2017 Yellow     Clarity, UA 11/15/2017 Clear     Specific Gravity, UA 11/15/2017 1 015     pH, UA 11/15/2017 8 0     Leukocytes, UA 11/15/2017 Moderate*    Nitrite, UA 11/15/2017 Negative     Protein, UA 11/15/2017 Trace*    Glucose, UA 11/15/2017 Negative     Ketones, UA 11/15/2017 Negative     Urobilinogen, UA 11/15/2017 1 0     Bilirubin, UA 11/15/2017 Negative     Blood, UA 11/15/2017 Trace-Intact*    Ventricular Rate 11/16/2017 107     Atrial Rate 11/16/2017 107     CT Interval 11/16/2017 148     QRSD Interval 11/16/2017 134     QT Interval 11/16/2017 358     QTC Interval 11/16/2017 477     P Axis 11/16/2017 70     QRS Axis 11/16/2017 11     T Wave Axis 11/16/2017 153     Troponin I 11/15/2017 0 03     Lipase 11/15/2017 686*    Bilirubin, Direct 11/15/2017 1 47*    Rapid Influenza A Ag 11/15/2017 Negative     Rapid Influenza B Ag 11/15/2017 Negative     RBC, UA 11/15/2017 1-2*    WBC, UA 11/15/2017 Innumerable*    Epithelial Cells 11/15/2017 Occasional     Bacteria, UA 11/15/2017 Innumerable*    OTHER OBSERVATIONS 11/15/2017 Transitional Epithelial Cells     INFLU A PCR 11/16/2017 None Detected     INFLU B PCR 11/16/2017 None Detected     RSV PCR 11/16/2017 None Detected     Segmented % 11/15/2017 90*    Lymphocytes % 11/15/2017 5*    Monocytes % 11/15/2017 4     Eosinophils % 11/15/2017 0     Basophils % 11/15/2017 0     Atypical Lymphocytes % 11/15/2017 1*    Absolute Neutrophils 11/15/2017 8 67*    Lymphocytes Absolute 11/15/2017 0 48*    Monocytes Absolute 11/15/2017 0 39     Eosinophils Absolute 11/15/2017 0 00     Basophils Absolute 11/15/2017 0 00     Total Counted 11/15/2017 100     Platelet Estimate 98/54/7492 Adequate     Urine Culture 11/17/2017 >100,000 cfu/ml Proteus mirabilis*    Protime 11/16/2017 13 6     INR 11/16/2017 1 01     Sodium 11/16/2017 142     Potassium 11/16/2017 4 9     Chloride 11/16/2017 108     CO2 11/16/2017 27     Anion Gap 11/16/2017 7     BUN 11/16/2017 29*    Creatinine 11/16/2017 1 56*    Glucose 11/16/2017 86     Calcium 11/16/2017 8 8     AST 11/16/2017 206*    ALT 11/16/2017 343*    Alkaline Phosphatase 11/16/2017 469*    Total Protein 11/16/2017 6 1*    Albumin 11/16/2017 2 5*    Total Bilirubin 11/16/2017 2 00*    eGFR 11/16/2017 27     Magnesium 11/16/2017 2 1     WBC 11/16/2017 9 35     RBC 11/16/2017 2 82*    Hemoglobin 11/16/2017 8 3*    Hematocrit 11/16/2017 27 4*    MCV 11/16/2017 97     MCH 11/16/2017 29 4     MCHC 11/16/2017 30 3*    RDW 11/16/2017 14 3     Platelets 50/74/0227 271     MPV 11/16/2017 9 3     Ventricular Rate 11/17/2017 88     Atrial Rate 11/17/2017 88     SC Interval 11/17/2017 176     QRSD Interval 11/17/2017 142     QT Interval 11/17/2017 420     QTC Interval 11/17/2017 508     P Axis 11/17/2017 79     QRS Axis 11/17/2017 -5     T Wave [place] 11/17/2017 127     Sodium 11/17/2017 141     Potassium 11/17/2017 4 1     Chloride 11/17/2017 109*    CO2 11/17/2017 23     Anion Gap 11/17/2017 9     BUN 11/17/2017 25     Creatinine 11/17/2017 1 28     Glucose 11/17/2017 108     Calcium 11/17/2017 8 8     eGFR 11/17/2017 35     WBC 11/17/2017 7 13     RBC 11/17/2017 2 65*    Hemoglobin 11/17/2017 8 1*    Hematocrit 11/17/2017 25 3*    MCV 11/17/2017 96     MCH 11/17/2017 30 6     MCHC 11/17/2017 32 0     RDW 11/17/2017 14 3     MPV 11/17/2017 10 3     Platelets 41/08/0905 261     Neutrophils Relative 11/17/2017 73     Lymphocytes Relative 11/17/2017 14     Monocytes Relative 11/17/2017 13*    Eosinophils Relative 11/17/2017 0     Basophils Relative 11/17/2017 0     Neutrophils Absolute 11/17/2017 5 18     Lymphocytes Absolute 11/17/2017 1 01     Monocytes Absolute 11/17/2017 0 91     Eosinophils Absolute 11/17/2017 0 02     Basophils Absolute 11/17/2017 0 01     WBC 11/18/2017 4 87     RBC 11/18/2017 2 66*    Hemoglobin 11/18/2017 7 8*    Hematocrit 11/18/2017 25 3*    MCV 11/18/2017 95     MCH 11/18/2017 29 3     MCHC 11/18/2017 30 8*    RDW 11/18/2017 14 4     MPV 11/18/2017 10 0     Platelets 69/81/9054 272     Neutrophils Relative 11/18/2017 60     Lymphocytes Relative 11/18/2017 24     Monocytes Relative 11/18/2017 13*    Eosinophils Relative 11/18/2017 3     Basophils Relative 11/18/2017 0     Neutrophils Absolute 11/18/2017 2 93     Lymphocytes Absolute 11/18/2017 1 15     Monocytes Absolute 11/18/2017 0 61     Eosinophils Absolute 11/18/2017 0 16     Basophils Absolute 11/18/2017 0 02     Sodium 11/18/2017 142     Potassium 11/18/2017 3 9     Chloride 11/18/2017 109*    CO2 11/18/2017 24     Anion Gap 11/18/2017 9     BUN 11/18/2017 20     Creatinine 11/18/2017 1 27     Glucose 11/18/2017 93     Calcium 11/18/2017 8 9     AST 11/18/2017 109*    ALT 11/18/2017 225*    Alkaline Phosphatase 11/18/2017 374*    Total Protein 11/18/2017 6 0*    Albumin 11/18/2017 2 4*    Total Bilirubin 11/18/2017 2 70*    eGFR 11/18/2017 35      Imaging Studies: I have personally reviewed pertinent reports

## 2017-11-18 NOTE — ASSESSMENT & PLAN NOTE
· Bacteremia, uncertain source, possible biliary source vs colonic source  · Blood culture 1/2 positive for Klebsiella pneumoniae and 2/2 positive for Klebsiella pneumoniae and E  coli  · ID consulted, appreciate input  Continue IV ceftriaxone and IV Flagyl  · CT chest, abdomen and pelvis on admission showed no acute thoracic or abdominopelvic findings  Cholelithiasis without cholecystitis  · US abdomen on admission: Distended gallbladder containing a large 3 x 2 cm gallstone in the gallbladder neck  No pericholecystic fluid or significant gallbladder wall thickening  · GI consulted, appreciate input  Consider colonoscopy if biliary source is negative     · Follow-up on repeat blood cultures obtained this AM

## 2017-11-18 NOTE — ASSESSMENT & PLAN NOTE
· RALPH, POA, on CKD  · RALPH resolved after IV fluids  · Cr stable at 1 27, appears to be at baseline  · Continue to monitor

## 2017-11-18 NOTE — PHYSICIAN ADVISOR
This patient is a 80 y o  y/o female who is admitted to the hospital for Fever - 75 years or older (Pt presents to the ED for evaluation of Nausea, vomitting and fever that started at 12pm  Per staff at nursing facility pt had temp of 104, did get rectal tylenol suppository prior to arrival)   The patient presented to the ED on 11/15/17 at  and was admitted to the hospital on 11/15/2017 1445  History of Present Illness includes: The patient had a prior admission from October 29 to November 2 secondary to syncope  The patient also had mild AK I given IV fluids and was treated and discharged in stable condition  She was not orthostatic on this admission  The patient presented on this admission with confusion as well as nausea and vomiting  The patient also had abdominal pain and decreased oral intake  Increased lethargy was noted      Vital signs in the ER are as follows   ED Triage Vitals   Temperature Pulse Respirations Blood Pressure SpO2   11/15/17 1448 11/15/17 1448 11/15/17 1448 11/15/17 1448 11/15/17 1448   (!) 102 8 °F (39 3 °C) (!) 114 20 122/58 91 %      Temp Source Heart Rate Source Patient Position - Orthostatic VS BP Location FiO2 (%)   11/15/17 1448 11/15/17 1448 11/15/17 1448 11/15/17 1448 --   Oral Monitor Lying Left arm       Pain Score       11/15/17 2042       No Pain         On exam, the patient has dry mucous membranes and tachycardia  Breath sounds are diminished and the patient is confused  Hemoglobin is 9 4 creatinine is 1 4  AST is 308, ALT is  431 and alkaline phosphatase is 558    This patient is being admitted secondary to sepsis, toxic metabolic encephalopathy as well as increased liver function test  The patient had an episode of nonsustained be tacking emergency room  The patient also has acute kidney injury  The plan of care includes urine and blood cultures, intravenous antibiotics, whatever quadrant ultrasound, repeat LFTs, repeat labs   This patient is appropriate for inpatient admission as her length of stay is expected to be a least two midnights   This admission is unrelated to her prior admission for syncope as she was treated and discharged in stable condition

## 2017-11-18 NOTE — ASSESSMENT & PLAN NOTE
· Patient will mild bilateral pedal edema  Continue to monitor, denies SOB  · Monitor daily weights and intake/output

## 2017-11-19 PROBLEM — K56.41 FECAL IMPACTION (HCC): Status: ACTIVE | Noted: 2017-11-19

## 2017-11-19 LAB
ALBUMIN SERPL BCP-MCNC: 2.3 G/DL (ref 3.5–5)
ALP SERPL-CCNC: 326 U/L (ref 46–116)
ALT SERPL W P-5'-P-CCNC: 170 U/L (ref 12–78)
ANION GAP SERPL CALCULATED.3IONS-SCNC: 6 MMOL/L (ref 4–13)
AST SERPL W P-5'-P-CCNC: 79 U/L (ref 5–45)
BASOPHILS # BLD AUTO: 0.03 THOUSANDS/ΜL (ref 0–0.1)
BASOPHILS NFR BLD AUTO: 1 % (ref 0–1)
BILIRUB SERPL-MCNC: 2 MG/DL (ref 0.2–1)
BUN SERPL-MCNC: 16 MG/DL (ref 5–25)
CALCIUM SERPL-MCNC: 8.8 MG/DL (ref 8.3–10.1)
CHLORIDE SERPL-SCNC: 107 MMOL/L (ref 100–108)
CO2 SERPL-SCNC: 26 MMOL/L (ref 21–32)
CREAT SERPL-MCNC: 1.15 MG/DL (ref 0.6–1.3)
EOSINOPHIL # BLD AUTO: 0.16 THOUSAND/ΜL (ref 0–0.61)
EOSINOPHIL NFR BLD AUTO: 3 % (ref 0–6)
ERYTHROCYTE [DISTWIDTH] IN BLOOD BY AUTOMATED COUNT: 14.4 % (ref 11.6–15.1)
GFR SERPL CREATININE-BSD FRML MDRD: 39 ML/MIN/1.73SQ M
GLUCOSE SERPL-MCNC: 93 MG/DL (ref 65–140)
HCT VFR BLD AUTO: 24.6 % (ref 34.8–46.1)
HGB BLD-MCNC: 7.8 G/DL (ref 11.5–15.4)
INR PPP: 0.95 (ref 0.86–1.16)
LYMPHOCYTES # BLD AUTO: 1.01 THOUSANDS/ΜL (ref 0.6–4.47)
LYMPHOCYTES NFR BLD AUTO: 21 % (ref 14–44)
MCH RBC QN AUTO: 29.9 PG (ref 26.8–34.3)
MCHC RBC AUTO-ENTMCNC: 31.7 G/DL (ref 31.4–37.4)
MCV RBC AUTO: 94 FL (ref 82–98)
MONOCYTES # BLD AUTO: 0.53 THOUSAND/ΜL (ref 0.17–1.22)
MONOCYTES NFR BLD AUTO: 11 % (ref 4–12)
NEUTROPHILS # BLD AUTO: 3.14 THOUSANDS/ΜL (ref 1.85–7.62)
NEUTS SEG NFR BLD AUTO: 64 % (ref 43–75)
PLATELET # BLD AUTO: 293 THOUSANDS/UL (ref 149–390)
PMV BLD AUTO: 9.9 FL (ref 8.9–12.7)
POTASSIUM SERPL-SCNC: 3.8 MMOL/L (ref 3.5–5.3)
PROT SERPL-MCNC: 5.8 G/DL (ref 6.4–8.2)
PROTHROMBIN TIME: 13 SECONDS (ref 12.1–14.4)
RBC # BLD AUTO: 2.61 MILLION/UL (ref 3.81–5.12)
SODIUM SERPL-SCNC: 139 MMOL/L (ref 136–145)
WBC # BLD AUTO: 4.87 THOUSAND/UL (ref 4.31–10.16)

## 2017-11-19 PROCEDURE — 80053 COMPREHEN METABOLIC PANEL: CPT | Performed by: PHYSICIAN ASSISTANT

## 2017-11-19 PROCEDURE — 85025 COMPLETE CBC W/AUTO DIFF WBC: CPT | Performed by: PHYSICIAN ASSISTANT

## 2017-11-19 PROCEDURE — 85610 PROTHROMBIN TIME: CPT | Performed by: PHYSICIAN ASSISTANT

## 2017-11-19 RX ADMIN — Medication 1 TABLET: at 08:05

## 2017-11-19 RX ADMIN — FERROUS SULFATE TAB 325 MG (65 MG ELEMENTAL FE) 325 MG: 325 (65 FE) TAB at 15:50

## 2017-11-19 RX ADMIN — OXYCODONE HYDROCHLORIDE AND ACETAMINOPHEN 250 MG: 500 TABLET ORAL at 08:04

## 2017-11-19 RX ADMIN — POLYETHYLENE GLYCOL 3350 17 G: 17 POWDER, FOR SOLUTION ORAL at 08:05

## 2017-11-19 RX ADMIN — PANTOPRAZOLE SODIUM 40 MG: 40 TABLET, DELAYED RELEASE ORAL at 06:09

## 2017-11-19 RX ADMIN — CEFAZOLIN SODIUM 1000 MG: 1 SOLUTION INTRAVENOUS at 14:43

## 2017-11-19 RX ADMIN — HEPARIN SODIUM 5000 UNITS: 5000 INJECTION, SOLUTION INTRAVENOUS; SUBCUTANEOUS at 06:09

## 2017-11-19 RX ADMIN — SUCRALFATE 1 G: 1 TABLET ORAL at 19:58

## 2017-11-19 RX ADMIN — HEPARIN SODIUM 5000 UNITS: 5000 INJECTION, SOLUTION INTRAVENOUS; SUBCUTANEOUS at 22:03

## 2017-11-19 RX ADMIN — DOCUSATE SODIUM 100 MG: 100 CAPSULE, LIQUID FILLED ORAL at 18:05

## 2017-11-19 RX ADMIN — METRONIDAZOLE 500 MG: 500 TABLET ORAL at 14:43

## 2017-11-19 RX ADMIN — DOCUSATE SODIUM 100 MG: 100 CAPSULE, LIQUID FILLED ORAL at 08:04

## 2017-11-19 RX ADMIN — GABAPENTIN 100 MG: 100 CAPSULE ORAL at 22:03

## 2017-11-19 RX ADMIN — CHOLECALCIFEROL TAB 25 MCG (1000 UNIT) 1000 UNITS: 25 TAB at 08:05

## 2017-11-19 RX ADMIN — HEPARIN SODIUM 5000 UNITS: 5000 INJECTION, SOLUTION INTRAVENOUS; SUBCUTANEOUS at 14:43

## 2017-11-19 RX ADMIN — METRONIDAZOLE 500 MG: 500 TABLET ORAL at 22:03

## 2017-11-19 RX ADMIN — TRAVOPROST 1 DROP: 0.04 SOLUTION/ DROPS OPHTHALMIC at 22:03

## 2017-11-19 RX ADMIN — SUCRALFATE 1 G: 1 TABLET ORAL at 08:04

## 2017-11-19 RX ADMIN — FERROUS SULFATE TAB 325 MG (65 MG ELEMENTAL FE) 325 MG: 325 (65 FE) TAB at 08:04

## 2017-11-19 RX ADMIN — ONDANSETRON 4 MG: 2 INJECTION INTRAMUSCULAR; INTRAVENOUS at 10:39

## 2017-11-19 RX ADMIN — CEFAZOLIN SODIUM 1000 MG: 1 SOLUTION INTRAVENOUS at 03:29

## 2017-11-19 RX ADMIN — SENNOSIDES 8.6 MG: 8.6 TABLET, FILM COATED ORAL at 08:04

## 2017-11-19 RX ADMIN — METRONIDAZOLE 500 MG: 500 TABLET ORAL at 06:10

## 2017-11-19 NOTE — ASSESSMENT & PLAN NOTE
· Elevated LFTs and elevated bilirubin possibly secondary to sepsis/ bacteremia  Possible biliary source of bacteremia  · US abdomen on 11/15/17: Distended gallbladder containing a large 3 x 2 cm gallstone in the gallbladder neck  No pericholecystic fluid or significant gallbladder wall thickening  · LFTs and bilirubin trending down  · GI consulted  MRI/ MRCP pending  · Cr stable at 1 15 today, appears to be at baseline     · Obtain repeat CMP in AM

## 2017-11-19 NOTE — ASSESSMENT & PLAN NOTE
· RALPH, POA, on CKD  · RALPH resolved after IV fluids  · Cr stable at 1 15, appears to be at baseline  · Continue to monitor

## 2017-11-19 NOTE — ASSESSMENT & PLAN NOTE
· Patient denies urinary symptoms  · Urine culture: proteus mirabilis  Likely colonization per ID  · No need for antibiotics for this per ID

## 2017-11-19 NOTE — ASSESSMENT & PLAN NOTE
· Iron deficiency anemia  · Hg stable at 7 8 today  · Obtain stool occult blood  · Continue iron supplement  · Repeat CBC in AM  Transfuse for Hg < 7 0

## 2017-11-19 NOTE — ASSESSMENT & PLAN NOTE
· Patient recently started on gabapentin, reportedly developed side effects with increased dose but patient does not recall side effects  · Dose decreased to gabapentin 100 mg daily from 100 mg TID yesterday

## 2017-11-19 NOTE — PROGRESS NOTES
Progress Note - Infectious Disease   Mirella Carter 80 y o  female MRN: [id number]  Unit/Bed#: -01 Encounter: [id number]      Impression/Recommendations:  1   Sepsis, POA: fever and tachycardia   Source of sepsis is unclear   Consider biliary vs colonic source   Patient's abdominal exam is benign  Kasandra Pool is clinically much improved on IV antibiotisc  Antibiotic plan as below  Monitor temperature/WBC  Monitor hemodynamics      2   Polymicrobic bacteremia  Mahesh Montero and E  coli   Doubt urinary source, given a different GNR in the urine culture and no urinary symptoms   Consider biliary source, given elevated LFTs and presence of cholelithiasis   Consider colonic lesion versus fecal impaction with translocation from gut as source of bacteremia  Continue cefazolin/Flagyl for now  Follow up repeat blood cultures  Follow temperatures and white blood cell count closely  Supportive care as per the primary service     3   Elevated LFTs, including bilirubin   Consider due to sepsis versus passed gallstone  No clinical or radiographic evidence of acute cholecystitis  LFTs trending down  MRCP per GI with close follow-up ongoing     4   Cholelithiasis     May need elective cholecystectomy if sepsis determined to be biliary source     5   History of colon cancer, status post colon resection     Consider colonoscopy if biliary workup negative for source of bacteremia      6   Fecal impaction   Bowel regimen per GI    7  RALPH, superimposed on CKD   This is most likely secondary to sepsis   Creatinine is improved  Follow creatinine closely and dose-adjust antibiotics as indicated     7   Asymptomatic bacteriuria   This is most likely colonization   No antibiotic needed for this      Antibiotics   Cefazolin #2  Flagyl #3  Antibiotics #5    Subjective:  Patient seen on AM rounds  Feels well  Denies any abdominal pain  Denies any history of constipation Denies fevers, chills, or sweats    Denies nausea, vomiting, or diarrhea  Denies moving her bowels but did get a tap water enema yesterday which yielded a moderate amount of stool  Objective:  Vitals:  HR:  [73-85] 73  Resp:  [18-20] 20  BP: (164-169)/(72-75) 169/73  SpO2:  [96 %] 96 %  Temp (24hrs), Av 7 °F (37 1 °C), Min:98 2 °F (36 8 °C), Max:99 1 °F (37 3 °C)  Current: Temperature: 99 1 °F (37 3 °C)    Physical Exam:   General:  No acute distress  Eyes:  Normal lids and conjunctivae  ENT:  Normal external ears and nose  Neck:  Neck symmetric with midline trachea  Pulmonary:  Normal respiratory effort without accessory muscle use  Cardiovascular:  Regular rate and rhythm; no peripheral edema  Gastrointestinal:  No tenderness or distention  Musculoskeletal:  No digital clubbing or cyanosis  Skin:  No visible rashes; No palpable nodules  Neurologic:  Sensation grossly intact to light touch  Psychiatric:  Alert and oriented; Normal mood    Lab Results:  I have personally reviewed pertinent labs      Results from last 7 days  Lab Units 170 17  0438   SODIUM mmol/L 139 142 141 142   POTASSIUM mmol/L 3 8 3 9 4 1 4 9   CHLORIDE mmol/L 107 109* 109* 108   CO2 mmol/L 26 24 23 27   ANION GAP mmol/L 6 9 9 7   BUN mg/dL 16 20 25 29*   CREATININE mg/dL 1 15 1 27 1 28 1 56*   EGFR ml/min/1 73sq m 39 35 35 27   GLUCOSE RANDOM mg/dL 93 93 108 86   CALCIUM mg/dL 8 8 8 9 8 8 8 8   AST U/L 79* 109*  --  206*   ALT U/L 170* 225*  --  343*   ALK PHOS U/L 326* 374*  --  469*   TOTAL PROTEIN g/dL 5 8* 6 0*  --  6 1*   ALBUMIN g/dL 2 3* 2 4*  --  2 5*   BILIRUBIN TOTAL mg/dL 2 00* 2 70*  --  2 00*       Results from last 7 days  Lab Units 17  0440   WBC Thousand/uL 4 87 4 87 7 13   HEMOGLOBIN g/dL 7 8* 7 8* 8 1*   PLATELETS Thousands/uL 293 272 261       Results from last 7 days  Lab Units 11/15/17  1532 11/15/17  1524 11/15/17  1508   BLOOD CULTURE   --   --    Escherichia coli*  Klebsiella pneumoniae* Klebsiella pneumoniae*   GRAM STAIN RESULT   --   --  Gram negative rods  Gram negative rods   URINE CULTURE  >100,000 cfu/ml Proteus mirabilis*  --   --    INFLUENZA A PCR   --  None Detected  --    INFLUENZA B PCR   --  None Detected  --    RSV PCR   --  None Detected  --        Imaging Studies:   I have personally reviewed pertinent imaging study reports and images in PACS  EKG, Pathology, and Other Studies:   I have personally reviewed pertinent reports

## 2017-11-19 NOTE — PROGRESS NOTES
Progress Note - Dl Newell 80 y o  female MRN: [id number]    Unit/Bed#: -01 Encounter: [id number]        * Sepsis [facility])   Assessment & Plan    Sepsis, POA, as evidenced by fever, tachycardia, uncertain source, possible biliary or colonic source  · Patient remains afebrile  No leukocytosis  · ID consulted given results of blood cultures  · Blood cultures: 1/2 positive for klebsiella pneumoniae and 2/2 positive for klebsiella pneumonia and E coli  · Urine culture (preliminary): > 100, 000 cfu/ml proteus mirabilis  Likely asymptomatic bacteruria per ID  · Continue IV cefazolin and IV Flagyl per ID's recommendation  · Follow-up on results of repeat blood cultures obtained this AM   · GI following  MRI with MRCP pending  If no biliary source of infection, consider flexible sigmoidoscopy per GI  Asymptomatic bacteriuria   Assessment & Plan    · Patient denies urinary symptoms  · Urine culture: proteus mirabilis  Likely colonization per ID  · No need for antibiotics for this per ID  Polymicrobial bacterial infection   Assessment & Plan    · Bacteremia, uncertain source, possible biliary source vs colonic source  · Blood culture 1/2 positive for Klebsiella pneumoniae and 2/2 positive for Klebsiella pneumoniae and E  coli  · ID consulted, appreciate input  Continue IV cefazolin and IV Flagyl  · CT chest, abdomen and pelvis on admission showed no acute thoracic or abdominopelvic findings  Cholelithiasis without cholecystitis  · US abdomen on admission: Distended gallbladder containing a large 3 x 2 cm gallstone in the gallbladder neck  No pericholecystic fluid or significant gallbladder wall thickening  · GI consulted  · MRI with MRCP pending  If positive for biliary source, may need surgery consult for consideration of cholecystectomy  · If MRI is negative for biliary source, consider flexible sigmoidoscopy for possible colonic source  · Follow-up on repeat blood cultures  Encephalopathy   Assessment & Plan    · Encephalopathy likely toxic metabolic, due to sepsis/ bacteremia: Improved  · CT head on admission: no acute findings  · Continue to monitor  · Patient alert and oriented x 3 today  Neuropathy   Assessment & Plan    · Patient recently started on gabapentin, reportedly developed side effects with increased dose but patient does not recall side effects  · Dose decreased to gabapentin 100 mg daily from 100 mg TID yesterday  Anemia   Assessment & Plan    · Iron deficiency anemia  · Hg stable at 7 8 today  · Obtain stool occult blood  · Continue iron supplement  · Repeat CBC in AM  Transfuse for Hg < 7 0  Transaminitis   Assessment & Plan    · Elevated LFTs and elevated bilirubin possibly secondary to sepsis/ bacteremia  Possible biliary source of bacteremia  · US abdomen on 11/15/17: Distended gallbladder containing a large 3 x 2 cm gallstone in the gallbladder neck  No pericholecystic fluid or significant gallbladder wall thickening  · LFTs and bilirubin trending down  · GI consulted  MRI/ MRCP pending  · Cr stable at 1 15 today, appears to be at baseline  · Obtain repeat CMP in AM          Fecal impaction ([facility] [address] )   Assessment & Plan    · S/p enema and bowel movement yesterday  · Continue bowel regimen with Colace and Miralax  History of colon cancer   Assessment & Plan    · Patient with history of colon CA at the age of 80 and underwent resection  She did not have chemotherapy or radiation therapy  · If biliary source of bacteremia is ruled out, consider colonic source and consider flexible sigmoidoscopy  Stage 3 chronic kidney disease   Assessment & Plan    · RALPH, POA, on CKD  · RALPH resolved after IV fluids  · Cr stable at 1 15, appears to be at baseline  · Continue to monitor  Chronic combined systolic and diastolic heart failure ([address] )   Assessment & Plan    · Patient will mild bilateral pedal edema  Continue to monitor  · Monitor daily weights and intake/output  Weakness   Assessment & Plan    · Likely multifactorial given infection and age  · Obtain PT evaluation  VTE Pharmacologic Prophylaxis:   Pharmacologic: Heparin  Mechanical VTE Prophylaxis in Place: Yes    Patient Centered Rounds: I have performed bedside rounds with nursing staff today  Discussions with Specialists or Other Care Team Provider: Nursing    Education and Discussions with Family / Patient: Patient and patient's daughter, Alek Bailey, via phone  Time Spent for Care: 30 minutes  More than 50% of total time spent on counseling and coordination of care as described above  Current Length of Stay: 4 day(s)    Current Patient Status: Inpatient   Certification Statement: The patient will continue to require additional inpatient hospital stay due to awaiting MRI with MRCP, need for IV antibiotics for bacteremia  Discharge Plan: When medically stable  Code Status: Level 1 - Full Code      Subjective:   Patient just transitioned from the bed to the chair and admits to some SOB as well as nausea at this time  Also admits to a poor appetite  She slept well  She denies chest pain, abdominal pain or lightheadedness/ dizziness  Patient received an enema and had a bowel movement yesterday  Patient had a nose bleed this morning which resolved spontaneously  She notes that she gets nose bleeds a couple times per year due to dryness  Objective:     Vitals:   Temp (24hrs), Av 7 °F (37 1 °C), Min:98 2 °F (36 8 °C), Max:99 1 °F (37 3 °C)    HR:  [73-85] 73  Resp:  [18-20] 20  BP: (164-169)/(72-75) 169/73  SpO2:  [96 %] 96 %  Body mass index is 24 98 kg/m²  Input and Output Summary (last 24 hours):        Intake/Output Summary (Last 24 hours) at 17 1113  Last data filed at 17 0140   Gross per 24 hour   Intake              240 ml   Output             2500 ml   Net            -2260 ml       Physical Exam: Physical Exam   Constitutional: She is oriented to person, place, and time  No distress  HENT:   Head: Normocephalic and atraumatic  Eyes: Conjunctivae are normal    Neck: Neck supple  Cardiovascular: Normal rate and regular rhythm  Pulmonary/Chest: Effort normal and breath sounds normal  No respiratory distress  Abdominal: Soft  Bowel sounds are normal  There is no tenderness  Musculoskeletal: She exhibits edema (1+ b/l pedal edema)  Neurological: She is alert and oriented to person, place, and time  Skin: Skin is warm and dry  She is not diaphoretic  Psychiatric: Her behavior is normal  She exhibits a depressed mood  Nursing note and vitals reviewed  Additional Data:     Labs:      Results from last 7 days  Lab Units 11/19/17  0443   WBC Thousand/uL 4 87   HEMOGLOBIN g/dL 7 8*   HEMATOCRIT % 24 6*   PLATELETS Thousands/uL 293   NEUTROS PCT % 64   LYMPHS PCT % 21   MONOS PCT % 11   EOS PCT % 3       Results from last 7 days  Lab Units 11/19/17  0443   SODIUM mmol/L 139   POTASSIUM mmol/L 3 8   CHLORIDE mmol/L 107   CO2 mmol/L 26   BUN mg/dL 16   CREATININE mg/dL 1 15   CALCIUM mg/dL 8 8   TOTAL PROTEIN g/dL 5 8*   BILIRUBIN TOTAL mg/dL 2 00*   ALK PHOS U/L 326*   ALT U/L 170*   AST U/L 79*   GLUCOSE RANDOM mg/dL 93       Results from last 7 days  Lab Units 11/19/17  0443   INR  0 95       * I Have Reviewed All Lab Data Listed Above  * Additional Pertinent Lab Tests Reviewed:  All Labs Within Last 24 Hours Reviewed    Imaging:    Imaging Reports Reviewed Today Include: None  Imaging Personally Reviewed by Myself Includes:  None    Recent Cultures (last 7 days):       Results from last 7 days  Lab Units 11/15/17  1532 11/15/17  1524 11/15/17  1508   BLOOD CULTURE   --   --    Escherichia coli*  Klebsiella pneumoniae*  Klebsiella pneumoniae*   GRAM STAIN RESULT   --   --  Gram negative rods  Gram negative rods   URINE CULTURE  >100,000 cfu/ml Proteus mirabilis*  --   --    INFLUENZA A PCR   --  None Detected  --    INFLUENZA B PCR   --  None Detected  --    RSV PCR   --  None Detected  --        Last 24 Hours Medication List:     ascorbic acid 250 mg Oral Daily   cefazolin 1,000 mg Intravenous Q12H   cholecalciferol 1,000 Units Oral Daily   cycloSPORINE 1 drop Both Eyes BID   docusate sodium 100 mg Oral BID   ferrous sulfate 325 mg Oral BID With Meals   gabapentin 100 mg Oral HS   heparin (porcine) 5,000 Units Subcutaneous Q8H [address]   metroNIDAZOLE 500 mg Oral Q8H [address]   multivitamin-minerals 1 tablet Oral Daily   pantoprazole 40 mg Oral Daily Before Breakfast   polyethylene glycol 17 g Oral Daily   senna 1 tablet Oral Daily   sucralfate 1 g Oral Q12H   travoprost 1 drop Both Eyes HS        Today, Patient Was Seen By: Staci Arrington PA-C    ** Please Note: Dictation voice to text software may have been used in the creation of this document   **

## 2017-11-19 NOTE — ASSESSMENT & PLAN NOTE
· Patient will mild bilateral pedal edema  Continue to monitor  · Monitor daily weights and intake/output

## 2017-11-19 NOTE — PROGRESS NOTES
[facility]  Progress Note - Kaela Vergarar 80 y o  female MRN: [id number]    Unit/Bed#: -01 Encounter: [id number]    Assessment/Plan:    Pleasant 80-year-old female presented with polymicrobial sepsis of unclear etiology  Found to have elevated LFTs, has history of colon cancer and noted that fecal impaction  1   Fecal impaction:  Appears to be much better today clinically no pain on examination, status post enema yesterday  -recommend bowel regimen with MiraLax    2  Elevated LFTs colon appeared to be improving at this time differential includes biliary process versus sepsis related  -MRI/MRCP is pending    3  Polymicrobial sepsis:  Etiology possibly biliary or colonic source  -awaiting MRI  -if MRI is negative consider flexible sigmoidoscopy          Subjective:   Patient had a very large bowel movement yesterday after enema  She denies any complaints  Objective:     Vitals: Blood pressure 169/73, pulse 73, temperature 99 1 °F (37 3 °C), temperature source Oral, resp  rate 20, weight 70 2 kg (154 lb 12 2 oz), SpO2 96 %  ,Body mass index is 24 98 kg/m²  Intake/Output Summary (Last 24 hours) at 11/19/17 0935  Last data filed at 11/19/17 0140   Gross per 24 hour   Intake              240 ml   Output             2500 ml   Net            -2260 ml       Physical Exam:    GEN: wn/wd, NAD  HEENT: MMM, no cervical or supraclavicular LAD, anciteric  CV: RRR, no m/r/g  CHEST: CTA b/l, no w/r/r  ABD: +BS, soft, NT/ND, no hepatosplenomegaly  EXT: no c/c/e  SKIN: no rashes  NEURO: aaox2      Invasive Devices     Peripheral Intravenous Line            Peripheral IV 11/19/17 Right Forearm less than 1 day                        Lab, Imaging and other studies:     Admission on 11/15/2017   No results displayed because visit has over 200 results  I have personally reviewed pertinent reports        Current Facility-Administered Medications   Medication Dose Route Frequency    acetaminophen (TYLENOL) tablet 650 mg  650 mg Oral Q6H PRN    aluminum-magnesium hydroxide-simethicone (MYLANTA) 200-200-20 mg/5 mL oral suspension 30 mL  30 mL Oral Q4H PRN    ascorbic acid (VITAMIN C) tablet 250 mg  250 mg Oral Daily    calcium carbonate (TUMS) chewable tablet 1,000 mg  1,000 mg Oral Daily PRN    ceFAZolin (ANCEF) IVPB (premix) 1,000 mg  1,000 mg Intravenous Q12H    cholecalciferol (VITAMIN D3) tablet 1,000 Units  1,000 Units Oral Daily    cycloSPORINE (RESTASIS) 0 05 % ophthalmic emulsion 1 drop  1 drop Both Eyes BID    docusate sodium (COLACE) capsule 100 mg  100 mg Oral BID    ferrous sulfate tablet 325 mg  325 mg Oral BID With Meals    gabapentin (NEURONTIN) capsule 100 mg  100 mg Oral HS    heparin (porcine) subcutaneous injection 5,000 Units  5,000 Units Subcutaneous Q8H [address]    metroNIDAZOLE (FLAGYL) tablet 500 mg  500 mg Oral Q8H [address]    multivitamin-minerals (CENTRUM) tablet 1 tablet  1 tablet Oral Daily    ondansetron (ZOFRAN) injection 4 mg  4 mg Intravenous Q6H PRN    pantoprazole (PROTONIX) EC tablet 40 mg  40 mg Oral Daily Before Breakfast    polyethylene glycol (MIRALAX) packet 17 g  17 g Oral Daily    senna (SENOKOT) tablet 8 6 mg  1 tablet Oral Daily    sucralfate (CARAFATE) tablet 1 g  1 g Oral Q12H    travoprost (TRAVATAN-Z) 0 004 % ophthalmic solution 1 drop  1 drop Both Eyes HS

## 2017-11-19 NOTE — ASSESSMENT & PLAN NOTE
· Patient with history of colon CA at the age of 80 and underwent resection  She did not have chemotherapy or radiation therapy  · If biliary source of bacteremia is ruled out, consider colonic source and consider flexible sigmoidoscopy

## 2017-11-19 NOTE — ASSESSMENT & PLAN NOTE
· Bacteremia, uncertain source, possible biliary source vs colonic source  · Blood culture 1/2 positive for Klebsiella pneumoniae and 2/2 positive for Klebsiella pneumoniae and E  coli  · ID consulted, appreciate input  Continue IV cefazolin and IV Flagyl  · CT chest, abdomen and pelvis on admission showed no acute thoracic or abdominopelvic findings  Cholelithiasis without cholecystitis  · US abdomen on admission: Distended gallbladder containing a large 3 x 2 cm gallstone in the gallbladder neck  No pericholecystic fluid or significant gallbladder wall thickening  · GI consulted  · MRI with MRCP pending  If positive for biliary source, may need surgery consult for consideration of cholecystectomy  · If MRI is negative for biliary source, consider flexible sigmoidoscopy for possible colonic source  · Follow-up on repeat blood cultures

## 2017-11-19 NOTE — ASSESSMENT & PLAN NOTE
Sepsis, POA, as evidenced by fever, tachycardia, uncertain source, possible biliary or colonic source  · Patient remains afebrile  No leukocytosis  · ID consulted given results of blood cultures  · Blood cultures: 1/2 positive for klebsiella pneumoniae and 2/2 positive for klebsiella pneumonia and E coli  · Urine culture (preliminary): > 100, 000 cfu/ml proteus mirabilis  Likely asymptomatic bacteruria per ID  · Continue IV cefazolin and IV Flagyl per ID's recommendation  · Follow-up on results of repeat blood cultures obtained this AM   · GI following  MRI with MRCP pending  If no biliary source of infection, consider flexible sigmoidoscopy per GI

## 2017-11-19 NOTE — ASSESSMENT & PLAN NOTE
· Encephalopathy likely toxic metabolic, due to sepsis/ bacteremia: Improved  · CT head on admission: no acute findings  · Continue to monitor  · Patient alert and oriented x 3 today

## 2017-11-20 ENCOUNTER — APPOINTMENT (INPATIENT)
Dept: MRI IMAGING | Facility: HOSPITAL | Age: 82
DRG: 871 | End: 2017-11-20
Payer: MEDICARE

## 2017-11-20 LAB
ALBUMIN SERPL BCP-MCNC: 2.3 G/DL (ref 3.5–5)
ALP SERPL-CCNC: 304 U/L (ref 46–116)
ALT SERPL W P-5'-P-CCNC: 108 U/L (ref 12–78)
ANION GAP SERPL CALCULATED.3IONS-SCNC: 7 MMOL/L (ref 4–13)
AST SERPL W P-5'-P-CCNC: 46 U/L (ref 5–45)
BASOPHILS # BLD MANUAL: 0 THOUSAND/UL (ref 0–0.1)
BASOPHILS NFR MAR MANUAL: 0 % (ref 0–1)
BILIRUB SERPL-MCNC: 1.3 MG/DL (ref 0.2–1)
BUN SERPL-MCNC: 18 MG/DL (ref 5–25)
CALCIUM SERPL-MCNC: 8.7 MG/DL (ref 8.3–10.1)
CHLORIDE SERPL-SCNC: 107 MMOL/L (ref 100–108)
CO2 SERPL-SCNC: 26 MMOL/L (ref 21–32)
CREAT SERPL-MCNC: 1.21 MG/DL (ref 0.6–1.3)
EOSINOPHIL # BLD MANUAL: 0.13 THOUSAND/UL (ref 0–0.4)
EOSINOPHIL NFR BLD MANUAL: 2 % (ref 0–6)
ERYTHROCYTE [DISTWIDTH] IN BLOOD BY AUTOMATED COUNT: 14.7 % (ref 11.6–15.1)
GFR SERPL CREATININE-BSD FRML MDRD: 37 ML/MIN/1.73SQ M
GLUCOSE SERPL-MCNC: 94 MG/DL (ref 65–140)
HCT VFR BLD AUTO: 26.1 % (ref 34.8–46.1)
HGB BLD-MCNC: 8.3 G/DL (ref 11.5–15.4)
LYMPHOCYTES # BLD AUTO: 1.56 THOUSAND/UL (ref 0.6–4.47)
LYMPHOCYTES # BLD AUTO: 25 % (ref 14–44)
MCH RBC QN AUTO: 30 PG (ref 26.8–34.3)
MCHC RBC AUTO-ENTMCNC: 31.8 G/DL (ref 31.4–37.4)
MCV RBC AUTO: 94 FL (ref 82–98)
MONOCYTES # BLD AUTO: 0.56 THOUSAND/UL (ref 0–1.22)
MONOCYTES NFR BLD: 9 % (ref 4–12)
NEUTROPHILS # BLD MANUAL: 3.81 THOUSAND/UL (ref 1.85–7.62)
NEUTS BAND NFR BLD MANUAL: 1 % (ref 0–8)
NEUTS SEG NFR BLD AUTO: 60 % (ref 43–75)
NRBC BLD AUTO-RTO: 3 /100 WBC (ref 0–2)
PLATELET # BLD AUTO: 326 THOUSANDS/UL (ref 149–390)
PLATELET BLD QL SMEAR: ADEQUATE
PMV BLD AUTO: 10.2 FL (ref 8.9–12.7)
POTASSIUM SERPL-SCNC: 3.8 MMOL/L (ref 3.5–5.3)
PROT SERPL-MCNC: 5.9 G/DL (ref 6.4–8.2)
RBC # BLD AUTO: 2.77 MILLION/UL (ref 3.81–5.12)
SODIUM SERPL-SCNC: 140 MMOL/L (ref 136–145)
TOTAL CELLS COUNTED SPEC: 100
VARIANT LYMPHS # BLD AUTO: 3 %
WBC # BLD AUTO: 6.25 THOUSAND/UL (ref 4.31–10.16)

## 2017-11-20 PROCEDURE — 74181 MRI ABDOMEN W/O CONTRAST: CPT

## 2017-11-20 PROCEDURE — 85027 COMPLETE CBC AUTOMATED: CPT | Performed by: PHYSICIAN ASSISTANT

## 2017-11-20 PROCEDURE — 80053 COMPREHEN METABOLIC PANEL: CPT | Performed by: PHYSICIAN ASSISTANT

## 2017-11-20 PROCEDURE — 85007 BL SMEAR W/DIFF WBC COUNT: CPT | Performed by: PHYSICIAN ASSISTANT

## 2017-11-20 RX ADMIN — METRONIDAZOLE 500 MG: 500 TABLET ORAL at 13:07

## 2017-11-20 RX ADMIN — CHOLECALCIFEROL TAB 25 MCG (1000 UNIT) 1000 UNITS: 25 TAB at 08:59

## 2017-11-20 RX ADMIN — SENNOSIDES 8.6 MG: 8.6 TABLET, FILM COATED ORAL at 09:00

## 2017-11-20 RX ADMIN — CEFAZOLIN SODIUM 1000 MG: 1 SOLUTION INTRAVENOUS at 14:41

## 2017-11-20 RX ADMIN — HEPARIN SODIUM 5000 UNITS: 5000 INJECTION, SOLUTION INTRAVENOUS; SUBCUTANEOUS at 06:09

## 2017-11-20 RX ADMIN — POLYETHYLENE GLYCOL 3350 17 G: 17 POWDER, FOR SOLUTION ORAL at 08:58

## 2017-11-20 RX ADMIN — OXYCODONE HYDROCHLORIDE AND ACETAMINOPHEN 250 MG: 500 TABLET ORAL at 09:00

## 2017-11-20 RX ADMIN — PANTOPRAZOLE SODIUM 40 MG: 40 TABLET, DELAYED RELEASE ORAL at 06:09

## 2017-11-20 RX ADMIN — DOCUSATE SODIUM 100 MG: 100 CAPSULE, LIQUID FILLED ORAL at 17:30

## 2017-11-20 RX ADMIN — HEPARIN SODIUM 5000 UNITS: 5000 INJECTION, SOLUTION INTRAVENOUS; SUBCUTANEOUS at 21:50

## 2017-11-20 RX ADMIN — CEFAZOLIN SODIUM 1000 MG: 1 SOLUTION INTRAVENOUS at 04:18

## 2017-11-20 RX ADMIN — FERROUS SULFATE TAB 325 MG (65 MG ELEMENTAL FE) 325 MG: 325 (65 FE) TAB at 17:30

## 2017-11-20 RX ADMIN — Medication 1 TABLET: at 08:59

## 2017-11-20 RX ADMIN — SUCRALFATE 1 G: 1 TABLET ORAL at 08:59

## 2017-11-20 RX ADMIN — SUCRALFATE 1 G: 1 TABLET ORAL at 20:16

## 2017-11-20 RX ADMIN — TRAVOPROST 1 DROP: 0.04 SOLUTION/ DROPS OPHTHALMIC at 21:49

## 2017-11-20 RX ADMIN — DOCUSATE SODIUM 100 MG: 100 CAPSULE, LIQUID FILLED ORAL at 08:59

## 2017-11-20 RX ADMIN — METRONIDAZOLE 500 MG: 500 TABLET ORAL at 06:09

## 2017-11-20 RX ADMIN — GABAPENTIN 100 MG: 100 CAPSULE ORAL at 21:50

## 2017-11-20 RX ADMIN — FERROUS SULFATE TAB 325 MG (65 MG ELEMENTAL FE) 325 MG: 325 (65 FE) TAB at 08:59

## 2017-11-20 RX ADMIN — METRONIDAZOLE 500 MG: 500 TABLET ORAL at 21:50

## 2017-11-20 RX ADMIN — HEPARIN SODIUM 5000 UNITS: 5000 INJECTION, SOLUTION INTRAVENOUS; SUBCUTANEOUS at 13:07

## 2017-11-20 NOTE — ASSESSMENT & PLAN NOTE
· Bacteremia, uncertain source, possible biliary source vs colonic source  · Blood culture 1/2 positive for Klebsiella pneumoniae and 2/2 positive for Klebsiella pneumoniae and E  coli  · ID consulted, appreciate input  Continue IV cefazolin and IV Flagyl  · CT chest, abdomen and pelvis on admission showed no acute thoracic or abdominopelvic findings  Cholelithiasis without cholecystitis  · US abdomen on admission: Distended gallbladder containing a large 3 x 2 cm gallstone in the gallbladder neck  No pericholecystic fluid or significant gallbladder wall thickening  · GI consulted  · MRI with MRCP pending  If positive for biliary source, may need surgery consult for consideration of cholecystectomy  · If MRI is negative for biliary source, consider flexible sigmoidoscopy for possible colonic source  · Repeat blood cultures x 2: no growth at 24 hours

## 2017-11-20 NOTE — ASSESSMENT & PLAN NOTE
· Encephalopathy likely toxic metabolic, due to sepsis/ bacteremia: Improved  · CT head on admission: no acute findings  · Continue to monitor  · Patient alert and oriented x 3 today   Remains confused about why she came to the hospital

## 2017-11-20 NOTE — PROGRESS NOTES
Progress Note - Hola Piña 80 y o  female MRN: [id number]    Unit/Bed#: -01 Encounter: [id number]        * Sepsis [facility])   Assessment & Plan    Sepsis, POA, as evidenced by fever, tachycardia, uncertain source, possible biliary or colonic source  · Patient remains afebrile  No leukocytosis  · ID following  Continue IV cefazolin and IV Flagyl  · Blood cultures: 1/2 positive for klebsiella pneumoniae and 2/2 positive for klebsiella pneumonia and E coli  · Urine culture (preliminary): > 100, 000 cfu/ml proteus mirabilis  Likely asymptomatic bacteruria per ID  · Repeat blood cultures x 2: no growth at 24 hours  · GI following  MRI with MRCP pending  If no biliary source of infection, consider flexible sigmoidoscopy per GI  Asymptomatic bacteriuria   Assessment & Plan    · Patient denies urinary symptoms  · Urine culture: proteus mirabilis  Likely colonization per ID  · No need for antibiotics for this per ID  Bacteremia   Assessment & Plan    · Bacteremia, uncertain source, possible biliary source vs colonic source  · Blood culture 1/2 positive for Klebsiella pneumoniae and 2/2 positive for Klebsiella pneumoniae and E  coli  · ID consulted, appreciate input  Continue IV cefazolin and IV Flagyl  · CT chest, abdomen and pelvis on admission showed no acute thoracic or abdominopelvic findings  Cholelithiasis without cholecystitis  · US abdomen on admission: Distended gallbladder containing a large 3 x 2 cm gallstone in the gallbladder neck  No pericholecystic fluid or significant gallbladder wall thickening  · GI consulted  · MRI with MRCP pending  If positive for biliary source, may need surgery consult for consideration of cholecystectomy  · If MRI is negative for biliary source, consider flexible sigmoidoscopy for possible colonic source  · Repeat blood cultures x 2: no growth at 24 hours          Encephalopathy   Assessment & Plan    · Encephalopathy likely toxic metabolic, due to sepsis/ bacteremia: Improved  · CT head on admission: no acute findings  · Continue to monitor  · Patient alert and oriented x 3 today  Remains confused about why she came to the hospital          Neuropathy   Assessment & Plan    · Patient recently started on gabapentin, reportedly developed side effects with increased dose but patient does not recall side effects  · Dose decreased to gabapentin 100 mg daily from 100 mg TID  · Patient reports no improvement in her neuropathy since starting the gabapentin and notes that nothing helps her neuropathy  · Consider discontinuing gabapentin prior to d/c  Anemia   Assessment & Plan    · Iron deficiency anemia  · Hg stable at 8 3 today  · Continue iron supplement  · Repeat CBC in AM         Transaminitis   Assessment & Plan    · Elevated LFTs and elevated bilirubin possibly secondary to sepsis/ bacteremia  Possible biliary source of bacteremia  · US abdomen on 11/15/17: Distended gallbladder containing a large 3 x 2 cm gallstone in the gallbladder neck  No pericholecystic fluid or significant gallbladder wall thickening  · LFTs and bilirubin trending down  · GI consulted  MRI/ MRCP pending  · Cr stable at 1 21 today, appears to be at baseline  · Patient reportedly had surgery for hearing loss 55 years ago by ENT at which time she reports that a vein was taken from her hand and placed in her ear; denies having any metal in her ear  Daughter, Brenden Echevarria, confirmed this and recent CT head showed no evidence of metal   · Discussed with JAYANT, ok for MRI with MRCP  · Obtain repeat CMP in AM          Fecal impaction ([address] )   Assessment & Plan    · S/p enema  · Continue bowel regimen with Colace and Miralax  History of colon cancer   Assessment & Plan    · Patient with history of colon CA at the age of 80 and underwent resection  She did not have chemotherapy or radiation therapy    · If biliary source of bacteremia is ruled out, consider colonic source and consider flexible sigmoidoscopy  Stage 3 chronic kidney disease   Assessment & Plan    · RALPH, POA, on CKD  · RALPH resolved after IV fluids  · Cr stable at 1 21, at baseline  · Continue to monitor  Chronic combined systolic and diastolic heart failure ([address] )   Assessment & Plan    · Patient will mild bilateral pedal edema  Continue to monitor, denies SOB today  · Last echo 10/30/17: EF 40 %, grade 2 diastolic dysfunction, mild pulmonary hypertension  · Monitor daily weights and intake/output  Weakness   Assessment & Plan    · Likely multifactorial given infection and age  · PT eval pending  VTE Pharmacologic Prophylaxis:   Pharmacologic: Heparin  Mechanical VTE Prophylaxis in Place: Yes    Patient Centered Rounds: I have performed bedside rounds with nursing staff today  Discussions with Specialists or Other Care Team Provider: Nursing    Education and Discussions with Family / Patient: Patient and pt's daughter, Praveen Mccord  Time Spent for Care: 20 minutes  More than 50% of total time spent on counseling and coordination of care as described above  Current Length of Stay: 5 day(s)    Current Patient Status: Inpatient   Certification Statement: The patient will continue to require additional inpatient hospital stay due to IV antibiotics for bacteremia, further work-up needed to determine source of bacteremia    Discharge Plan: When medically stable  Code Status: Level 1 - Full Code      Subjective:   Patient sitting up in chair  No complaints at this time, slept well, ate breakfast without difficulty  She denies chest pain, SOB, abdominal pain or nausea/ vomiting  Objective:     Vitals:   Temp (24hrs), Av 8 °F (37 1 °C), Min:98 3 °F (36 8 °C), Max:99 4 °F (37 4 °C)    HR:  [76-85] 85  Resp:  [20] 20  BP: (137-167)/(63-71) 146/71  SpO2:  [94 %-97 %] 96 %  Body mass index is 24 98 kg/m²  Input and Output Summary (last 24 hours):        Intake/Output Summary (Last 24 hours) at 11/20/17 1106  Last data filed at 11/20/17 0900   Gross per 24 hour   Intake             1140 ml   Output             1650 ml   Net             -510 ml       Physical Exam:     Physical Exam   Constitutional: She is oriented to person, place, and time  No distress  HENT:   Head: Normocephalic and atraumatic  Eyes: Conjunctivae are normal    Neck: Neck supple  Cardiovascular: Normal rate and regular rhythm  Pulmonary/Chest: Effort normal and breath sounds normal  No respiratory distress  Abdominal: Soft  Bowel sounds are normal  There is no tenderness  Musculoskeletal: Normal range of motion  She exhibits edema (b/l pedal edema)  Neurological: She is alert and oriented to person, place, and time  Skin: Skin is warm and dry  She is not diaphoretic  No erythema  Psychiatric: She has a normal mood and affect  Nursing note and vitals reviewed  Additional Data:     Labs:      Results from last 7 days  Lab Units 11/20/17  0543 11/19/17  0443   WBC Thousand/uL 6 25 4 87   HEMOGLOBIN g/dL 8 3* 7 8*   HEMATOCRIT % 26 1* 24 6*   PLATELETS Thousands/uL 326 293   NEUTROS PCT %  --  64   LYMPHS PCT %  --  21   LYMPHO PCT % 25  --    MONOS PCT %  --  11   MONO PCT MAN % 9  --    EOS PCT %  --  3   EOSINO PCT MANUAL % 2  --        Results from last 7 days  Lab Units 11/20/17  0543   SODIUM mmol/L 140   POTASSIUM mmol/L 3 8   CHLORIDE mmol/L 107   CO2 mmol/L 26   BUN mg/dL 18   CREATININE mg/dL 1 21   CALCIUM mg/dL 8 7   TOTAL PROTEIN g/dL 5 9*   BILIRUBIN TOTAL mg/dL 1 30*   ALK PHOS U/L 304*   ALT U/L 108*   AST U/L 46*   GLUCOSE RANDOM mg/dL 94       Results from last 7 days  Lab Units 11/19/17  0443   INR  0 95       * I Have Reviewed All Lab Data Listed Above  * Additional Pertinent Lab Tests Reviewed:  All Labs Within Last 24 Hours Reviewed    Imaging:    Imaging Reports Reviewed Today Include: None  Imaging Personally Reviewed by Myself Includes:  None    Recent Cultures (last 7 days):       Results from last 7 days  Lab Units 11/18/17  0511 11/15/17  1532 11/15/17  1524 11/15/17  1508   BLOOD CULTURE  No Growth at 24 hrs  No Growth at 24 hrs   --   --    Escherichia coli*  Klebsiella pneumoniae*  Klebsiella pneumoniae*   GRAM STAIN RESULT   --   --   --  Gram negative rods  Gram negative rods   URINE CULTURE   --  >100,000 cfu/ml Proteus mirabilis*  --   --    INFLUENZA A PCR   --   --  None Detected  --    INFLUENZA B PCR   --   --  None Detected  --    RSV PCR   --   --  None Detected  --        Last 24 Hours Medication List:     ascorbic acid 250 mg Oral Daily   cefazolin 1,000 mg Intravenous Q12H   cholecalciferol 1,000 Units Oral Daily   cycloSPORINE 1 drop Both Eyes BID   docusate sodium 100 mg Oral BID   ferrous sulfate 325 mg Oral BID With Meals   gabapentin 100 mg Oral HS   heparin (porcine) 5,000 Units Subcutaneous Q8H [address]   metroNIDAZOLE 500 mg Oral Q8H [address]   multivitamin-minerals 1 tablet Oral Daily   pantoprazole 40 mg Oral Daily Before Breakfast   polyethylene glycol 17 g Oral Daily   senna 1 tablet Oral Daily   sucralfate 1 g Oral Q12H   travoprost 1 drop Both Eyes HS        Today, Patient Was Seen By: Olena Estrada PA-C    ** Please Note: Dictation voice to text software may have been used in the creation of this document   **

## 2017-11-20 NOTE — ASSESSMENT & PLAN NOTE
Sepsis, POA, as evidenced by fever, tachycardia, uncertain source, possible biliary or colonic source  · Patient remains afebrile  No leukocytosis  · ID following  Continue IV cefazolin and IV Flagyl  · Blood cultures: 1/2 positive for klebsiella pneumoniae and 2/2 positive for klebsiella pneumonia and E coli  · Urine culture (preliminary): > 100, 000 cfu/ml proteus mirabilis  Likely asymptomatic bacteruria per ID  · Repeat blood cultures x 2: no growth at 24 hours  · GI following  MRI with MRCP pending  If no biliary source of infection, consider flexible sigmoidoscopy per GI

## 2017-11-20 NOTE — ASSESSMENT & PLAN NOTE
· Patient will mild bilateral pedal edema  Continue to monitor, denies SOB today  · Last echo 10/30/17: EF 40 %, grade 2 diastolic dysfunction, mild pulmonary hypertension  · Monitor daily weights and intake/output

## 2017-11-20 NOTE — ASSESSMENT & PLAN NOTE
· Iron deficiency anemia  · Hg stable at 8 3 today  · Continue iron supplement    · Repeat CBC in AM

## 2017-11-20 NOTE — ASSESSMENT & PLAN NOTE
· Patient recently started on gabapentin, reportedly developed side effects with increased dose but patient does not recall side effects  · Dose decreased to gabapentin 100 mg daily from 100 mg TID  · Patient reports no improvement in her neuropathy since starting the gabapentin and notes that nothing helps her neuropathy  · Consider discontinuing gabapentin prior to d/c

## 2017-11-20 NOTE — PROGRESS NOTES
Nurse received a phone call from patient's daughter clarifying ear implant that patient had placed in  Per daughter it was done about fifty years ago and it is a piece of vein from her hand  Pt confirmed saying that "the doctor took a piece of skin from my hand"    Nurse passed the message to [facility] from [facility] and MRI team

## 2017-11-20 NOTE — PROGRESS NOTES
Progress Note - Kurt Fernando 80 y o  female MRN: [id number]    Unit/Bed#: -01 Encounter: [id number]        Subjective:   Patient reports feeling well, she denies any abdominal discomfort or pain  Denies any subjective fever or chills  Reports to be tolerating diet  Objective:     Vitals: Blood pressure 146/71, pulse 85, temperature 98 6 °F (37 °C), temperature source Oral, resp  rate 20, weight 70 2 kg (154 lb 12 2 oz), SpO2 96 %  ,Body mass index is 24 98 kg/m²  Intake/Output Summary (Last 24 hours) at 11/20/17 1149  Last data filed at 11/20/17 0900   Gross per 24 hour   Intake             1140 ml   Output             1650 ml   Net             -510 ml       Physical Exam:   General appearance: alert, appears stated age and cooperative  Lungs: clear to auscultation bilaterally, no labored breathing/accessory muscle use  Heart: regular rate and rhythm, S1, S2 normal, no murmur, click, rub or gallop  Abdomen: soft, non-tender; bowel sounds normal; no masses,  no organomegaly  Extremities: no edema    Invasive Devices     Peripheral Intravenous Line            Peripheral IV 11/19/17 Right Forearm 1 day                Lab, Imaging and other studies: I have personally reviewed pertinent reports  Admission on 11/15/2017   No results displayed because visit has over 200 results  Results for Ney Summers (MRN [id number]) as of 11/20/2017 11:49   Ref   Range 11/17/2017 04:40 11/18/2017 05:11 11/18/2017 05:11 11/19/2017 04:43 11/20/2017 05:43   eGFR Latest Units: ml/min/1 73sq m 35 35  39 37   Sodium Latest Ref Range: 136 - 145 mmol/L 141 142  139 140   Potassium Latest Ref Range: 3 5 - 5 3 mmol/L 4 1 3 9  3 8 3 8   Chloride Latest Ref Range: 100 - 108 mmol/L 109 (H) 109 (H)  107 107   CO2 Latest Ref Range: 21 - 32 mmol/L 23 24  26 26   Anion Gap Latest Ref Range: 4 - 13 mmol/L 9 9  6 7   BUN Latest Ref Range: 5 - 25 mg/dL 25 20  16 18   Creatinine Latest Ref Range: 0 60 - 1 30 mg/dL 1 28 1 27  1 15 1 21   Glucose Latest Ref Range: 65 - 140 mg/dL 108 93  93 94   Calcium Latest Ref Range: 8 3 - 10 1 mg/dL 8 8 8 9  8 8 8 7   AST Latest Ref Range: 5 - 45 U/L  109 (H)  79 (H) 46 (H)   ALT Latest Ref Range: 12 - 78 U/L  225 (H)  170 (H) 108 (H)   Alkaline Phosphatase Latest Ref Range: 46 - 116 U/L  374 (H)  326 (H) 304 (H)   Total Protein Latest Ref Range: 6 4 - 8 2 g/dL  6 0 (L)  5 8 (L) 5 9 (L)   Albumin Latest Ref Range: 3 5 - 5 0 g/dL  2 4 (L)  2 3 (L) 2 3 (L)   Total Bilirubin Latest Ref Range: 0 20 - 1 00 mg/dL  2 70 (H)  2 00 (H) 1 30 (H)   WBC Latest Ref Range: 4 31 - 10 16 Thousand/uL 7 13 4 87  4 87 6 25   RBC Latest Ref Range: 3 81 - 5 12 Million/uL 2 65 (L) 2 66 (L)  2 61 (L) 2 77 (L)   Hemoglobin Latest Ref Range: 11 5 - 15 4 g/dL 8 1 (L) 7 8 (L)  7 8 (L) 8 3 (L)   Hematocrit Latest Ref Range: 34 8 - 46 1 % 25 3 (L) 25 3 (L)  24 6 (L) 26 1 (L)   MCV Latest Ref Range: 82 - 98 fL 96 95  94 94   MCH Latest Ref Range: 26 8 - 34 3 pg 30 6 29 3  29 9 30 0   MCHC Latest Ref Range: 31 4 - 37 4 g/dL 32 0 30 8 (L)  31 7 31 8   RDW Latest Ref Range: 11 6 - 15 1 % 14 3 14 4  14 4 14 7   Platelets Latest Ref Range: 149 - 390 Thousands/uL 261 272  293 326   MPV Latest Ref Range: 8 9 - 12 7 fL 10 3 10 0  9 9 10 2   Platelet Estimate Latest Ref Range: Adequate      Adequate   Segs Relative Latest Ref Range: 43 - 75 %     60   Neutrophils Relative Latest Ref Range: 43 - 75 % 73 60  64    Neutrophils Absolute Latest Ref Range: 1 85 - 7 62 Thousand/uL     3 81   Bands Relative Latest Ref Range: 0 - 8 %     1   Lymphocytes Relative Latest Ref Range: 14 - 44 % 14 24  21    Lymphocytes % Latest Ref Range: 14 - 44 %     25   Monocytes Relative Latest Ref Range: 4 - 12 % 13 (H) 13 (H)  11    Eosinophils Relative Latest Ref Range: 0 - 6 % 0 3  3    Eosinophils % Latest Ref Range: 0 - 6 %     2   Basophils Relative Latest Ref Range: 0 - 1 % 0 0  1    Basophils % Latest Ref Range: 0 - 1 %     0   Atypical Lymphocytes % Latest Ref Range: <=0 %     3 (H)   Neutrophils Absolute Latest Ref Range: 1 85 - 7 62 Thousands/µL 5 18 2 93  3 14    Lymphocytes Absolute Latest Ref Range: 0 60 - 4 47 Thousands/µL 1 01 1 15  1 01    Lymphocytes Absolute Latest Ref Range: 0 60 - 4 47 Thousand/uL     1 56   Monocytes Absolute Latest Ref Range: 0 17 - 1 22 Thousand/µL 0 91 0 61  0 53    Monocytes Absolute Latest Ref Range: 0 00 - 1 22 Thousand/uL     0 56   Eosinophils Absolute Latest Ref Range: 0 00 - 0 61 Thousand/µL 0 02 0 16  0 16    Eosinophils Absolute Latest Ref Range: 0 00 - 0 40 Thousand/uL     0 13   Basophils Absolute Latest Ref Range: 0 00 - 0 10 Thousands/µL 0 01 0 02  0 03    Basophils Absolute Latest Ref Range: 0 00 - 0 10 Thousand/uL     0 00   Monocytes % Latest Ref Range: 4 - 12 %     9   Total Counted Unknown     100   nRBC Latest Ref Range: 0 - 2 /100 WBC     3 (H)   Protime Latest Ref Range: 12 1 - 14 4 seconds    13 0    INR Latest Ref Range: 0 86 - 1 16     0 95    BLOOD CULTURE Unknown  Rpt Rpt         Assessment/Plan:    1  Polymicrobial sepsis of unclear source, she also had elevated LFTs although these have been improving  Elevated LFTs may be related to the sepsis itself, but rule out any biliary obstructive process such as CBD stones or sludge  Also consider colonic source    - check MRI with MRCP today  - continue monitoring LFTs  - if MRI is negative, consider flexible sigmoidoscopy; risks and benefits to be considered in this 80year-old patient      2    Fecal impaction, appears improved, patient had enemas yesterday with bowel movements, no bowel movements yet since that time    - MiraLax daily  - diet as tolerated

## 2017-11-20 NOTE — ASSESSMENT & PLAN NOTE
· RALPH, POA, on CKD  · RALPH resolved after IV fluids  · Cr stable at 1 21, at baseline  · Continue to monitor

## 2017-11-20 NOTE — CASE MANAGEMENT
Continued Stay Review    Date: 2017     Vital Signs: Temp (24hrs), Av 7 °F (37 1 °C), Min:98 2 °F (36 8 °C), Max:99 1 °F (37 3 °C)     HR:  [73-85] 73  Resp:  [18-20] 20  BP: (164-169)/(72-75) 169/73  SpO2:  [96 %] 96 %  Body mass index is 24 98 kg/m²  Medications:   Scheduled Meds:   ascorbic acid 250 mg Oral Daily   cefazolin 1,000 mg Intravenous Q12H   cholecalciferol 1,000 Units Oral Daily   cycloSPORINE 1 drop Both Eyes BID   docusate sodium 100 mg Oral BID   ferrous sulfate 325 mg Oral BID With Meals   gabapentin 100 mg Oral HS   heparin (porcine) 5,000 Units Subcutaneous Q8H [address]   metroNIDAZOLE 500 mg Oral Q8H [address]   multivitamin-minerals 1 tablet Oral Daily   pantoprazole 40 mg Oral Daily Before Breakfast   polyethylene glycol 17 g Oral Daily   senna 1 tablet Oral Daily   sucralfate 1 g Oral Q12H   travoprost 1 drop Both Eyes HS     Continuous Infusions:    PRN Meds:   acetaminophen    aluminum-magnesium hydroxide-simethicone    calcium carbonate    ondansetron    Abnormal Labs/Diagnostic Results:   hgb 7 8, hct 24 6  Total protein 5 8  Total bilirubin 2  Mireya phos 326  Alt 170   ast 79    Age/Sex: 80 y o  female     Assessment/Plan:   Sepsis ([facility] )   Assessment & Plan     Sepsis, POA, as evidenced by fever, tachycardia, uncertain source, possible biliary or colonic source  · Patient remains afebrile  No leukocytosis  · ID consulted given results of blood cultures  ? Blood cultures: 1/2 positive for klebsiella pneumoniae and 2/2 positive for klebsiella pneumonia and E coli  ? Urine culture (preliminary): > 100, 000 cfu/ml proteus mirabilis  Likely asymptomatic bacteruria per ID  · Continue IV cefazolin and IV Flagyl per ID's recommendation  · Follow-up on results of repeat blood cultures obtained this AM   · GI following  MRI with MRCP pending   If no biliary source of infection, consider flexible sigmoidoscopy per GI        Asymptomatic bacteriuria   Assessment & Plan     · Patient denies urinary symptoms  · Urine culture: proteus mirabilis  Likely colonization per ID  · No need for antibiotics for this per ID        Polymicrobial bacterial infection   Assessment & Plan     · Bacteremia, uncertain source, possible biliary source vs colonic source  ? Blood culture 1/2 positive for Klebsiella pneumoniae and 2/2 positive for Klebsiella pneumoniae and E  coli  § ID consulted, appreciate input  Continue IV cefazolin and IV Flagyl  ? CT chest, abdomen and pelvis on admission showed no acute thoracic or abdominopelvic findings  Cholelithiasis without cholecystitis  ? US abdomen on admission: Distended gallbladder containing a large 3 x 2 cm gallstone in the gallbladder neck  No pericholecystic fluid or significant gallbladder wall thickening  ? GI consulted  § MRI with MRCP pending  If positive for biliary source, may need surgery consult for consideration of cholecystectomy  § If MRI is negative for biliary source, consider flexible sigmoidoscopy for possible colonic source  ? Follow-up on repeat blood cultures        Encephalopathy   Assessment & Plan     · Encephalopathy likely toxic metabolic, due to sepsis/ bacteremia: Improved  ? CT head on admission: no acute findings  ? Continue to monitor  ? Patient alert and oriented x 3 today         Neuropathy   Assessment & Plan     · Patient recently started on gabapentin, reportedly developed side effects with increased dose but patient does not recall side effects  · Dose decreased to gabapentin 100 mg daily from 100 mg TID yesterday         Anemia   Assessment & Plan     · Iron deficiency anemia  · Hg stable at 7 8 today  · Obtain stool occult blood  · Continue iron supplement  · Repeat CBC in AM  Transfuse for Hg < 7 0        Transaminitis   Assessment & Plan     · Elevated LFTs and elevated bilirubin possibly secondary to sepsis/ bacteremia  Possible biliary source of bacteremia    ? US abdomen on 11/15/17: Distended gallbladder containing a large 3 x 2 cm gallstone in the gallbladder neck  No pericholecystic fluid or significant gallbladder wall thickening  ? LFTs and bilirubin trending down  ? GI consulted  MRI/ MRCP pending  § Cr stable at 1 15 today, appears to be at baseline  ? Obtain repeat CMP in AM         Fecal impaction (HCC)   Assessment & Plan     · S/p enema and bowel movement yesterday  · Continue bowel regimen with Colace and Miralax        History of colon cancer   Assessment & Plan     · Patient with history of colon CA at the age of 80 and underwent resection  She did not have chemotherapy or radiation therapy  · If biliary source of bacteremia is ruled out, consider colonic source and consider flexible sigmoidoscopy         Stage 3 chronic kidney disease   Assessment & Plan     · RALPH, POA, on CKD  ? RALPH resolved after IV fluids  ? Cr stable at 1 15, appears to be at baseline  ? Continue to monitor         Chronic combined systolic and diastolic heart failure (HCC)   Assessment & Plan     · Patient will mild bilateral pedal edema  Continue to monitor  · Monitor daily weights and intake/output         Weakness   Assessment & Plan     · Likely multifactorial given infection and age    · Obtain PT evaluation               Discharge Plan:  Short term rehab at [facility]

## 2017-11-20 NOTE — ASSESSMENT & PLAN NOTE
· Elevated LFTs and elevated bilirubin possibly secondary to sepsis/ bacteremia  Possible biliary source of bacteremia  · US abdomen on 11/15/17: Distended gallbladder containing a large 3 x 2 cm gallstone in the gallbladder neck  No pericholecystic fluid or significant gallbladder wall thickening  · LFTs and bilirubin trending down  · GI consulted  MRI/ MRCP pending  · Cr stable at 1 21 today, appears to be at baseline  · Patient reportedly had surgery for hearing loss 55 years ago by ENT at which time she reports that a vein was taken from her hand and placed in her ear; denies having any metal in her ear  Daughter, Gayatri Sawyer, confirmed this and recent CT head showed no evidence of metal   · Discussed with joel SABA for MRI with MRCP     · Obtain repeat CMP in AM

## 2017-11-21 PROBLEM — K83.09 CHOLANGITIS: Status: ACTIVE | Noted: 2017-11-21

## 2017-11-21 PROBLEM — G93.40 ENCEPHALOPATHY: Status: RESOLVED | Noted: 2017-11-15 | Resolved: 2017-11-21

## 2017-11-21 LAB
ALBUMIN SERPL BCP-MCNC: 2.3 G/DL (ref 3.5–5)
ALP SERPL-CCNC: 294 U/L (ref 46–116)
ALT SERPL W P-5'-P-CCNC: 76 U/L (ref 12–78)
ANION GAP SERPL CALCULATED.3IONS-SCNC: 7 MMOL/L (ref 4–13)
ANISOCYTOSIS BLD QL SMEAR: PRESENT
AST SERPL W P-5'-P-CCNC: 49 U/L (ref 5–45)
BASOPHILS # BLD MANUAL: 0 THOUSAND/UL (ref 0–0.1)
BASOPHILS NFR MAR MANUAL: 0 % (ref 0–1)
BILIRUB SERPL-MCNC: 1.1 MG/DL (ref 0.2–1)
BUN SERPL-MCNC: 18 MG/DL (ref 5–25)
CALCIUM SERPL-MCNC: 8.9 MG/DL (ref 8.3–10.1)
CHLORIDE SERPL-SCNC: 108 MMOL/L (ref 100–108)
CO2 SERPL-SCNC: 27 MMOL/L (ref 21–32)
CREAT SERPL-MCNC: 1.22 MG/DL (ref 0.6–1.3)
EOSINOPHIL # BLD MANUAL: 0.25 THOUSAND/UL (ref 0–0.4)
EOSINOPHIL NFR BLD MANUAL: 4 % (ref 0–6)
ERYTHROCYTE [DISTWIDTH] IN BLOOD BY AUTOMATED COUNT: 14.7 % (ref 11.6–15.1)
GFR SERPL CREATININE-BSD FRML MDRD: 37 ML/MIN/1.73SQ M
GLUCOSE SERPL-MCNC: 100 MG/DL (ref 65–140)
HCT VFR BLD AUTO: 25 % (ref 34.8–46.1)
HGB BLD-MCNC: 7.9 G/DL (ref 11.5–15.4)
LYMPHOCYTES # BLD AUTO: 1.41 THOUSAND/UL (ref 0.6–4.47)
LYMPHOCYTES # BLD AUTO: 23 % (ref 14–44)
MCH RBC QN AUTO: 30 PG (ref 26.8–34.3)
MCHC RBC AUTO-ENTMCNC: 31.6 G/DL (ref 31.4–37.4)
MCV RBC AUTO: 95 FL (ref 82–98)
METAMYELOCYTES NFR BLD MANUAL: 1 % (ref 0–1)
MONOCYTES # BLD AUTO: 0.55 THOUSAND/UL (ref 0–1.22)
MONOCYTES NFR BLD: 9 % (ref 4–12)
MYELOCYTES NFR BLD MANUAL: 1 % (ref 0–1)
NEUTROPHILS # BLD MANUAL: 3.81 THOUSAND/UL (ref 1.85–7.62)
NEUTS BAND NFR BLD MANUAL: 4 % (ref 0–8)
NEUTS SEG NFR BLD AUTO: 58 % (ref 43–75)
PLATELET # BLD AUTO: 313 THOUSANDS/UL (ref 149–390)
PLATELET BLD QL SMEAR: ADEQUATE
PMV BLD AUTO: 9.7 FL (ref 8.9–12.7)
POTASSIUM SERPL-SCNC: 3.9 MMOL/L (ref 3.5–5.3)
PROT SERPL-MCNC: 5.7 G/DL (ref 6.4–8.2)
RBC # BLD AUTO: 2.63 MILLION/UL (ref 3.81–5.12)
SODIUM SERPL-SCNC: 142 MMOL/L (ref 136–145)
TOTAL CELLS COUNTED SPEC: 100
WBC # BLD AUTO: 6.15 THOUSAND/UL (ref 4.31–10.16)

## 2017-11-21 PROCEDURE — 97110 THERAPEUTIC EXERCISES: CPT

## 2017-11-21 PROCEDURE — 85027 COMPLETE CBC AUTOMATED: CPT | Performed by: PHYSICIAN ASSISTANT

## 2017-11-21 PROCEDURE — 97530 THERAPEUTIC ACTIVITIES: CPT

## 2017-11-21 PROCEDURE — 85007 BL SMEAR W/DIFF WBC COUNT: CPT | Performed by: PHYSICIAN ASSISTANT

## 2017-11-21 PROCEDURE — 80053 COMPREHEN METABOLIC PANEL: CPT | Performed by: PHYSICIAN ASSISTANT

## 2017-11-21 RX ORDER — SENNOSIDES 8.6 MG
2 TABLET ORAL 2 TIMES DAILY
Status: DISCONTINUED | OUTPATIENT
Start: 2017-11-21 | End: 2017-11-24 | Stop reason: HOSPADM

## 2017-11-21 RX ORDER — POLYETHYLENE GLYCOL 3350 17 G/17G
17 POWDER, FOR SOLUTION ORAL DAILY PRN
Status: DISCONTINUED | OUTPATIENT
Start: 2017-11-21 | End: 2017-11-22

## 2017-11-21 RX ADMIN — HEPARIN SODIUM 5000 UNITS: 5000 INJECTION, SOLUTION INTRAVENOUS; SUBCUTANEOUS at 06:34

## 2017-11-21 RX ADMIN — FERROUS SULFATE TAB 325 MG (65 MG ELEMENTAL FE) 325 MG: 325 (65 FE) TAB at 17:46

## 2017-11-21 RX ADMIN — GABAPENTIN 100 MG: 100 CAPSULE ORAL at 21:44

## 2017-11-21 RX ADMIN — SENNOSIDES 8.6 MG: 8.6 TABLET, FILM COATED ORAL at 08:29

## 2017-11-21 RX ADMIN — OXYCODONE HYDROCHLORIDE AND ACETAMINOPHEN 250 MG: 500 TABLET ORAL at 08:29

## 2017-11-21 RX ADMIN — CEFAZOLIN SODIUM 1000 MG: 1 SOLUTION INTRAVENOUS at 02:35

## 2017-11-21 RX ADMIN — CEFAZOLIN SODIUM 1000 MG: 1 SOLUTION INTRAVENOUS at 14:30

## 2017-11-21 RX ADMIN — SUCRALFATE 1 G: 1 TABLET ORAL at 21:43

## 2017-11-21 RX ADMIN — METRONIDAZOLE 500 MG: 500 TABLET ORAL at 21:43

## 2017-11-21 RX ADMIN — METRONIDAZOLE 500 MG: 500 TABLET ORAL at 14:30

## 2017-11-21 RX ADMIN — PANTOPRAZOLE SODIUM 40 MG: 40 TABLET, DELAYED RELEASE ORAL at 06:34

## 2017-11-21 RX ADMIN — SUCRALFATE 1 G: 1 TABLET ORAL at 08:29

## 2017-11-21 RX ADMIN — FERROUS SULFATE TAB 325 MG (65 MG ELEMENTAL FE) 325 MG: 325 (65 FE) TAB at 06:34

## 2017-11-21 RX ADMIN — SENNOSIDES 17.2 MG: 8.6 TABLET, FILM COATED ORAL at 17:48

## 2017-11-21 RX ADMIN — POLYETHYLENE GLYCOL 3350 17 G: 17 POWDER, FOR SOLUTION ORAL at 08:29

## 2017-11-21 RX ADMIN — DOCUSATE SODIUM 100 MG: 100 CAPSULE, LIQUID FILLED ORAL at 08:29

## 2017-11-21 RX ADMIN — FOLIC ACID-PYRIDOXINE-CYANOCOBALAMIN TAB 2.5-25-2 MG 1 TABLET: 2.5-25-2 TAB at 15:19

## 2017-11-21 RX ADMIN — Medication 1 TABLET: at 08:29

## 2017-11-21 RX ADMIN — METRONIDAZOLE 500 MG: 500 TABLET ORAL at 06:34

## 2017-11-21 RX ADMIN — DOCUSATE SODIUM 100 MG: 100 CAPSULE, LIQUID FILLED ORAL at 17:46

## 2017-11-21 RX ADMIN — CHOLECALCIFEROL TAB 25 MCG (1000 UNIT) 1000 UNITS: 25 TAB at 08:29

## 2017-11-21 RX ADMIN — HEPARIN SODIUM 5000 UNITS: 5000 INJECTION, SOLUTION INTRAVENOUS; SUBCUTANEOUS at 21:44

## 2017-11-21 RX ADMIN — HEPARIN SODIUM 5000 UNITS: 5000 INJECTION, SOLUTION INTRAVENOUS; SUBCUTANEOUS at 14:29

## 2017-11-21 RX ADMIN — TRAVOPROST 1 DROP: 0.04 SOLUTION/ DROPS OPHTHALMIC at 21:44

## 2017-11-21 NOTE — PLAN OF CARE
Problem: PHYSICAL THERAPY ADULT  Goal: Performs mobility at highest level of function for planned discharge setting  See evaluation for individualized goals  Treatment/Interventions: Functional transfer training, LE strengthening/ROM, Therapeutic exercise, Elevations, Endurance training, Gait training, Spoke to nursing, Equipment eval/education, Bed mobility  Equipment Recommended: Shell Aviles       See flowsheet documentation for full assessment, interventions and recommendations  Outcome: Progressing  Prognosis: Fair  Problem List: Decreased strength, Decreased range of motion, Decreased endurance, Impaired balance, Decreased mobility, Decreased coordination, Decreased cognition, Impaired judgement, Decreased safety awareness, Impaired vision  Assessment: Pt tolerated treatment well and is progressing slowly with functional mobility  Able to perform multiple trials of sit to stand to increase LE strength, balance, as well as standing tolerance  Pre-gait and dynamic standing balance activities performed in stance with use of BUE on RW for support  Requires significant cues for appropriate standing balance/posture  Initial retropulsion noted with each standing trial   Approximately 2-3` standing tolerance each trial   Only able to clear foot approximately 1-2 centimeters from floor during standing marches  Participates well in LE exercises in sitting  Will continue to benefit from ongoing skilled PT to maximize her functional mobility and increase her level of independence  Recommending rehab at time of discharge when medically appropriate  Barriers to Discharge: Inaccessible home environment, Decreased caregiver support     Recommendation: Post acute IP rehab          See flowsheet documentation for full assessment

## 2017-11-21 NOTE — ASSESSMENT & PLAN NOTE
Sepsis, POA, as evidenced by fever, tachycardia--clinically improved  · Source felt to be cholangitis based on abnormal MRCP  · ID following  Continue IV cefazolin and IV Flagyl day #7  · Blood cultures: 1/2 positive for klebsiella pneumoniae and 2/2 positive for klebsiella pneumonia and E coli  · Urine culture (preliminary): > 100, 000 cfu/ml proteus mirabilis  Likely asymptomatic bacteruria per ID  · Repeat blood cultures x 2: no growth at 72 hours

## 2017-11-21 NOTE — PROGRESS NOTES
Progress Note - Benjie Belt 80 y o  female MRN: [id number]    Unit/Bed#: -01 Encounter: [id number]        Subjective:   Patient reports feeling well, denies any abdominal pain or nausea  Tolerating diet  No subjective fever or chills  She says her breathing feels good  Objective:     Vitals: Blood pressure 157/71, pulse 69, temperature 98 3 °F (36 8 °C), temperature source Oral, resp  rate 18, weight 72 2 kg (159 lb 2 8 oz), SpO2 96 %  ,Body mass index is 25 69 kg/m²  Intake/Output Summary (Last 24 hours) at 11/21/17 1632  Last data filed at 11/21/17 1327   Gross per 24 hour   Intake             1400 ml   Output             1300 ml   Net              100 ml       Physical Exam:   General appearance: alert, appears stated age and cooperative  Lungs: clear to auscultation bilaterally, no labored breathing/accessory muscle use  Heart: regular rate and rhythm, S1, S2 normal, no murmur, click, rub or gallop  Abdomen: soft, non-tender; bowel sounds normal; no masses,  no organomegaly  Extremities: no edema    Invasive Devices     Peripheral Intravenous Line            Peripheral IV 11/19/17 Right Forearm 2 days                Lab, Imaging and other studies: I have personally reviewed pertinent reports  Admission on 11/15/2017   No results displayed because visit has over 200 results  Results for Michaelle Caldwell (MRN [id number]) as of 11/21/2017 16:22   Ref   Range 11/17/2017 04:40 11/18/2017 05:11 11/18/2017 05:11 11/19/2017 04:43 11/20/2017 05:43 11/21/2017 04:40   eGFR Latest Units: ml/min/1 73sq m 35 35  39 37 37   Sodium Latest Ref Range: 136 - 145 mmol/L 141 142  139 140 142   Potassium Latest Ref Range: 3 5 - 5 3 mmol/L 4 1 3 9  3 8 3 8 3 9   Chloride Latest Ref Range: 100 - 108 mmol/L 109 (H) 109 (H)  107 107 108   CO2 Latest Ref Range: 21 - 32 mmol/L 23 24  26 26 27   Anion Gap Latest Ref Range: 4 - 13 mmol/L 9 9  6 7 7   BUN Latest Ref Range: 5 - 25 mg/dL 25 20  16 18 18   Creatinine Latest Ref Range: 0 60 - 1 30 mg/dL 1 28 1 27  1 15 1 21 1 22   Glucose Latest Ref Range: 65 - 140 mg/dL 108 93  93 94 100   Calcium Latest Ref Range: 8 3 - 10 1 mg/dL 8 8 8 9  8 8 8 7 8 9   AST Latest Ref Range: 5 - 45 U/L  109 (H)  79 (H) 46 (H) 49 (H)   ALT Latest Ref Range: 12 - 78 U/L  225 (H)  170 (H) 108 (H) 76   Alkaline Phosphatase Latest Ref Range: 46 - 116 U/L  374 (H)  326 (H) 304 (H) 294 (H)   Total Protein Latest Ref Range: 6 4 - 8 2 g/dL  6 0 (L)  5 8 (L) 5 9 (L) 5 7 (L)   Albumin Latest Ref Range: 3 5 - 5 0 g/dL  2 4 (L)  2 3 (L) 2 3 (L) 2 3 (L)   Total Bilirubin Latest Ref Range: 0 20 - 1 00 mg/dL  2 70 (H)  2 00 (H) 1 30 (H) 1 10 (H)   WBC Latest Ref Range: 4 31 - 10 16 Thousand/uL 7 13 4 87  4 87 6 25 6 15   RBC Latest Ref Range: 3 81 - 5 12 Million/uL 2 65 (L) 2 66 (L)  2 61 (L) 2 77 (L) 2 63 (L)   Hemoglobin Latest Ref Range: 11 5 - 15 4 g/dL 8 1 (L) 7 8 (L)  7 8 (L) 8 3 (L) 7 9 (L)   Hematocrit Latest Ref Range: 34 8 - 46 1 % 25 3 (L) 25 3 (L)  24 6 (L) 26 1 (L) 25 0 (L)   MCV Latest Ref Range: 82 - 98 fL 96 95  94 94 95   MCH Latest Ref Range: 26 8 - 34 3 pg 30 6 29 3  29 9 30 0 30 0   MCHC Latest Ref Range: 31 4 - 37 4 g/dL 32 0 30 8 (L)  31 7 31 8 31 6   RDW Latest Ref Range: 11 6 - 15 1 % 14 3 14 4  14 4 14 7 14 7   Platelets Latest Ref Range: 149 - 390 Thousands/uL 261 272  293 326 313   MPV Latest Ref Range: 8 9 - 12 7 fL 10 3 10 0  9 9 10 2 9 7   Platelet Estimate Latest Ref Range: Adequate      Adequate Adequate   Segs Relative Latest Ref Range: 43 - 75 %     60 58   Neutrophils Relative Latest Ref Range: 43 - 75 % 73 60  64     Neutrophils Absolute Latest Ref Range: 1 85 - 7 62 Thousand/uL     3 81 3 81   Bands Relative Latest Ref Range: 0 - 8 %     1 4   Lymphocytes Relative Latest Ref Range: 14 - 44 % 14 24  21     Lymphocytes % Latest Ref Range: 14 - 44 %     25 23   Monocytes Relative Latest Ref Range: 4 - 12 % 13 (H) 13 (H)  11     Eosinophils Relative Latest Ref Range: 0 - 6 % 0 3  3 Eosinophils % Latest Ref Range: 0 - 6 %     2 4   Basophils Relative Latest Ref Range: 0 - 1 % 0 0  1     Basophils % Latest Ref Range: 0 - 1 %     0 0   Myelocytes % Latest Ref Range: 0 - 1 %      1   Metamyelocytes% Latest Ref Range: 0 - 1 %      1   Atypical Lymphocytes % Latest Ref Range: <=0 %     3 (H)    Neutrophils Absolute Latest Ref Range: 1 85 - 7 62 Thousands/µL 5 18 2 93  3 14     Lymphocytes Absolute Latest Ref Range: 0 60 - 4 47 Thousands/µL 1 01 1 15  1 01     Lymphocytes Absolute Latest Ref Range: 0 60 - 4 47 Thousand/uL     1 56 1 41   Monocytes Absolute Latest Ref Range: 0 17 - 1 22 Thousand/µL 0 91 0 61  0 53     Monocytes Absolute Latest Ref Range: 0 00 - 1 22 Thousand/uL     0 56 0 55   Eosinophils Absolute Latest Ref Range: 0 00 - 0 61 Thousand/µL 0 02 0 16  0 16     Eosinophils Absolute Latest Ref Range: 0 00 - 0 40 Thousand/uL     0 13 0 25   Basophils Absolute Latest Ref Range: 0 00 - 0 10 Thousands/µL 0 01 0 02  0 03     Basophils Absolute Latest Ref Range: 0 00 - 0 10 Thousand/uL     0 00 0 00   Monocytes % Latest Ref Range: 4 - 12 %     9 9   Total Counted Unknown     100 100   nRBC Latest Ref Range: 0 - 2 /100 WBC     3 (H)    Anisocytosis Unknown      Present   Protime Latest Ref Range: 12 1 - 14 4 seconds    13 0     INR Latest Ref Range: 0 86 - 1 16     0 95     BLOOD CULTURE Unknown  Rpt Rpt        MRI OF THE ABDOMEN WITHOUT CONTRAST WITH MRCP     INDICATION: Evaluate the CBD     COMPARISON: Previous CT from November 15, 2017     TECHNIQUE:  The following pulse sequences were obtained on a 1 5 T scanner: 3D MRCP with radial thick slabs and projections  Coronal and axial T2 with TE of 90 and 180 respectively, axial T1-weighted in-and-out-of phase, axial DWI/ADC, axial T2 with fat   saturation, axial FIESTA fat-sat, and axial T1 with fat saturation   3D rendering was performed from the acquisition scanner      Evaluation of the solid abdominal viscera is limited without intravenous contrast      FINDINGS:     BILIARY/PANCREATIC DUCTS:    There is dilation of the intrahepatic ducts  There is dilation of the common bile duct  There is no choledocholithiasis with a stone in the distal aspect of the CBD  This measures 16 mm  The pancreatic duct is not dilated     LIVER:  Normal        GALLBLADDER: The gallbladder is markedly distended  There is cholelithiasis and there is suggestion of mild gallbladder wall thickening     PANCREAS:  Normal      ADRENAL GLANDS:  Normal      SPLEEN: Normal      KIDNEYS:  Normal      ABDOMINAL CAVITY:  No lymphadenopathy or mass  No Ascites      BOWEL:  Unremarkable MRI appearance      OSSEOUS STRUCTURES:  There is scoliosis with convexity to the right side  A T1 hyperintensity noted within the L2 vertebra on the right side without any is likely benign     VASCULAR STRUCTURES:  No aneurysm      ABDOMINAL WALL:  Normal      LOWER CHEST:  Small bilateral effusions are noted     IMPRESSION:     Dilated common bile duct and the intrahepatic ducts with choledocholithiasis     Cholelithiasis with mild gallbladder wall thickening and gallbladder distention, correlate clinically for cholecystitis     Small bilateral effusion    Assessment/Plan:    1  Sepsis initially with fever and tachycardia, and found with polymicrobial bacteremia including Klebsiella and E coli  Suspect there was biliary source, patient presented with elevated LFTs and was noted with cholelithiasis  Fortunately her bilirubin and other liver enzymes have been trending down since presentation and her abdominal exam is benign currently  However, MRI checked yesterday showed presence of stone in the CBD      - will discuss with attending regarding risks and benefits of ERCP for further management, care must be taken in this 80year-old patient  - I discussed the case and plan with Franklin Neal PA-C ([facility])  - continue to monitor liver enzymes closely, monitor CBC and temperature along with abdominal exam  - continue infectious disease follow-up, IV antibiotics including cefazolin and Flagyl  - patient may continue with diet for today, but would need to be NPO after midnight prior to ERCP if this is planned  - also recommend surgical evaluation, she was noted with distended gallbladder, mild gallbladder wall thickening and cholelithiasis on the MRI in addition to the biliary ductal dilation and choledocholithiasis

## 2017-11-21 NOTE — ASSESSMENT & PLAN NOTE
· Iron deficiency and B12 deficiency anemia--already on supplementation  · No overt bleeding noted  · Counts have been consistently low the past month and she is asymptomatic    If she should trended any further down she may require blood transfusion given her age

## 2017-11-21 NOTE — ASSESSMENT & PLAN NOTE
· Await input from GI today regarding ERCP for choledocholithiasis and management for possible cholecystitis, doubtful she is surgical candidate at her advanced age  · Associated transaminitis noted to be improving  · See above regarding sepsis/plan

## 2017-11-21 NOTE — PHYSICAL THERAPY NOTE
PHYSICAL THERAPY TREATMENT NOTE    Patient Name: Arianne Marino  [id number] Date: 11/21/2017 11/21/17 5108   Pain Assessment   Pain Assessment No/denies pain   Pain Score No Pain  (discomfort in thighs )   Restrictions/Precautions   Weight Bearing Precautions Per Order No   Other Precautions Bed Alarm; Chair Alarm;Telemetry; Fall Risk   General   Chart Reviewed Yes   Response to Previous Treatment Patient with no complaints from previous session  Family/Caregiver Present No   Cognition   Overall Cognitive Status Impaired   Arousal/Participation Cooperative   Attention Within functional limits   Subjective   Subjective Pt agrees to PT treatment  Bed Mobility   Supine to Sit Unable to assess  (OOB in chair pre/post treatment with alarm intact)   Transfers   Sit to Stand 2  Maximal assistance   Additional items Assist x 1; Increased time required;Verbal cues  (multiple trials performed; approximately 5)   Stand to Sit 3  Moderate assistance   Additional items Assist x 1; Increased time required;Verbal cues   Stand pivot Unable to assess  (not assessed due to OOB in chair)   Ambulation/Elevation   Gait pattern (pre-gait activities only)   Balance   Static Sitting Fair -   Dynamic Sitting Poor +   Static Standing Poor +  (initially Poor)   Dynamic Standing Poor   Endurance Deficit   Endurance Deficit Yes   Endurance Deficit Description limited standing tolerance   Activity Tolerance   Activity Tolerance Patient limited by fatigue   Nurse Made Aware Per RN, pt appropriate to treat   Exercises   Knee AROM [place] [employer]; Bilateral;20 reps  (x2)   Ankle Pumps Sitting;20 reps;AROM; Bilateral  (x2)   Marching Sitting;20 reps;AROM; Bilateral  (x2)   Assessment   Prognosis Fair   Problem List Decreased strength;Decreased range of motion;Decreased endurance; Impaired balance;Decreased mobility; Decreased coordination;Decreased cognition; Impaired judgement;Decreased safety awareness; Impaired vision   Assessment Pt tolerated treatment well and is progressing slowly with functional mobility  Able to perform multiple trials of sit to stand to increase LE strength, balance, as well as standing tolerance  Pre-gait and dynamic standing balance activities performed in stance with use of BUE on RW for support  Requires significant cues for appropriate standing balance/posture  Initial retropulsion noted with each standing trial   Approximately 2-3` standing tolerance each trial   Only able to clear foot approximately 1-2 centimeters from floor during standing marches  Participates well in LE exercises in sitting  Will continue to benefit from ongoing skilled PT to maximize her functional mobility and increase her level of independence  Recommending rehab at time of discharge when medically appropriate  Goals   Patient Goals to walk better    STG Expiration Date 11/24/17   Treatment Day 2   Plan   Treatment/Interventions Functional transfer training;LE strengthening/ROM; Therapeutic exercise; Endurance training;Cognitive reorientation;Patient/family training;Equipment eval/education; Bed mobility;Gait training   Progress Slow progress, decreased activity tolerance   PT Frequency 5x/wk   Recommendation   Recommendation Post acute IP rehab   Equipment Recommended Nick Pierre, PT,DPT

## 2017-11-21 NOTE — ASSESSMENT & PLAN NOTE
· Appears to be clinically stable at this time   · Last echo 10/30/17: EF 40 %, grade 2 diastolic dysfunction, mild pulmonary hypertension  · Monitor daily weights and intake/output

## 2017-11-21 NOTE — PLAN OF CARE
Problem: Potential for Falls  Goal: Patient will remain free of falls  INTERVENTIONS:  - Assess patient frequently for physical needs  -  Identify cognitive and physical deficits and behaviors that affect risk of falls    -  [place] fall precautions as indicated by assessment   - Educate patient/family on patient safety including physical limitations  - Instruct patient to call for assistance with activity based on assessment  - Modify environment to reduce risk of injury  - Consider OT/PT consult to assist with strengthening/mobility   Outcome: Progressing      Problem: Prexisting or High Potential for Compromised Skin Integrity  Goal: Skin integrity is maintained or improved  INTERVENTIONS:  - Identify patients at risk for skin breakdown  - Assess and monitor skin integrity  - Assess and monitor nutrition and hydration status  - Monitor labs (i e  albumin)  - Assess for incontinence   - Turn and reposition patient  - Assist with mobility/ambulation  - Relieve pressure over bony prominences  - Avoid friction and shearing  - Provide appropriate hygiene as needed including keeping skin clean and dry  - Evaluate need for skin moisturizer/barrier cream  - Collaborate with interdisciplinary team (i e  Nutrition, Rehabilitation, etc )   - Patient/family teaching   Outcome: Progressing      Problem: GENITOURINARY - ADULT  Goal: Maintains or returns to baseline urinary function  INTERVENTIONS:  - Assess urinary function  - Encourage oral fluids to ensure adequate hydration  - Administer IV fluids as ordered to ensure adequate hydration  - Administer ordered medications as needed  - Offer frequent toileting  - Follow urinary retention protocol if ordered   Outcome: Progressing    Goal: Absence of urinary retention  INTERVENTIONS:  - Assess patient's ability to void and empty bladder  - Monitor I/O  - Bladder scan as needed  - Discuss with physician/AP medications to alleviate retention as needed  - Discuss catheterization for long term situations as appropriate   Outcome: Progressing      Problem: METABOLIC, FLUID AND ELECTROLYTES - ADULT  Goal: Electrolytes maintained within normal limits  INTERVENTIONS:  - Monitor labs and assess patient for signs and symptoms of electrolyte imbalances  - Administer electrolyte replacement as ordered  - Monitor response to electrolyte replacements, including repeat lab results as appropriate  - Instruct patient on fluid and nutrition as appropriate   Outcome: Progressing    Goal: Fluid balance maintained  INTERVENTIONS:  - Monitor labs and assess for signs and symptoms of volume excess or deficit  - Monitor I/O and WT  - Instruct patient on fluid and nutrition as appropriate   Outcome: Progressing      Problem: DISCHARGE PLANNING - CARE MANAGEMENT  Goal: Discharge to post-acute care or home with appropriate resources  INTERVENTIONS:  - Conduct assessment to determine patient/family and health care team treatment goals, and need for post-acute services based on payer coverage, community resources, and patient preferences, and barriers to discharge  - Address psychosocial, clinical, and financial barriers to discharge as identified in assessment in conjunction with the patient/family and health care team  - Arrange appropriate level of post-acute services according to patient's   needs and preference and payer coverage in collaboration with the physician and health care team  - Communicate with and update the patient/family, physician, and health care team regarding progress on the discharge plan  - Arrange appropriate transportation to post-acute venues   Outcome: Progressing

## 2017-11-21 NOTE — PROGRESS NOTES
Progress Note - Infectious Disease   Marisa Peterson 80 y o  female MRN: [id number]  Unit/Bed#: -01 Encounter: [id number]      Impression/Recommendations:  1   Sepsis, POA: fever and tachycardia   Source of sepsis was unclear initially  Srikanth Wilhelm, now with patient developing epigastric abdominal pain and MRCP with choledocholithiasis, source is most likely biliary  Patient is clinically much improved  Sepsis has resolved  Fever resolved  WBC normalized  Antibiotic plan as below  Monitor temperature/WBC  Monitor hemodynamics      2   Polymicrobic bacteremia  Durene Conrado and E  coli   Doubt urinary source, given a different GNR in the urine culture and no urinary symptoms  It appears now that source is most likely biliary/cholangitis  Repeat blood cultures negative so far  Continue cefazolin/Flagyl for now  Follow up repeat blood cultures  Follow temperatures and white blood cell count closely  Supportive care as per the primary service  Probable need for ERCP      3   Probable cholangitis   MRCP with choledocholithiasis and dilated CBD, most like secondary to obstruction  Patient is clinically improved with antibiotic but will most likely still need ERCP  LFT's slowly inproving but still elevated  Probable need for ERCP      4   Cholelithiasis   No obvious cholecystitis  May need elective cholecystectomy also      5   History of colon cancer, status post colon resection     Consider colonoscopy if biliary workup negative for source of bacteremia      6   Fecal impaction              Bowel regimen per GI     7   RALPH, superimposed on CKD   This is most likely secondary to sepsis   Creatinine is improving  Follow creatinine closely and dose-adjust antibiotics as indicated     7   Asymptomatic bacteriuria   This is most likely colonization   No antibiotic needed for this      Discussed with patient in detail regarding the above plan    Discussed with Dr Nikko Brothers from [facility] service      Antibiotics Cefazolin/Flagyl   Antibiotics # 7     Subjective:  Patient complains of RUQ abdominal pain today  No urinary symptoms  Temperature stays down  No chills  She is tolerating the biotics well  No nausea, vomiting or diarrhea  Objective:  Vitals:  HR:  [69-71] 69  Resp:  [18] 18  BP: (169-186)/(72-79) 186/79  SpO2:  [95 %-98 %] 98 %  Temp (24hrs), Av °F (36 7 °C), Min:97 7 °F (36 5 °C), Max:98 2 °F (36 8 °C)  Current: Temperature: 97 7 °F (36 5 °C)    Physical Exam:     General: Awake, alert, cooperative, no distress  Lungs: Expansion symmetric, no rales, no wheezing, respirations unlabored  Heart[de-identified]  Regular rate and rhythm, S1 and S2 normal, no murmur  Abdomen: Soft, nondistended, mild RUQ and epigastric tenderness, bowel sounds active all four quadrants,        no masses, no organomegaly  Extremities: Trace edema  No erythema/warmth  No ulcer  Nontender to palpation  Skin:  No rash  Invasive Devices     Peripheral Intravenous Line            Peripheral IV 17 Right Forearm 2 days                Labs studies:   I have personally reviewed pertinent labs      Results from last 7 days  Lab Units 170 1743 17  0443   SODIUM mmol/L 142 140 139   POTASSIUM mmol/L 3 9 3 8 3 8   CHLORIDE mmol/L 108 107 107   CO2 mmol/L 27 26 26   ANION GAP mmol/L 7 7 6   BUN mg/dL 18 18 16   CREATININE mg/dL 1 22 1 21 1 15   EGFR ml/min/1 73sq m 37 37 39   GLUCOSE RANDOM mg/dL 100 94 93   CALCIUM mg/dL 8 9 8 7 8 8   AST U/L 49* 46* 79*   ALT U/L 76 108* 170*   ALK PHOS U/L 294* 304* 326*   TOTAL PROTEIN g/dL 5 7* 5 9* 5 8*   ALBUMIN g/dL 2 3* 2 3* 2 3*   BILIRUBIN TOTAL mg/dL 1 10* 1 30* 2 00*       Results from last 7 days  Lab Units 17  0440 17  0543 17  0443   WBC Thousand/uL 6 15 6 25 4 87   HEMOGLOBIN g/dL 7 9* 8 3* 7 8*   PLATELETS Thousands/uL 313 326 293       Results from last 7 days  Lab Units 17  0511 11/15/17  1532 11/15/17  1524 11/15/17  1508 BLOOD CULTURE  No Growth at 48 hrs  No Growth at 48 hrs   --   --    Escherichia coli*  Klebsiella pneumoniae*  Klebsiella pneumoniae*   GRAM STAIN RESULT   --   --   --  Gram negative rods  Gram negative rods   URINE CULTURE   --  >100,000 cfu/ml Proteus mirabilis*  --   --    INFLUENZA A PCR   --   --  None Detected  --    INFLUENZA B PCR   --   --  None Detected  --    RSV PCR   --   --  None Detected  --        Imaging Studies:   I have personally reviewed pertinent imaging study reports and images in PACS  MRCP reviewed personally  There is dilated CBD with choledocholithiasis  EKG, Pathology, and Other Studies:   I have personally reviewed pertinent reports

## 2017-11-21 NOTE — ASSESSMENT & PLAN NOTE
· S/p enema, now with no further bowel movements for the past 3 days  · Continue/augment bowel regimen with Colace and Miralax, senna

## 2017-11-22 ENCOUNTER — ANESTHESIA (INPATIENT)
Dept: PERIOP | Facility: HOSPITAL | Age: 82
DRG: 871 | End: 2017-11-22
Payer: MEDICARE

## 2017-11-22 ENCOUNTER — ANESTHESIA EVENT (INPATIENT)
Dept: PERIOP | Facility: HOSPITAL | Age: 82
DRG: 871 | End: 2017-11-22
Payer: MEDICARE

## 2017-11-22 ENCOUNTER — APPOINTMENT (INPATIENT)
Dept: RADIOLOGY | Facility: HOSPITAL | Age: 82
DRG: 871 | End: 2017-11-22
Payer: MEDICARE

## 2017-11-22 PROBLEM — K80.50 CHOLEDOCHOLITHIASIS: Status: ACTIVE | Noted: 2017-11-15

## 2017-11-22 PROCEDURE — B40BYZZ PLAIN RADIOGRAPHY OF OTHER INTRA-ABDOMINAL ARTERIES USING OTHER CONTRAST: ICD-10-PCS | Performed by: INTERNAL MEDICINE

## 2017-11-22 PROCEDURE — 74328 X-RAY BILE DUCT ENDOSCOPY: CPT

## 2017-11-22 PROCEDURE — 0FC98ZZ EXTIRPATION OF MATTER FROM COMMON BILE DUCT, VIA NATURAL OR ARTIFICIAL OPENING ENDOSCOPIC: ICD-10-PCS | Performed by: INTERNAL MEDICINE

## 2017-11-22 PROCEDURE — C1769 GUIDE WIRE: HCPCS | Performed by: INTERNAL MEDICINE

## 2017-11-22 RX ORDER — POLYETHYLENE GLYCOL 3350 17 G/17G
17 POWDER, FOR SOLUTION ORAL DAILY
Status: DISCONTINUED | OUTPATIENT
Start: 2017-11-22 | End: 2017-11-22

## 2017-11-22 RX ORDER — LIDOCAINE HYDROCHLORIDE 10 MG/ML
INJECTION, SOLUTION INFILTRATION; PERINEURAL AS NEEDED
Status: DISCONTINUED | OUTPATIENT
Start: 2017-11-22 | End: 2017-11-22 | Stop reason: SURG

## 2017-11-22 RX ORDER — PROPOFOL 10 MG/ML
INJECTION, EMULSION INTRAVENOUS AS NEEDED
Status: DISCONTINUED | OUTPATIENT
Start: 2017-11-22 | End: 2017-11-22 | Stop reason: SURG

## 2017-11-22 RX ORDER — ONDANSETRON 2 MG/ML
4 INJECTION INTRAMUSCULAR; INTRAVENOUS ONCE AS NEEDED
Status: DISCONTINUED | OUTPATIENT
Start: 2017-11-22 | End: 2017-11-22 | Stop reason: HOSPADM

## 2017-11-22 RX ORDER — SODIUM CHLORIDE 9 MG/ML
INJECTION, SOLUTION INTRAVENOUS CONTINUOUS PRN
Status: DISCONTINUED | OUTPATIENT
Start: 2017-11-22 | End: 2017-11-22 | Stop reason: SURG

## 2017-11-22 RX ORDER — MEPERIDINE HYDROCHLORIDE 25 MG/ML
12.5 INJECTION INTRAMUSCULAR; INTRAVENOUS; SUBCUTANEOUS AS NEEDED
Status: DISCONTINUED | OUTPATIENT
Start: 2017-11-22 | End: 2017-11-22 | Stop reason: HOSPADM

## 2017-11-22 RX ORDER — LABETALOL HYDROCHLORIDE 5 MG/ML
5 INJECTION, SOLUTION INTRAVENOUS
Status: DISCONTINUED | OUTPATIENT
Start: 2017-11-22 | End: 2017-11-22 | Stop reason: HOSPADM

## 2017-11-22 RX ORDER — SUCCINYLCHOLINE/SOD CL,ISO/PF 100 MG/5ML
SYRINGE (ML) INTRAVENOUS AS NEEDED
Status: DISCONTINUED | OUTPATIENT
Start: 2017-11-22 | End: 2017-11-22 | Stop reason: SURG

## 2017-11-22 RX ORDER — FENTANYL CITRATE/PF 50 MCG/ML
25 SYRINGE (ML) INJECTION
Status: DISCONTINUED | OUTPATIENT
Start: 2017-11-22 | End: 2017-11-22 | Stop reason: HOSPADM

## 2017-11-22 RX ORDER — ONDANSETRON 2 MG/ML
INJECTION INTRAMUSCULAR; INTRAVENOUS AS NEEDED
Status: DISCONTINUED | OUTPATIENT
Start: 2017-11-22 | End: 2017-11-22 | Stop reason: SURG

## 2017-11-22 RX ORDER — LABETALOL HYDROCHLORIDE 5 MG/ML
INJECTION, SOLUTION INTRAVENOUS AS NEEDED
Status: DISCONTINUED | OUTPATIENT
Start: 2017-11-22 | End: 2017-11-22 | Stop reason: SURG

## 2017-11-22 RX ORDER — ALBUTEROL SULFATE 2.5 MG/3ML
2.5 SOLUTION RESPIRATORY (INHALATION) ONCE AS NEEDED
Status: DISCONTINUED | OUTPATIENT
Start: 2017-11-22 | End: 2017-11-22 | Stop reason: HOSPADM

## 2017-11-22 RX ADMIN — SUCRALFATE 1 G: 1 TABLET ORAL at 21:09

## 2017-11-22 RX ADMIN — Medication 100 MG: at 16:55

## 2017-11-22 RX ADMIN — LABETALOL 20 MG/4 ML (5 MG/ML) INTRAVENOUS SYRINGE 5 MG: at 17:45

## 2017-11-22 RX ADMIN — CEFAZOLIN SODIUM 1000 MG: 1 SOLUTION INTRAVENOUS at 14:45

## 2017-11-22 RX ADMIN — TRAVOPROST 1 DROP: 0.04 SOLUTION/ DROPS OPHTHALMIC at 21:09

## 2017-11-22 RX ADMIN — LABETALOL HYDROCHLORIDE 10 MG: 5 INJECTION, SOLUTION INTRAVENOUS at 17:06

## 2017-11-22 RX ADMIN — GABAPENTIN 100 MG: 100 CAPSULE ORAL at 21:09

## 2017-11-22 RX ADMIN — METRONIDAZOLE 500 MG: 500 TABLET ORAL at 06:09

## 2017-11-22 RX ADMIN — SODIUM CHLORIDE: 0.9 INJECTION, SOLUTION INTRAVENOUS at 16:52

## 2017-11-22 RX ADMIN — LIDOCAINE HYDROCHLORIDE 50 MG: 10 INJECTION, SOLUTION INFILTRATION; PERINEURAL at 16:55

## 2017-11-22 RX ADMIN — ONDANSETRON 4 MG: 2 INJECTION INTRAMUSCULAR; INTRAVENOUS at 16:59

## 2017-11-22 RX ADMIN — CEFAZOLIN SODIUM 1000 MG: 1 SOLUTION INTRAVENOUS at 03:27

## 2017-11-22 RX ADMIN — PROPOFOL 150 MG: 10 INJECTION, EMULSION INTRAVENOUS at 16:55

## 2017-11-22 RX ADMIN — METRONIDAZOLE 500 MG: 500 TABLET ORAL at 21:09

## 2017-11-22 RX ADMIN — HEPARIN SODIUM 5000 UNITS: 5000 INJECTION, SOLUTION INTRAVENOUS; SUBCUTANEOUS at 21:09

## 2017-11-22 RX ADMIN — LABETALOL 20 MG/4 ML (5 MG/ML) INTRAVENOUS SYRINGE 5 MG: at 18:07

## 2017-11-22 RX ADMIN — LABETALOL 20 MG/4 ML (5 MG/ML) INTRAVENOUS SYRINGE 5 MG: at 17:56

## 2017-11-22 NOTE — ANESTHESIA PREPROCEDURE EVALUATION
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,    Pulmonary       GI/Hepatic            Endo/Other     GYN       Hematology  Anemia chronic blood loss anemia,     Musculoskeletal       Neurology   Psychology           Physical Exam    Airway    Mallampati score: II  TM Distance: >3 FB  Neck ROM: limited     Dental       Cardiovascular      Pulmonary      Other Findings        Anesthesia Plan  ASA Score- 2       Anesthesia Type- general with ASA Monitors  Additional Monitors:   Airway Plan: ETT  Comment: Patient seen and examined  History reviewed  Patient to be done under general anesthesia with ETT and routine monitors  Risks discussed with the patient  Consent obtained          Induction- intravenous  Informed Consent- Anesthetic plan and risks discussed with patient and son  I personally reviewed this patient with the CRNA  Discussed and agreed on the Anesthesia Plan with the CRNA  Bert Orosco

## 2017-11-22 NOTE — OP NOTE
**** GI/ENDOSCOPY REPORT ****     PATIENT NAME: Isael FOSTER - VISIT ID:  Patient ID: [id number] YOB: 1917     INTRODUCTION: Endoscopic Retrograde Cholangiopancreatography - A 89female   patient presents for an inpatient Endoscopic Retrograde   Cholangiopancreatography at [place] [address]  INDICATIONS: Cholangitis  Suspected bile duct stone, diagnosed by MRCP  CONSENT:  The benefits, risks, and alternatives to the procedure were   discussed and informed consent was obtained from the patient  PREPARATION:  EKG, pulse, pulse oximetry and blood pressure were monitored   throughout the procedure  MEDICATIONS: Anesthesia-check records     PROCEDURE:  The endoscope was passed with ease under direct visualization   and advanced to the 3rd portion of the duodenum  The scope was withdrawn   and the mucosa was carefully examined  The views were excellent  The   patient's toleration of the procedure was excellent  FINDINGS:  The third attempt at cannulation (via the major papilla) of the   bile duct was successful and entered deeply with a sphincterotome with a   guidewire  Radiographs were taken  Biliary tract: A single 1 5 cm stone   was found to be causing a mild obstruction in the common bile duct  This   obstruction was traversed  Upstream, the ducts was dilated  An extensive   biliary sphincterotomy was performed with a rotatable sphincterotome and a   guidewire  The stone was removed, successfully using, a 12 mm balloon  COMPLICATIONS: There were no complications  IMPRESSIONS: Cannulation via the major papilla  Stone in the common bile   duct  Sphincterotomy was performed  Stone extraction was performed  RECOMMENDATIONS: Call Dr zAalia Boland [phone] with questions or   problems       ESTIMATED BLOOD LOSS:     PROCEDURE CODES: 02835 - ERCP with sphincterotomy/papillotomy 33787 - ERCP   with stone removal (w/ sphincterotomy); and 95995 - ERCP with sphincterotomy     ICD-9 Codes: 574 5 Calculus of bile duct w/o mention of cholecystitis   574 51 Calculus of bile duct without mention of cholecystitis, with   obstruction     ICD-10 Codes: R93 3 Abnormal findings on diagnostic imaging of other parts   of digestive tract     PERFORMED BY: MITCHEL Juarez  on 11/22/2017  Version 1, electronically signed by MITCHEL Juarez  on   11/22/2017 at 17:27

## 2017-11-22 NOTE — PROGRESS NOTES
[facility] Internal Medicine Progress Note  Patient: Boogie Butler 80 y o  female   MRN: [id number]  PCP: Lionel Diaz MD  Unit/Bed#: MS Carlos-37 Encounter: [id number]  Date Of Visit: 11/22/17    Assessment:    Principal Problem:    Sepsis ([address] )  Active Problems:    Transaminitis    Anemia    Chronic combined systolic and diastolic heart failure (HCC)    Stage 3 chronic kidney disease    Bacteremia    Asymptomatic bacteriuria    Fecal impaction (HCC)    Cholangitis    Choledocholithiasis      Plan:    · Sepsis, present on admission:  Met criteria secondary to fever and tachycardia  Appears septic source is cholangitis  Id following  Continue Ancef and Flagyl  · Polymicrobial bacteremia:  Klebsiella and E coli  Doubt urinary source given lack of urinary symptoms  Felt to be related to cholangitis  Id has evaluated the patient, continuing on Ancef and Flagyl for now, post ERCP which transitioned to oral antibiotics for an additional 6 days for 14 days total   · Probable cholangitis: MRCP with choledocholithiasis  Plan for ERCP today at 1500  Continue to trend LFTs and bili, repeat CMP in AM    · Cholelithasis: no overt evidence of cholecystitis on exam at this time, but MRI shows wall thickening, distention  · History of colon cancer s/p colon resection: outpatient follow up   · Fecal impaction: s/p enema  Schedule miralax daily  Continue senna, colace scheduled  If no BM by tomorrow, may need to consider lactulose vs  Enema  · Asymptomatic bacteriuria:  No need for antibiotics  · Chronic combined systolic and diastolic CHF:  Last EF showed 40% in October of 2017  Continue monitor daily weights denies any notice  · Acute on chronic anemia:  Patient has a known history of iron deficiency and B12 anemia  No overt GI bleeding  Hemoccult for stool is currently pending  Repeat H&H tomorrow, if any lower, which transfuse 1 unit of packed red blood cells given advanced age    · BLE edema: elevate legs, compression stockings  VTE Pharmacologic Prophylaxis:   Pharmacologic: Heparin  Mechanical VTE Prophylaxis in Place: Yes    Patient Centered Rounds: I have performed bedside rounds with nursing staff today  Caro Files with Specialists or Other Care Team Provider: nursing, GI    Education and Discussions with Family / Patient: shanell daughter via phone (also updated by GI regarding procedure)    Time Spent for Care: 45 minutes  More than 50% of total time spent on counseling and coordination of care as described above  Current Length of Stay: 7 day(s)    Current Patient Status: Inpatient   Certification Statement: The patient will continue to require additional inpatient hospital stay due to ERCP today    Discharge Plan / Estimated Discharge Date: await ERCP, continue IV abx  Likely discharge friday    Code Status: Level 1 - Full Code      Subjective:   Ms Fausto Luna is a 51-year-old female who was concerned regarding her ERCP  She reports I want to make it to my 80 birthday"  She denies nausea, vomiting, diarrhea, abdominal pain  She has not had a bowel movement since enema was performed on the   Stool according to RN was green/black color at that time  Objective:     Vitals:   Temp (24hrs), Av 4 °F (36 9 °C), Min:97 9 °F (36 6 °C), Max:99 °F (37 2 °C)    HR:  [69-82] 74  Resp:  [18] 18  BP: (157-166)/(71-84) 160/84  SpO2:  [94 %-97 %] 94 %  Body mass index is 25 98 kg/m²  Input and Output Summary (last 24 hours): Intake/Output Summary (Last 24 hours) at 17 1126  Last data filed at 17 1012   Gross per 24 hour   Intake             1020 ml   Output             2300 ml   Net            -1280 ml       Physical Exam:     Physical Exam   Constitutional: She is oriented to person, place, and time  No distress  Appears younger than stated age   HENT:   Head: Normocephalic and atraumatic  Eyes: Conjunctivae are normal    Neck: No JVD present     Cardiovascular: Normal rate, regular rhythm and intact distal pulses  Murmur heard  Pulmonary/Chest: Effort normal and breath sounds normal  No respiratory distress  She has no wheezes  She has no rales  Abdominal: Soft  Bowel sounds are normal  She exhibits no distension  There is no tenderness  There is no rebound and no guarding  Musculoskeletal: She exhibits edema (Bilateral lower extremity edema, greatest in the ankles)  She exhibits no tenderness (negative homans sign)  Neurological: She is alert and oriented to person, place, and time  Skin: Skin is warm and dry  No rash noted  She is not diaphoretic  No erythema  There is pallor  Psychiatric: She has a normal mood and affect  Her behavior is normal    Nursing note and vitals reviewed  Additional Data:     Labs:      Results from last 7 days  Lab Units 11/21/17 0440 11/19/17 0443   WBC Thousand/uL 6 15  < > 4 87   HEMOGLOBIN g/dL 7 9*  < > 7 8*   HEMATOCRIT % 25 0*  < > 24 6*   PLATELETS Thousands/uL 313  < > 293   NEUTROS PCT %  --   --  64   LYMPHS PCT %  --   --  21   LYMPHO PCT % 23  < >  --    MONOS PCT %  --   --  11   MONO PCT MAN % 9  < >  --    EOS PCT %  --   --  3   EOSINO PCT MANUAL % 4  < >  --    < > = values in this interval not displayed  Results from last 7 days  Lab Units 11/21/17 0440   SODIUM mmol/L 142   POTASSIUM mmol/L 3 9   CHLORIDE mmol/L 108   CO2 mmol/L 27   BUN mg/dL 18   CREATININE mg/dL 1 22   CALCIUM mg/dL 8 9   TOTAL PROTEIN g/dL 5 7*   BILIRUBIN TOTAL mg/dL 1 10*   ALK PHOS U/L 294*   ALT U/L 76   AST U/L 49*   GLUCOSE RANDOM mg/dL 100       Results from last 7 days  Lab Units 11/19/17 0443   INR  0 95       * I Have Reviewed All Lab Data Listed Above  * Additional Pertinent Lab Tests Reviewed:  Deb[address] Admission Reviewed    Imaging:    Imaging Reports Reviewed Today Include: MRI  Imaging Personally Reviewed by Myself Includes:  none    Recent Cultures (last 7 days):       Results from last 7 days  Lab Units 11/18/17  0511 11/15/17  1532 11/15/17  1524 11/15/17  1508   BLOOD CULTURE  No Growth at 72 hrs  No Growth at 72 hrs   --   --    Escherichia coli*  Klebsiella pneumoniae*  Klebsiella pneumoniae*   GRAM STAIN RESULT   --   --   --  Gram negative rods  Gram negative rods   URINE CULTURE   --  >100,000 cfu/ml Proteus mirabilis*  --   --    INFLUENZA A PCR   --   --  None Detected  --    INFLUENZA B PCR   --   --  None Detected  --    RSV PCR   --   --  None Detected  --        Last 24 Hours Medication List:     ascorbic acid 250 mg Oral Daily   cefazolin 1,000 mg Intravenous Q12H   cholecalciferol 1,000 Units Oral Daily   cycloSPORINE 1 drop Both Eyes BID   docusate sodium 100 mg Oral BID   ferrous sulfate 325 mg Oral BID With Meals   folic acid-pyridoxine-cyanocobalamin 1 tablet Oral Daily   gabapentin 100 mg Oral HS   heparin (porcine) 5,000 Units Subcutaneous Q8H [address]   metroNIDAZOLE 500 mg Oral Q8H [address]   multivitamin-minerals 1 tablet Oral Daily   pantoprazole 40 mg Oral Daily Before Breakfast   polyethylene glycol 17 g Oral Daily   senna 2 tablet Oral BID   sucralfate 1 g Oral Q12H   travoprost 1 drop Both Eyes HS        Today, Patient Was Seen By: Melissa Arana PA-C    ** Please Note: This note has been constructed using a voice recognition system   **

## 2017-11-22 NOTE — ANESTHESIA POSTPROCEDURE EVALUATION
Post-Op Assessment Note      CV Status:  Stable    Mental Status:  Alert and awake    Hydration Status:  Stable and euvolemic    PONV Controlled:  Controlled    Airway Patency:  Patent and adequate    Post Op Vitals Reviewed: Yes          Staff: RACHELL           BP (!) 188/73 (11/22/17 1731)    Temp      Pulse 72 (11/22/17 1731)   Resp 20 (11/22/17 1731)    SpO2 99 % (11/22/17 1731)

## 2017-11-22 NOTE — PROGRESS NOTES
Progress Note - Infectious Disease   Raji Davies 80 y o  female MRN: [id number]  Unit/Bed#: -01 Encounter: [id number]      Impression/Recommendations:  1   Sepsis, POA: fever and tachycardia   Source of sepsis was unclear initially   Now, it appears that source of sepsis was most likely cholangitis  Patient is clinically much improved  Sepsis has resolved  Fever resolved  WBC normalized  Antibiotic plan as below  Monitor temperature/WBC  Monitor hemodynamics      2   Polymicrobic bacteremia  Marvin Fraser and E  coli   Doubt urinary source, given a different GNR in the urine culture and no urinary symptoms  It appears now that source is most likely biliary/cholangitis   Bacteremia is clearing  Continue cefazolin/Flagyl for now  Follow up repeat blood cultures  Monitor temperature/WBC  Plan for ERCP noted  Antibiotics can be changed to p o  post ERCP  Treat times 14 days total, another 6 days      3   Probable cholangitis   MRCP with choledocholithiasis and dilated CBD, most like secondary to obstruction  Patient is clinically improved with antibiotic but will most likely still need ERCP  LFT's slowly inproving but still elevated  Plan for ERCP noted      4   Cholelithiasis   No obvious cholecystitis  Patient may need elective cholecystectomy also      5   History of colon cancer, status post colon resection        6   Fecal impaction              Bowel regimen per GI     7   RALPH, superimposed on CKD   This is most likely secondary to sepsis   Creatinine is improving  Follow creatinine closely and dose-adjust antibiotics as indicated     8   Asymptomatic bacteriuria   This is most likely colonization   No antibiotic needed for this      Discussed with patient in detail regarding the above plan  Discussed with Dr Idalia Araujo from [facility] service      Antibiotics   Cefazolin/Flagyl   Antibiotics # 8     Subjective:  Patient's abdominal pain is better today  No urinary symptoms    Temperature stays down   No chills  She is tolerating the biotics well   No nausea, vomiting or diarrhea  Objective:  Vitals:  HR:  [69-82] 74  Resp:  [18] 18  BP: (157-166)/(71-84) 160/84  SpO2:  [94 %-97 %] 94 %  Temp (24hrs), Av 4 °F (36 9 °C), Min:97 9 °F (36 6 °C), Max:99 °F (37 2 °C)  Current: Temperature: 97 9 °F (36 6 °C)    Physical Exam:     General: Awake, alert, cooperative, no distress  Lungs: Expansion symmetric, no rales, no wheezing, respirations unlabored  Heart[de-identified]  Regular rate and rhythm, S1 and S2 normal, no murmur  Abdomen: Soft, nondistended, non-tender, bowel sounds active all four quadrants,        no masses, no organomegaly  Extremities: Trace edema  No erythema/warmth  No ulcer  Nontender to palpation  Skin:  No rash  Invasive Devices     Peripheral Intravenous Line            Peripheral IV 17 Right Forearm 3 days                Labs studies:   I have personally reviewed pertinent labs  Results from last 7 days  Lab Units 17  0440 17  0543 17  0443   SODIUM mmol/L 142 140 139   POTASSIUM mmol/L 3 9 3 8 3 8   CHLORIDE mmol/L 108 107 107   CO2 mmol/L 27 26 26   ANION GAP mmol/L 7 7 6   BUN mg/dL 18 18 16   CREATININE mg/dL 1 22 1 21 1 15   EGFR ml/min/1 73sq m 37 37 39   GLUCOSE RANDOM mg/dL 100 94 93   CALCIUM mg/dL 8 9 8 7 8 8   AST U/L 49* 46* 79*   ALT U/L 76 108* 170*   ALK PHOS U/L 294* 304* 326*   TOTAL PROTEIN g/dL 5 7* 5 9* 5 8*   ALBUMIN g/dL 2 3* 2 3* 2 3*   BILIRUBIN TOTAL mg/dL 1 10* 1 30* 2 00*       Results from last 7 days  Lab Units 17  0440 17  0543 17  0443   WBC Thousand/uL 6 15 6 25 4 87   HEMOGLOBIN g/dL 7 9* 8 3* 7 8*   PLATELETS Thousands/uL 313 326 293       Results from last 7 days  Lab Units 17  0511 11/15/17  1532 11/15/17  1524 11/15/17  1508   BLOOD CULTURE  No Growth at 72 hrs    No Growth at 72 hrs   --   --    Escherichia coli*  Klebsiella pneumoniae*  Klebsiella pneumoniae*   GRAM STAIN RESULT   --   -- --  Gram negative rods  Gram negative rods   URINE CULTURE   --  >100,000 cfu/ml Proteus mirabilis*  --   --    INFLUENZA A PCR   --   --  None Detected  --    INFLUENZA B PCR   --   --  None Detected  --    RSV PCR   --   --  None Detected  --        Imaging Studies:   I have personally reviewed pertinent imaging study reports and images in PACS  EKG, Pathology, and Other Studies:   I have personally reviewed pertinent reports

## 2017-11-23 PROBLEM — K59.00 CONSTIPATION: Status: ACTIVE | Noted: 2017-11-19

## 2017-11-23 PROBLEM — R74.01 TRANSAMINITIS: Status: RESOLVED | Noted: 2017-10-04 | Resolved: 2017-11-23

## 2017-11-23 LAB
ALBUMIN SERPL BCP-MCNC: 2.5 G/DL (ref 3.5–5)
ALP SERPL-CCNC: 267 U/L (ref 46–116)
ALT SERPL W P-5'-P-CCNC: 50 U/L (ref 12–78)
ANION GAP SERPL CALCULATED.3IONS-SCNC: 8 MMOL/L (ref 4–13)
APTT PPP: 34 SECONDS (ref 23–35)
AST SERPL W P-5'-P-CCNC: 44 U/L (ref 5–45)
BACTERIA BLD CULT: NORMAL
BACTERIA BLD CULT: NORMAL
BASOPHILS # BLD AUTO: 0.02 THOUSANDS/ΜL (ref 0–0.1)
BASOPHILS NFR BLD AUTO: 0 % (ref 0–1)
BILIRUB SERPL-MCNC: 0.9 MG/DL (ref 0.2–1)
BUN SERPL-MCNC: 20 MG/DL (ref 5–25)
CALCIUM SERPL-MCNC: 8.7 MG/DL (ref 8.3–10.1)
CHLORIDE SERPL-SCNC: 107 MMOL/L (ref 100–108)
CO2 SERPL-SCNC: 28 MMOL/L (ref 21–32)
CREAT SERPL-MCNC: 1.24 MG/DL (ref 0.6–1.3)
EOSINOPHIL # BLD AUTO: 0.17 THOUSAND/ΜL (ref 0–0.61)
EOSINOPHIL NFR BLD AUTO: 3 % (ref 0–6)
ERYTHROCYTE [DISTWIDTH] IN BLOOD BY AUTOMATED COUNT: 15.7 % (ref 11.6–15.1)
GFR SERPL CREATININE-BSD FRML MDRD: 36 ML/MIN/1.73SQ M
GLUCOSE SERPL-MCNC: 110 MG/DL (ref 65–140)
HCT VFR BLD AUTO: 26.1 % (ref 34.8–46.1)
HGB BLD-MCNC: 8.2 G/DL (ref 11.5–15.4)
INR PPP: 0.93 (ref 0.86–1.16)
LYMPHOCYTES # BLD AUTO: 1.37 THOUSANDS/ΜL (ref 0.6–4.47)
LYMPHOCYTES NFR BLD AUTO: 23 % (ref 14–44)
MAGNESIUM SERPL-MCNC: 2 MG/DL (ref 1.6–2.6)
MCH RBC QN AUTO: 30 PG (ref 26.8–34.3)
MCHC RBC AUTO-ENTMCNC: 31.4 G/DL (ref 31.4–37.4)
MCV RBC AUTO: 96 FL (ref 82–98)
MONOCYTES # BLD AUTO: 0.56 THOUSAND/ΜL (ref 0.17–1.22)
MONOCYTES NFR BLD AUTO: 9 % (ref 4–12)
NEUTROPHILS # BLD AUTO: 3.94 THOUSANDS/ΜL (ref 1.85–7.62)
NEUTS SEG NFR BLD AUTO: 65 % (ref 43–75)
PLATELET # BLD AUTO: 352 THOUSANDS/UL (ref 149–390)
PMV BLD AUTO: 9.9 FL (ref 8.9–12.7)
POTASSIUM SERPL-SCNC: 3.9 MMOL/L (ref 3.5–5.3)
PROT SERPL-MCNC: 6 G/DL (ref 6.4–8.2)
PROTHROMBIN TIME: 12.7 SECONDS (ref 12.1–14.4)
RBC # BLD AUTO: 2.73 MILLION/UL (ref 3.81–5.12)
SODIUM SERPL-SCNC: 143 MMOL/L (ref 136–145)
WBC # BLD AUTO: 6.06 THOUSAND/UL (ref 4.31–10.16)

## 2017-11-23 PROCEDURE — 83735 ASSAY OF MAGNESIUM: CPT | Performed by: PHYSICIAN ASSISTANT

## 2017-11-23 PROCEDURE — 85025 COMPLETE CBC W/AUTO DIFF WBC: CPT | Performed by: PHYSICIAN ASSISTANT

## 2017-11-23 PROCEDURE — 80053 COMPREHEN METABOLIC PANEL: CPT | Performed by: PHYSICIAN ASSISTANT

## 2017-11-23 PROCEDURE — 85610 PROTHROMBIN TIME: CPT | Performed by: PHYSICIAN ASSISTANT

## 2017-11-23 PROCEDURE — 85730 THROMBOPLASTIN TIME PARTIAL: CPT | Performed by: PHYSICIAN ASSISTANT

## 2017-11-23 RX ORDER — LACTULOSE 20 G/30ML
20 SOLUTION ORAL ONCE
Status: COMPLETED | OUTPATIENT
Start: 2017-11-23 | End: 2017-11-23

## 2017-11-23 RX ADMIN — FERROUS SULFATE TAB 325 MG (65 MG ELEMENTAL FE) 325 MG: 325 (65 FE) TAB at 08:34

## 2017-11-23 RX ADMIN — METRONIDAZOLE 500 MG: 500 TABLET ORAL at 21:36

## 2017-11-23 RX ADMIN — HEPARIN SODIUM 5000 UNITS: 5000 INJECTION, SOLUTION INTRAVENOUS; SUBCUTANEOUS at 05:47

## 2017-11-23 RX ADMIN — LACTULOSE 20 G: 20 SOLUTION ORAL at 09:57

## 2017-11-23 RX ADMIN — METRONIDAZOLE 500 MG: 500 TABLET ORAL at 14:33

## 2017-11-23 RX ADMIN — CEFAZOLIN SODIUM 1000 MG: 1 SOLUTION INTRAVENOUS at 04:25

## 2017-11-23 RX ADMIN — CHOLECALCIFEROL TAB 25 MCG (1000 UNIT) 1000 UNITS: 25 TAB at 08:35

## 2017-11-23 RX ADMIN — TRAVOPROST 1 DROP: 0.04 SOLUTION/ DROPS OPHTHALMIC at 21:37

## 2017-11-23 RX ADMIN — DOCUSATE SODIUM 100 MG: 100 CAPSULE, LIQUID FILLED ORAL at 16:59

## 2017-11-23 RX ADMIN — SENNOSIDES 17.2 MG: 8.6 TABLET, FILM COATED ORAL at 08:34

## 2017-11-23 RX ADMIN — GABAPENTIN 100 MG: 100 CAPSULE ORAL at 21:37

## 2017-11-23 RX ADMIN — FOLIC ACID-PYRIDOXINE-CYANOCOBALAMIN TAB 2.5-25-2 MG 1 TABLET: 2.5-25-2 TAB at 08:35

## 2017-11-23 RX ADMIN — FERROUS SULFATE TAB 325 MG (65 MG ELEMENTAL FE) 325 MG: 325 (65 FE) TAB at 16:59

## 2017-11-23 RX ADMIN — POLYETHYLENE GLYCOL 3350 17 G: 17 POWDER, FOR SOLUTION ORAL at 08:34

## 2017-11-23 RX ADMIN — METRONIDAZOLE 500 MG: 500 TABLET ORAL at 05:47

## 2017-11-23 RX ADMIN — CEFAZOLIN SODIUM 1000 MG: 1 SOLUTION INTRAVENOUS at 14:35

## 2017-11-23 RX ADMIN — PANTOPRAZOLE SODIUM 40 MG: 40 TABLET, DELAYED RELEASE ORAL at 05:47

## 2017-11-23 RX ADMIN — Medication 1 TABLET: at 08:34

## 2017-11-23 RX ADMIN — SUCRALFATE 1 G: 1 TABLET ORAL at 08:34

## 2017-11-23 RX ADMIN — HEPARIN SODIUM 5000 UNITS: 5000 INJECTION, SOLUTION INTRAVENOUS; SUBCUTANEOUS at 21:37

## 2017-11-23 RX ADMIN — DOCUSATE SODIUM 100 MG: 100 CAPSULE, LIQUID FILLED ORAL at 08:35

## 2017-11-23 RX ADMIN — OXYCODONE HYDROCHLORIDE AND ACETAMINOPHEN 250 MG: 500 TABLET ORAL at 08:35

## 2017-11-23 RX ADMIN — SUCRALFATE 1 G: 1 TABLET ORAL at 21:36

## 2017-11-23 RX ADMIN — HEPARIN SODIUM 5000 UNITS: 5000 INJECTION, SOLUTION INTRAVENOUS; SUBCUTANEOUS at 14:33

## 2017-11-23 RX ADMIN — SENNOSIDES 17.2 MG: 8.6 TABLET, FILM COATED ORAL at 17:01

## 2017-11-23 NOTE — PROGRESS NOTES
[facility] Internal Medicine Progress Note  Patient: Viraj Evans 80 y o  female   MRN: [id number]  PCP: Jose Hannah MD  Unit/Bed#: -88 Encounter: [id number]  Date Of Visit: 11/23/17    Assessment:    Principal Problem:    Sepsis ([address] )  Active Problems:    Anemia    Chronic combined systolic and diastolic heart failure (HCC)    Stage 3 chronic kidney disease    Bacteremia    Asymptomatic bacteriuria    Constipation    Cholangitis    Choledocholithiasis      Plan:    · Sepsis, POA:  Fever and tachycardia  Septic source cholangitis  Currently on Ancef and Flagyl and plan will be to change to oral antibiotics for discharge  · Polymicrobial bacteremia:  Klebsiella and E coli  Felt to be related to cholangitis  Id following  Continuing Ancef and Flagyl for now, will transition to oral antibiotics for discharge  Will require an additional 5 days to complete 14 days total   · Probable cholangitis with choledocholithiasis:  Status post ERCP with stone extraction and sphincterotomy  LFTs and bilirubin improved  Continue regular diet  · Cholelithiasis:  No evidence of cholecystitis on exam at this time  MRI was noted to show wall thickening and distention  Outpatient follow-up with surgery for potential further evaluation however no need for urgent surgical evaluation  · History of colon cancer status post colon resection:  Outpatient follow-up  · Constipation: Status post enema  Patient currently on senna, MiraLax, Colace  Still without bowel movement  Mineral oil enema was given 11/15  Add lactulose x 1  If no improvement, repeat enema to be given  · BLE edema: elevate legs, compression stockings  · Acute on chronic anemia: history of iron deficiency and B12 anemia  No overt GI bleeding, hemoccult stool pending (has not had BM)  · Chronic combined systolic and diastolic CHF:  Last EF 35% in October of 2017  Continue to monitor daily weights, I&Os    · Asymptomatic bacteriuria:  No need for antibiotics  · Peripheral neuropathy:  Continue gabapentin  · CKD3: at baseline creatinine      VTE Pharmacologic Prophylaxis:   Pharmacologic: Heparin  Mechanical VTE Prophylaxis in Place: Yes    Patient Centered Rounds: I have performed bedside rounds with nursing staff today  Arturo Winn    Discussions with Specialists or Other Care Team Provider: patient    Education and Discussions with Family / Patient: daugther via phone    Time Spent for Care: 30 minutes  More than 50% of total time spent on counseling and coordination of care as described above  Current Length of Stay: 8 day(s)    Current Patient Status: Inpatient   Certification Statement: The patient will continue to require additional inpatient hospital stay due to no bowel movements for 5 days    Discharge Plan / Estimated Discharge Date: needs BM prior to d/c to [facility]  Suspect will be discharged tomorrow to [facility]  Patient's family request wheelchair Shelley ashford for transport via [employer] service    Code Status: Level 1 - Full Code      Subjective:   Ms Mathew Preciado is frustrated regarding that she has not had a bowel movement and is now reporting some rectal pain given that she feels urgency to go to the bathroom but is unable to  She has no nausea or vomiting  She has no chest pain or shortness of breath  She reports that she still has peripheral neuropathy which is bothersome to her  Objective:     Vitals:   Temp (24hrs), Av 9 °F (36 6 °C), Min:97 4 °F (36 3 °C), Max:98 8 °F (37 1 °C)    HR:  [67-93] 74  Resp:  [16-20] 18  BP: (119-196)/(58-88) 123/61  SpO2:  [92 %-99 %] 94 %  Body mass index is 26 15 kg/m²  Input and Output Summary (last 24 hours):        Intake/Output Summary (Last 24 hours) at 17 4396  Last data filed at 17 1720   Gross per 24 hour   Intake              200 ml   Output              200 ml   Net                0 ml       Physical Exam:     Physical Exam   Constitutional: She is oriented to person, place, and time  No distress  HENT:   Head: Normocephalic and atraumatic  Eyes: Conjunctivae are normal    Neck: No JVD present  Cardiovascular: Normal rate, regular rhythm, normal heart sounds and intact distal pulses  No murmur heard  Pulmonary/Chest: Effort normal and breath sounds normal  No respiratory distress  She has no wheezes  She has no rales  Abdominal: Soft  Bowel sounds are normal  She exhibits no distension  There is no tenderness  There is no rebound and no guarding  Negative murphys sign  Normoactive BS   Musculoskeletal: She exhibits edema (bilateral ankle edema)  She exhibits no tenderness  Neurological: She is alert and oriented to person, place, and time  Skin: Skin is warm and dry  No rash noted  She is not diaphoretic  No erythema  Psychiatric: She has a normal mood and affect  Her behavior is normal    Nursing note and vitals reviewed  Additional Data:     Labs:      Results from last 7 days  Lab Units 11/23/17  0553   WBC Thousand/uL 6 06   HEMOGLOBIN g/dL 8 2*   HEMATOCRIT % 26 1*   PLATELETS Thousands/uL 352   NEUTROS PCT % 65   LYMPHS PCT % 23   MONOS PCT % 9   EOS PCT % 3       Results from last 7 days  Lab Units 11/23/17  0553   SODIUM mmol/L 143   POTASSIUM mmol/L 3 9   CHLORIDE mmol/L 107   CO2 mmol/L 28   BUN mg/dL 20   CREATININE mg/dL 1 24   CALCIUM mg/dL 8 7   TOTAL PROTEIN g/dL 6 0*   BILIRUBIN TOTAL mg/dL 0 90   ALK PHOS U/L 267*   ALT U/L 50   AST U/L 44   GLUCOSE RANDOM mg/dL 110       Results from last 7 days  Lab Units 11/23/17  0553   INR  0 93       * I Have Reviewed All Lab Data Listed Above  * Additional Pertinent Lab Tests Reviewed: All Labs Within Last 24 Hours Reviewed    Imaging:    Imaging Reports Reviewed Today Include: ERCP report  Imaging Personally Reviewed by Myself Includes:  none    Recent Cultures (last 7 days):       Results from last 7 days  Lab Units 11/18/17  0511   BLOOD CULTURE  No Growth After 4 Days    No Growth After 4 Days        Last 24 Hours Medication List:     ascorbic acid 250 mg Oral Daily   cefazolin 1,000 mg Intravenous Q12H   cholecalciferol 1,000 Units Oral Daily   docusate sodium 100 mg Oral BID   ferrous sulfate 325 mg Oral BID With Meals   folic acid-pyridoxine-cyanocobalamin 1 tablet Oral Daily   gabapentin 100 mg Oral HS   heparin (porcine) 5,000 Units Subcutaneous Q8H [address]   lactulose 20 g Oral Once   metroNIDAZOLE 500 mg Oral Q8H [address]   multivitamin-minerals 1 tablet Oral Daily   pantoprazole 40 mg Oral Daily Before Breakfast   polyethylene glycol 17 g Oral Daily   senna 2 tablet Oral BID   sucralfate 1 g Oral Q12H   travoprost 1 drop Both Eyes HS        Today, Patient Was Seen By: Emiliana Larson PA-C    ** Please Note: This note has been constructed using a voice recognition system   **

## 2017-11-23 NOTE — PROGRESS NOTES
[facility]  Progress Note - Araseli Waite 80 y o  female MRN: [id number]    Unit/Bed#: -01 Encounter: [id number]    Assessment/Plan:  1  Choledocholithiasis resulting in biliary sepsis  -status post ERCP with stone extraction, sphincterotomy  Currently doing well without any acute issues  T bili has normalized  Tolerating p o  intake  No further management plan at this time  Will sign off for now please re-consult if there is any acute issues or concerns  Subjective:   Overall doing well  Tolerating p o  intake  No subjective fevers or chills  Overall no acute distress  Denies any abdominal pain, or nausea  Objective:     Vitals: Blood pressure 123/61, pulse 74, temperature 98 3 °F (36 8 °C), temperature source Oral, resp  rate 18, weight 73 5 kg (162 lb 0 6 oz), SpO2 94 %  ,Body mass index is 26 15 kg/m²  Intake/Output Summary (Last 24 hours) at 11/23/17 8289  Last data filed at 11/22/17 1720   Gross per 24 hour   Intake              200 ml   Output              200 ml   Net                0 ml       Physical Exam:    GEN: wn/wd, NAD  HEENT: MMM, no cervical or supraclavicular LAD, anciteric  CV: RRR, no m/r/g  CHEST: CTA b/l, no w/r/r  ABD: +BS, soft, NT/ND, no hepatosplenomegaly  EXT: no c/c/e  SKIN: no rashes  NEURO: aaox3      Invasive Devices     Peripheral Intravenous Line            Peripheral IV 11/19/17 Right Forearm 4 days                        Lab, Imaging and other studies:     Admission on 11/15/2017   No results displayed because visit has over 200 results  I have personally reviewed pertinent reports        Current Facility-Administered Medications   Medication Dose Route Frequency    acetaminophen (TYLENOL) tablet 650 mg  650 mg Oral Q6H PRN    aluminum-magnesium hydroxide-simethicone (MYLANTA) 200-200-20 mg/5 mL oral suspension 30 mL  30 mL Oral Q4H PRN    ascorbic acid (VITAMIN C) tablet 250 mg  250 mg Oral Daily    calcium carbonate (TUMS) chewable tablet 1,000 mg  1,000 mg Oral Daily PRN    ceFAZolin (ANCEF) IVPB (premix) 1,000 mg  1,000 mg Intravenous Q12H    cholecalciferol (VITAMIN D3) tablet 1,000 Units  1,000 Units Oral Daily    docusate sodium (COLACE) capsule 100 mg  100 mg Oral BID    ferrous sulfate tablet 325 mg  325 mg Oral BID With Meals    folic acid-pyridoxine-cyanocobalamin 2 5-25-2 mg per tablet 1 tablet  1 tablet Oral Daily    gabapentin (NEURONTIN) capsule 100 mg  100 mg Oral HS    heparin (porcine) subcutaneous injection 5,000 Units  5,000 Units Subcutaneous Q8H [address]    metroNIDAZOLE (FLAGYL) tablet 500 mg  500 mg Oral Q8H [address]    multivitamin-minerals (CENTRUM) tablet 1 tablet  1 tablet Oral Daily    ondansetron (ZOFRAN) injection 4 mg  4 mg Intravenous Q6H PRN    pantoprazole (PROTONIX) EC tablet 40 mg  40 mg Oral Daily Before Breakfast    polyethylene glycol (MIRALAX) packet 17 g  17 g Oral Daily    senna (SENOKOT) tablet 17 2 mg  2 tablet Oral BID    sucralfate (CARAFATE) tablet 1 g  1 g Oral Q12H    travoprost (TRAVATAN-Z) 0 004 % ophthalmic solution 1 drop  1 drop Both Eyes HS

## 2017-11-23 NOTE — PROGRESS NOTES
Progress Note - Infectious Disease   Mickey Newman 80 y o  female MRN: [id number]  Unit/Bed#: -01 Encounter: [id number]      Impression/Recommendations:  1   Sepsis, POA: fever and tachycardia   Source of sepsis was unclear initially   Now, it appears that source of sepsis was most likely cholangitis   Patient is clinically much improved   Sepsis has resolved   Fever resolved   WBC normalized     Antibiotic plan as below  Monitor temperature/WBC  Monitor hemodynamics      2   Polymicrobic bacteremia  Faythe Genoveva and E  coli   Doubt urinary source, given a different GNR in the urine culture and no urinary symptoms  It appears now that source is most likely biliary/cholangitis   Bacteremia cleared  Continue cefazolin/Flagyl for now  Follow up repeat blood cultures  Monitor temperature/WBC  Change to p o  Keflex/Flagyl in a rena Ernst Treat x 14 days total, another 5 days      3   Probable cholangitis   MRCP with choledocholithiasis and dilated CBD, most like secondary to obstruction  Patient is now status post ERCP with papillotomy and removal of CBD stone  LFT's improving  Monitor LFTs      4   Cholelithiasis   No obvious cholecystitis  Patient may need elective cholecystectomy down the line also      5   History of colon cancer, status post colon resection        6   Fecal impaction              Bowel regimen per GI     7   RALPH, superimposed on CKD   This is most likely secondary to sepsis   Creatinine is improving  Follow creatinine closely and dose-adjust antibiotics as indicated     8   Asymptomatic bacteriuria   This is most likely colonization   No antibiotic needed for this      Discussed with patient in detail regarding the above plan  Discussed with slim service      Antibiotics   Cefazolin/Flagyl   Antibiotics # 9     Subjective:  Patient is status post ERCP and papillotomy, with removal of CBD stone  Patient has no further abdominal pain  No urinary symptoms    Temperature stays down   No chills  She is tolerating the biotics well   No nausea, vomiting or diarrhea  Objective:  Vitals:  HR:  [67-93] 74  Resp:  [16-20] 18  BP: (119-196)/(58-88) 123/61  SpO2:  [92 %-99 %] 94 %  Temp (24hrs), Av 9 °F (36 6 °C), Min:97 4 °F (36 3 °C), Max:98 8 °F (37 1 °C)  Current: Temperature: 98 3 °F (36 8 °C)    Physical Exam:     General: Awake, alert, cooperative, no distress  Lungs: Expansion symmetric, no rales, no wheezing, respirations unlabored  Heart[de-identified]  Regular rate and rhythm, S1 and S2 normal, no murmur  Abdomen: Soft, nondistended, non-tender, bowel sounds active all four quadrants,        no masses, no organomegaly  Extremities: No edema  No erythema/warmth  No ulcer  Nontender to palpation  Skin:  No rash  Invasive Devices     Peripheral Intravenous Line            Peripheral IV 17 Right Forearm 4 days                Labs studies:   I have personally reviewed pertinent labs  Results from last 7 days  Lab Units 17  0553 17  0543   SODIUM mmol/L 143 142 140   POTASSIUM mmol/L 3 9 3 9 3 8   CHLORIDE mmol/L 107 108 107   CO2 mmol/L 28 27 26   ANION GAP mmol/L 8 7 7   BUN mg/dL 20 18 18   CREATININE mg/dL 1 24 1 22 1 21   EGFR ml/min/1 73sq m 36 37 37   GLUCOSE RANDOM mg/dL 110 100 94   CALCIUM mg/dL 8 7 8 9 8 7   AST U/L 44 49* 46*   ALT U/L 50 76 108*   ALK PHOS U/L 267* 294* 304*   TOTAL PROTEIN g/dL 6 0* 5 7* 5 9*   ALBUMIN g/dL 2 5* 2 3* 2 3*   BILIRUBIN TOTAL mg/dL 0 90 1 10* 1 30*       Results from last 7 days  Lab Units 17  0553 17  0440 17  0543   WBC Thousand/uL 6 06 6 15 6 25   HEMOGLOBIN g/dL 8 2* 7 9* 8 3*   PLATELETS Thousands/uL 352 313 326       Results from last 7 days  Lab Units 17  0511   BLOOD CULTURE  No Growth After 4 Days  No Growth After 4 Days  Imaging Studies:   I have personally reviewed pertinent imaging study reports and images in PACS      EKG, Pathology, and Other Studies:   I have personally reviewed pertinent reports

## 2017-11-24 VITALS
TEMPERATURE: 98.2 F | WEIGHT: 162.04 LBS | SYSTOLIC BLOOD PRESSURE: 130 MMHG | RESPIRATION RATE: 18 BRPM | BODY MASS INDEX: 26.15 KG/M2 | OXYGEN SATURATION: 99 % | HEART RATE: 85 BPM | DIASTOLIC BLOOD PRESSURE: 60 MMHG

## 2017-11-24 PROBLEM — A41.9 SEPSIS (HCC): Status: RESOLVED | Noted: 2017-11-15 | Resolved: 2017-11-24

## 2017-11-24 PROBLEM — K80.50 CHOLEDOCHOLITHIASIS: Status: RESOLVED | Noted: 2017-11-15 | Resolved: 2017-11-24

## 2017-11-24 PROBLEM — K59.00 CONSTIPATION: Status: RESOLVED | Noted: 2017-11-19 | Resolved: 2017-11-24

## 2017-11-24 LAB
ALBUMIN SERPL BCP-MCNC: 2.5 G/DL (ref 3.5–5)
ALP SERPL-CCNC: 222 U/L (ref 46–116)
ALT SERPL W P-5'-P-CCNC: 39 U/L (ref 12–78)
ANION GAP SERPL CALCULATED.3IONS-SCNC: 7 MMOL/L (ref 4–13)
AST SERPL W P-5'-P-CCNC: 34 U/L (ref 5–45)
BILIRUB SERPL-MCNC: 0.7 MG/DL (ref 0.2–1)
BUN SERPL-MCNC: 17 MG/DL (ref 5–25)
CALCIUM SERPL-MCNC: 9 MG/DL (ref 8.3–10.1)
CHLORIDE SERPL-SCNC: 107 MMOL/L (ref 100–108)
CO2 SERPL-SCNC: 28 MMOL/L (ref 21–32)
CREAT SERPL-MCNC: 1.25 MG/DL (ref 0.6–1.3)
GFR SERPL CREATININE-BSD FRML MDRD: 36 ML/MIN/1.73SQ M
GLUCOSE SERPL-MCNC: 105 MG/DL (ref 65–140)
POTASSIUM SERPL-SCNC: 4.2 MMOL/L (ref 3.5–5.3)
PROT SERPL-MCNC: 5.9 G/DL (ref 6.4–8.2)
SODIUM SERPL-SCNC: 142 MMOL/L (ref 136–145)

## 2017-11-24 PROCEDURE — 80053 COMPREHEN METABOLIC PANEL: CPT | Performed by: PHYSICIAN ASSISTANT

## 2017-11-24 PROCEDURE — 97110 THERAPEUTIC EXERCISES: CPT

## 2017-11-24 PROCEDURE — 97116 GAIT TRAINING THERAPY: CPT

## 2017-11-24 RX ORDER — CEPHALEXIN 500 MG/1
500 CAPSULE ORAL EVERY 8 HOURS SCHEDULED
Status: DISCONTINUED | OUTPATIENT
Start: 2017-11-24 | End: 2017-11-24 | Stop reason: HOSPADM

## 2017-11-24 RX ORDER — METRONIDAZOLE 500 MG/1
500 TABLET ORAL EVERY 8 HOURS SCHEDULED
Refills: 0
Start: 2017-11-24 | End: 2017-11-28

## 2017-11-24 RX ORDER — CEPHALEXIN 500 MG/1
500 CAPSULE ORAL EVERY 8 HOURS SCHEDULED
Refills: 0
Start: 2017-11-24 | End: 2017-11-28

## 2017-11-24 RX ORDER — GABAPENTIN 100 MG/1
100 CAPSULE ORAL
Refills: 0
Start: 2017-11-24 | End: 2017-11-24 | Stop reason: HOSPADM

## 2017-11-24 RX ADMIN — PANTOPRAZOLE SODIUM 40 MG: 40 TABLET, DELAYED RELEASE ORAL at 06:30

## 2017-11-24 RX ADMIN — HEPARIN SODIUM 5000 UNITS: 5000 INJECTION, SOLUTION INTRAVENOUS; SUBCUTANEOUS at 06:30

## 2017-11-24 RX ADMIN — SENNOSIDES 17.2 MG: 8.6 TABLET, FILM COATED ORAL at 09:28

## 2017-11-24 RX ADMIN — METRONIDAZOLE 500 MG: 500 TABLET ORAL at 06:30

## 2017-11-24 RX ADMIN — CEFAZOLIN SODIUM 1000 MG: 1 SOLUTION INTRAVENOUS at 03:30

## 2017-11-24 RX ADMIN — FERROUS SULFATE TAB 325 MG (65 MG ELEMENTAL FE) 325 MG: 325 (65 FE) TAB at 06:30

## 2017-11-24 RX ADMIN — CEPHALEXIN 500 MG: 500 CAPSULE ORAL at 14:26

## 2017-11-24 RX ADMIN — METRONIDAZOLE 500 MG: 500 TABLET ORAL at 14:26

## 2017-11-24 RX ADMIN — OXYCODONE HYDROCHLORIDE AND ACETAMINOPHEN 250 MG: 500 TABLET ORAL at 09:28

## 2017-11-24 RX ADMIN — SUCRALFATE 1 G: 1 TABLET ORAL at 09:28

## 2017-11-24 RX ADMIN — Medication 1 TABLET: at 09:28

## 2017-11-24 RX ADMIN — HEPARIN SODIUM 5000 UNITS: 5000 INJECTION, SOLUTION INTRAVENOUS; SUBCUTANEOUS at 14:26

## 2017-11-24 RX ADMIN — FOLIC ACID-PYRIDOXINE-CYANOCOBALAMIN TAB 2.5-25-2 MG 1 TABLET: 2.5-25-2 TAB at 09:29

## 2017-11-24 RX ADMIN — CHOLECALCIFEROL TAB 25 MCG (1000 UNIT) 1000 UNITS: 25 TAB at 09:28

## 2017-11-24 RX ADMIN — DOCUSATE SODIUM 100 MG: 100 CAPSULE, LIQUID FILLED ORAL at 09:28

## 2017-11-24 NOTE — PLAN OF CARE
Problem: DISCHARGE PLANNING - CARE MANAGEMENT  Goal: Discharge to post-acute care or home with appropriate resources  INTERVENTIONS:  - Conduct assessment to determine patient/family and health care team treatment goals, and need for post-acute services based on payer coverage, community resources, and patient preferences, and barriers to discharge  - Address psychosocial, clinical, and financial barriers to discharge as identified in assessment in conjunction with the patient/family and health care team  - Arrange appropriate level of post-acute services according to patient's   needs and preference and payer coverage in collaboration with the physician and health care team  - Communicate with and update the patient/family, physician, and health care team regarding progress on the discharge plan  - Arrange appropriate transportation to post-acute venues   Outcome: Completed Date Met: 11/24/17  CCM spoke with ed and reviewed IMM  Patient will discharge from [address] [place] [address] to [address] [place] [address] at 4:30 via wheelchair van  Family in agreeance and patient will discharge later today  No other needs  Nurse will need to call report via phone  CCM will call [facility] and inform them of time

## 2017-11-24 NOTE — PLAN OF CARE
Problem: PHYSICAL THERAPY ADULT  Goal: Performs mobility at highest level of function for planned discharge setting  See evaluation for individualized goals  Treatment/Interventions: Functional transfer training, LE strengthening/ROM, Therapeutic exercise, Elevations, Endurance training, Gait training, Spoke to nursing, Equipment eval/education, Bed mobility  Equipment Recommended: Rell Pierre       See flowsheet documentation for full assessment, interventions and recommendations  Outcome: Progressing  Prognosis: Fair  Problem List: Decreased strength, Decreased range of motion, Decreased endurance, Impaired balance, Decreased mobility, Decreased coordination, Decreased cognition, Impaired judgement, Decreased safety awareness, Impaired vision (gait deviations )  Assessment: Pt appears to need less overall assistance for transfers and ambulation this session, but mobility trials are easier with A of 2 burden of care due to pt's balance and needs for A with walker management  Pt was able to amb further total distances and further trials this session  Recommend continued trials with rolling walker as pt tolerates, and progress to decreasing burden of care consistently to an A of 1 level  Pt did make some progress from initial eval, but slow progress an has not met her goals  Goals updated below  Barriers to Discharge: Inaccessible home environment, Decreased caregiver support     Recommendation: Post acute IP rehab          See flowsheet documentation for full assessment

## 2017-11-24 NOTE — PHYSICAL THERAPY NOTE
PHYSICAL THERAPY NOTE  Patient Name: Sorin Blair  BSSHB'K Date: 11/24/2017 11/24/17 0901   Pain Assessment   Pain Assessment No/denies pain   Restrictions/Precautions   Other Precautions Bed Alarm; Chair Alarm;Cognitive;Hard of hearing; Fall Risk   General   Chart Reviewed Yes   Response to Previous Treatment Patient reporting fatigue but able to participate  Family/Caregiver Present No   Cognition   Overall Cognitive Status Impaired   Arousal/Participation Cooperative   Orientation Level Oriented to person;Oriented to place   Memory Decreased short term memory;Decreased recall of recent events   Following Commands Follows one step commands inconsistently   Subjective   Subjective Pt initially lethargic, but jovial by the end of session  Pt knows that she is being DCed today  Bed Mobility   Supine to Sit 3  Moderate assistance   Additional items Assist x 1; Increased time required;Verbal cues;HOB elevated   Transfers   Sit to Stand 4  Minimal assistance  (x2 trials)   Additional items Assist x 2; Increased time required;Verbal cues  (instruction for forward weight shift, pushing down thru feet)   Stand to Sit 4  Minimal assistance  (x2 trials)   Additional items Assist x 2;Verbal cues; Increased time required  (instruction to bend @ hips/trunk, hand placement)   Ambulation/Elevation   Gait pattern Retropulsion; Step to;Short stride; Shuffling   Gait Assistance 4  Minimal assist   Additional items Assist x 2;Verbal cues  (A of 2 for walker mangement and balance)   Assistive Device Rolling walker   Distance 2'+4   Stair Management Assistance Not tested   Balance   Static Sitting Good  (>8 min)   Static Standing Poor +  (x2 min @ commode)   Ambulatory Poor -   Endurance Deficit   Endurance Deficit Yes   Endurance Deficit Description limited standing tolerance and anb distance   Activity Tolerance   Activity Tolerance Patient limited by fatigue   Nurse Made Aware spoke to Alex Gallegos and Concepción Hanks PCA   Exercises   Heelslides Supine;5 reps;AAROM; Bilateral   Ankle Pumps Supine;15 reps;AAROM; Bilateral   Assessment   Prognosis Fair   Problem List Decreased strength;Decreased range of motion;Decreased endurance; Impaired balance;Decreased mobility; Decreased coordination;Decreased cognition; Impaired judgement;Decreased safety awareness; Impaired vision  (gait deviations )   Assessment Pt appears to need less overall assistance for transfers and ambulation this session, but mobility trials are easier with A of 2 burden of care due to pt's balance and needs for A with walker management  Pt was able to amb further total distances and further trials this session  Recommend continued trials with rolling walker as pt tolerates, and progress to decreasing burden of care consistently to an A of 1 level  Pt did make some progress from initial eval, but slow progress an has not met her goals  Goals updated below   Barriers to Discharge Inaccessible home environment;Decreased caregiver support   Goals   Patient Goals to walk better   STG Expiration Date 12/04/2017   Short Term Goal #1 Pt will be min A for bed mobility and transfers  Pt will be min A for ambulation x 50 ft with rolling walker   Add goal of WC mobility via WC propulsion for 50' w/ UE and Les, indep brakes WC   Treatment Day 1   Plan   Treatment/Interventions Functional transfer training;LE strengthening/ROM; Patient/family training;Equipment eval/education; Bed mobility;Gait training;Cognitive reorientation; Endurance training; Therapeutic exercise;Spoke to nursing   Progress Slow progress, decreased activity tolerance   PT Frequency 5x/wk   Recommendation   Recommendation Post acute IP rehab   Equipment Recommended Walker   Barthel =35    Skilled PT recommended while in hospital and upon DC to progress pt toward treatment goals     Yordan Hightower, PT

## 2017-11-24 NOTE — DISCHARGE SUMMARY
Transition of Care Discharge Summary - [facility]    Patient Information: Raji Davies 80 y o  female MRN: [id number]  Unit/Bed#: -01 Encounter: [id number]    Discharging Physician / Practitioner: Evelio Power PA-C  PCP: Loco Alarcon MD  Admission Date: 11/15/2017  Discharge Date: 11/24/17    Disposition:      Short Term Rehab or SNF at a [facility]F (see below)    For Discharges to [facility]:   · [facility]    Reason for Admission: sepsis    Discharge Diagnoses:     Principal Problem:    Bacteremia  Active Problems:    Anemia    Chronic combined systolic and diastolic heart failure (HCC)    Stage 3 chronic kidney disease    Asymptomatic bacteriuria    Cholangitis  Resolved Problems:    Transaminitis    Acute kidney injury ([address] )    Sepsis ([facility] [address] )    Encephalopathy    Constipation    Choledocholithiasis      Consultations During Hospital Stay:  · GI Dr Zakiya Shearer  · ID Dr Kunal George    Procedures Performed:     · ERCP: Fluoroscopic guidance was provided for performance of ERCP and stone removal from the CBD  Please see procedure report for further details  · MRI abdomen: Dilated common bile duct and the intrahepatic ducts with choledocholithiasis  Cholelithiasis with mild gallbladder wall thickening and gallbladder distention, correlate clinically for cholecystitis  Small bilateral effusion  · US abdomen: Distended gallbladder containing a large 3 x 2 cm gallstone in the gallbladder neck  No pericholecystic fluid or significant gallbladder wall thickening  Negative Childers sign  · Hip xray: Intact appearing left hip ORIF  Degenerative arthritis  No acute osseous abnormality  · CT C/A/P: No acute thoracic or abdominopelvic findings  Mild anasarca  Diffuse strandy appearance of the mesentery without bianca ascites  Cholelithiasis without evidence for cholecystitis  Moderate sized excess stool burden    ·  CT head: No acute intracranial abnormality  Medication Adjustments and Discharge Medications:  · Summary of Medication Adjustments made as a result of this hospitalization: started keflex and flagyl PO for 4 additional days  · Medication Dosing Tapers - Please refer to Discharge Medication List for details on any medication dosing tapers (if applicable to patient)  · Medications being temporarily held (include recommended restart time): none  · Discharge Medication List: See after visit summary for reconciled discharge medications  Wound Care Recommendations:  When applicable, please see wound care section of After Visit Summary  Diet Recommendations at Discharge:  Diet -        Diet Orders            Start     Ordered    11/22/17 1826  Diet Regular; Regular House  Diet effective now     Question Answer Comment   Diet Type Regular    Regular Regular House    RD to adjust diet per protocol? Yes        11/22/17 1826    11/15/17 2042  Room Service  Once     Question:  Type of Service  Answer:  Room Service - Appropriate with Assistance    11/15/17 2041          Instructions for any Catheters / Lines Present at Discharge (including removal date, if applicable): n/a    Significant Findings / Test Results:     · Sepsis, present on admission, secondary to cholangitis  · Polymicrobial bacteremia  · Probable cholangitis    Incidental Findings:   Asymptomatic bacteriuria    Test Results Pending at Discharge (will require follow up): · None     Outpatient Tests Requested:  · Follow-up with GI, follow up with general surgery for outpatient evaluation however given advanced age do not suspect cholecystectomy would be indicated at this time    Complications:  None    Hospital Course: Mago Taveras is a 80 y o  female patient who originally presented to the hospital on 11/15/2017 due to sepsis, confusion, fever and generalized lethargy    The patient was noted to have a fever with tachycardia however the source of sepsis is undecided  Initially was felt to be a urinary tract related  The patient was noted to have transaminitis  The patient was seen in consultation by Infectious Disease secondary to polymicrobial bacteremia   The patient had elevated LFTs as well as hyperbilirubinemia and cholelithiasis  Patient underwent MRI of the abdomen which was noted to show choledocholithiasis and gallbladder distension  There was no evidence that suggests acute cholecystitis on exam   The patient was noted to be seen by GI and was noted to have fecal impaction as well  The patient was given tap water enema as well as aggressive bowel regimen  The patient's septic etiology was then felt to be related to biliary source versus fecal impaction with translocation from gut  The patient had MRI MRCP performed which was noted to show choledocholithiasis  Given the sepsis, choledocholithiasis, it was felt this was likely cholangitis  Therefore ERCP was noted be performed  This was noted to be performed on 11/22 and the patient was noted to have cannulation of the major papilla, stone in common bile duct was noted and sphincterotomy was performed with stone extraction  The patient was continued on IV antibiotics and subsequently transitioned to oral antibiotics on 11/24/2017  Her transaminitis and hyperbilirubinemia resolved  She had been afebrile  She continued to have multiple episodes of constipation and was put on aggressive bowel regimen  She did require enemas throughout her stay  She was noted to have large bowel movements on 11/23/2017  Patient did have bilateral lower extremity edema during her stay and was recommended for elevation of legs and compression stockings  She had no shortness of breath to suggest CHF as etiology  Patient's hemoglobin was noted to be maintained approximately 7 8-9 during her stay  On 11/23/2017 the patient's CBC showed hemoglobin of 8 2    This has been consistently decreasing for the past several months however the patient is asymptomatic in this regard  She is maintained on iron supplementation  Her stools were noted to be dark brown in color with no evidence of liver melena or bright red blood per rectum  Patient is recommended to follow up as an outpatient with repeat CBC  Family had discussion regarding flexible sigmoidoscopy versus colonoscopy and they did not wish to proceed with this given the patient's advanced age  Condition at Discharge: stable     Discharge Day Visit / Exam:     Subjective:  Feeling well  Working with PT this AM during my evaluation  Vitals: Blood Pressure: 162/72 (11/24/17 0743)  Pulse: 77 (11/24/17 0743)  Temperature: 98 6 °F (37 °C) (11/24/17 0743)  Temp Source: Oral (11/24/17 0743)  Respirations: 18 (11/24/17 0743)  Weight - Scale: 73 5 kg (162 lb 0 6 oz) (11/23/17 0600)  SpO2: 96 % (11/24/17 0743)    Exam:   Physical Exam   Constitutional: She is oriented to person, place, and time  No distress  HENT:   Head: Normocephalic and atraumatic  Eyes: Conjunctivae are normal    Neck: No JVD present  Cardiovascular: Normal rate, regular rhythm, normal heart sounds and intact distal pulses  No murmur heard  Pulmonary/Chest: Effort normal and breath sounds normal  No respiratory distress  She has no wheezes  She has no rales  Abdominal: Soft  Bowel sounds are normal  She exhibits no distension  There is no tenderness  There is no rebound and no guarding  Musculoskeletal: She exhibits edema (bilateral ankles)  She exhibits no tenderness  Neurological: She is alert and oriented to person, place, and time  Skin: Skin is warm and dry  No rash noted  She is not diaphoretic  No erythema  Psychiatric: She has a normal mood and affect  Her behavior is normal    Nursing note and vitals reviewed          Discussion with Family: daughter via phone    Goals of Care Discussions:  · Code Status at Discharge: Level 1 - Full Code  · Were there any Goals of Care Discussions during Hospitalization?: No  · Results of any General Goals of Care Discussions: N/A   · POLST Completed: No   · If POLST Completed, Summary of POLST Agreement Provided Here: no   · OK to Rehospitalize if Needed? Yes    Discharge instructions/Information to patient and family:   See after visit summary section titled Discharge Instructions for information provided to patient and family  Planned Readmission: no      Discharge Statement:  I spent >45 minutes discharging the patient  This time was spent on the day of discharge  I had direct contact with the patient on the day of discharge  Greater than 50% of the total time was spent examining patient, answering all patient questions, arranging and discussing plan of care with patient as well as directly providing post-discharge instructions  Additional time then spent on discharge activities      ** Please Note: This note has been constructed using a voice recognition system **

## 2017-11-24 NOTE — PROGRESS NOTES
Progress Note - Infectious Disease   Marisa Peterson 80 y o  female MRN: [id number]  Unit/Bed#: -01 Encounter: [id number]      Impression/Recommendations:  1   Sepsis, POA: fever and tachycardia   Source of sepsis was unclear initially   Now, it appears that source of sepsis was most likely cholangitis   Patient is clinically much improved   Sepsis has resolved   Fever resolved   WBC normalized     Antibiotic plan as below  Monitor temperature/WBC  Monitor hemodynamics      2   Polymicrobic bacteremia  Jaimie Chris and E  coli   Doubt urinary source, given a different GNR in the urine culture and no urinary symptoms  It appears now that source is most likely biliary/cholangitis   Bacteremia cleared  Change antibiotic to p o  Keflex/Flagyl  Monitor temperature/WBC  Treat x 14 days total, another 4 days      3   Probable cholangitis   MRCP with choledocholithiasis and dilated CBD, most like secondary to obstruction  Patient is now status post ERCP with papillotomy and removal of CBD stone  LFT's improving  Monitor LFTs      4   Cholelithiasis   No obvious cholecystitis  Patient may need elective cholecystectomy down the line also      5   History of colon cancer, status post colon resection        6   Fecal impaction              Bowel regimen per GI     7   RALPH, superimposed on CKD   This is most likely secondary to sepsis   Creatinine improved  Follow creatinine closely and dose-adjust antibiotics as indicated     8   Asymptomatic bacteriuria   This is most likely colonization   No antibiotic needed for this      Discussed with patient in detail regarding the above plan  Discussed with slim service  Okay for discharge from ID viewpoint      Antibiotics   Cefazolin/Flagyl   Antibiotics # 10     Subjective:  Patient feels well  She has no further abdominal pain  She is eating well  No urinary symptoms  Temperature stays down   No chills    She is tolerating antibiotics well   No nausea, vomiting or diarrhea  Objective:  Vitals:  HR:  [70-77] 77  Resp:  [18] 18  BP: (122-162)/(57-72) 162/72  SpO2:  [96 %-98 %] 96 %  Temp (24hrs), Av 5 °F (36 9 °C), Min:98 3 °F (36 8 °C), Max:98 6 °F (37 °C)  Current: Temperature: 98 6 °F (37 °C)    Physical Exam:     General: Awake, alert, cooperative, no distress  Lungs: Expansion symmetric, no rales, no wheezing, respirations unlabored  Heart[de-identified]  Regular rate and rhythm, S1 and S2 normal, no murmur  Abdomen: Soft, nondistended, non-tender, bowel sounds active all four quadrants,        no masses, no organomegaly  Extremities: No edema  No erythema/warmth  No ulcer  Nontender to palpation  Skin:  No rash  Invasive Devices     Peripheral Intravenous Line            Peripheral IV 17 Right Arm 1 day                Labs studies:   I have personally reviewed pertinent labs  Results from last 7 days  Lab Units 178 17  0553 17  0440   SODIUM mmol/L 142 143 142   POTASSIUM mmol/L 4 2 3 9 3 9   CHLORIDE mmol/L 107 107 108   CO2 mmol/L 28 28 27   ANION GAP mmol/L 7 8 7   BUN mg/dL 17 20 18   CREATININE mg/dL 1 25 1 24 1 22   EGFR ml/min/1 73sq m 36 36 37   GLUCOSE RANDOM mg/dL 105 110 100   CALCIUM mg/dL 9 0 8 7 8 9   AST U/L 34 44 49*   ALT U/L 39 50 76   ALK PHOS U/L 222* 267* 294*   TOTAL PROTEIN g/dL 5 9* 6 0* 5 7*   ALBUMIN g/dL 2 5* 2 5* 2 3*   BILIRUBIN TOTAL mg/dL 0 70 0 90 1 10*       Results from last 7 days  Lab Units 17  0553 17  0440 17  0543   WBC Thousand/uL 6 06 6 15 6 25   HEMOGLOBIN g/dL 8 2* 7 9* 8 3*   PLATELETS Thousands/uL 352 313 326       Results from last 7 days  Lab Units 17  0511   BLOOD CULTURE  No Growth After 5 Days  No Growth After 5 Days  Imaging Studies:   I have personally reviewed pertinent imaging study reports and images in PACS  EKG, Pathology, and Other Studies:   I have personally reviewed pertinent reports

## 2017-11-24 NOTE — PLAN OF CARE
Problem: DISCHARGE PLANNING - CARE MANAGEMENT  Goal: Discharge to post-acute care or home with appropriate resources  INTERVENTIONS:  - Conduct assessment to determine patient/family and health care team treatment goals, and need for post-acute services based on payer coverage, community resources, and patient preferences, and barriers to discharge  - Address psychosocial, clinical, and financial barriers to discharge as identified in assessment in conjunction with the patient/family and health care team  - Arrange appropriate level of post-acute services according to patient's   needs and preference and payer coverage in collaboration with the physician and health care team  - Communicate with and update the patient/family, physician, and health care team regarding progress on the discharge plan  - Arrange appropriate transportation to post-acute venues   Outcome: Adequate for Discharge  Patient is able to return to [facility]  Patient will be pick via Wheelchair Mary Cesar from [facility] to [facility] at 4:30pm  Report will need to be given to [facility] which is listed in emergency contacts  No medical necessity form will be required at this time  Information will need to be reviewed with patient and family due to cost of [facility]  Patient does not meet the need for S transport and will not be covered under insurance   CM will follow up with treatment team

## 2017-11-24 NOTE — SOCIAL WORK
CCM spoke with ed and reviewed IMM  Patient will discharge from [facility] to [address] at 4:30 via wheelchair van  Family in agreeance and patient will discharge later today  No other needs  Nurse will need to call report via phone  CCM will call [facility] and inform them of time

## 2017-11-24 NOTE — SOCIAL WORK
Patient is able to return to [facility]  Patient will be pick via Wheelchair Shelley makeda from [facility] to [facility] at 4:30pm  Report will need to be given to [facility] which is listed in emergency contacts  No medical necessity form will be required at this time  Information will need to be reviewed with patient and family due to cost of [address] [facility] [address]  Patient does not meet the need for BLS transport and will not be covered under insurance   CM will follow up with treatment team

## 2017-11-24 NOTE — CASE MANAGEMENT
Continued Stay Review    Date:   11/23/2017     Vital Signs: /72   Pulse 77   Temp 98 6 °F (37 °C) (Oral)   Resp 18   Wt 73 5 kg (162 lb 0 6 oz)   LMP  (LMP Unknown)   SpO2 96%   BMI 26 15 kg/m²     Medications:   Scheduled Meds:   ascorbic acid 250 mg Oral Daily   cephalexin 500 mg Oral Q8H JERONIMO   cholecalciferol 1,000 Units Oral Daily   docusate sodium 100 mg Oral BID   ferrous sulfate 325 mg Oral BID With Meals   folic acid-pyridoxine-cyanocobalamin 1 tablet Oral Daily   gabapentin 100 mg Oral HS   heparin (porcine) 5,000 Units Subcutaneous Q8H [address]   metroNIDAZOLE 500 mg Oral Q8H [address]   multivitamin-minerals 1 tablet Oral Daily   pantoprazole 40 mg Oral Daily Before Breakfast   polyethylene glycol 17 g Oral Daily   senna 2 tablet Oral BID   sucralfate 1 g Oral Q12H   travoprost 1 drop Both Eyes HS     Continuous Infusions:    PRN Meds: not used:    acetaminophen    aluminum-magnesium hydroxide-simethicone    calcium carbonate    ondansetron    Abnormal Labs/Diagnostic Results:  Alkaline phosphatase 267  Total protein 6  Albumin 2 5      Age/Sex: 80 y o  female   Assessment/Plan: Sepsis, POA:  Fever and tachycardia  Septic source cholangitis  Currently on Ancef and Flagyl and plan will be to change to oral antibiotics for discharge  · Polymicrobial bacteremia:  Klebsiella and E coli  Felt to be related to cholangitis  Id following  Continuing Ancef and Flagyl for now, will transition to oral antibiotics for discharge  Will require an additional 5 days to complete 14 days total   · Probable cholangitis with choledocholithiasis:  Status post ERCP with stone extraction and sphincterotomy  LFTs and bilirubin improved  Continue regular diet  · Cholelithiasis:  No evidence of cholecystitis on exam at this time  MRI was noted to show wall thickening and distention    Outpatient follow-up with surgery for potential further evaluation however no need for urgent surgical evaluation  · History of colon cancer status post colon resection:  Outpatient follow-up  · Constipation: Status post enema  Patient currently on senna, MiraLax, Colace  Still without bowel movement  Mineral oil enema was given 11/15  Add lactulose x 1  If no improvement, repeat enema to be given  · BLE edema: elevate legs, compression stockings  · Acute on chronic anemia: history of iron deficiency and B12 anemia  No overt GI bleeding, hemoccult stool pending (has not had BM)  · Chronic combined systolic and diastolic CHF:  Last EF 91% in October of 2017  Continue to monitor daily weights, I&Os    · Asymptomatic bacteriuria:  No need for antibiotics  · Peripheral neuropathy:  Continue gabapentin  · CKD3: at baseline creatinine       Discharge Plan:  S

## 2017-11-28 ENCOUNTER — GENERIC CONVERSION - ENCOUNTER (OUTPATIENT)
Dept: OTHER | Facility: OTHER | Age: 82
End: 2017-11-28

## 2017-12-01 ENCOUNTER — GENERIC CONVERSION - ENCOUNTER (OUTPATIENT)
Dept: OTHER | Facility: OTHER | Age: 82
End: 2017-12-01

## 2017-12-13 ENCOUNTER — GENERIC CONVERSION - ENCOUNTER (OUTPATIENT)
Dept: CARDIOLOGY CLINIC | Facility: CLINIC | Age: 82
End: 2017-12-13

## 2018-01-08 ENCOUNTER — ALLSCRIPTS OFFICE VISIT (OUTPATIENT)
Dept: OTHER | Facility: OTHER | Age: 83
End: 2018-01-08

## 2018-01-09 NOTE — CONSULTS
Assessment   1  Syncope (780 2) (R55)    Plan   Syncope    · Follow-up visit in 3 months Evaluation and Treatment  Follow-up  Status: Hold For -    Scheduling  Requested for: 42PZT8916  Ordered; For: Syncope;  Ordered By: Collins Carrasquillo  Performed:   Due: 40GUH3221   · Event recorder with review and interpretation by physician or other qualified professional    - [address] [place] [address]; [address] [place] “” [place]; Requested OIE:[id number]; Perform: In Office; NDM:[id number]; For:Syncope; Ordered By:Jonny Bess; Discussion/Summary      Recurrent syncope, 1st 1 appears to be related to her septic syndrome  She does have of his conduction disease with left bundle branch block and possible mild cardiomyopathy  However he given her age and overall condition, no aggressive interventions will be made  The patient herself does not want a pacemaker which is the only intervention that I feel might be been viable if we determine that that is the reason for syncope  She agreed to have a 3 week recorder, if this is unrevealing favor no interventions  Holter monitor was reviewed  It did not reveal any significant bradyarrhythmias or tachyarrhythmias  Resting sinus rhythm at an average of 76  Minimal ectopy overall very benign for age  Chief Complaint   Pt  here for new patient consult due to syncopal episode  Pt  has no cardiac complaints  History of Present Illness   Cardiology HPI Free Text Note Form [facility]: Cardiology consultation for evaluation of syncope the patient is 8 years old  First episode of syncope happened while she was at the nursing home, she developed what appears to be fever and diaphoresis and chills, this was in the setting of her cholangitis and sepsis the 2nd 1 was witnessed, there was spontaneous recovery  She did not suffer significant trauma  Reportedly there were no previous cardiac problems until her recent hospitalization  Her EKG demonstrates normal sinus rhythm with left bundle-branch block   She did undergo a Holter monitor couple weeks ago, results are not available yet 2 months ago with biliary sepsis, requiring ERCP which she did actually tolerated despite her age  Echocardiogram at that time revealed ejection fraction estimated about 40%  There was mild-to-moderate mitral insufficiency and mild pulmonary hypertension  She did suffer a non STEMI type 2 at that time  There were no reported rhythm disturbances during this hospitalization  The patient states she had a syncopal event about 5 years ago  No details available  She is currently mostly wheelchair-bound because of significant neuropathy of the lower extremities  Review of Systems           Cardiac: experiencing fainting/blackouts,-- has swelling in the legs-- and-- AM fatigue, but-- as noted in HPI,-- no chest pain,-- no rhythm problems,-- no heart murmur present,-- no palpitations present,-- no syncope/fainting-- and-- no witnessed apnea episodes  Skin: No complaints of nonhealing sores or skin rash  Genitourinary: frequent urination at night,-- kidney problems-- and-- post menopausal, but-- no difficult urination,-- no blood in urine,-- no kidney stones-- and-- no loss of bladder control-- Chronic kidney disease, stage III creatinine 1 2, GFR in the 30s  Psychological: No complaints of feeling depressed, anxiety, panic attacks, or difficulty concentrating ,-- no depression,-- no anxiety,-- no panic attacks,-- no difficulty concentrating-- and-- no palpitations present  General: lack of energy/fatigue, but-- no trouble sleeping,-- no changes in weight,-- no fever,-- no night sweats-- and-- no frequent infections  Respiratory: cough/sputum, but-- no shortness of breath,-- no wheezing-- and-- no phlegm        HEENT: serious eye problems,-- hearing problems-- and-- throat problems, but-- no nose problems-- and-- no snoring      Gastrointestinal: liver problems,-- heartburn-- and-- constipation, but-- as noted in HPI,-- no vomiting,-- no bloody stools,-- no diarrhea,-- no abdonimal pain-- and-- no rectal bleeding      Hematologic: No complaints of bleeding disorders, anemia, blood clots, or excessive brusing ,-- no bleeding disorders,-- no anemia,-- no blood clots-- and-- no excessive bruising      Neurological: numbnes,-- tingling-- and-- headaches, but-- no weakness,-- no dizziness,-- no diplopia-- and-- no daytime sleepiness      Musculoskeletal: arthritis-- and-- swelling/pain, but-- No complaints of arthritis, back pain, or painfull swelling -- and-- no back pain      Active Problems   1  Abnormal mammogram (793 80) (R92 8)  2  Acute gastric ulcer (531 30) (K25 3)  3  Acute pain of left hip (719 45) (M25 552)  4  Acute pain of right knee (719 46) (M25 561)  5  Advance directive discussed with patient (V65 49) (Z71 89)  6  Anemia (285 9) (D64 9)  7  Avitaminosis D (268 9) (E55 9)  8  Choledocholithiasis (574 50) (K80 50)  9  Chronic fatigue (780 79) (R53 82)  10  Encounter for preventive health examination (V70 0) (Z00 00)  11  Encounter for screening mammogram for malignant neoplasm of breast (V76 12)      (Z12 31)  12  Glaucoma (365 9) (H40 9)  13  Greater trochanteric bursitis (726 5) (M70 60)  14  Greater trochanteric bursitis of right hip (726 5) (M70 61)  15  Limb pain (729 5) (M79 609)  16  Lower back pain (724 2) (M54 5)  17  Lumbar canal stenosis (724 02) (M48 061)  18  Malignant neoplasm of colon (153 9) (C18 9)  19  Medicare annual wellness visit, initial (V70 0) (Z00 00)  20  Medicare annual wellness visit, subsequent (V70 0) (Z00 00)  21  Need for prophylactic vaccination and inoculation against influenza (V04 81) (Z23)  22  Need for vaccination with 13-polyvalent pneumococcal conjugate vaccine (V03 82) (Z23)  23  Need for zoster vaccination (V04 89) (Z23)  24  Osteoarthritis of hip (715 95) (M16 9)  25  Osteoarthritis of knees, 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)  26   Pain due to bone fixation device, sequela (909 3) (T84 84XS)  27  Right hip pain (719 45) (M25 551)  28  Screening for genitourinary condition (V81 6) (Z13 89)  29  Screening for neurological condition (V80 09) (Z13 89)  30  Syncope (780 2) (R55)  31  Trochanteric bursitis (726 5) (M70 60)  32  Unequal leg length (acquired) (736 81) (M21 70)  33  Urinary incontinence (788 30) (R32)  34  Vaginitis, atrophic (627 3) (N95 2)  35  Visit for screening mammogram (V76 12) (Z12 31)  36  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)    Past Medical History    · History of Colon Cancer (V10 05)   · History of Costochondritis (733 6) (M94 0)   · History of acute gastritis (V12 70) (Z87 19)   · History of acute sinusitis (V12 69) (Z87 09)   · History of acute sinusitis (V12 69) (Z87 09)   · History of chest pain (V13 89) (S26 349)   · History of dermatitis (V13 3) (Z87 2)     The active problems and past medical history were reviewed and updated today  Surgical History    · History of Back Surgery   · History of Cataract Surgery   · History of Complete Colonoscopy   · History of Hip Surgery   · History of Wrist Surgery     The surgical history was reviewed and updated today  Family History   Mother    · Family history of Acute Myocardial Infarction (V17 3)  Father    · Family history of Acute Myocardial Infarction (V17 3)  Sister    · Family history of Cancer   · Family history of Heart Disease (V17 49)  Brother    · Family history of Acute Myocardial Infarction (V17 3)   · Family history of Coronary Artery Disease (V17 49)  Family History    · Family history of Colon Cancer (V16 0)   · Denied: Family history of substance abuse   · Denied: Family history of Mental problem  Family History Reviewed: The family history was reviewed and updated today         Social History    · Denied: History of Alcohol Use (History)   · Denied: History of Being A Social Drinker   · Caffeine Use   · Marital History -    · Never a smoker   · Never smoked cigarettes (V49 89) (Z78 9)   · Occupation: Retired   · Denied: History of Tobacco Use  The social history was reviewed and updated today  Current Meds   1  Aspirin 81 MG Oral Tablet Delayed Release; TAKE 1 TABLET DAILY; Therapy: 56HIM7392 to (Evaluate:07Feb2018) Recorded  2  Colace 100 MG Oral Capsule; TAKE 1 CAPSULE TWICE DAILY; Therapy: 95MAF1789 to (Evaluate:85Zhu6059) Recorded  3  Diclofenac Sodium 1 % Transdermal Gel; APPLY TO KNEES B/L UP TO TID FOR OA     PAIN;     Therapy: 35TRC8329 to (Evaluate:15Apr2017)  Requested for: 38QMB9328; Last     Rx:16Mar2017 Ordered  4  Ferrous Sulfate 325 (65 Fe) MG Oral Tablet; take 1 tablet by mouth once daily; Therapy: 93ZES6476 to (Severa Coupe) Recorded  5  Furosemide 20 MG Oral Tablet; TAKE 1 TABLET DAILY AS DIRECTED; Therapy: 37ECX8949 to (Evaluate:09Mar2018) Recorded  6  Gabapentin 300 MG Oral Capsule; take 1 capsule at night; Therapy: 98HCK8586 to Recorded  7  Pantoprazole Sodium 40 MG Oral Tablet Delayed Release; 1 tab daily in am;     Therapy: 60QZR1012 to (Evaluate:12Oct2017)  Requested for: 16MYE8426; Last     Rx:16Mar2017 Ordered  8  Potassium Chloride ER 10 MEQ Oral Capsule Extended Release; take 1 capsule daily; Therapy: 40FLZ4822 to (Evaluate:03Jan2019) Recorded  9  Sucralfate 1 GM Oral Tablet; take 1 tablet by mouth every 12 hours; Therapy: 30ZTJ1274 to (Evaluate:16Sep2016)  Requested for: 17Aug2016; Last     Rx:17Aug2016 Ordered  10  Tessalon Perles 100 MG Oral Capsule; TAKE 1 CAPSULE 2-3 TIMES DAILY AS      DIRECTED; Therapy: 34OXI2860 to (Evaluate:12Jan2018) Recorded  11  Travatan Z 0 004 % Ophthalmic Solution; Therapy: 72WFH7485 to (Evaluate:02Feb2015) Recorded  12  Tylenol Extra Strength 500 MG Oral Tablet; Therapy: (Recorded:16Mar2017) to Recorded  13  Vitamin B-12 1000 MCG Oral Tablet; TAKE 1 TABLET DAILY AS DIRECTED; Therapy: 73DSL4106 to (Evaluate:08Apr2018) Recorded  14   Vitamin D 2000 UNIT Oral Capsule Recorded     The medication list was reviewed and updated today  Allergies   1  No Known Drug Allergies    Physical Exam        Constitutional      General appearance: No acute distress, well appearing and well nourished  appears healthy,-- within normal limits of ideal weight,-- well hydrated-- and-- appears younger than stated age  Neck      Neck and thyroid: Normal, supple, trachea midline, no thyromegaly  Jugular Veins: the JVP was not elevated  Pulmonary      Respiratory effort: No increased work of breathing or signs of respiratory distress  Respiratory rate: normal  Assessment of respiratory effort revealed normal rhythm and effort  Respiratory Findings: dry cough, but-- no audible wheezing-- and-- no stridor  Auscultation of lungs: Abnormal   Auscultation of the lungs revealed bibasilar rales/crackles  Cardiovascular      Palpation of heart: Abnormal   The apical impulse was not palpable  Auscultation of heart: Abnormal   The heart rate was normal  The rhythm was regular  Heart sounds: an S4 was heard-- and-- the heart sounds were distant, but-- normal S1,-- normal S2-- and-- no S3  No pericardial rub  A grade 1 systolic murmur was heard at the RUSB  Carotid pulses: Normal, 2+ bilaterally  Pedal pulses: Abnormal   Posterior tibialis pulse was 1+ on the right-- and-- 1+ on the left  Dorsalis pedis pulse was 0 on the right-- and-- 0 on the left  Examination of extremities for edema and/or varicosities: Abnormal   bilateral ankle 1+ pitting edema  Neurologic - Speech: Normal        Psychiatric - Mood and affect: Normal       End of Encounter Meds   1  Pantoprazole Sodium 40 MG Oral Tablet Delayed Release; 1 tab daily in am;     Therapy: 95IUA0957 to (Evaluate:12Oct2017)  Requested for: 41MYO5153; Last     Rx:16Mar2017 Ordered  2  Sucralfate 1 GM Oral Tablet; take 1 tablet by mouth every 12 hours;      Therapy: 34ULR3397 to (Evaluate:16Sep2016)  Requested for: 17Aug2016; Last Rx:17Aug2016 Ordered  3  Diclofenac Sodium 1 % Transdermal Gel; APPLY TO KNEES B/L UP TO TID FOR OA     PAIN;     Therapy: 66KFS3844 to (Evaluate:15Apr2017)  Requested for: 72DTA0235; Last     Rx:16Mar2017 Ordered  4  Travatan Z 0 004 % Ophthalmic Solution; Therapy: 52IWF5467 to (Evaluate:39Ilr3990) Recorded  5  Tylenol Extra Strength 500 MG Oral Tablet; Therapy: (Recorded:16Mar2017) to Recorded  6   Vitamin D 2000 UNIT Oral Capsule Recorded    Signatures    Electronically signed by : MITCHEL Major ; Jan 8 2018  1:53PM EST                       (Author)

## 2018-01-10 NOTE — PROGRESS NOTES
Assessment    1  Encounter for preventive health examination (V70 0) (Z00 00)   2  Never a smoker   3  Encounter for preventive health examination (V70 0) (Z00 00)   4  Anemia (285 9) (D64 9)    Plan  Anemia, Health Maintenance    · (1) TSH; Status:Active; Requested for:15Mar2016;   Encounter for preventive health examination    · Acetaminophen 325 MG Oral Tablet  Health Maintenance    · (1) CBC/PLT/DIFF; Status:Active; Requested for:15Mar2016;    · (1) COMPREHENSIVE METABOLIC PANEL; Status:Active; Requested for:15Mar2016;    · (1) LIPID PANEL, FASTING; Status:Active; Requested for:15Mar2016;    · (1) VITAMIN B12; Status:Active; Requested for:15Mar2016;    · (1) VITAMIN D 25-HYDROXY; Status:Active; Requested for:15Mar2016;   Limb pain    · TraMADol HCl - 50 MG Oral Tablet  Need for zoster vaccination    · Stop: Zostavax 19603 UNT/0 65ML Subcutaneous Solution Reconstituted  Screening for genitourinary condition    · *VB-Urinary Incontinence Screen (Dx V81 6 Screen for UI); Status:Complete -  Retrospective By Protocol Authorization;   Done: [id number] 02:53PM  Screening for neurological condition    · *VB - Fall Risk Assessment  (Dx V80 09 Screen for Neurologic Disorder);  Status:Complete - Retrospective By Protocol Authorization;   Done: [id number] 02:52PM  Urinary incontinence    · Oxybutynin Chloride ER 5 MG Oral Tablet Extended Release 24 Hour    Discussion/Summary    OBTAIN LABS  MOISTEN MOUJTH PRIOR TO EATING  DECREASE MYRBETRIQ TO QOD TO HELP WITH ORAL DRYNESS   FOLLOW UP PRN;   IMM UP TO DATE;  Impression: Subsequent Annual Wellness Visit, with preventive exam as well as age and risk appropriate counseling completed  Cardiovascular screening and counseling: counseling was given on maintaining a healthy diet, counseling was given on maintaining a healthy weight and Dx - V81 2 Screen for CV Disorder  Diabetes screening and counseling: Dx - V77 1 Screen for DM     Colorectal cancer screening and counseling: Dx - V76 51 Screen for CRC  Abdominal aortic aneurysm screening and counseling: Dx - V81 2 Screen for CV Disorder  Glaucoma screening and counseling: PT HAS APPT 3/2016 and Dx - V80 1 Screen for Glaucoma  Advance Directive Planning: complete and up to date, she was encouraged to follow-up with me to discuss her questions and/or decisions  Patient Discussion: plan discussed with the patient, plan discussed with the patient's family, follow-up visit needed in one year  Chief Complaint  PATIENT HERE WITH HER DAUGHTER - SHE DOES GET SOME ESOPHAGEAL SPASM - SHE FEELS LIKE SHE CAN'T SWALLOW; HAPPENED WITH SPAGETTI; SHE GETS PAIN ACROSS HER CHEST - HAPPENS ON OCCASION; History of Present Illness  HPI: PATIENT FEELS WELL  SHE IS 80YEARS OLD; SHE HAS A HISTORY OF CHOLELITHIASIS AND COLON CANCER  SHE DOES TAKE ALEVE 2 A DAY FOR HIP PAIN   Welcome to Medicare and Wellness Visits: The patient is being seen for the subsequent annual wellness visit  Medicare Screening and Risk Factors   Hospitalizations: she has been previously hospitalizied  Medicare Screening Tests Risk Questions   Abdominal aortic aneurysm risk assessment: none indicated  Osteoporosis risk assessment: none indicated  HIV risk assessment: none indicated  Drug and Alcohol Use: The patient has never smoked cigarettes  The patient reports never drinking alcohol  She has never used illicit drugs  Diet and Physical Activity: Current diet includes well balanced meals  She is sedentary  Mood Disorder and Cognitive Impairment Screening:   Depression screening  negative for symptoms  She denies feeling down, depressed, or hopeless over the past two weeks  She denies feeling little interest or pleasure in doing things over the past two weeks     Cognitive impairment screening: denies difficulty learning/retaining new information, denies difficulty handling complex tasks, denies difficulty with reasoning, denies difficulty with spatial ability and orientation and denies difficulty with language  Functional Ability/Level of Safety: Hearing is normal bilaterally  The patient is currently able to do activities of daily living with limitations and unable to drive, but able to do instrumental activities of daily living without limitations and able to participate in social activities without limitations  Activities of daily living details: does not need help using the phone, no transportation help needed, does not need help shopping, no meal preparation help needed, does not need help doing housework, does not need help doing laundry and does not need help managing money  Fall risk factors: The patient fell 0 times in the past 12 months , no polypharmacy, no antidepressant use, no sedative use and no antihypertensive use  Home safety risk factors:  no unfamiliar surroundings, no loose rugs, no poor household lighting, no uneven floors, no household clutter, grab bars in the bathroom and handrails on the stairs  Advance Directives: Advance directives: living will, durable power of  for health care directives and advance directives  I agree with the patient's decisions  Co-Managers and Medical Equipment/Suppliers: See Patient Care Team   Reviewed Updated Zaragoaz Herrera:   Last Medicare Wellness Visit Information was reviewed, patient interviewed, no change since last AW  Preventive Quality Program 65 and Older: Falls Risk: The patient fell 0 times in the past 12 months  The patient is currently asymptomatic Symptoms Include:  Associated symptoms:  No associated symptoms are reported  Date of last glaucoma screen was 3/2016   , Adult Female: The patient is being seen for a health maintenance evaluation  General Health: The patient's health since the last visit is described as good  She has regular dental visits  She denies vision problems  She denies hearing loss  Immunizations status: up to date  Lifestyle:   She consumes a diverse and healthy diet  She does not have any weight concerns  She exercises regularly  She does not use tobacco  She denies alcohol use  She denies drug use  Reproductive health: the patient is postmenopausal    Screening: cancer screening reviewed and current  metabolic screening reviewed and current  risk screening reviewed and current  Welcome to [employer] and Wellness Visits: The patient is being seen for a subsequent annual wellness visit  The patient reports her health to be fair  Tobacco use: non-user  Alcohol use: non-user  Illicit drug use: non-user  Current diet includes well balanced meals  Patient Care Team    Care Team Member Role Specialty Office Number   Mauricio MARRERO MD, error  Family Medicine [phone]   [facility] MD  Obstetrics/Gynecology [phone] [address]  Orthopedic Surgery [phone]   Villa Cowden MD  Pain Management [phone]     Review of Systems    Constitutional: negative  Eyes: negative  ENT: negative, no nasal congestion, no nasal discharge, no sneezing, no sore throat and no scratchy throat  Cardiovascular: negative, no chest pain and no palpitations  Respiratory: negative  Gastrointestinal: negative, no abdominal pain, no abdominal bloating, no abdominal cramps, no nausea and no vomiting  Genitourinary: negative, no dysuria, no urinary frequency and no urinary urgency  Musculoskeletal: negative  Integumentary and Breasts: negative, no rashes, no skin lesions, no skin wound, no erythema and no edema  Neurological: negative, no headache, no confusion and no dizziness  Psychiatric: negative and no insomnia  Endocrine: negative  Hematologic and Lymphatic: negative, no swollen glands, no tendency for easy bleeding and no tendency for easy bruising  Active Problems    1  Abnormal mammogram (793 80) (R92 8)   2  Acute pain of left hip (719 45) (M25 552)   3  Anemia (285 9) (D64 9)   4   Avitaminosis D (268 9) (E55 9)   5  Choledocholithiasis (574 50) (K80 50)   6  Chronic fatigue (780 79) (R53 82)   7  Encounter for preventive health examination (V70 0) (Z00 00)   8  Encounter for screening mammogram for malignant neoplasm of breast (V76 12)   (Z12 31)   9  Glaucoma (365 9) (H40 9)   10  Limb pain (729 5) (M79 609)   11  Lower back pain (724 2) (M54 5)   12  Lumbar canal stenosis (724 02) (M48 06)   13  Malignant neoplasm of colon (153 9) (C18 9)   14  Need for prophylactic vaccination and inoculation against influenza (V04 81) (Z23)   15  Need for vaccination with 13-polyvalent pneumococcal conjugate vaccine (V03 82) (Z23)   16  Need for zoster vaccination (V04 89) (Z23)   17  Osteoarthritis of hip (715 95) (M16 9)   18  Screening for genitourinary condition (V81 6) (Z13 89)   19  Screening for neurological condition (V80 09) (Z13 89)   20  Trochanteric bursitis (726 5) (M70 60)   21  Trochanteric bursitis, unspecified laterality (726 5) (M70 60)   22  Unequal leg length (acquired) (736 81) (M21 70)   23  Urinary incontinence (788 30) (R32)   24  Vaginitis, atrophic (627 3) (N95 2)   25  Visit for screening mammogram (V76 12) (Z12 31)   26  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)    Past Medical History    · History of Colon Cancer (V10 05)   · History of Costochondritis (733 6) (M94 0)   · History of acute gastritis (V12 70) (Z87 19)   · History of acute sinusitis (V12 69) (Z87 09)   · History of acute sinusitis (V12 69) (Z87 09)   · History of chest pain (V13 89) (F48 496)   · History of dermatitis (V13 3) (Z87 2)    The active problems and past medical history were reviewed and updated today  Surgical History    · History of Back Surgery   · History of Cataract Surgery   · History of Complete Colonoscopy   · History of Hip Surgery   · History of Wrist Surgery    The surgical history was reviewed and updated today         Family History    · Family history of Acute Myocardial Infarction (V17 3)    · Family history of Acute Myocardial Infarction (V17 3)    · Family history of Cancer   · Family history of Heart Disease (V17 49)    · Family history of Acute Myocardial Infarction (V17 3)   · Family history of Coronary Artery Disease (V17 49)    · Family history of Colon Cancer (V16 0)    Social History    · Denied: History of Alcohol Use (History)   · Denied: History of Being A Social Drinker   · Caffeine Use   · Marital History -    · Never a smoker   · Occupation: Retired   · Denied: History of Tobacco Use  The social history was reviewed and updated today  The social history was reviewed and is unchanged  Current Meds   1  Acetaminophen 325 MG Oral Tablet; Therapy: (Recorded:72Mjo4884) to Recorded   2  Combigan 0 2-0 5 % Ophthalmic Solution; Therapy: 10Apr2014 to (Evaluate:13Sep2014) Recorded   3  Myrbetriq 25 MG Oral Tablet Extended Release 24 Hour; Therapy: (Recorded:15Mar2016) to Recorded   4  Oxybutynin Chloride ER 5 MG Oral Tablet Extended Release 24 Hour; 1 tab PO daily; Therapy: 31STG2048 to (Evaluate:10Oct2016)  Requested for: 99GQO7361; Last   Rx:14Jan2016 Ordered   5  PriLOSEC 40 MG Oral Capsule Delayed Release; Therapy: (Recorded:53Jfl2477) to Recorded   6  TraMADol HCl - 50 MG Oral Tablet; TAKE 1 TABLET EVERY 12 HOURS as needed   Recorded   7  Travatan Z 0 004 % Ophthalmic Solution; Therapy: 85RAK9237 to (Evaluate:45Qqf2469) Recorded    Allergies    1  No Known Drug Allergies    Immunizations   ** Printed in Appendix #1 below  Vitals  Signs [Data Includes: Current Encounter]    Temperature: 97 3 F, Tympanic  Heart Rate: 80  Respiration: 16  Systolic: 468, LUE, Sitting  Diastolic: 64, LUE, Sitting  Height: 5 ft 1 5 in  Weight: 152 lb 6 08 oz  BMI Calculated: 28 33  BSA Calculated: 1 69    Physical Exam    Constitutional   General appearance: No acute distress, well appearing and well nourished    WDWN ELDERLY FEMALE IN NAD;    Eyes   Conjunctiva and lids: No swelling, erythema or discharge  Pupils and irises: Equal, round, reactive to light  Ears, Nose, Mouth, and Throat   External inspection of ears and nose: Normal     Otoscopic examination: Tympanic membranes translucent with normal light reflex  Canals patent without erythema  Hearing: Normal     Nasal mucosa, septum, and turbinates: Normal without edema or erythema  Lips, teeth, and gums: Normal, good dentition  Oropharynx: Normal with no erythema, edema, exudate or lesions  Inspection of the oropharynx showed visible hard and soft palate, upper portion of tonsils and uvula (Mallampati class 2)  Oral mucosa was moist, but was normal  The palate examination showed no abnormalities  The tongue was normal  The tonsils were normal    Neck   Neck: Supple, symmetric, trachea midline, no masses  Thyroid: Normal, no thyromegaly  Pulmonary   Respiratory effort: No increased work of breathing or signs of respiratory distress  Percussion of chest: Normal     Palpation of chest: Normal     Auscultation of lungs: Clear to auscultation  Auscultation of the lungs revealed no expiratory wheezing, normal expiratory time and no inspiratory wheezing  no rales or crackles were heard bilaterally  no rhonchi  no friction rub  no wheezing  no diminished breath sounds  no bronchial breath sounds  Cardiovascular   Palpation of heart: Normal PMI, no thrills  Auscultation of heart: Normal rate and rhythm, normal S1 and S2, no murmurs  Carotid pulses: 2+ bilaterally  Abdominal aorta: Normal     Femoral pulses: 2+ bilaterally  Pedal pulses: 2+ bilaterally  Examination of extremities for edema and/or varicosities: Normal     Abdomen   Abdomen: Non-tender, no masses  Liver and spleen: No hepatomegaly or splenomegaly  Lymphatic   Palpation of lymph nodes in neck: No lymphadenopathy  Musculoskeletal   Gait and station: Normal     Digits and nails: Normal without clubbing or cyanosis      Joints, bones, and muscles: Normal     Range of motion: Normal     Stability: Normal     Muscle strength/tone: Normal     Skin   Skin and subcutaneous tissue: Normal without rashes or lesions  Palpation of skin and subcutaneous tissue: Normal turgor  Neurologic   Cranial nerves: Cranial nerves II-XII intact  Reflexes: 2+ and symmetric  Sensation: No sensory loss  Psychiatric   Judgment and insight: Normal     Orientation to person, place, and time: Normal     Recent and remote memory: Intact  Mood and affect: Normal        Results/Data  *VB-Urinary Incontinence Screen (Dx V81 6 Screen for UI) 71RRJ1897 02:53PM "[employer] Chamber     Test Name Result Flag Reference   Urinary Incontinence Assessment [id number]       *VB - Fall Risk Assessment  (Dx V80 09 Screen for Neurologic Disorder) 59JEC2023 02:52PM [facility]     Test Name Result Flag Reference   Fall Risk Assessment [id number]         Signatures   Electronically signed by :  Chandrika Whelan DO; Mar 15 2016  3:27PM EST                       (Author)    Appendix #1     Patient: Lore Hobbs ; : 1917; MRN: [id number]      0 2 9 7 9    DTP/DTaP  78YFZ9938 44HII7387 94OAR5975 60XNZ2592 62CJG3214    Hepatitis A  54AUL7168 56Mwm7103       Hepatitis B  58JZG2822 88BDP2298 00YPR5140      HIB  49MZC0207 08PBG5656 19IYT4066      Influenza  34BMM7937 42RRM1724       MMR  95EZY8210 05REV4652       Pneumococcal  68TBC2249 61Ege5250 86QQD1400 84VHA6203     Polio  16URU9193 03Vsj4713 35EUC1427 82Bnv2796     Varicella  81QQT2710 17GXX9202

## 2018-01-11 NOTE — PROGRESS NOTES
Assessment    1  Encounter for preventive health examination (V70 0) (Z00 00)   2  Never a smoker   3  Encounter for preventive health examination (V70 0) (Z00 00)   4  Anemia (285 9) (D64 9)    Plan  Anemia, Health Maintenance    · (1) TSH; Status:Active; Requested for:15Mar2016;   Encounter for preventive health examination    · Acetaminophen 325 MG Oral Tablet  Health Maintenance    · (1) CBC/PLT/DIFF; Status:Active; Requested for:15Mar2016;    · (1) COMPREHENSIVE METABOLIC PANEL; Status:Active; Requested for:15Mar2016;    · (1) LIPID PANEL, FASTING; Status:Active; Requested for:15Mar2016;    · (1) VITAMIN B12; Status:Active; Requested for:15Mar2016;    · (1) VITAMIN D 25-HYDROXY; Status:Active; Requested for:15Mar2016;   Limb pain    · TraMADol HCl - 50 MG Oral Tablet  Need for zoster vaccination    · Stop: Zostavax 31578 UNT/0 65ML Subcutaneous Solution Reconstituted  Screening for genitourinary condition    · *VB-Urinary Incontinence Screen (Dx V81 6 Screen for UI); Status:Complete;   Done:  [id number] 02:53PM  Screening for neurological condition    · *VB - Fall Risk Assessment  (Dx V80 09 Screen for Neurologic Disorder);  Status:Complete;   Done: 68AWW2381 02:52PM  Urinary incontinence    · Oxybutynin Chloride ER 5 MG Oral Tablet Extended Release 24 Hour    Discussion/Summary    DRINK WATER BEFORE EATING- KEEP MOUTH MOIST  CAN DECREASE MYRBETRIQ TO QOD TO HELP WTIH DRY MOUTH  IF SHE TAKES ALEVE I ADVISED HER TO TAKE A PPI ALSO - TO PROTECT HER STOMACH  FOLLOW UP 1 YEAR  Impression: Subsequent Annual Wellness Visit, with preventive exam as well as age and risk appropriate counseling completed  Cardiovascular screening and counseling: counseling was given on maintaining a healthy diet, counseling was given on maintaining a healthy weight and Dx - V81 2 Screen for CV Disorder  Diabetes screening and counseling: Dx - V77 1 Screen for DM     Colorectal cancer screening and counseling: screening not indicated and Dx - V76 51 Screen for CRC  Breast cancer screening and counseling: screening not indicated  Abdominal aortic aneurysm screening and counseling: screening not indicated and Dx - V81 2 Screen for CV Disorder  Glaucoma screening and counseling: screening is current and Dx - V80 1 Screen for Glaucoma  HIV screening and counseling: screening not indicated  Immunizations: influenza vaccine is up to date this year, the lifetime pneumococcal vaccine has been completed and hepatitis B vaccination series is not indicated at this time due to the patient's low risk of leandro the disease  Advance Directive Planning: complete and up to date  Patient Discussion: plan discussed with the patient, follow-up visit needed in one year  Chief Complaint  PATIENT HERE FOR AMW =- ACCOMPANIED BY HER MARK      History of Present Illness  HPI: PT OCC HAS TROUBLE SWALLOWING THEN PAIN ACROSS HER CHEST B/L - RESOLVES   Welcome to [employer] and Wellness Visits: The patient is being seen for the subsequent annual wellness visit  Medicare Screening and Risk Factors   Hospitalizations: she has been previously hospitalizied  Medicare Screening Tests Risk Questions   Abdominal aortic aneurysm risk assessment: none indicated  Osteoporosis risk assessment: none indicated  HIV risk assessment: none indicated  Drug and Alcohol Use: The patient has never smoked cigarettes  The patient reports never drinking alcohol  She has never used illicit drugs  Diet and Physical Activity: Current diet includes well balanced meals  She is sedentary  Mood Disorder and Cognitive Impairment Screening:   Depression screening  negative for symptoms  She denies feeling down, depressed, or hopeless over the past two weeks  She denies feeling little interest or pleasure in doing things over the past two weeks     Cognitive impairment screening: denies difficulty learning/retaining new information, denies difficulty handling complex tasks, denies difficulty with reasoning, denies difficulty with spatial ability and orientation and denies difficulty with language  Functional Ability/Level of Safety: Hearing is normal bilaterally  The patient is currently able to do activities of daily living with limitations and unable to drive, but able to do instrumental activities of daily living without limitations and able to participate in social activities without limitations  Activities of daily living details: does not need help using the phone, no transportation help needed, does not need help shopping, no meal preparation help needed, does not need help doing housework, does not need help doing laundry and does not need help managing money  Fall risk factors: The patient fell 0 times in the past 12 months , no polypharmacy, no antidepressant use, no sedative use and no antihypertensive use  Home safety risk factors:  no unfamiliar surroundings, no loose rugs, no poor household lighting, no uneven floors, no household clutter, grab bars in the bathroom and handrails on the stairs  Advance Directives: Advance directives: living will, durable power of  for health care directives and advance directives  I agree with the patient's decisions  Co-Managers and Medical Equipment/Suppliers: See Patient Care Team   Reviewed Updated Iesha Yeung:   Last Medicare Wellness Visit Information was reviewed, patient interviewed, no change since last AW  Preventive Quality Program 65 and Older: Falls Risk: The patient fell 0 times in the past 12 months  The patient is currently asymptomatic Symptoms Include:  Associated symptoms:  No associated symptoms are reported  Date of last glaucoma screen was 3/2016   HM, Adult Female: The patient is being seen for a health maintenance evaluation  General Health: She has regular dental visits  She denies vision problems  She has hearing loss  Immunizations status: up to date  Lifestyle:   She consumes a diverse and healthy diet  She does not have any weight concerns  She exercises regularly  She does not use tobacco  She denies alcohol use  She denies drug use  Reproductive health: the patient is postmenopausal    Screening: cancer screening reviewed and current  risk screening reviewed and current  Welcome to [employer] and Wellness Visits: The patient is being seen for a subsequent annual wellness visit  Tobacco use: non-user  Alcohol use: non-user  Illicit drug use: non-user  Current diet includes well balanced meals  She exercises daily  Anxiety screening NO ANXIETY  Patient Care Team    Care Team Member Role Specialty Office Number   Velma MARRERO MD, [facility] [phone]   Bob Balderas MD  Obstetrics/Gynecology [phone]   Heidy Chris, [address]  Orthopedic Surgery [phone]   Km Witt MD  Pain Management [phone]     Review of Systems    Constitutional: negative  Eyes: negative  ENT: negative, no nasal congestion, no nasal discharge, no sneezing, no sore throat and no scratchy throat  Cardiovascular: negative, no chest pain and no palpitations  Respiratory: negative  Gastrointestinal: negative, no abdominal pain, no abdominal bloating, no abdominal cramps, no nausea and no vomiting  Genitourinary: negative, no dysuria, no urinary frequency and no urinary urgency  Musculoskeletal: negative  Integumentary and Breasts: negative, no rashes, no skin lesions, no skin wound, no erythema and no edema  Neurological: negative, no headache, no confusion and no dizziness  Psychiatric: negative and no insomnia  Endocrine: negative  Hematologic and Lymphatic: negative, no swollen glands, no tendency for easy bleeding and no tendency for easy bruising  Active Problems    1  Abnormal mammogram (793 80) (R92 8)   2  Acute pain of left hip (719 45) (M25 552)   3  Anemia (285 9) (D64 9)   4  Avitaminosis D (268 9) (E55 9)   5  Choledocholithiasis (574 50) (K80 50)   6  Chronic fatigue (780 79) (R53 82)   7  Encounter for preventive health examination (V70 0) (Z00 00)   8  Encounter for screening mammogram for malignant neoplasm of breast (V76 12)   (Z12 31)   9  Glaucoma (365 9) (H40 9)   10  Limb pain (729 5) (M79 609)   11  Lower back pain (724 2) (M54 5)   12  Lumbar canal stenosis (724 02) (M48 06)   13  Malignant neoplasm of colon (153 9) (C18 9)   14  Need for prophylactic vaccination and inoculation against influenza (V04 81) (Z23)   15  Need for vaccination with 13-polyvalent pneumococcal conjugate vaccine (V03 82) (Z23)   16  Need for zoster vaccination (V04 89) (Z23)   17  Osteoarthritis of hip (715 95) (M16 9)   18  Screening for genitourinary condition (V81 6) (Z13 89)   19  Screening for neurological condition (V80 09) (Z13 89)   20  Trochanteric bursitis (726 5) (M70 60)   21  Trochanteric bursitis, unspecified laterality (726 5) (M70 60)   22  Unequal leg length (acquired) (736 81) (M21 70)   23  Urinary incontinence (788 30) (R32)   24  Vaginitis, atrophic (627 3) (N95 2)   25  Visit for screening mammogram (V76 12) (Z12 31)   26   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)    Past Medical History    · History of Colon Cancer (V10 05)   · History of Costochondritis (733 6) (M94 0)   · History of acute gastritis (V12 70) (Z87 19)   · History of acute sinusitis (V12 69) (Z87 09)   · History of acute sinusitis (V12 69) (Z87 09)   · History of chest pain (V13 89) (L78 217)   · History of dermatitis (V13 3) (Z87 2)    Surgical History    · History of Back Surgery   · History of Cataract Surgery   · History of Complete Colonoscopy   · History of Hip Surgery   · History of Wrist Surgery    Family History    · Family history of Acute Myocardial Infarction (V17 3)    · Family history of Acute Myocardial Infarction (V17 3)    · Family history of Cancer   · Family history of Heart Disease (V17 49)    · Family history of Acute Myocardial Infarction (V17 3)   · Family history of Coronary Artery Disease (V17 49)    · Family history of Colon Cancer (V16 0)    Social History    · Denied: History of Alcohol Use (History)   · Denied: History of Being A Social Drinker   · Caffeine Use   · Marital History -    · Never a smoker   · Occupation: Retired   · Denied: History of Tobacco Use    Current Meds   1  Acetaminophen 325 MG Oral Tablet; Therapy: (Recorded:09Mao6000) to Recorded   2  Combigan 0 2-0 5 % Ophthalmic Solution; Therapy: 27Nuu1651 to (Evaluate:38Hbh4070) Recorded   3  Myrbetriq 25 MG Oral Tablet Extended Release 24 Hour; Therapy: (Recorded:15Mar2016) to Recorded   4  Oxybutynin Chloride ER 5 MG Oral Tablet Extended Release 24 Hour; 1 tab PO daily; Therapy: 78YIH0465 to (Evaluate:10Oct2016)  Requested for: 22OER2443; Last   Rx:14Jan2016 Ordered   5  PriLOSEC 40 MG Oral Capsule Delayed Release; Therapy: (Recorded:05Gik8313) to Recorded   6  TraMADol HCl - 50 MG Oral Tablet; TAKE 1 TABLET EVERY 12 HOURS as needed   Recorded   7  Travatan Z 0 004 % Ophthalmic Solution; Therapy: 46PLI9638 to (Evaluate:53Nzl6063) Recorded    Allergies    1  No Known Drug Allergies    Immunizations   ** Printed in Appendix #1 below  Vitals  Signs [Data Includes: Current Encounter]    Temperature: 97 3 F, Tympanic  Heart Rate: 80  Respiration: 16  Systolic: 886, LUE, Sitting  Diastolic: 64, LUE, Sitting  Height: 5 ft 1 5 in  Weight: 152 lb 6 08 oz  BMI Calculated: 28 33  BSA Calculated: 1 69    Physical Exam    Constitutional   General appearance: No acute distress, well appearing and well nourished  WDWN ELDERLY FEMALE IN NAD;    Eyes   Conjunctiva and lids: No swelling, erythema or discharge  Pupils and irises: Equal, round, reactive to light  Ears, Nose, Mouth, and Throat   External inspection of ears and nose: Normal     Otoscopic examination: Tympanic membranes translucent with normal light reflex  Canals patent without erythema  Hearing: Normal     Nasal mucosa, septum, and turbinates: Normal without edema or erythema  Lips, teeth, and gums: Normal, good dentition  Oropharynx: Normal with no erythema, edema, exudate or lesions  Inspection of the oropharynx showed visible hard and soft palate, upper portion of tonsils and uvula (Mallampati class 2)  Oral mucosa was moist, but was normal  The palate examination showed no abnormalities  The tongue was normal  The tonsils were normal    Neck   Neck: Supple, symmetric, trachea midline, no masses  Thyroid: Normal, no thyromegaly  Pulmonary   Respiratory effort: No increased work of breathing or signs of respiratory distress  Percussion of chest: Normal     Palpation of chest: Normal     Auscultation of lungs: Clear to auscultation  Auscultation of the lungs revealed no expiratory wheezing, normal expiratory time and no inspiratory wheezing  no rales or crackles were heard bilaterally  no rhonchi  no friction rub  no wheezing  no diminished breath sounds  no bronchial breath sounds  Cardiovascular   Palpation of heart: Normal PMI, no thrills  Auscultation of heart: Normal rate and rhythm, normal S1 and S2, no murmurs  Carotid pulses: 2+ bilaterally  Abdominal aorta: Normal     Femoral pulses: 2+ bilaterally  Pedal pulses: 2+ bilaterally  Examination of extremities for edema and/or varicosities: Normal     Abdomen   Abdomen: Non-tender, no masses  Liver and spleen: No hepatomegaly or splenomegaly  Lymphatic   Palpation of lymph nodes in neck: No lymphadenopathy  Musculoskeletal   Gait and station: Normal     Digits and nails: Normal without clubbing or cyanosis  Joints, bones, and muscles: Normal     Range of motion: Normal     Stability: Normal     Muscle strength/tone: Normal     Skin   Skin and subcutaneous tissue: Normal without rashes or lesions  Palpation of skin and subcutaneous tissue: Normal turgor      Neurologic   Cranial nerves: Cranial nerves II-XII intact  Reflexes: 2+ and symmetric  Sensation: No sensory loss  Psychiatric   Judgment and insight: Normal     Orientation to person, place, and time: Normal     Recent and remote memory: Intact  Mood and affect: Normal        Results/Data  *VB-Urinary Incontinence Screen (Dx V81 6 Screen for UI) 88APD3727 02:53PM Homar Salle     Test Name Result Flag Reference   Urinary Incontinence Assessment 37GDN8327       *VB - Fall Risk Assessment  (Dx V80 09 Screen for Neurologic Disorder) 50OLX2244 02:52PM Homar Salle     Test Name Result Flag Reference   Fall Risk Assessment 09CHM8885         Signatures   Electronically signed by :  Chandrika Whelan DO; Mar 16 2016  7:50AM EST                       (Author)    Appendix #1     Patient: Maria Alejandra Pressley ; : 1917; MRN: [id number]      1 2 3 4 5    DTP/DTaP  90YKC5223 12CES1758 97XQP5529 97CDD2686 30KTL6436    Hepatitis A  33TSM3232 03Axj2502       Hepatitis B  29TSV1958 92PRT7919 62RWF9708      HIB  38FFZ4811 95UTV9774 33WXK4140      Influenza  34AZN6725 24KDV1217       MMR  53QRY2904 83Wbn0599       Pneumococcal  61EKA5965 98Bba9389 21AZH7831 83WKJ2356     Polio  00TPT4175 29Ozg2144 53VXX3010 18Oyt1774     Varicella  98TWO5726 11MHE6436

## 2018-01-11 NOTE — RESULT NOTES
Verified Results  * US ABDOMEN COMPLETE 66Zfd8546 09:24AM Sravani Kim Order Number: [id number]    - Patient Instructions: To schedule this appointment, please contact Central Scheduling at [phone]   Order Number: [id number]    - Patient Instructions: To schedule this appointment, please contact Central Scheduling at [phone]  Test Name Result Flag Reference   US ABDOMEN COMPLETE (Report)     ABDOMEN ULTRASOUND, COMPLETE     INDICATION: Abdominal pain  COMPARISON: Ultrasound 12/2/2013, CT abdomen and pelvis 8/1/2013     TECHNIQUE:  Real-time ultrasound of the abdomen was performed with a curvilinear transducer with both volumetric sweeps and still imaging techniques  FINDINGS:     PANCREAS: Visualized portions of the pancreas are within normal limits  AORTA AND IVC: Visualized portions are normal for patient age  LIVER:   Size: Within normal range  The liver measures 12 5 cm in the midclavicular line  Contour: Surface contour is smooth  Parenchyma: Echogenicity and echotexture are within normal limits  No evidence of suspicious mass  The main portal vein is patent and hepatopetal       BILIARY:   The gallbladder is normal in caliber  No wall thickening or pericholecystic fluid  3 cm shadowing gallstone  Sonographic Kelvin Mile sign is negative  No intrahepatic biliary dilatation  CBD measures 6 mm  No choledocholithiasis  KIDNEY:    Right kidney measures 8 5 x 4 2 cm  Within normal limits  Left kidney measures 8 7 x 5 1 cm  Within normal limits  SPLEEN:    Measures 7 9 cm  Within normal limits  ASCITES: None  IMPRESSION:     Cholelithiasis  Small kidneys         Workstation performed: [id number]     Signed by:   Alireza Wilson DO   8/24/16

## 2018-01-11 NOTE — PROGRESS NOTES
Assessment    1  Osteoarthritis of knees, 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)   2  Medicare annual wellness visit, subsequent (V70 0) (Z00 00)    Plan  Acute gastric ulcer    · Pantoprazole Sodium 40 MG Oral Tablet Delayed Release; 1 tab daily in am  Advance directive discussed with patient    · We recommend that you create an advance directive ; Status:Complete - Retrospective  By Protocol Authorization;   Done: 67TPY1245  Osteoarthritis of knees, bilateral    · Diclofenac Sodium 1 % Transdermal Gel; APPLY TO KNEES B/L UP TO TID FOR  OA PAIN  Screening for genitourinary condition    · *VB - Urinary Incontinence Screen (Dx Z13 89 Screen for UI); Status:Complete -  Retrospective By Protocol Authorization;   Done: 50SVW6858 02:20PM  Screening for neurological condition    · *VB - Fall Risk Assessment  (Dx Z13 89 Screen for Neurologic Disorder);  Status:Complete - Retrospective By Protocol Authorization;   Done: 56QXM9584 02:20PM    Discussion/Summary    TRIAL OF VOLTAREN GEL FOR KNEE PAIN  BP STABLE  CONSIDER HOME HEALTH AID  REFILLL PANTOPRAZOLE   CONSIDER LIFT CHAIR/RECLINER- PT DEFERS;       Chief Complaint  PATIENT HERE FOR AMW  SHE FEELS WELL       History of Present Illness  HPI: PATIENT HERE FOR AMW;   SHE FEELS SHAKY AD DIZZY UPON AWAKENING;   HER HEARING IS WORSE LATELY;   HER DAUGHTER IS REQUESTING VOLTAREN GEL   SHE DOES GO DOWN THE STAIRS TO TAKE A SHOWER    Welcome to [facility] and Wellness Visits: The patient is being seen for the subsequent annual wellness visit  Medicare Screening and Risk Factors   Hospitalizations: she has been previously hospitalizied  Medicare Screening Tests Risk Questions   Abdominal aortic aneurysm risk assessment: none indicated  Osteoporosis risk assessment: none indicated  HIV risk assessment: none indicated  Drug and Alcohol Use: The patient has never smoked cigarettes  The patient reports never drinking alcohol  She has never used illicit drugs     Diet and Physical Activity: Current diet includes well balanced meals  She is sedentary  Mood Disorder and Cognitive Impairment Screening:   Depression screening  negative for symptoms  She denies feeling down, depressed, or hopeless over the past two weeks  She denies feeling little interest or pleasure in doing things over the past two weeks  Cognitive impairment screening: denies difficulty learning/retaining new information, denies difficulty handling complex tasks, denies difficulty with reasoning, denies difficulty with spatial ability and orientation and denies difficulty with language  Functional Ability/Level of Safety: Hearing is normal bilaterally  The patient is currently able to do activities of daily living with limitations and unable to drive, but able to do instrumental activities of daily living without limitations and able to participate in social activities without limitations  Activities of daily living details: does not need help using the phone, no transportation help needed, does not need help shopping, no meal preparation help needed, does not need help doing housework, does not need help doing laundry and does not need help managing money  Fall risk factors: The patient fell 0 times in the past 12 months , no polypharmacy, no antidepressant use, no sedative use and no antihypertensive use  Home safety risk factors:  no unfamiliar surroundings, no loose rugs, no poor household lighting, no uneven floors, no household clutter, grab bars in the bathroom and handrails on the stairs  Advance Directives: Advance directives: living will, durable power of  for health care directives and advance directives  I agree with the patient's decisions  Co-Managers and Medical Equipment/Suppliers: See Patient Care Team   Reviewed Updated Angel Montes:   Last Medicare Wellness Visit Information was reviewed, patient interviewed, no change since last AW     Preventive Quality Program 65 and Older: Falls Risk: The patient fell 0 times in the past 12 months  The patient is currently asymptomatic Symptoms Include: The patient currently has no urinary incontinence symptoms  Urinary Incontinence Symptoms includes: no urinary incontinence and no incomplete bladder emptying    Date of last glaucoma screen was 2016      Patient Care Team    Care Team Member Role Specialty Office Number   Len Salvador MARRERO  Family Medicine [phone]   Tank Corrigan MD  Obstetrics/Gynecology [phone]   Olivia Savage, [address]  Orthopedic Surgery [phone]   Piyush Yusuf MD  Pain Management [phone]     Review of Systems    Constitutional: negative, no malaise and no fatigue  Eyes: negative, no eye pain and eyes not red  ENT: negative, no nasal congestion, no nasal discharge, no sneezing, no sore throat and no scratchy throat  Cardiovascular: negative, no chest pain, no palpitations, the heart is not racing and no lightheadedness  Respiratory: negative, no shortness of breath, no wheezing, no cough and no dry cough  Gastrointestinal: negative, no abdominal pain, no abdominal bloating, no abdominal cramps, no nausea and no vomiting  Genitourinary: negative, no dysuria, no urinary frequency and no urinary urgency  Musculoskeletal: negative  Integumentary and Breasts: negative, no rashes, no skin lesions, no skin wound, no erythema and no edema  Neurological: negative, no headache, no confusion and no dizziness  Psychiatric: negative and no insomnia  Endocrine: negative  Hematologic and Lymphatic: negative, no swollen glands, no tendency for easy bleeding and no tendency for easy bruising  Active Problems    1  Abnormal mammogram (793 80) (R92 8)   2  Acute gastric ulcer (531 30) (K25 3)   3  Acute pain of left hip (719 45) (M25 552)   4  Acute pain of right knee (719 46) (M25 561)   5  Advance directive discussed with patient (V65 49) (Z71 89)   6  Anemia (285 9) (D64 9)   7   Avitaminosis D (268 9) (E55 9)   8  Choledocholithiasis (574 50) (K80 50)   9  Chronic fatigue (780 79) (R53 82)   10  Encounter for preventive health examination (V70 0) (Z00 00)   11  Encounter for screening mammogram for malignant neoplasm of breast (V76 12)    (Z12 31)   12  Glaucoma (365 9) (H40 9)   13  Greater trochanteric bursitis (726 5) (M70 60)   14  Limb pain (729 5) (M79 609)   15  Lower back pain (724 2) (M54 5)   16  Lumbar canal stenosis (724 02) (M48 06)   17  Malignant neoplasm of colon (153 9) (C18 9)   18  Medicare annual wellness visit, initial (V70 0) (Z00 00)   19  Need for prophylactic vaccination and inoculation against influenza (V04 81) (Z23)   20  Need for vaccination with 13-polyvalent pneumococcal conjugate vaccine (V03 82) (Z23)   21  Need for zoster vaccination (V04 89) (Z23)   22  Osteoarthritis of hip (715 95) (M16 9)   23  Pain due to bone fixation device, sequela (909 3) (T84 84XS)   24  Right hip pain (719 45) (M25 551)   25  Screening for genitourinary condition (V81 6) (Z13 89)   26  Screening for neurological condition (V80 09) (Z13 89)   27  Trochanteric bursitis (726 5) (M70 60)   28  Unequal leg length (acquired) (736 81) (M21 70)   29  Urinary incontinence (788 30) (R32)   30  Vaginitis, atrophic (627 3) (N95 2)   31  Visit for screening mammogram (V76 12) (Z12 31)   32  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)    Past Medical History    · History of Colon Cancer (V10 05)   · History of Costochondritis (733 6) (M94 0)   · History of acute gastritis (V12 70) (Z87 19)   · History of acute sinusitis (V12 69) (Z87 09)   · History of acute sinusitis (V12 69) (Z87 09)   · History of chest pain (V13 89) (Y92 485)   · History of dermatitis (V13 3) (Z87 2)    The active problems and past medical history were reviewed and updated today        Surgical History    · History of Back Surgery   · History of Cataract Surgery   · History of Complete Colonoscopy   · History of Hip Surgery   · History of Wrist Surgery    The surgical history was reviewed and updated today  Family History  Mother    · Family history of Acute Myocardial Infarction (V17 3)  Father    · Family history of Acute Myocardial Infarction (V17 3)  Sister    · Family history of Cancer   · Family history of Heart Disease (V17 49)  Brother    · Family history of Acute Myocardial Infarction (V17 3)   · Family history of Coronary Artery Disease (V17 49)  Family History    · Family history of Colon Cancer (V16 0)   · Denied: Family history of substance abuse   · Denied: Family history of Mental problem    The family history was reviewed and updated today  Social History    · Denied: History of Alcohol Use (History)   · Denied: History of Being A Social Drinker   · Caffeine Use   · Marital History -    · Never a smoker   · Never smoked cigarettes (V49 89) (Z78 9)   · Occupation: Retired   · Denied: History of Tobacco Use  The social history was reviewed and updated today  The social history was reviewed and is unchanged  Current Meds   1  Pantoprazole Sodium 40 MG Oral Tablet Delayed Release; 1 PO BID FOR 2 WEEKS   THEN 1 IN AM;   Therapy: 64NHC1018 to (Evaluate:52Zcs7062)  Requested for: 17Aug2016; Last   Rx:17Aug2016 Ordered   2  Sucralfate 1 GM Oral Tablet; take 1 tablet by mouth every 12 hours; Therapy: 68GDH6806 to (Evaluate:84Kbw0871)  Requested for: 17Aug2016; Last   Rx:17Aug2016 Ordered   3  Travatan Z 0 004 % Ophthalmic Solution; Therapy: 01QIQ8527 to (Evaluate:78Aww9822) Recorded   4  Tylenol Extra Strength 500 MG Oral Tablet; Therapy: (Recorded:16Mar2017) to Recorded   5  Vitamin D 2000 UNIT Oral Capsule Recorded    Allergies    1  No Known Drug Allergies    Immunizations   ** Printed in Appendix #1 below       Vitals  Signs    Temperature: 97 F, Tympanic  Heart Rate: 93  Respiration: 16  Systolic: 275  Diastolic: 84  Height: 5 ft 3 25 in  Weight: 139 lb 6 4 oz  BMI Calculated: 24 5  BSA Calculated: 1 66    Physical Exam    Constitutional   General appearance: No acute distress, well appearing and well nourished  WDWN ELDERLY FEMALE IN NAD;    Eyes   Conjunctiva and lids: No swelling, erythema or discharge  Pupils and irises: Equal, round, reactive to light  Ears, Nose, Mouth, and Throat   External inspection of ears and nose: Normal     Otoscopic examination: Tympanic membranes translucent with normal light reflex  Canals patent without erythema  The right tympanic membrane was normal  The left tympanic membrane was normal  The right external canal was normal  The left external canal was normal  DECREASED HEARING B/L  Hearing: Normal     Nasal mucosa, septum, and turbinates: Normal without edema or erythema  Lips, teeth, and gums: Normal, good dentition  Oropharynx: Normal with no erythema, edema, exudate or lesions  Inspection of the oropharynx showed visible hard and soft palate, upper portion of tonsils and uvula (Mallampati class 2)  Oral mucosa was moist, but was normal  The palate examination showed no abnormalities  The tongue was normal  The tonsils were normal    Neck   Neck: Supple, symmetric, trachea midline, no masses  Thyroid: Normal, no thyromegaly  Pulmonary   Respiratory effort: No increased work of breathing or signs of respiratory distress  Percussion of chest: Normal     Palpation of chest: Normal     Auscultation of lungs: Clear to auscultation  Auscultation of the lungs revealed no expiratory wheezing, normal expiratory time and no inspiratory wheezing  no rales or crackles were heard bilaterally  no rhonchi  no friction rub  no wheezing  no diminished breath sounds  no bronchial breath sounds  Cardiovascular   Palpation of heart: Normal PMI, no thrills  Auscultation of heart: Normal rate and rhythm, normal S1 and S2, no murmurs  Carotid pulses: 2+ bilaterally  Abdominal aorta: Normal     Femoral pulses: 2+ bilaterally  Pedal pulses: 2+ bilaterally      Examination of extremities for edema and/or varicosities: Normal     Abdomen   Abdomen: Non-tender, no masses  Liver and spleen: No hepatomegaly or splenomegaly  Lymphatic   Palpation of lymph nodes in neck: No lymphadenopathy  Musculoskeletal   Gait and station: Normal     Digits and nails: Normal without clubbing or cyanosis  Joints, bones, and muscles: Normal     Range of motion: Normal     Stability: Normal     Muscle strength/tone: Normal     Skin   Skin and subcutaneous tissue: Normal without rashes or lesions  Palpation of skin and subcutaneous tissue: Normal turgor  Neurologic   Cranial nerves: Cranial nerves II-XII intact  Reflexes: 2+ and symmetric  Sensation: No sensory loss  Psychiatric   Judgment and insight: Normal     Orientation to person, place, and time: Normal     Recent and remote memory: Intact  Mood and affect: Normal        Results/Data  *VB - Fall Risk Assessment  (Dx Z13 89 Screen for Neurologic Disorder) 97BUC6875 02:20PM Tanesha Ashley     Test Name Result Flag Reference   Falls Risk      No falls in the past year     *VB - Urinary Incontinence Screen (Dx Z13 89 Screen for UI) 34OEU6883 02:20PM Tanesha Ashley     Test Name Result Flag Reference   Urinary Incontinence Assessment [id number]       PHQ-2 Adult Depression Screening 67RIN2429 02:18PM Eloisa Starkey     Test Name Result Flag Reference   PHQ-2 Adult Depression Score 0     Over the last two weeks, how often have you been bothered by any of the following problems? Little interest or pleasure in doing things: Not at all - 0  Feeling down, depressed, or hopeless: Not at all - 0   PHQ-2 Adult Depression Screening Negative         Future Appointments    Date/Time Provider Specialty Site   04/28/2017 01:30 PM MITCHEL Torres  Orthopedic [address] [place] [address] Dr Iraheta   Electronically signed by :  Chandrika [address] [place] [address]; Mar 16 2017  2:52PM EST                       (Author)    Appendix #1     Patient: Jam Beth ; : 1917; MRN: [id number]      1 2 3 4 5    DTP/DTaP  01-May-2001  (83y) 2001  (83y) 29-Aug-2001  (83y) 2002  (84y) 2005  (87y)    Hepatitis A  11-Mar-2003  (85y) 2005  (87y)       Hepatitis B  01-May-2001  (83y) 2001  (83y) 2002  (84y)      HIB  01-May-2001  (83y) 2001  (83y) 2002  (84y)      Influenza  13-Oct-2014  (96y) 23-Sep-2015  (97y) 13-Oct-2016  (98y)      MMR  2002  (84y) 2012  (94y)       PCV  23-Sep-2015  (97y)        PPSV  01-May-2001  (83y) 29-Aug-2001  (83y) 2002  (84y)      Polio  01-May-2001  (83y) 2001  (83y) 2002  (84y) 2005  (87y)     Varicella  2002  (84y) 2009  (91y)       Zoster  Temporarily Deferred: Patient reports item recently done

## 2018-01-12 VITALS
SYSTOLIC BLOOD PRESSURE: 126 MMHG | WEIGHT: 136 LBS | DIASTOLIC BLOOD PRESSURE: 70 MMHG | HEART RATE: 123 BPM | BODY MASS INDEX: 24.1 KG/M2 | HEIGHT: 63 IN

## 2018-01-12 VITALS
RESPIRATION RATE: 16 BRPM | SYSTOLIC BLOOD PRESSURE: 132 MMHG | HEIGHT: 63 IN | WEIGHT: 139.4 LBS | BODY MASS INDEX: 24.7 KG/M2 | DIASTOLIC BLOOD PRESSURE: 84 MMHG | HEART RATE: 93 BPM | TEMPERATURE: 97 F

## 2018-01-12 VITALS
DIASTOLIC BLOOD PRESSURE: 74 MMHG | HEIGHT: 62 IN | SYSTOLIC BLOOD PRESSURE: 138 MMHG | WEIGHT: 138.13 LBS | BODY MASS INDEX: 25.42 KG/M2 | HEART RATE: 103 BPM

## 2018-01-12 NOTE — MISCELLANEOUS
History of Present Illness  TCM Communication [facility]: The patient is being contacted for follow-up after hospitalization  Hospital course was discussed with the inpatient physician  She was hospitalized at [place] [address]  The date of admission: 11/15/2017, date of discharge: 11/24/2017  Diagnosis: BACTEREMIA, ANEMIA, CHRONIC COMBINED SYSTOLIC AND DIASTOLIC HEART FAILURE, CKD 3, ASYMPTOMATIC BACTERURIA, CHOLANGITIS, TRANSAMINITIS, RALPH, SEPSIS, ENCEPHALOPATHY, CONSTIPATION, CHOLEDOCHOLITHIASIS  She was discharged to a rehabilitation center  Medications were not reviewed today  She did not schedule a follow up appointment  She refused a follow up appointment due to [facility]   Communication performed and completed by Flor Marsh      Active Problems    1  Abnormal mammogram (793 80) (R92 8)   2  Acute gastric ulcer (531 30) (K25 3)   3  Acute pain of left hip (719 45) (M25 552)   4  Acute pain of right knee (719 46) (M25 561)   5  Advance directive discussed with patient (V65 49) (Z71 89)   6  Anemia (285 9) (D64 9)   7  Avitaminosis D (268 9) (E55 9)   8  Choledocholithiasis (574 50) (K80 50)   9  Chronic fatigue (780 79) (R53 82)   10  Encounter for preventive health examination (V70 0) (Z00 00)   11  Encounter for screening mammogram for malignant neoplasm of breast (V76 12)    (Z12 31)   12  Glaucoma (365 9) (H40 9)   13  Greater trochanteric bursitis (726 5) (M70 60)   14  Greater trochanteric bursitis of right hip (726 5) (M70 61)   15  Limb pain (729 5) (M79 609)   16  Lower back pain (724 2) (M54 5)   17  Lumbar canal stenosis (724 02) (M48 061)   18  Malignant neoplasm of colon (153 9) (C18 9)   19  Medicare annual wellness visit, initial (V70 0) (Z00 00)   20  Medicare annual wellness visit, subsequent (V70 0) (Z00 00)   21  Need for prophylactic vaccination and inoculation against influenza (V04 81) (Z23)   22   Need for vaccination with 13-polyvalent pneumococcal conjugate vaccine (V03 82) (Z23)   23  Need for zoster vaccination (V04 89) (Z23)   24  Osteoarthritis of hip (715 95) (M16 9)   25  Osteoarthritis of knees, 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)   26  Pain due to bone fixation device, sequela (909 3) (T84 84XS)   27  Right hip pain (719 45) (M25 551)   28  Screening for genitourinary condition (V81 6) (Z13 89)   29  Screening for neurological condition (V80 09) (Z13 89)   30  Trochanteric bursitis (726 5) (M70 60)   31  Unequal leg length (acquired) (736 81) (M21 70)   32  Urinary incontinence (788 30) (R32)   33  Vaginitis, atrophic (627 3) (N95 2)   34  Visit for screening mammogram (V76 12) (Z12 31)   35  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)    Past Medical History    1  History of Colon Cancer (V10 05)   2  History of Costochondritis (733 6) (M94 0)   3  History of acute gastritis (V12 70) (Z87 19)   4  History of acute sinusitis (V12 69) (Z87 09)   5  History of acute sinusitis (V12 69) (Z87 09)   6  History of chest pain (V13 89) (Z87 898)   7  History of dermatitis (V13 3) (Z87 2)    Surgical History    1  History of Back Surgery   2  History of Cataract Surgery   3  History of Complete Colonoscopy   4  History of Hip Surgery   5  History of Wrist Surgery    Family History  Mother    1  Family history of Acute Myocardial Infarction (V17 3)  Father    2  Family history of Acute Myocardial Infarction (V17 3)  Sister    3  Family history of Cancer   4  Family history of Heart Disease (V17 49)  Brother    5  Family history of Acute Myocardial Infarction (V17 3)   6  Family history of Coronary Artery Disease (V17 49)  Family History    7  Family history of Colon Cancer (V16 0)   8  Denied: Family history of substance abuse   9   Denied: Family history of Mental problem    Social History    · Denied: History of Alcohol Use (History)   · Denied: History of Being A Social Drinker   · Caffeine Use   · Marital History -    · Never a smoker   · Never smoked cigarettes (V49 89) (Z78 9)   · Occupation: Retired   · Denied: History of Tobacco Use    Current Meds   1  Diclofenac Sodium 1 % Transdermal Gel; APPLY TO KNEES B/L UP TO TID FOR OA   PAIN;   Therapy: 08SFL1261 to (Evaluate:31Zsr7479)  Requested for: 44OGK3095; Last   Rx:16Mar2017 Ordered   2  Pantoprazole Sodium 40 MG Oral Tablet Delayed Release; 1 tab daily in am;   Therapy: 88SWQ1533 to (Evaluate:78Xkx1987)  Requested for: 31WUK4941; Last   Rx:16Mar2017 Ordered   3  Sucralfate 1 GM Oral Tablet; take 1 tablet by mouth every 12 hours; Therapy: 04AHB1623 to (Evaluate:75Qvz1836)  Requested for: 43Cxj5374; Last   Rx:17Aug2016 Ordered   4  Travatan Z 0 004 % Ophthalmic Solution; Therapy: 34XOE9480 to (Evaluate:53Kzc0956) Recorded   5  Tylenol Extra Strength 500 MG Oral Tablet; Therapy: (Recorded:16Mar2017) to Recorded   6  Vitamin D 2000 UNIT Oral Capsule Recorded    Allergies    1  No Known Drug Allergies    Message   Recorded as Task   Date: 11/24/2017 04:49 PM, Created By: System   Task Name: Vazquez Elle   Assigned To: Marilia Iraheta   Regarding Patient: Junious Grief, Status: Complete   Comment:    System - 24 Nov 2017 4:49 PM     Patient discharged from hospital   Patient Name: Yusuf Salinas  Patient YOB: 1917  Discharge Date: 11/24/2017  Facility: [place] [id number] Nov 2017 8:16 AM     TASK REASSIGNED: Previously Assigned To Krista Reardon Nov 2017 10:28 AM     TASK COMPLETED     Signatures   Electronically signed by :  Chandrika Whelan DO; Nov 28 2017  5:56PM EST                       (Author)

## 2018-01-13 VITALS
SYSTOLIC BLOOD PRESSURE: 117 MMHG | BODY MASS INDEX: 24.63 KG/M2 | WEIGHT: 139 LBS | HEART RATE: 88 BPM | DIASTOLIC BLOOD PRESSURE: 67 MMHG | HEIGHT: 63 IN

## 2018-01-13 NOTE — RESULT NOTES
Verified Results  (1) VITAMIN D 25-HYDROXY 97VEX0062 09:08AM Ady SKINNER Order Number: [id number]    TW Order Number: [id number]     Test Name Result Flag Reference   VIT D 25-HYDROX 42 7 ng/mL  30 0-100 0

## 2018-01-14 NOTE — MISCELLANEOUS
History of Present Illness  TCM Communication [facility]: The patient is being contacted for follow-up after hospitalization  Hospital course was discussed with the inpatient physician  She was hospitalized at [facility]  The date of admission: 10/29/2017, date of discharge: 11/02/2017  Diagnosis: SYNCOPE, ANEMIA, CHRONIC COMBINED SYSTOLIC AND DIASTOLIC HEART FAILURE, NEUROPATHY, CKD 3, IRON DEFICIENCY, VITAMIN B 12 DEFICIENCY, RALPH  She was discharged to a rehabilitation center, [address] [place] [address]  Medications were not reviewed today  She did not schedule a follow up appointment  She refused a follow up appointment due to PT DISCHARGED TO SNF   Communication performed and completed by Alva Carter      Active Problems    1  Abnormal mammogram (793 80) (R92 8)   2  Acute gastric ulcer (531 30) (K25 3)   3  Acute pain of left hip (719 45) (M25 552)   4  Acute pain of right knee (719 46) (M25 561)   5  Advance directive discussed with patient (V65 49) (Z71 89)   6  Anemia (285 9) (D64 9)   7  Avitaminosis D (268 9) (E55 9)   8  Choledocholithiasis (574 50) (K80 50)   9  Chronic fatigue (780 79) (R53 82)   10  Encounter for preventive health examination (V70 0) (Z00 00)   11  Encounter for screening mammogram for malignant neoplasm of breast (V76 12)    (Z12 31)   12  Glaucoma (365 9) (H40 9)   13  Greater trochanteric bursitis (726 5) (M70 60)   14  Greater trochanteric bursitis of right hip (726 5) (M70 61)   15  Limb pain (729 5) (M79 609)   16  Lower back pain (724 2) (M54 5)   17  Lumbar canal stenosis (724 02) (M48 061)   18  Malignant neoplasm of colon (153 9) (C18 9)   19  Medicare annual wellness visit, initial (V70 0) (Z00 00)   20  Medicare annual wellness visit, subsequent (V70 0) (Z00 00)   21  Need for prophylactic vaccination and inoculation against influenza (V04 81) (Z23)   22  Need for vaccination with 13-polyvalent pneumococcal conjugate vaccine (V03 82) (Z23)   23   Need for zoster vaccination (V04 89) (Z23)   24  Osteoarthritis of hip (715 95) (M16 9)   25  Osteoarthritis of knees, 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)   26  Pain due to bone fixation device, sequela (909 3) (T84 84XS)   27  Right hip pain (719 45) (M25 551)   28  Screening for genitourinary condition (V81 6) (Z13 89)   29  Screening for neurological condition (V80 09) (Z13 89)   30  Trochanteric bursitis (726 5) (M70 60)   31  Unequal leg length (acquired) (736 81) (M21 70)   32  Urinary incontinence (788 30) (R32)   33  Vaginitis, atrophic (627 3) (N95 2)   34  Visit for screening mammogram (V76 12) (Z12 31)   35  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)    Past Medical History    1  History of Colon Cancer (V10 05)   2  History of Costochondritis (733 6) (M94 0)   3  History of acute gastritis (V12 70) (Z87 19)   4  History of acute sinusitis (V12 69) (Z87 09)   5  History of acute sinusitis (V12 69) (Z87 09)   6  History of chest pain (V13 89) (Z87 898)   7  History of dermatitis (V13 3) (Z87 2)    Surgical History    1  History of Back Surgery   2  History of Cataract Surgery   3  History of Complete Colonoscopy   4  History of Hip Surgery   5  History of Wrist Surgery    Family History  Mother    1  Family history of Acute Myocardial Infarction (V17 3)  Father    2  Family history of Acute Myocardial Infarction (V17 3)  Sister    3  Family history of Cancer   4  Family history of Heart Disease (V17 49)  Brother    5  Family history of Acute Myocardial Infarction (V17 3)   6  Family history of Coronary Artery Disease (V17 49)  Family History    7  Family history of Colon Cancer (V16 0)   8  Denied: Family history of substance abuse   9   Denied: Family history of Mental problem    Social History    · Denied: History of Alcohol Use (History)   · Denied: History of Being A Social Drinker   · Caffeine Use   · Marital History -    · Never a smoker   · Never smoked cigarettes (V49 89) (Z78 9)   · Occupation: Retired   · Denied: History of Tobacco Use    Current Meds   1  Diclofenac Sodium 1 % Transdermal Gel; APPLY TO KNEES B/L UP TO TID FOR OA   PAIN;   Therapy: 73BEP2708 to (Evaluate:99Eop6060)  Requested for: 14WBB6083; Last   Rx:16Mar2017 Ordered   2  Pantoprazole Sodium 40 MG Oral Tablet Delayed Release; 1 tab daily in am;   Therapy: 46MUJ0802 to (Evaluate:86Rjh5200)  Requested for: 27JWT6156; Last   Rx:16Mar2017 Ordered   3  Sucralfate 1 GM Oral Tablet; take 1 tablet by mouth every 12 hours; Therapy: 86ZJU9493 to (Evaluate:81Wes6079)  Requested for: 76Trz3950; Last   Rx:88Jki1750 Ordered   4  Travatan Z 0 004 % Ophthalmic Solution; Therapy: 63TRP3803 to (Evaluate:72Bxf2784) Recorded   5  Tylenol Extra Strength 500 MG Oral Tablet; Therapy: (Recorded:16Mar2017) to Recorded   6  Vitamin D 2000 UNIT Oral Capsule Recorded    Allergies    1  No Known Drug Allergies    Message   Recorded as Task   Date: 11/02/2017 10:29 AM, Created By: System   Task Name: Brian Lai   Assigned To: Anuel Reynoso   Regarding Patient: Marbin Jeffery, Status: Active   Comment:    System - 02 Nov 2017 10:29 AM     Patient discharged from hospital   Patient Name: Pritesh Peguero  Patient YOB: 1917  Discharge Date: 11/2/2017  Facility: [place] [facility] [address] Nov 2017 10:34 AM     TASK REASSIGNED: Previously Assigned To [employer]   Electronically signed by :  Chandrika Whelan DO; Nov  3 2017  3:00PM EST                       (Author)

## 2018-01-15 NOTE — MISCELLANEOUS
History of Present Illness  TCM Communication [facility]: The patient is being contacted for follow-up after hospitalization  Hospital course was discussed with the inpatient physician  She was hospitalized at [facility]  The date of admission: 10/04/2017, date of discharge: 10/09/2017  Diagnosis: TRANSAMINITIS, NSTEMI, CKD STAGE 4, WEAKNESS, RUQ PAIN  She was discharged to a rehabilitation center, [address] [place] [address]  Medications were not reviewed today  She did not schedule a follow up appointment  She refused a follow up appointment due to [facility]   Communication performed and completed by Michael Oliveira      Active Problems    1  Abnormal mammogram (793 80) (R92 8)   2  Acute gastric ulcer (531 30) (K25 3)   3  Acute pain of left hip (719 45) (M25 552)   4  Acute pain of right knee (719 46) (M25 561)   5  Advance directive discussed with patient (V65 49) (Z71 89)   6  Anemia (285 9) (D64 9)   7  Avitaminosis D (268 9) (E55 9)   8  Choledocholithiasis (574 50) (K80 50)   9  Chronic fatigue (780 79) (R53 82)   10  Encounter for preventive health examination (V70 0) (Z00 00)   11  Encounter for screening mammogram for malignant neoplasm of breast (V76 12)    (Z12 31)   12  Glaucoma (365 9) (H40 9)   13  Greater trochanteric bursitis (726 5) (M70 60)   14  Greater trochanteric bursitis of right hip (726 5) (M70 61)   15  Limb pain (729 5) (M79 609)   16  Lower back pain (724 2) (M54 5)   17  Lumbar canal stenosis (724 02) (M48 061)   18  Malignant neoplasm of colon (153 9) (C18 9)   19  Medicare annual wellness visit, initial (V70 0) (Z00 00)   20  Medicare annual wellness visit, subsequent (V70 0) (Z00 00)   21  Need for prophylactic vaccination and inoculation against influenza (V04 81) (Z23)   22  Need for vaccination with 13-polyvalent pneumococcal conjugate vaccine (V03 82) (Z23)   23  Need for zoster vaccination (V04 89) (Z23)   24  Osteoarthritis of hip (715 95) (M16 9)   25  Osteoarthritis of knees, 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)   26  Pain due to bone fixation device, sequela (909 3) (T84 84XS)   27  Right hip pain (719 45) (M25 551)   28  Screening for genitourinary condition (V81 6) (Z13 89)   29  Screening for neurological condition (V80 09) (Z13 89)   30  Trochanteric bursitis (726 5) (M70 60)   31  Unequal leg length (acquired) (736 81) (M21 70)   32  Urinary incontinence (788 30) (R32)   33  Vaginitis, atrophic (627 3) (N95 2)   34  Visit for screening mammogram (V76 12) (Z12 31)   35  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)    Past Medical History    1  History of Colon Cancer (V10 05)   2  History of Costochondritis (733 6) (M94 0)   3  History of acute gastritis (V12 70) (Z87 19)   4  History of acute sinusitis (V12 69) (Z87 09)   5  History of acute sinusitis (V12 69) (Z87 09)   6  History of chest pain (V13 89) (Z87 898)   7  History of dermatitis (V13 3) (Z87 2)    Surgical History    1  History of Back Surgery   2  History of Cataract Surgery   3  History of Complete Colonoscopy   4  History of Hip Surgery   5  History of Wrist Surgery    Family History  Mother    1  Family history of Acute Myocardial Infarction (V17 3)  Father    2  Family history of Acute Myocardial Infarction (V17 3)  Sister    3  Family history of Cancer   4  Family history of Heart Disease (V17 49)  Brother    5  Family history of Acute Myocardial Infarction (V17 3)   6  Family history of Coronary Artery Disease (V17 49)  Family History    7  Family history of Colon Cancer (V16 0)   8  Denied: Family history of substance abuse   9  Denied: Family history of Mental problem    Social History    · Denied: History of Alcohol Use (History)   · Denied: History of Being A Social Drinker   · Caffeine Use   · Marital History -    · Never a smoker   · Never smoked cigarettes (V49 89) (Z78 9)   · Occupation: Retired   · Denied: History of Tobacco Use    Current Meds   1   Diclofenac Sodium 1 % Transdermal Gel; APPLY TO KNEES B/L UP TO TID FOR OA   PAIN;   Therapy: 17MKN3808 to (Evaluate:15Apr2017)  Requested for: 47UED6831; Last   Rx:16Mar2017 Ordered   2  Pantoprazole Sodium 40 MG Oral Tablet Delayed Release; 1 tab daily in am;   Therapy: 08QQT3710 to (Evaluate:12Oct2017)  Requested for: 03YXB5090; Last   Rx:16Mar2017 Ordered   3  Sucralfate 1 GM Oral Tablet; take 1 tablet by mouth every 12 hours; Therapy: 89KJJ6760 to (Evaluate:86Nzo5243)  Requested for: 34Jro9511; Last   Rx:17Aug2016 Ordered   4  Travatan Z 0 004 % Ophthalmic Solution; Therapy: 47CLG6741 to (Evaluate:95Oze6449) Recorded   5  Tylenol Extra Strength 500 MG Oral Tablet; Therapy: (Recorded:16Mar2017) to Recorded   6  Vitamin D 2000 UNIT Oral Capsule Recorded    Allergies    1  No Known Drug Allergies    Message   Recorded as Task   Date: 10/09/2017 04:03 PM, Created By: System   Task Name: Evelio Mercedes   Assigned To: Melinda Monique   Regarding Patient: Terrance Thomas, Status: Active   Comment:    System - 09 Oct 2017 4:03 PM     Patient discharged from hospital   Patient Name: Faisal Hannah  Patient YOB: 1917  Discharge Date: 10/9/2017  Facility: [facility] 09 Oct 2017 4:22 PM     TASK REASSIGNED: Previously Assigned To Ramón Walker     Future Appointments    Date/Time Provider Specialty Site   10/27/2017 11:00 AM MITCHEL Lopez  Orthopedic [address] [place] [address] Dr Iraheta   Electronically signed by :  Chandrika [address] [place] [address]; Oct 17 2017  2:17PM EST                       (Author)

## 2018-01-16 NOTE — MISCELLANEOUS
History of Present Illness  TCM Communication [facility]: The patient is being contacted for follow-up after hospitalization  Hospital course was discussed with the inpatient physician  She was hospitalized [facility]IT  The date of admission: 10/09/2017, date of discharge: 10/25/2017  Diagnosis: TRANSAMINITIS  She was discharged to home  Medications were not reviewed today  She scheduled a follow up appointment  Counseling was provided to the patient  Topics counseled included importance of compliance with treatment  Communication performed and completed by AMINAH SCHMITT      Active Problems    1  Abnormal mammogram (793 80) (R92 8)   2  Acute gastric ulcer (531 30) (K25 3)   3  Acute pain of left hip (719 45) (M25 552)   4  Acute pain of right knee (719 46) (M25 561)   5  Advance directive discussed with patient (V65 49) (Z71 89)   6  Anemia (285 9) (D64 9)   7  Avitaminosis D (268 9) (E55 9)   8  Choledocholithiasis (574 50) (K80 50)   9  Chronic fatigue (780 79) (R53 82)   10  Encounter for preventive health examination (V70 0) (Z00 00)   11  Encounter for screening mammogram for malignant neoplasm of breast (V76 12)    (Z12 31)   12  Glaucoma (365 9) (H40 9)   13  Greater trochanteric bursitis (726 5) (M70 60)   14  Greater trochanteric bursitis of right hip (726 5) (M70 61)   15  Limb pain (729 5) (M79 609)   16  Lower back pain (724 2) (M54 5)   17  Lumbar canal stenosis (724 02) (M48 061)   18  Malignant neoplasm of colon (153 9) (C18 9)   19  Medicare annual wellness visit, initial (V70 0) (Z00 00)   20  Medicare annual wellness visit, subsequent (V70 0) (Z00 00)   21  Need for prophylactic vaccination and inoculation against influenza (V04 81) (Z23)   22  Need for vaccination with 13-polyvalent pneumococcal conjugate vaccine (V03 82) (Z23)   23  Need for zoster vaccination (V04 89) (Z23)   24  Osteoarthritis of hip (715 95) (M16 9)   25   Osteoarthritis of knees, 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)   26  Pain due to bone fixation device, sequela (909 3) (T84 84XS)   27  Right hip pain (719 45) (M25 551)   28  Screening for genitourinary condition (V81 6) (Z13 89)   29  Screening for neurological condition (V80 09) (Z13 89)   30  Trochanteric bursitis (726 5) (M70 60)   31  Unequal leg length (acquired) (736 81) (M21 70)   32  Urinary incontinence (788 30) (R32)   33  Vaginitis, atrophic (627 3) (N95 2)   34  Visit for screening mammogram (V76 12) (Z12 31)   35  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)    Past Medical History    1  History of Colon Cancer (V10 05)   2  History of Costochondritis (733 6) (M94 0)   3  History of acute gastritis (V12 70) (Z87 19)   4  History of acute sinusitis (V12 69) (Z87 09)   5  History of acute sinusitis (V12 69) (Z87 09)   6  History of chest pain (V13 89) (Z87 898)   7  History of dermatitis (V13 3) (Z87 2)    Surgical History    1  History of Back Surgery   2  History of Cataract Surgery   3  History of Complete Colonoscopy   4  History of Hip Surgery   5  History of Wrist Surgery    Family History  Mother    1  Family history of Acute Myocardial Infarction (V17 3)  Father    2  Family history of Acute Myocardial Infarction (V17 3)  Sister    3  Family history of Cancer   4  Family history of Heart Disease (V17 49)  Brother    5  Family history of Acute Myocardial Infarction (V17 3)   6  Family history of Coronary Artery Disease (V17 49)  Family History    7  Family history of Colon Cancer (V16 0)   8  Denied: Family history of substance abuse   9  Denied: Family history of Mental problem    Social History    · Denied: History of Alcohol Use (History)   · Denied: History of Being A Social Drinker   · Caffeine Use   · Marital History -    · Never a smoker   · Never smoked cigarettes (V49 89) (Z78 9)   · Occupation: Retired   · Denied: History of Tobacco Use    Current Meds   1   Diclofenac Sodium 1 % Transdermal Gel; APPLY TO KNEES B/L UP TO TID FOR OA PAIN;   Therapy: 58CUZ5297 to (Evaluate:15Apr2017)  Requested for: 24KOH4744; Last   Rx:16Mar2017 Ordered   2  Pantoprazole Sodium 40 MG Oral Tablet Delayed Release; 1 tab daily in am;   Therapy: 91OAT3443 to (Evaluate:12Oct2017)  Requested for: 04DMI0526; Last   Rx:16Mar2017 Ordered   3  Sucralfate 1 GM Oral Tablet; take 1 tablet by mouth every 12 hours; Therapy: 37PFZ2325 to (Evaluate:64Ocs4474)  Requested for: 17Aug2016; Last   Rx:17Aug2016 Ordered   4  Travatan Z 0 004 % Ophthalmic Solution; Therapy: 40LPQ2895 to (Evaluate:02Feb2015) Recorded   5  Tylenol Extra Strength 500 MG Oral Tablet; Therapy: (Recorded:16Mar2017) to Recorded   6  Vitamin D 2000 UNIT Oral Capsule Recorded    Allergies    1  No Known Drug Allergies    Future Appointments    Date/Time Provider Specialty Site   11/02/2017 10:30 AM [facility]ae Saucedo DO [facility] [address]   Electronically signed by :  Chandrika Whelan DO; Oct 27 2017  3:17PM EST                       (Author)

## 2018-01-23 VITALS — SYSTOLIC BLOOD PRESSURE: 118 MMHG | HEART RATE: 72 BPM | DIASTOLIC BLOOD PRESSURE: 66 MMHG | HEIGHT: 63 IN

## 2018-03-28 RX ORDER — BENZONATATE 100 MG/1
1 CAPSULE ORAL
COMMUNITY
Start: 2018-01-08 | End: 2018-05-14 | Stop reason: ALTCHOICE

## 2018-03-28 RX ORDER — LANOLIN ALCOHOL/MO/W.PET/CERES
1 CREAM (GRAM) TOPICAL DAILY
COMMUNITY
Start: 2018-01-08

## 2018-03-28 RX ORDER — POTASSIUM CHLORIDE 750 MG/1
1 CAPSULE, EXTENDED RELEASE ORAL DAILY
COMMUNITY
Start: 2018-01-08 | End: 2018-10-26

## 2018-03-28 RX ORDER — FUROSEMIDE 20 MG/1
1 TABLET ORAL DAILY
COMMUNITY
Start: 2018-01-08 | End: 2018-10-25 | Stop reason: ALTCHOICE

## 2018-04-17 ENCOUNTER — OFFICE VISIT (OUTPATIENT)
Dept: CARDIOLOGY CLINIC | Facility: CLINIC | Age: 83
End: 2018-04-17
Payer: MEDICARE

## 2018-04-17 VITALS
OXYGEN SATURATION: 98 % | WEIGHT: 175 LBS | HEIGHT: 66 IN | SYSTOLIC BLOOD PRESSURE: 128 MMHG | BODY MASS INDEX: 28.12 KG/M2 | DIASTOLIC BLOOD PRESSURE: 62 MMHG | HEART RATE: 80 BPM

## 2018-04-17 DIAGNOSIS — R55 SYNCOPE, UNSPECIFIED SYNCOPE TYPE: Primary | ICD-10-CM

## 2018-04-17 DIAGNOSIS — I50.42 CHRONIC COMBINED SYSTOLIC AND DIASTOLIC HEART FAILURE (HCC): ICD-10-CM

## 2018-04-17 PROCEDURE — 99213 OFFICE O/P EST LOW 20 MIN: CPT | Performed by: INTERNAL MEDICINE

## 2018-04-17 NOTE — PROGRESS NOTES
Cardiology Follow Up    David Farrell  12/14/1917  [id number]  [address] [facility]  [address] [place] [address] [address]    1  Syncope, unspecified syncope type     2  Chronic combined systolic and diastolic heart failure (HCC)         Interval History:  Cardiology follow-up  Overall clinically stable, she has had no recurrent syncope  She is wheelchair-bound due to significant neuropathy of lower extremities  No orthopnea or PND  She does complain of lower extremity edema  She is on a diuretic regimen  Patient Active Problem List   Diagnosis    NSTEMI (non-ST elevated myocardial infarction) ([facility] [address] )    Weakness    Syncope    Anemia    Chronic combined systolic and diastolic heart failure (HCC)    Neuropathy    Stage 3 chronic kidney disease    Iron deficiency    Vitamin B12 deficiency    Dehydration    Non-sustained ventricular tachycardia ([facility] )    Hip pain    Bacteremia    Asymptomatic bacteriuria    History of colon cancer    Cholangitis     Past Medical History:   Diagnosis Date    Cancer [facility])     "some kind of cancer when I was 80"    Iron deficiency 10/31/2017    Stage 2 chronic kidney disease 10/31/2017    Syncope     Vitamin B12 deficiency 10/31/2017     Social History     Social History    Marital status:      Spouse name: N/A    Number of children: N/A    Years of education: N/A     Occupational History    Not on file  Social History Main Topics    Smoking status: Never Smoker    Smokeless tobacco: Never Used    Alcohol use No    Drug use: No    Sexual activity: Not on file     Other Topics Concern    Not on file     Social History Narrative    No narrative on file      No family history on file    Past Surgical History:   Procedure Laterality Date    BACK SURGERY      COLON SURGERY      ERCP W/ SPHICTEROTOMY N/A 11/22/2017    Procedure: ENDOSCOPIC RETROGRADE CHOLANGIOPANCREATOGRAPHY (ERCP) W/ SPHINCTEROTOMY;  Surgeon: Ashleigh Kaur MD;  Location: [facility];  Service: Gastroenterology    HIP FRACTURE SURGERY         Current Outpatient Prescriptions:     acetaminophen (TYLENOL) 325 mg tablet, Take 2 tablets by mouth every 6 (six) hours as needed for mild pain, headaches or fever (Patient taking differently: Take 650 mg by mouth every 4 (four) hours as needed for mild pain, headaches or fever  ), Disp: 30 tablet, Rfl: 0    ascorbic acid (VITAMIN C) 500 MG tablet, Take 0 5 tablets by mouth 2 (two) times a day, Disp: , Rfl: 0    aspirin (ECOTRIN LOW STRENGTH) 81 mg EC tablet, Take 1 tablet by mouth daily, Disp: , Rfl: 0    Cholecalciferol (VITAMIN D) 2000 units CAPS, Take 2,000 Units by mouth daily, Disp: , Rfl:     cyanocobalamin (VITAMIN B-12) 1,000 mcg tablet, Take 1 tablet by mouth daily, Disp: , Rfl:     diclofenac sodium (VOLTAREN) 1 %, Place on the skin, Disp: , Rfl:     docusate sodium (COLACE) 100 mg capsule, Take 1 capsule by mouth 2 (two) times a day, Disp: 10 capsule, Rfl: 0    ferrous sulfate 325 (65 Fe) mg tablet, Take 1 tablet by mouth 2 (two) times a day with meals, Disp: , Rfl: 0    furosemide (LASIX) 20 mg tablet, Take 1 tablet by mouth daily, Disp: , Rfl:     Magnesium Hydroxide (MILK OF MAGNESIA PO), Take 30 mL by mouth as needed, Disp: , Rfl:     Multiple Vitamins-Minerals (PRESERVISION AREDS 2 PO), Take by mouth 2 (two) times a day, Disp: , Rfl:     pantoprazole (PROTONIX) 40 mg tablet, Take 40 mg by mouth daily, Disp: , Rfl:     potassium chloride (MICRO-K) 10 MEQ CR capsule, Take 1 capsule by mouth daily, Disp: , Rfl:     senna (SENOKOT) 8 6 mg, Take 1 tablet by mouth daily, Disp: 120 each, Rfl: 0    sucralfate (CARAFATE) 1 g tablet, Take 1 g by mouth every 12 (twelve) hours, Disp: , Rfl:     benzonatate (TESSALON PERLES) 100 mg capsule, Take 1 capsule by mouth, Disp: , Rfl:     cycloSPORINE (RESTASIS) 0 05 % ophthalmic emulsion, Administer 1 drop to both eyes 2 (two) times a day, Disp: , Rfl:     gabapentin (NEURONTIN) 300 mg capsule, Take 300 mg by mouth 3 (three) times a day  , Disp: , Rfl:     latanoprost (XALATAN) 0 005 % ophthalmic solution, 1 drop daily at bedtime, Disp: , Rfl:     travoprost (TRAVATAN Z) 0 004 % ophthalmic solution, Apply 1 drop to eye daily, Disp: , Rfl:   No Known Allergies    Labs:  No visits with results within 2 Month(s) from this visit  Latest known visit with results is:   Admission on 11/15/2017, Discharged on 11/24/2017   No results displayed because visit has over 200 results  Imaging: No results found  Review of Systems:  Review of Systems   Constitutional: Negative for diaphoresis and fever  Respiratory: Negative for shortness of breath, wheezing and stridor  Cardiovascular: Positive for leg swelling  Negative for chest pain and palpitations  Psychiatric/Behavioral: Negative for confusion  Physical Exam:  Physical Exam   Constitutional: No distress  Neck: No JVD present  Cardiovascular: Regular rhythm, S1 normal and S2 normal   Exam reveals gallop, S4 and distant heart sounds  No murmur heard  Pulmonary/Chest: Effort normal and breath sounds normal  No respiratory distress  She has no wheezes  She has no rales  She exhibits no tenderness  Skin: She is not diaphoretic  Discussion/Summary:  Cardiomyopathy, coronary systolic and diastolic heart failure, ejection fraction around 40 percent with mild-to-moderate mitral insufficiency on echocardiogram last year  Left bundle-branch block  She appears to be euvolemic, slightly volume overload  Favor no changes in her diuretic regimen  Postural maneuvers have been recommended  No aggressive interventions or diagnostic test given her age

## 2018-05-14 ENCOUNTER — OFFICE VISIT (OUTPATIENT)
Dept: FAMILY MEDICINE CLINIC | Facility: CLINIC | Age: 83
End: 2018-05-14
Payer: MEDICARE

## 2018-05-14 VITALS
HEART RATE: 80 BPM | DIASTOLIC BLOOD PRESSURE: 62 MMHG | TEMPERATURE: 97.8 F | SYSTOLIC BLOOD PRESSURE: 124 MMHG | RESPIRATION RATE: 16 BRPM

## 2018-05-14 DIAGNOSIS — R21 RASH: Primary | ICD-10-CM

## 2018-05-14 DIAGNOSIS — E61.1 IRON DEFICIENCY: ICD-10-CM

## 2018-05-14 DIAGNOSIS — M10.9 GOUT, UNSPECIFIED CAUSE, UNSPECIFIED CHRONICITY, UNSPECIFIED SITE: ICD-10-CM

## 2018-05-14 DIAGNOSIS — R23.4 CHANGES IN SKIN TEXTURE: ICD-10-CM

## 2018-05-14 PROCEDURE — 99214 OFFICE O/P EST MOD 30 MIN: CPT | Performed by: INTERNAL MEDICINE

## 2018-05-14 RX ORDER — DEXAMETHASONE SODIUM PHOSPHATE 4 MG/ML
4 INJECTION, SOLUTION INTRA-ARTICULAR; INTRALESIONAL; INTRAMUSCULAR; INTRAVENOUS; SOFT TISSUE ONCE
Status: CANCELLED | OUTPATIENT
Start: 2018-05-14

## 2018-05-14 RX ORDER — PREDNISONE 10 MG/1
TABLET ORAL
Qty: 15 TABLET | Refills: 0 | Status: SHIPPED | OUTPATIENT
Start: 2018-05-14 | End: 2018-10-26

## 2018-05-14 RX ORDER — PREDNISONE 10 MG/1
TABLET ORAL
Qty: 15 TABLET | Refills: 0 | Status: SHIPPED | OUTPATIENT
Start: 2018-05-14 | End: 2018-05-14 | Stop reason: SDUPTHER

## 2018-05-14 RX ORDER — CETIRIZINE HYDROCHLORIDE 5 MG/1
5 TABLET, CHEWABLE ORAL DAILY
Qty: 30 TABLET | Refills: 0 | Status: SHIPPED | OUTPATIENT
Start: 2018-05-14

## 2018-05-14 RX ORDER — ASPIRIN 81 MG/1
81 TABLET ORAL EVERY OTHER DAY
Refills: 0
Start: 2018-05-14

## 2018-05-14 RX ORDER — HYDROXYZINE HYDROCHLORIDE 25 MG/1
TABLET, FILM COATED ORAL
Qty: 90 TABLET | Refills: 0 | Status: SHIPPED | OUTPATIENT
Start: 2018-05-14 | End: 2018-10-26

## 2018-05-14 NOTE — PROGRESS NOTES
ASSESSMENT and PLAN:  Araseli Waite is a 80 y o  female with:   Problem List Items Addressed This Visit     None          SUBJECTIVE:  Araseli Waite is a 80 y o  female who presents today with a chief complaint of Rash    Patient with itchiness; She has a  Rash ; her daughter put some steroid cream on - it did not help that much; She has had this since she is a [facility]  It started on her head; she has hx of gout; she was on prednisone- did not help the itch  Review of Systems   Constitutional: Negative  HENT: Negative  I have reviewed the patient's PMH, Social History, Medication List and Allergies  OBJECTIVE:  /62   Pulse 80   Temp 97 8 °F (36 6 °C)   Resp 16   LMP  (LMP Unknown)   Physical Exam   Constitutional: She appears well-developed and well-nourished  HENT:   Head: Normocephalic  Right Ear: External ear normal    Eyes: Conjunctivae are normal  Pupils are equal, round, and reactive to light  Neck: Normal range of motion  Neck supple  Pulmonary/Chest: Effort normal and breath sounds normal  No respiratory distress  She has no wheezes  Skin: Rash noted  Erythema: diffuse rash with scaling on shoulders,    arms, legs and groin  Nursing note and vitals reviewed

## 2018-06-25 ENCOUNTER — TELEPHONE (OUTPATIENT)
Dept: FAMILY MEDICINE CLINIC | Facility: CLINIC | Age: 83
End: 2018-06-25

## 2018-06-25 NOTE — TELEPHONE ENCOUNTER
Call from [facility] [address]  Patient currently holding her iron  Labs should be in finally  Do you want her to go back on it? Also her pharmacy is questioning the hydroxyzine, they want to make sure that you are aware that it can effect cognitive ability in elderly

## 2018-06-26 NOTE — TELEPHONE ENCOUNTER
She can d/c the hydroxizine- it was for itching;   She can restart the iron - her hemoglobin is steady but on low side so it will help

## 2018-10-25 ENCOUNTER — OFFICE VISIT (OUTPATIENT)
Dept: CARDIOLOGY CLINIC | Facility: CLINIC | Age: 83
End: 2018-10-25
Payer: MEDICARE

## 2018-10-25 VITALS
HEIGHT: 66 IN | SYSTOLIC BLOOD PRESSURE: 126 MMHG | BODY MASS INDEX: 31.4 KG/M2 | WEIGHT: 195.4 LBS | HEART RATE: 66 BPM | DIASTOLIC BLOOD PRESSURE: 66 MMHG

## 2018-10-25 DIAGNOSIS — I47.2 NON-SUSTAINED VENTRICULAR TACHYCARDIA (HCC): ICD-10-CM

## 2018-10-25 DIAGNOSIS — I50.42 CHRONIC COMBINED SYSTOLIC AND DIASTOLIC HEART FAILURE (HCC): Primary | ICD-10-CM

## 2018-10-25 DIAGNOSIS — I34.0 NON-RHEUMATIC MITRAL REGURGITATION: ICD-10-CM

## 2018-10-25 PROCEDURE — 99213 OFFICE O/P EST LOW 20 MIN: CPT | Performed by: INTERNAL MEDICINE

## 2018-10-25 RX ORDER — MOMETASONE FUROATE 1 MG/G
CREAM TOPICAL
COMMUNITY

## 2018-10-25 RX ORDER — IPRATROPIUM BROMIDE AND ALBUTEROL SULFATE 2.5; .5 MG/3ML; MG/3ML
SOLUTION RESPIRATORY (INHALATION)
COMMUNITY

## 2018-10-25 RX ORDER — CYCLOSPORINE 0.5 MG/ML
EMULSION OPHTHALMIC
COMMUNITY
End: 2018-10-26

## 2018-10-25 RX ORDER — GABAPENTIN 300 MG/1
CAPSULE ORAL
COMMUNITY

## 2018-10-25 RX ORDER — TRIAMCINOLONE ACETONIDE 1 MG/G
CREAM TOPICAL
COMMUNITY

## 2018-10-25 RX ORDER — TORSEMIDE 10 MG/1
20 TABLET ORAL DAILY
Status: ON HOLD | COMMUNITY
End: 2018-10-28

## 2018-10-25 NOTE — PROGRESS NOTES
Cardiology Follow Up    Kirsten Poster  12/14/1917  [id number]  [address]  [address]    1  Chronic combined systolic and diastolic heart failure ([facility] )     2  Non-sustained ventricular tachycardia ([facility] )     3  Non-rheumatic mitral regurgitation         Interval History:   Cardiology follow-up  Patient is under years old  She still very lucid, she does complain of lower extremity edema  No orthopnea no PND, her diuretic furosemide was changed to torsemide recently  Because of worsening edema with partial response she denies any chest pain  She is compliant medications, currently only on aspirin the diuretic  Not on beta-blocker not on Ace inhibitor, she does have chronic kidney disease advanced  Patient Active Problem List   Diagnosis    NSTEMI (non-ST elevated myocardial infarction) ([facility] )    Weakness    Syncope    Anemia    Chronic combined systolic and diastolic heart failure (HCC)    Neuropathy    Stage 3 chronic kidney disease (HCC)    Iron deficiency    Vitamin B12 deficiency    Dehydration    Non-sustained ventricular tachycardia (HCC)    Hip pain    Bacteremia    Asymptomatic bacteriuria    History of colon cancer    Cholangitis    Rash    Changes in skin texture     Gout    Non-rheumatic mitral regurgitation     Past Medical History:   Diagnosis Date    Cancer [facility])     "some kind of cancer when I was 80"    Colon cancer ([facility] )     Costochondritis     Last Assessed:3/18/2013    Iron deficiency 10/31/2017    Stage 2 chronic kidney disease 10/31/2017    Syncope     Vitamin B12 deficiency 10/31/2017     Social History     Social History    Marital status:       Spouse name: N/A    Number of children: N/A    Years of education: N/A     Occupational History    retired      Social History Main Topics    Smoking status: Never Smoker    Smokeless tobacco: Never Used    Alcohol use No    Drug use: No    Sexual activity: Not on file     Other Topics Concern    Not on file     Social History Narrative    Caffeine use          Family History   Problem Relation Age of Onset    Heart attack Mother     Heart attack Father     Cancer Sister     Heart attack Brother     Coronary artery disease Brother     Heart disease Brother     Colon cancer Family      Past Surgical History:   Procedure Laterality Date    BACK SURGERY      CATARACT EXTRACTION      COLON SURGERY      ERCP W/ SPHICTEROTOMY N/A 11/22/2017    Procedure: ENDOSCOPIC RETROGRADE CHOLANGIOPANCREATOGRAPHY (ERCP) W/ SPHINCTEROTOMY;  Surgeon: Robbin Velarde MD;  [facility];  Service: Gastroenterology    HIP FRACTURE SURGERY      WRIST SURGERY         Current Outpatient Prescriptions:     ascorbic acid (VITAMIN C) 500 MG tablet, Take 0 5 tablets by mouth 2 (two) times a day, Disp: , Rfl: 0    aspirin (ECOTRIN LOW STRENGTH) 81 mg EC tablet, Take 1 tablet (81 mg total) by mouth every other day, Disp: , Rfl: 0    cetirizine (ZyrTEC) 5 MG chewable tablet, Chew 1 tablet (5 mg total) daily Chew 1 tab at night for itch, Disp: 30 tablet, Rfl: 0    Cholecalciferol (VITAMIN D) 2000 units CAPS, Take 2,000 Units by mouth daily, Disp: , Rfl:     cyanocobalamin (VITAMIN B-12) 1,000 mcg tablet, Take 1 tablet by mouth daily, Disp: , Rfl:     docusate sodium (COLACE) 100 mg capsule, Take 1 capsule by mouth 2 (two) times a day, Disp: 10 capsule, Rfl: 0    furosemide (LASIX) 20 mg tablet, Take 1 tablet by mouth daily, Disp: , Rfl:     gabapentin (NEURONTIN) 300 mg capsule, gabapentin 300 mg capsule, Disp: , Rfl:     ipratropium-albuterol (DUO-NEB) 0 5-2 5 mg/3 mL nebulizer solution, ipratropium-albuterol 0 5 mg-3 mg(2 5 mg base)/3 mL nebulization soln, Disp: , Rfl:     latanoprost (XALATAN) 0 005 % ophthalmic solution, 1 drop daily at bedtime, Disp: , Rfl:     Magnesium Hydroxide (MILK OF MAGNESIA PO), Take 30 mL by mouth as needed, Disp: , Rfl:     mometasone (ELOCON) 0 1 % cream, mometasone 0 1 % topical cream, Disp: , Rfl:     Multiple Vitamins-Minerals (PRESERVISION AREDS 2 PO), Take by mouth 2 (two) times a day, Disp: , Rfl:     pantoprazole (PROTONIX) 40 mg tablet, Take 40 mg by mouth daily, Disp: , Rfl:     potassium chloride (MICRO-K) 10 MEQ CR capsule, Take 1 capsule by mouth daily, Disp: , Rfl:     senna (SENOKOT) 8 6 mg, Take 1 tablet by mouth daily, Disp: 120 each, Rfl: 0    triamcinolone (KENALOG) 0 1 % cream, triamcinolone acetonide 0 1 % topical cream, Disp: , Rfl:     cycloSPORINE (RESTASIS) 0 05 % ophthalmic emulsion, Administer 1 drop to both eyes 2 (two) times a day, Disp: , Rfl:     cycloSPORINE (RESTASIS) 0 05 % ophthalmic emulsion, Restasis 0 05 % eye drops in a dropperette, Disp: , Rfl:     hydrOXYzine HCL (ATARAX) 25 mg tablet, 1/2-1 tab every 8 hours for itch (Patient not taking: Reported on 10/25/2018 ), Disp: 90 tablet, Rfl: 0    predniSONE 10 mg tablet, 1 5 TABS PO BID FOR THREE DAYS THEN 1 PO BID FOR 3 DAYS AND OFF- TAKE WITH FOOD-start 5/15/18 (Patient not taking: Reported on 10/25/2018 ), Disp: 15 tablet, Rfl: 0    travoprost (TRAVATAN Z) 0 004 % ophthalmic solution, Apply 1 drop to eye daily, Disp: , Rfl:   No Known Allergies    Labs:  No visits with results within 6 Month(s) from this visit  Latest known visit with results is:   Admission on 11/15/2017, Discharged on 11/24/2017   No results displayed because visit has over 200 results  Imaging: No results found  Review of Systems:  Review of Systems   Constitutional: Positive for fatigue  Respiratory: Negative for shortness of breath, wheezing and stridor  Cardiovascular: Positive for leg swelling  Negative for chest pain and palpitations  Musculoskeletal: Positive for arthralgias  Neurological: Negative for syncope     Psychiatric/Behavioral: Negative for sleep disturbance  Physical Exam:  Physical Exam   Constitutional: No distress (Appears younger)  Eyes: No scleral icterus  Cardiovascular: Normal rate and regular rhythm  Exam reveals no gallop and no friction rub  Murmur (2/4 systolic ejection murmur) heard  Pulmonary/Chest: Effort normal and breath sounds normal  No respiratory distress  She has no wheezes  She has no rales  She exhibits no tenderness  Neurological: She is alert  Skin: She is not diaphoretic  Psychiatric: She has a normal mood and affect  Discussion/Summary:  Congestive heart failure, chronic systolic and diastolic, she does have a left bundle branch block and mild-to-moderate mitral insufficiency on echocardiogram in the past   Family patient's prefer no testing which I agree with  Continue current management with diuretic for edema/dyspnea  Will see her on a p r n  basis

## 2018-10-26 ENCOUNTER — APPOINTMENT (EMERGENCY)
Dept: ULTRASOUND IMAGING | Facility: HOSPITAL | Age: 83
DRG: 445 | End: 2018-10-26
Payer: MEDICARE

## 2018-10-26 ENCOUNTER — HOSPITAL ENCOUNTER (INPATIENT)
Facility: HOSPITAL | Age: 83
LOS: 1 days | Discharge: NURSING, REHAB OR LONG-TERM CARE | DRG: 445 | End: 2018-10-28
Attending: EMERGENCY MEDICINE | Admitting: INTERNAL MEDICINE
Payer: MEDICARE

## 2018-10-26 DIAGNOSIS — I50.42 CHRONIC COMBINED SYSTOLIC AND DIASTOLIC HEART FAILURE (HCC): ICD-10-CM

## 2018-10-26 DIAGNOSIS — R10.9 ABDOMINAL PAIN: Primary | ICD-10-CM

## 2018-10-26 DIAGNOSIS — K80.20 CHOLELITHIASIS: ICD-10-CM

## 2018-10-26 DIAGNOSIS — N17.9 ACUTE RENAL INJURY (HCC): ICD-10-CM

## 2018-10-26 PROBLEM — N18.30 ACUTE RENAL FAILURE SUPERIMPOSED ON STAGE 3 CHRONIC KIDNEY DISEASE (HCC): Status: ACTIVE | Noted: 2017-10-29

## 2018-10-26 LAB
ALBUMIN SERPL BCP-MCNC: 3.7 G/DL (ref 3.5–5)
ALP SERPL-CCNC: 62 U/L (ref 46–116)
ALT SERPL W P-5'-P-CCNC: 20 U/L (ref 12–78)
ANION GAP SERPL CALCULATED.3IONS-SCNC: 9 MMOL/L (ref 4–13)
APTT PPP: 34 SECONDS (ref 24–36)
AST SERPL W P-5'-P-CCNC: 16 U/L (ref 5–45)
BASOPHILS # BLD AUTO: 0.01 THOUSANDS/ΜL (ref 0–0.1)
BASOPHILS NFR BLD AUTO: 0 % (ref 0–1)
BILIRUB SERPL-MCNC: 0.3 MG/DL (ref 0.2–1)
BUN SERPL-MCNC: 61 MG/DL (ref 5–25)
CALCIUM SERPL-MCNC: 9.7 MG/DL (ref 8.3–10.1)
CHLORIDE SERPL-SCNC: 102 MMOL/L (ref 100–108)
CO2 SERPL-SCNC: 30 MMOL/L (ref 21–32)
CREAT SERPL-MCNC: 1.99 MG/DL (ref 0.6–1.3)
EOSINOPHIL # BLD AUTO: 0.19 THOUSAND/ΜL (ref 0–0.61)
EOSINOPHIL NFR BLD AUTO: 3 % (ref 0–6)
ERYTHROCYTE [DISTWIDTH] IN BLOOD BY AUTOMATED COUNT: 13.6 % (ref 11.6–15.1)
EXT FECAL OCCULT BLOOD SCREEN: NEGATIVE
GFR SERPL CREATININE-BSD FRML MDRD: 20 ML/MIN/1.73SQ M
GLUCOSE SERPL-MCNC: 95 MG/DL (ref 65–140)
HCT VFR BLD AUTO: 33.3 % (ref 34.8–46.1)
HGB BLD-MCNC: 10.5 G/DL (ref 11.5–15.4)
IMM GRANULOCYTES # BLD AUTO: 0.02 THOUSAND/UL (ref 0–0.2)
IMM GRANULOCYTES NFR BLD AUTO: 0 % (ref 0–2)
INR PPP: 1.01 (ref 0.86–1.17)
LIPASE SERPL-CCNC: 392 U/L (ref 73–393)
LYMPHOCYTES # BLD AUTO: 1.77 THOUSANDS/ΜL (ref 0.6–4.47)
LYMPHOCYTES NFR BLD AUTO: 30 % (ref 14–44)
MCH RBC QN AUTO: 30.4 PG (ref 26.8–34.3)
MCHC RBC AUTO-ENTMCNC: 31.5 G/DL (ref 31.4–37.4)
MCV RBC AUTO: 97 FL (ref 82–98)
MONOCYTES # BLD AUTO: 0.52 THOUSAND/ΜL (ref 0.17–1.22)
MONOCYTES NFR BLD AUTO: 9 % (ref 4–12)
NEUTROPHILS # BLD AUTO: 3.35 THOUSANDS/ΜL (ref 1.85–7.62)
NEUTS SEG NFR BLD AUTO: 58 % (ref 43–75)
NRBC BLD AUTO-RTO: 0 /100 WBCS
PLATELET # BLD AUTO: 220 THOUSANDS/UL (ref 149–390)
PMV BLD AUTO: 9.9 FL (ref 8.9–12.7)
POTASSIUM SERPL-SCNC: 4.7 MMOL/L (ref 3.5–5.3)
PROT SERPL-MCNC: 8.2 G/DL (ref 6.4–8.2)
PROTHROMBIN TIME: 13 SECONDS (ref 11.8–14.2)
RBC # BLD AUTO: 3.45 MILLION/UL (ref 3.81–5.12)
SODIUM SERPL-SCNC: 141 MMOL/L (ref 136–145)
WBC # BLD AUTO: 5.86 THOUSAND/UL (ref 4.31–10.16)

## 2018-10-26 PROCEDURE — 36415 COLL VENOUS BLD VENIPUNCTURE: CPT | Performed by: EMERGENCY MEDICINE

## 2018-10-26 PROCEDURE — 85610 PROTHROMBIN TIME: CPT | Performed by: EMERGENCY MEDICINE

## 2018-10-26 PROCEDURE — 83690 ASSAY OF LIPASE: CPT | Performed by: EMERGENCY MEDICINE

## 2018-10-26 PROCEDURE — 1124F ACP DISCUSS-NO DSCNMKR DOCD: CPT | Performed by: PHYSICIAN ASSISTANT

## 2018-10-26 PROCEDURE — 76705 ECHO EXAM OF ABDOMEN: CPT

## 2018-10-26 PROCEDURE — 80053 COMPREHEN METABOLIC PANEL: CPT | Performed by: EMERGENCY MEDICINE

## 2018-10-26 PROCEDURE — 99285 EMERGENCY DEPT VISIT HI MDM: CPT

## 2018-10-26 PROCEDURE — 82270 OCCULT BLOOD FECES: CPT | Performed by: EMERGENCY MEDICINE

## 2018-10-26 PROCEDURE — 85025 COMPLETE CBC W/AUTO DIFF WBC: CPT | Performed by: EMERGENCY MEDICINE

## 2018-10-26 PROCEDURE — 96360 HYDRATION IV INFUSION INIT: CPT

## 2018-10-26 PROCEDURE — 85730 THROMBOPLASTIN TIME PARTIAL: CPT | Performed by: EMERGENCY MEDICINE

## 2018-10-26 PROCEDURE — 99220 PR INITIAL OBSERVATION CARE/DAY 70 MINUTES: CPT | Performed by: PHYSICIAN ASSISTANT

## 2018-10-26 RX ORDER — HEPARIN SODIUM 5000 [USP'U]/ML
5000 INJECTION, SOLUTION INTRAVENOUS; SUBCUTANEOUS EVERY 8 HOURS SCHEDULED
Status: DISCONTINUED | OUTPATIENT
Start: 2018-10-26 | End: 2018-10-28 | Stop reason: HOSPADM

## 2018-10-26 RX ORDER — ASCORBIC ACID 500 MG
250 TABLET ORAL DAILY
Status: DISCONTINUED | OUTPATIENT
Start: 2018-10-27 | End: 2018-10-28 | Stop reason: HOSPADM

## 2018-10-26 RX ORDER — CHOLECALCIFEROL (VITAMIN D3) 125 MCG
1000 CAPSULE ORAL DAILY
Status: DISCONTINUED | OUTPATIENT
Start: 2018-10-27 | End: 2018-10-28 | Stop reason: HOSPADM

## 2018-10-26 RX ORDER — SENNOSIDES 8.6 MG
650 CAPSULE ORAL EVERY 4 HOURS PRN
COMMUNITY

## 2018-10-26 RX ORDER — SODIUM CHLORIDE 9 MG/ML
50 INJECTION, SOLUTION INTRAVENOUS CONTINUOUS
Status: DISCONTINUED | OUTPATIENT
Start: 2018-10-26 | End: 2018-10-27

## 2018-10-26 RX ORDER — MUSCLE RUB CREAM 100; 150 MG/G; MG/G
1 CREAM TOPICAL 3 TIMES DAILY
Status: DISCONTINUED | OUTPATIENT
Start: 2018-10-26 | End: 2018-10-28 | Stop reason: HOSPADM

## 2018-10-26 RX ORDER — ACETAMINOPHEN 325 MG/1
650 TABLET ORAL EVERY 4 HOURS PRN
Status: DISCONTINUED | OUTPATIENT
Start: 2018-10-26 | End: 2018-10-28 | Stop reason: HOSPADM

## 2018-10-26 RX ORDER — MENTHOL AND METHYL SALICYLATE 10; 30 G/100G; G/100G
1 CREAM TOPICAL 3 TIMES DAILY
COMMUNITY

## 2018-10-26 RX ORDER — BISACODYL 10 MG
10 SUPPOSITORY, RECTAL RECTAL AS NEEDED
COMMUNITY

## 2018-10-26 RX ORDER — FERROUS SULFATE 325(65) MG
325 TABLET ORAL 2 TIMES DAILY WITH MEALS
COMMUNITY

## 2018-10-26 RX ORDER — ASPIRIN 81 MG/1
81 TABLET ORAL EVERY OTHER DAY
Status: DISCONTINUED | OUTPATIENT
Start: 2018-10-27 | End: 2018-10-28 | Stop reason: HOSPADM

## 2018-10-26 RX ORDER — LORATADINE 10 MG/1
10 TABLET ORAL DAILY
Status: DISCONTINUED | OUTPATIENT
Start: 2018-10-27 | End: 2018-10-28 | Stop reason: HOSPADM

## 2018-10-26 RX ORDER — DOCUSATE SODIUM 100 MG/1
100 CAPSULE, LIQUID FILLED ORAL 2 TIMES DAILY
Status: DISCONTINUED | OUTPATIENT
Start: 2018-10-27 | End: 2018-10-28 | Stop reason: HOSPADM

## 2018-10-26 RX ORDER — BISACODYL 10 MG
10 SUPPOSITORY, RECTAL RECTAL AS NEEDED
Status: DISCONTINUED | OUTPATIENT
Start: 2018-10-26 | End: 2018-10-28 | Stop reason: HOSPADM

## 2018-10-26 RX ORDER — LEVOTHYROXINE SODIUM 0.03 MG/1
25 TABLET ORAL DAILY
COMMUNITY

## 2018-10-26 RX ORDER — MELATONIN
2000 DAILY
Status: DISCONTINUED | OUTPATIENT
Start: 2018-10-27 | End: 2018-10-28 | Stop reason: HOSPADM

## 2018-10-26 RX ORDER — IPRATROPIUM BROMIDE AND ALBUTEROL SULFATE 2.5; .5 MG/3ML; MG/3ML
3 SOLUTION RESPIRATORY (INHALATION) EVERY 6 HOURS PRN
Status: DISCONTINUED | OUTPATIENT
Start: 2018-10-26 | End: 2018-10-28 | Stop reason: HOSPADM

## 2018-10-26 RX ORDER — ONDANSETRON 2 MG/ML
4 INJECTION INTRAMUSCULAR; INTRAVENOUS EVERY 6 HOURS PRN
Status: DISCONTINUED | OUTPATIENT
Start: 2018-10-26 | End: 2018-10-28 | Stop reason: HOSPADM

## 2018-10-26 RX ORDER — FERROUS SULFATE 325(65) MG
325 TABLET ORAL 2 TIMES DAILY WITH MEALS
Status: DISCONTINUED | OUTPATIENT
Start: 2018-10-27 | End: 2018-10-28 | Stop reason: HOSPADM

## 2018-10-26 RX ORDER — GABAPENTIN 300 MG/1
300 CAPSULE ORAL 3 TIMES DAILY
Status: DISCONTINUED | OUTPATIENT
Start: 2018-10-26 | End: 2018-10-28 | Stop reason: HOSPADM

## 2018-10-26 RX ADMIN — SODIUM CHLORIDE 1000 ML: 0.9 INJECTION, SOLUTION INTRAVENOUS at 20:01

## 2018-10-26 NOTE — ED PROVIDER NOTES
History  Chief Complaint   Patient presents with    Abdominal Pain     pt  complains of consistant RUQ that has been ongoing for about a year she feels that since last night it has got worse     Patient presents to the emergency department from a local nursing home for an acute exacerbation of chronic right upper quadrant pain  Patient states she has had this pain for a few months but it worsened today  Patient does have a history of cholelithiasis and did require an MRCP for stone removal from the common bile duct last November  She denies trauma fall or injury  Denies fever chills  Denies chest pain sob  Denies lower abdominal pain  Denies urinary symptoms  Denies diarrhea or vomiting  Does state that she had bowel movement today that seem blackish but this is not chronic for her  She is not on blood thinners  Prior to Admission Medications   Prescriptions Last Dose Informant Patient Reported? Taking?    Cholecalciferol (VITAMIN D) 2000 units CAPS  Self Yes Yes   Sig: Take 2,000 Units by mouth daily   Magnesium Hydroxide (MILK OF MAGNESIA PO)  Self Yes Yes   Sig: Take 30 mL by mouth as needed   Menthol-Methyl Salicylate (ICY HOT EXTRA STRENGTH) 10-30 % CREA   Yes Yes   Sig: Apply 1 application topically 3 (three) times a day   Multiple Vitamins-Minerals (PRESERVISION AREDS 2 PO)  Self Yes Yes   Sig: Take by mouth 2 (two) times a day   acetaminophen (TYLENOL) 650 mg CR tablet   Yes Yes   Sig: Take 650 mg by mouth every 4 (four) hours as needed for mild pain   ascorbic acid (VITAMIN C) 500 MG tablet  Self No Yes   Sig: Take 0 5 tablets by mouth 2 (two) times a day   Patient taking differently: Take 250 mg by mouth daily     aspirin (ECOTRIN LOW STRENGTH) 81 mg EC tablet  Self No Yes   Sig: Take 1 tablet (81 mg total) by mouth every other day   bisacodyl (DULCOLAX) 10 mg suppository   Yes Yes   Sig: Insert 10 mg into the rectum as needed for constipation   cetirizine (ZyrTEC) 5 MG chewable tablet Self No Yes   Sig: Chew 1 tablet (5 mg total) daily Chew 1 tab at night for itch   cyanocobalamin (VITAMIN B-12) 1,000 mcg tablet  Self Yes Yes   Sig: Take 1 tablet by mouth daily   docusate sodium (COLACE) 100 mg capsule  Self No Yes   Sig: Take 1 capsule by mouth 2 (two) times a day   ferrous sulfate 325 (65 Fe) mg tablet   Yes Yes   Sig: Take 325 mg by mouth 2 (two) times a day with meals   gabapentin (NEURONTIN) 300 mg capsule  Self Yes Yes   Sig: gabapentin 300 mg capsule 3 times per day   ipratropium-albuterol (DUO-NEB) 0 5-2 5 mg/3 mL nebulizer solution  Self Yes Yes   Sig: ipratropium-albuterol 0 5 mg-3 mg(2 5 mg base)/3 mL nebulization soln every 6 hours PRN   latanoprost (XALATAN) 0 005 % ophthalmic solution  Self Yes Yes   Si drop daily at bedtime   levothyroxine 25 mcg tablet   Yes Yes   Sig: Take 25 mcg by mouth daily   mometasone (ELOCON) 0 1 % cream  Self Yes Yes   Sig: mometasone 0 1 % topical cream - Apply to posterior scalp for psoriasis   senna (SENOKOT) 8 6 mg  Self No Yes   Sig: Take 1 tablet by mouth daily   Patient taking differently: Take 1 tablet by mouth daily at bedtime as needed     torsemide (DEMADEX) 10 mg tablet  Self Yes Yes   Sig: Take 20 mg by mouth daily     triamcinolone (KENALOG) 0 1 % cream  Self Yes Yes   Sig: triamcinolone acetonide 0 1 % topical cream to b/l thighs daily      Facility-Administered Medications: None       Past Medical History:   Diagnosis Date    Cancer [facility])     "some kind of cancer when I was 80"    Colon cancer ([facility] )     Costochondritis     Last Assessed:3/18/2013    Iron deficiency 10/31/2017    Stage 2 chronic kidney disease 10/31/2017    Syncope     Vitamin B12 deficiency 10/31/2017       Past Surgical History:   Procedure Laterality Date    BACK SURGERY      CATARACT EXTRACTION      COLON SURGERY      ERCP W/ SPHICTEROTOMY N/A 2017    Procedure: ENDOSCOPIC RETROGRADE CHOLANGIOPANCREATOGRAPHY (ERCP) W/ SPHINCTEROTOMY;  Surgeon: Mari Cifuentes MD;  [facility];  Service: Gastroenterology    HIP FRACTURE SURGERY      WRIST SURGERY         Family History   Problem Relation Age of Onset    Heart attack Mother     Heart attack Father     Cancer Sister     Heart attack Brother     Coronary artery disease Brother     Heart disease Brother     Colon cancer Family      I have reviewed and agree with the history as documented  Social History   Substance Use Topics    Smoking status: Never Smoker    Smokeless tobacco: Never Used    Alcohol use No        Review of Systems   Constitutional: Negative  Negative for activity change, appetite change, chills, diaphoresis, fatigue and fever  HENT: Negative  Negative for congestion, drooling, ear discharge, ear pain, rhinorrhea, sinus pain, sore throat, trouble swallowing and voice change  Eyes: Negative  Negative for photophobia and visual disturbance  Respiratory: Negative  Negative for cough, choking, chest tightness, shortness of breath, wheezing and stridor  Cardiovascular: Negative  Negative for chest pain, palpitations and leg swelling  Gastrointestinal: Positive for abdominal pain  Negative for abdominal distention, anal bleeding, blood in stool, constipation, diarrhea, nausea, rectal pain and vomiting  Endocrine: Negative  Genitourinary: Negative  Negative for difficulty urinating, dysuria, flank pain, frequency, hematuria, urgency, vaginal bleeding, vaginal discharge and vaginal pain  Musculoskeletal: Positive for back pain  Negative for gait problem, joint swelling, myalgias, neck pain and neck stiffness  Skin: Negative  Negative for rash and wound  Allergic/Immunologic: Negative  Neurological: Negative  Negative for dizziness, tremors, seizures, syncope, facial asymmetry, speech difficulty, weakness, light-headedness, numbness and headaches  Hematological: Negative  Does not bruise/bleed easily  Psychiatric/Behavioral: Negative  Negative for confusion  Physical Exam  Physical Exam   Constitutional: She is oriented to person, place, and time  She appears well-developed and well-nourished  Nontoxic appearance  No respiratory distress  Patient looks comfortable sitting upright on the stretcher  She is able to engage in simple conversation and follow simple commands and answer simple questions appropriately  She refuses analgesics at this time  HENT:   Head: Normocephalic and atraumatic  Right Ear: External ear normal    Left Ear: External ear normal    Mouth/Throat: Oropharynx is clear and moist    Eyes: Pupils are equal, round, and reactive to light  Conjunctivae and EOM are normal    Neck: Normal range of motion  Neck supple  Cardiovascular: Normal rate, regular rhythm, normal heart sounds and intact distal pulses  Pulmonary/Chest: Effort normal and breath sounds normal  No respiratory distress  She has no wheezes  She has no rales  She exhibits no tenderness  Abdominal: Soft  Bowel sounds are normal  She exhibits no distension and no mass  There is tenderness  There is no rebound and no guarding  No hernia  Mild right upper quadrant tenderness to palpation  No hernias masses or peritoneal signs   Genitourinary:   Genitourinary Comments: Rectal exam performed under the supervision of Uziel Singh in emergency department technician  Stool was brown holden like without melena or bright red blood  Was Hemoccult negative  Musculoskeletal: Normal range of motion  She exhibits no edema, tenderness or deformity  Neurological: She is alert and oriented to person, place, and time  She has normal reflexes  She displays normal reflexes  No cranial nerve deficit or sensory deficit  She exhibits normal muscle tone  Coordination normal    Skin: Skin is warm and dry  Psychiatric: She has a normal mood and affect  Her behavior is normal  Judgment and thought content normal    Nursing note and vitals reviewed        Vital Signs  ED Triage Vitals [10/26/18 1849]   Temperature Pulse Respirations Blood Pressure SpO2   97 7 °F (36 5 °C) 88 18 154/68 97 %      Temp Source Heart Rate Source Patient Position - Orthostatic VS BP Location FiO2 (%)   Oral Monitor Lying Right arm --      Pain Score       5           Vitals:    10/26/18 1849 10/26/18 2047   BP: 154/68 (!) 171/77   Pulse: 88 66   Patient Position - Orthostatic VS: Lying Sitting       Visual Acuity      ED Medications  Medications   sodium chloride 0 9 % bolus 1,000 mL (1,000 mL Intravenous New Bag 10/26/18 2001)       Diagnostic Studies  Results Reviewed     Procedure Component Value Units Date/Time    Comprehensive metabolic panel [[id number]]  (Abnormal) Collected:  10/26/18 1921    Lab Status:  Final result Specimen:  Blood from Arm, Left Updated:  10/26/18 1944     Sodium 141 mmol/L      Potassium 4 7 mmol/L      Chloride 102 mmol/L      CO2 30 mmol/L      ANION GAP 9 mmol/L      BUN 61 (H) mg/dL      Creatinine 1 99 (H) mg/dL      Glucose 95 mg/dL      Calcium 9 7 mg/dL      AST 16 U/L      ALT 20 U/L      Alkaline Phosphatase 62 U/L      Total Protein 8 2 g/dL      Albumin 3 7 g/dL      Total Bilirubin 0 30 mg/dL      eGFR 20 ml/min/1 73sq m     Narrative:         National Kidney Disease Education Program recommendations are as follows:  GFR calculation is accurate only with a steady state creatinine  Chronic Kidney disease less than 60 ml/min/1 73 sq  meters  Kidney failure less than 15 ml/min/1 73 sq  meters      Lipase [[id number]]  (Normal) Collected:  10/26/18 1921    Lab Status:  Final result Specimen:  Blood from Arm, Left Updated:  10/26/18 1944     Lipase 392 u/L     Protime-INR [[id number]]  (Normal) Collected:  10/26/18 1921    Lab Status:  Final result Specimen:  Blood from Arm, Left Updated:  10/26/18 1938     Protime 13 0 seconds      INR 1 01    APTT [[id number]]  (Normal) Collected:  10/26/18 1921    Lab Status:  Final result Specimen:  Blood from Arm, Left Updated:  10/26/18 1938     PTT 34 seconds     CBC and differential [[id number]]  (Abnormal) Collected:  10/26/18 1921    Lab Status:  Final result Specimen:  Blood from Arm, Left Updated:  10/26/18 1928     WBC 5 86 Thousand/uL      RBC 3 45 (L) Million/uL      Hemoglobin 10 5 (L) g/dL      Hematocrit 33 3 (L) %      MCV 97 fL      MCH 30 4 pg      MCHC 31 5 g/dL      RDW 13 6 %      MPV 9 9 fL      Platelets 954 Thousands/uL      nRBC 0 /100 WBCs      Neutrophils Relative 58 %      Immat GRANS % 0 %      Lymphocytes Relative 30 %      Monocytes Relative 9 %      Eosinophils Relative 3 %      Basophils Relative 0 %      Neutrophils Absolute 3 35 Thousands/µL      Immature Grans Absolute 0 02 Thousand/uL      Lymphocytes Absolute 1 77 Thousands/µL      Monocytes Absolute 0 52 Thousand/µL      Eosinophils Absolute 0 19 Thousand/µL      Basophils Absolute 0 01 Thousands/µL                  US gallbladder   Final Result by Fracisco Velarde MD (10/26 2014)      Exam limited secondary to bowel gas  Mobile gallstones are seen within the gallbladder and the sonographic Childers sign is negative  Medical renal disease  Pancreas not seen due to overlying bowel gas  Workstation performed: [id number]                    Procedures  Procedures       Phone Contacts  ED Phone Contact    ED Course  ED Course as of Oct 26 2103   Cheyenne Freitas Oct 26, 2018   2101 Patient to be admitted to the hospitalist service  The case was discussed with Yanet Bruner the physician assistant covering the hospitalist service  Patient will be admitted to Dr Christina's service  Patient is comfortable at the time of admission                                  MDM  CritCare Time    Disposition  Final diagnoses:   Abdominal pain   Cholelithiasis   Acute renal injury ([facility] [address] )     Time reflects when diagnosis was documented in both MDM as applicable and the Disposition within this note     Time User Action Codes Description Comment    10/26/2018  9:02 PM Jacky Ferrari 56 [R10 9] Abdominal pain 10/26/2018  9:02 PM Steffany Ferrari Add [K80 20] Cholelithiasis     10/26/2018  9:02 PM Steffany Ferrari Add [N17 9] Acute renal injury [facility])       ED Disposition     ED Disposition Condition Comment    Admit  Case was discussed with Catherine Quispe  and the patient's admission status was agreed to be Admission Status: observation status to the service of Dr Mary Carreno    None         Patient's Medications   Discharge Prescriptions    No medications on file     No discharge procedures on file      ED Provider  Electronically Signed by           Cooper Ventura MD  10/26/18 1683

## 2018-10-27 LAB
ALBUMIN SERPL BCP-MCNC: 3.1 G/DL (ref 3.5–5)
ALP SERPL-CCNC: 53 U/L (ref 46–116)
ALT SERPL W P-5'-P-CCNC: 13 U/L (ref 12–78)
ANION GAP SERPL CALCULATED.3IONS-SCNC: 8 MMOL/L (ref 4–13)
AST SERPL W P-5'-P-CCNC: 15 U/L (ref 5–45)
BILIRUB SERPL-MCNC: 0.3 MG/DL (ref 0.2–1)
BUN SERPL-MCNC: 58 MG/DL (ref 5–25)
CALCIUM SERPL-MCNC: 9.3 MG/DL (ref 8.3–10.1)
CHLORIDE SERPL-SCNC: 105 MMOL/L (ref 100–108)
CO2 SERPL-SCNC: 28 MMOL/L (ref 21–32)
CREAT SERPL-MCNC: 1.8 MG/DL (ref 0.6–1.3)
ERYTHROCYTE [DISTWIDTH] IN BLOOD BY AUTOMATED COUNT: 13.7 % (ref 11.6–15.1)
GFR SERPL CREATININE-BSD FRML MDRD: 23 ML/MIN/1.73SQ M
GLUCOSE P FAST SERPL-MCNC: 91 MG/DL (ref 65–99)
GLUCOSE SERPL-MCNC: 91 MG/DL (ref 65–140)
HCT VFR BLD AUTO: 29.1 % (ref 34.8–46.1)
HGB BLD-MCNC: 9.2 G/DL (ref 11.5–15.4)
MAGNESIUM SERPL-MCNC: 2.5 MG/DL (ref 1.6–2.6)
MCH RBC QN AUTO: 30.6 PG (ref 26.8–34.3)
MCHC RBC AUTO-ENTMCNC: 31.6 G/DL (ref 31.4–37.4)
MCV RBC AUTO: 97 FL (ref 82–98)
PLATELET # BLD AUTO: 194 THOUSANDS/UL (ref 149–390)
PMV BLD AUTO: 10.3 FL (ref 8.9–12.7)
POTASSIUM SERPL-SCNC: 4.4 MMOL/L (ref 3.5–5.3)
PROT SERPL-MCNC: 7 G/DL (ref 6.4–8.2)
RBC # BLD AUTO: 3.01 MILLION/UL (ref 3.81–5.12)
SODIUM SERPL-SCNC: 141 MMOL/L (ref 136–145)
WBC # BLD AUTO: 4.8 THOUSAND/UL (ref 4.31–10.16)

## 2018-10-27 PROCEDURE — 80053 COMPREHEN METABOLIC PANEL: CPT | Performed by: PHYSICIAN ASSISTANT

## 2018-10-27 PROCEDURE — 83735 ASSAY OF MAGNESIUM: CPT | Performed by: PHYSICIAN ASSISTANT

## 2018-10-27 PROCEDURE — 85027 COMPLETE CBC AUTOMATED: CPT | Performed by: PHYSICIAN ASSISTANT

## 2018-10-27 PROCEDURE — 99232 SBSQ HOSP IP/OBS MODERATE 35: CPT | Performed by: INTERNAL MEDICINE

## 2018-10-27 PROCEDURE — 87081 CULTURE SCREEN ONLY: CPT | Performed by: PHYSICIAN ASSISTANT

## 2018-10-27 RX ADMIN — OXYCODONE HYDROCHLORIDE AND ACETAMINOPHEN 250 MG: 500 TABLET ORAL at 08:54

## 2018-10-27 RX ADMIN — FERROUS SULFATE TAB 325 MG (65 MG ELEMENTAL FE) 325 MG: 325 (65 FE) TAB at 17:17

## 2018-10-27 RX ADMIN — ASPIRIN 81 MG: 81 TABLET, COATED ORAL at 08:52

## 2018-10-27 RX ADMIN — LORATADINE 10 MG: 10 TABLET ORAL at 08:53

## 2018-10-27 RX ADMIN — GABAPENTIN 300 MG: 300 CAPSULE ORAL at 17:17

## 2018-10-27 RX ADMIN — SODIUM CHLORIDE 50 ML/HR: 0.9 INJECTION, SOLUTION INTRAVENOUS at 00:58

## 2018-10-27 RX ADMIN — GABAPENTIN 300 MG: 300 CAPSULE ORAL at 08:50

## 2018-10-27 RX ADMIN — HEPARIN SODIUM 5000 UNITS: 5000 INJECTION, SOLUTION INTRAVENOUS; SUBCUTANEOUS at 23:03

## 2018-10-27 RX ADMIN — DOCUSATE SODIUM 100 MG: 100 CAPSULE, LIQUID FILLED ORAL at 08:54

## 2018-10-27 RX ADMIN — HEPARIN SODIUM 5000 UNITS: 5000 INJECTION, SOLUTION INTRAVENOUS; SUBCUTANEOUS at 13:54

## 2018-10-27 RX ADMIN — DOCUSATE SODIUM 100 MG: 100 CAPSULE, LIQUID FILLED ORAL at 17:17

## 2018-10-27 RX ADMIN — FERROUS SULFATE TAB 325 MG (65 MG ELEMENTAL FE) 325 MG: 325 (65 FE) TAB at 08:53

## 2018-10-27 RX ADMIN — VITAMIN D, TAB 1000IU (100/BT) 2000 UNITS: 25 TAB at 08:51

## 2018-10-27 RX ADMIN — GABAPENTIN 300 MG: 300 CAPSULE ORAL at 23:03

## 2018-10-27 RX ADMIN — CYANOCOBALAMIN TAB 500 MCG 1000 MCG: 500 TAB at 08:50

## 2018-10-27 RX ADMIN — HEPARIN SODIUM 5000 UNITS: 5000 INJECTION, SOLUTION INTRAVENOUS; SUBCUTANEOUS at 05:43

## 2018-10-27 RX ADMIN — GABAPENTIN 300 MG: 300 CAPSULE ORAL at 01:20

## 2018-10-27 RX ADMIN — Medication 1 TABLET: at 08:49

## 2018-10-27 NOTE — ED NOTES
[facility] provided with update on patient's admission status  Offered no further questions at this time  Charge RN stated she would update patient's family on admission        Ok Dillon RN  10/26/18 7165

## 2018-10-27 NOTE — ASSESSMENT & PLAN NOTE
· Last documented creatinine was in November 2017 and at that time, baseline appeared to be approximately 1 2  · She is status post gentle IV hydration overnight with slight improvement in creatinine at 1 8 this morning down from 1 99 on presentation  It is possible that she is currently at a higher baseline  Will continue to monitor  · Will hold off on further IV fluids at this time to prevent volume overload in view of CHF history   Continue to hold diuretics  · Monitor renal function closely, avoid hypotension, avoid nephrotoxins

## 2018-10-27 NOTE — ASSESSMENT & PLAN NOTE
· Currently examines euvolemic however will hold off on further IV fluids to avoid volume overload  · Left echo in October of 2017 showed EF 40% with grade 1 diastolic dysfunction  · Continue to hold Lasix for now  · Daily weights, I's and O's, 2 g sodium diet

## 2018-10-27 NOTE — ASSESSMENT & PLAN NOTE
· Reported acute on chronic right upper quadrant pain on presentation  · Right upper quadrant ultrasound showed cholelithiasis without acute cholecystitis    LFTs are within normal limits  · This morning, patient is without complaint of pain

## 2018-10-27 NOTE — UTILIZATION REVIEW
Initial Clinical Review    Admission: Date/Time/Statement: Pt initially placed in Observation status on 10/26 @ 21:04 - changed to Inpatient on  10/27/18 @ 1241  Due to ARF  Requiring continued treatment     Orders Placed This Encounter   Procedures    Inpatient Admission     Standing Status:   Standing     Number of Occurrences:   1     Order Specific Question:   Admitting Physician     Answer:   Jack Hall [[id number]]     Order Specific Question:   Level of Care     Answer:   Med Surg [[id number]]     Order Specific Question:   Estimated length of stay     Answer:   More than 2 Midnights     Order Specific Question:   Certification     Answer:   I certify that inpatient services are medically necessary for this patient for a duration of greater than two midnights  See H&P and MD Progress Notes for additional information about the patient's course of treatment  ED: Date/Time/Mode of Arrival:   ED Arrival Information     Expected Arrival Acuity Means of Arrival Escorted By Service Admission Type    - 10/26/2018 18:43 Urgent Ambulance [address] Urgent    Arrival Complaint    -          Chief Complaint:   Chief Complaint   Patient presents with    Abdominal Pain     pt  complains of consistant RUQ that has been ongoing for about a year she feels that since last night it has got worse       History of Illness: Yovana Syed is a 80 y o  female with multiple medical conditions including cholelithiasis presents the ED for evaluation of worsening right upper quadrant pain x3 days  Patient reports that pain came on suddenly and just got worse compared to normal   She reports she was able to tell does worsen her mother when she would touch her abdomen she it would be painful  No associated nausea or vomiting  No associated constipation or diarrhea  No associated fevers or chills  Patient did not try anything at nursing home for pain    Patient has history of choledocholithiasis approximately 1 year ago     ED Vital Signs:   ED Triage Vitals [10/26/18 1849]   Temperature Pulse Respirations Blood Pressure SpO2   97 7 °F (36 5 °C) 88 18 154/68 97 %      Temp Source Heart Rate Source Patient Position - Orthostatic VS BP Location FiO2 (%)   Oral Monitor Lying Right arm --      Pain Score       5        Wt Readings from Last 1 Encounters:   10/26/18 90 5 kg (199 lb 8 3 oz)         Abnormal Labs/Diagnostic Test Results: 10/26 - Bun/cr 61/1 99 - H/H 10 5/33 3     10/27 -  Bun/cr 58/1 80 - Albumin 3 1  - H/H 9 2/29 1    US GB  - Exam limited secondary to bowel gas   Mobile gallstones are seen within the gallbladder and the sonographic Childers sign is negative   Medical renal disease   Pancreas not seen due to overlying bowel gas  ED Treatment:   Medication Administration from 10/26/2018 1842 to 10/26/2018 2224       Date/Time Order Dose Route Action     10/26/2018 2001 sodium chloride 0 9 % bolus 1,000 mL 1,000 mL Intravenous New Bag       Past Medical/Surgical History:     Past Medical History:   Diagnosis Date    [facility]     [facility]     Costochondritis     Iron deficiency 10/31/2017    Stage 2 chronic kidney disease 10/31/2017    Syncope     Vitamin B12 deficiency 10/31/2017       Admitting Diagnosis: Cholelithiasis [K80 20]  Abdominal pain [R10 9]  Acute renal injury ([facility] [address] ) [N17 9]    Age/Sex: 80 y o  female    Assessment/Plan:   Calculus of gallbladder without cholecystitis without obstruction     · Chronic issue, no former history of acute cholecystitis  Has had choledocholithiasis as recently as 1 year ago  · No evidence of obstruction today  · Repeat CMP tomorrow      RUQ pain     · Acute on chronic  Once again, a right upper quadrant ultrasound demonstrates cholelithiasis without acute cholecystitis  LFTs are within normal limits  · Pain control      * Acute renal failure superimposed on stage 3 chronic kidney disease (HCC)     · Baseline creatinine appears to be approximately 1 2  Today creatinine is 1 99  · Gentle IV fluid hydration in the setting of systolic CHF  · Hold nephrotoxins  · Repeat CMP in the morning            Admission Orders:  Scheduled Meds:   Current Facility-Administered Medications:  acetaminophen 650 mg Oral Q4H PRN   ascorbic acid 250 mg Oral Daily   aspirin 81 mg Oral Every Other Day   bisacodyl 10 mg Rectal PRN   cholecalciferol 2,000 Units Oral Daily   cyanocobalamin 1,000 mcg Oral Daily   docusate sodium 100 mg Oral BID   ferrous sulfate 325 mg Oral BID With Meals   gabapentin 300 mg Oral TID   heparin (porcine) 5,000 Units Subcutaneous Q8H [address]   ipratropium-albuterol 3 mL Nebulization Q6H PRN   loratadine 10 mg Oral Daily   magnesium hydroxide 30 mL Oral Daily PRN   menthol-methyl salicylate 1 application Apply externally TID   multivitamin-minerals 1 tablet Oral Daily   ondansetron 4 mg Intravenous Q6H PRN      NS @ 50 ml/hr - d/c'd on 10/27 @ 12:50    Nursing orders - VS q 4- SCD's to le's- up with assistance - diet regular house

## 2018-10-27 NOTE — ASSESSMENT & PLAN NOTE
· Baseline creatinine appears to be approximately 1 2    Today creatinine is 1 99  · Gentle IV fluid hydration in the setting of systolic CHF  · Hold nephrotoxins  · Repeat CMP in the morning

## 2018-10-27 NOTE — H&P
H&P- Kenney Boxer 12/14/1917, 80 y o  female MRN: [id number]    Unit/Bed#: CARMELLA Encounter: [id number]    Primary Care Provider: Almita Dias DO   Date and time admitted to hospital: 10/26/2018  6:43 PM    Calculus of gallbladder without cholecystitis without obstruction   Assessment & Plan    · Chronic issue, no former history of acute cholecystitis  Has had choledocholithiasis as recently as 1 year ago  · No evidence of obstruction today  · Repeat CMP tomorrow     RUQ pain   Assessment & Plan    · Acute on chronic  Once again, a right upper quadrant ultrasound demonstrates cholelithiasis without acute cholecystitis  LFTs are within normal limits  · Pain control     * Acute renal failure superimposed on stage 3 chronic kidney disease (HCC)   Assessment & Plan    · Baseline creatinine appears to be approximately 1 2  Today creatinine is 1 99  · Gentle IV fluid hydration in the setting of systolic CHF  · Hold nephrotoxins  · Repeat CMP in the morning       VTE Prophylaxis: Heparin  / sequential compression device   Code Status: Level 1-- Full Code  POLST: There is no POLST form on file for this patient (pre-hospital)  Discussion with family: family aware     Anticipated Length of Stay:  Patient will be admitted on an Observation basis with an anticipated length of stay of  < 2 midnights  Justification for Hospital Stay: pain management    Total Time for Visit, including Counseling / Coordination of Care: 30 minutes  Greater than 50% of this total time spent on direct patient counseling and coordination of care  Chief Complaint:   RUQ abdominal pain     History of Present Illness: Kenney Boxer is a 80 y o  female with multiple medical conditions including cholelithiasis presents the ED for evaluation of worsening right upper quadrant pain x3 days    Patient reports that pain came on suddenly and just got worse compared to normal   She reports she was able to tell does worsen her mother when she would touch her abdomen she it would be painful  No associated nausea or vomiting  No associated constipation or diarrhea  No associated fevers or chills  Patient did not try anything at nursing home for pain  Patient has history of choledocholithiasis approximately 1 year ago  Review of Systems:    Review of Systems   Constitutional: Negative  HENT: Negative  Eyes: Negative  Respiratory: Negative  Cardiovascular: Negative  Gastrointestinal: Positive for abdominal pain  Genitourinary: Negative  Musculoskeletal: Negative  Skin: Negative  Neurological: Negative  Hematological: Negative  Psychiatric/Behavioral: Negative  Past Medical and Surgical History:     Past Medical History:   Diagnosis Date    Cancer [facility])     "some kind of cancer when I was 80"    Colon cancer ([facility] )     Costochondritis     Last Assessed:3/18/2013    Iron deficiency 10/31/2017    Stage 2 chronic kidney disease 10/31/2017    Syncope     Vitamin B12 deficiency 10/31/2017       Past Surgical History:   Procedure Laterality Date    BACK SURGERY      CATARACT EXTRACTION      COLON SURGERY      ERCP W/ SPHICTEROTOMY N/A 11/22/2017    Procedure: ENDOSCOPIC RETROGRADE CHOLANGIOPANCREATOGRAPHY (ERCP) W/ SPHINCTEROTOMY;  Surgeon: Curt Hopper MD;  Location: [facility];  Service: Gastroenterology    HIP FRACTURE SURGERY      WRIST SURGERY         Meds/Allergies:    Prior to Admission medications    Medication Sig Start Date End Date Taking?  Authorizing Provider   acetaminophen (TYLENOL) 650 mg CR tablet Take 650 mg by mouth every 4 (four) hours as needed for mild pain   Yes Historical Provider, MD   ascorbic acid (VITAMIN C) 500 MG tablet Take 0 5 tablets by mouth 2 (two) times a day  Patient taking differently: Take 250 mg by mouth daily   11/2/17  Yes Maxine De La Garza MD   aspirin (ECOTRIN LOW STRENGTH) 81 mg EC tablet Take 1 tablet (81 mg total) by mouth every other day 5/14/18  Yes Beata Ferrell DO   bisacodyl (DULCOLAX) 10 mg suppository Insert 10 mg into the rectum as needed for constipation   Yes Historical Provider, MD   cetirizine (ZyrTEC) 5 MG chewable tablet Chew 1 tablet (5 mg total) daily Chew 1 tab at night for itch 5/14/18  Yes Beata Ferrell DO   Cholecalciferol (VITAMIN D) 2000 units CAPS Take 2,000 Units by mouth daily   Yes Historical Provider, MD   cyanocobalamin (VITAMIN B-12) 1,000 mcg tablet Take 1 tablet by mouth daily 1/8/18  Yes Historical Provider, MD   docusate sodium (COLACE) 100 mg capsule Take 1 capsule by mouth 2 (two) times a day 10/9/17  Yes Herber Terry MD   ferrous sulfate 325 (65 Fe) mg tablet Take 325 mg by mouth 2 (two) times a day with meals   Yes Historical Provider, MD   gabapentin (NEURONTIN) 300 mg capsule gabapentin 300 mg capsule 3 times per day   Yes Historical Provider, MD   ipratropium-albuterol (DUO-NEB) 0 5-2 5 mg/3 mL nebulizer solution ipratropium-albuterol 0 5 mg-3 mg(2 5 mg base)/3 mL nebulization soln every 6 hours PRN   Yes Historical Provider, MD   latanoprost (XALATAN) 0 005 % ophthalmic solution 1 drop daily at bedtime   Yes Historical Provider, MD   levothyroxine 25 mcg tablet Take 25 mcg by mouth daily   Yes Historical Provider, MD   Magnesium Hydroxide (MILK OF MAGNESIA PO) Take 30 mL by mouth as needed   Yes Historical Provider, MD   Menthol-Methyl Salicylate (ICY HOT EXTRA STRENGTH) 10-30 % CREA Apply 1 application topically 3 (three) times a day   Yes Historical Provider, MD   mometasone (ELOCON) 0 1 % cream mometasone 0 1 % topical cream - Apply to posterior scalp for psoriasis   Yes Historical Provider, MD   Multiple Vitamins-Minerals (PRESERVISION AREDS 2 PO) Take by mouth 2 (two) times a day   Yes Historical Provider, MD   senna (SENOKOT) 8 6 mg Take 1 tablet by mouth daily  Patient taking differently: Take 1 tablet by mouth daily at bedtime as needed   10/10/17  Yes Mirlande Linder MD   torsemide (DEMADEX) 10 mg tablet Take 20 mg by mouth daily     Yes Historical Provider, MD   triamcinolone (KENALOG) 0 1 % cream triamcinolone acetonide 0 1 % topical cream to b/l thighs daily   Yes Historical Provider, MD   cycloSPORINE (RESTASIS) 0 05 % ophthalmic emulsion Administer 1 drop to both eyes 2 (two) times a day  10/26/18  Historical Provider, MD   cycloSPORINE (RESTASIS) 0 05 % ophthalmic emulsion Restasis 0 05 % eye drops in a dropperette  10/26/18  Historical Provider, MD   hydrOXYzine HCL (ATARAX) 25 mg tablet 1/2-1 tab every 8 hours for itch  Patient not taking: Reported on 10/25/2018  5/14/18 10/26/18  Everette Camejo DO   pantoprazole (PROTONIX) 40 mg tablet Take 40 mg by mouth daily 8/17/16 10/26/18  Historical Provider, MD   potassium chloride (MICRO-K) 10 MEQ CR capsule Take 1 capsule by mouth daily 1/8/18 10/26/18  Historical Provider, MD   predniSONE 10 mg tablet 1 5 TABS PO BID FOR THREE DAYS THEN 1 PO BID FOR 3 DAYS AND OFF- TAKE WITH FOOD-start 5/15/18  Patient not taking: Reported on 10/25/2018  5/14/18 10/26/18  Everette Camejo DO   travoprost (TRAVATAN Z) 0 004 % ophthalmic solution Apply 1 drop to eye daily 10/30/14 10/26/18  Historical Provider, MD     I have reveiwed home medications using records provided by [facility]  Allergies: No Known Allergies    Social History:     Marital Status:     Occupation: retired  Patient Pre-hospital Living Situation: [address] [place] [address]  Patient Pre-hospital Level of Mobility: uses walker on occasion  Patient Pre-hospital Diet Restrictions: none  Substance Use History:   History   Alcohol Use No     History   Smoking Status    Never Smoker   Smokeless Tobacco    Never Used     History   Drug Use No       Family History:    non-contributory    Physical Exam:     Vitals:   Blood Pressure: 166/80 (10/26/18 2200)  Pulse: 82 (10/26/18 2200)  Temperature: 97 7 °F (36 5 °C) (10/26/18 1849)  Temp Source: Oral (10/26/18 1849)  Respirations: 18 (10/26/18 2200)  Height: 5' 6" (167 6 cm) (10/26/18 1849)  Weight - Scale: 90 5 kg (199 lb 8 3 oz) (10/26/18 1849)  SpO2: 96 % (10/26/18 2200)    Physical Exam   Constitutional: She is oriented to person, place, and time  No distress  HENT:   Head: Normocephalic and atraumatic  Mouth/Throat: No oropharyngeal exudate  Eyes: Pupils are equal, round, and reactive to light  Conjunctivae and EOM are normal    Neck: No JVD present  Cardiovascular: Normal rate and regular rhythm  Exam reveals no gallop and no friction rub  No murmur heard  Pulmonary/Chest: Effort normal and breath sounds normal  No respiratory distress  She has no wheezes  She has no rales  Abdominal: Soft  Bowel sounds are normal  She exhibits no distension  There is tenderness (mild, diffuse)  There is no rebound  Musculoskeletal: She exhibits no edema or tenderness  Neurological: She is alert and oriented to person, place, and time  She displays normal reflexes  No cranial nerve deficit  She exhibits normal muscle tone  Skin: Skin is warm and dry  No rash noted  She is not diaphoretic  No erythema  Psychiatric: She has a normal mood and affect  Her behavior is normal        Additional Data:     Lab Results: I have personally reviewed pertinent reports  Results from last 7 days  Lab Units 10/26/18  1921   WBC Thousand/uL 5 86   HEMOGLOBIN g/dL 10 5*   HEMATOCRIT % 33 3*   PLATELETS Thousands/uL 220   NEUTROS ABS Thousands/µL 3 35   NEUTROS PCT % 58   LYMPHS PCT % 30   MONOS PCT % 9   EOS PCT % 3       Results from last 7 days  Lab Units 10/26/18  1921   SODIUM mmol/L 141   POTASSIUM mmol/L 4 7   CHLORIDE mmol/L 102   CO2 mmol/L 30   BUN mg/dL 61*   CREATININE mg/dL 1 99*   ANION GAP mmol/L 9   CALCIUM mg/dL 9 7   ALBUMIN g/dL 3 7   TOTAL BILIRUBIN mg/dL 0 30   ALK PHOS U/L 62   ALT U/L 20   AST U/L 16       Results from last 7 days  Lab Units 10/26/18  1921   INR  1 01                   Imaging: I have personally reviewed pertinent reports        US gallbladder Final Result by Vance Quiros MD (10/26 2014)      Exam limited secondary to bowel gas  Mobile gallstones are seen within the gallbladder and the sonographic Childers sign is negative  Medical renal disease  Pancreas not seen due to overlying bowel gas  Workstation performed: [id number]             EKG, Pathology, and Other Studies Reviewed on Admission:   · EKG: unavailable    Allscripts / Epic Records Reviewed: Yes     ** Please Note: This note has been constructed using a voice recognition system   **

## 2018-10-27 NOTE — PLAN OF CARE
METABOLIC, FLUID AND ELECTROLYTES - ADULT     Electrolytes maintained within normal limits Progressing     Fluid balance maintained Progressing     Glucose maintained within target range Progressing        MUSCULOSKELETAL - ADULT     Maintain or return mobility to safest level of function Progressing     Maintain proper alignment of affected body part Progressing        Potential for Falls     Patient will remain free of falls Progressing        Prexisting or High Potential for Compromised Skin Integrity     Skin integrity is maintained or improved Progressing        SAFETY ADULT     Maintain or return to baseline ADL function Progressing     Maintain or return mobility status to optimal level Progressing     Patient will remain free of falls Progressing

## 2018-10-27 NOTE — ASSESSMENT & PLAN NOTE
· Chronic issue, no former history of acute cholecystitis    Has had choledocholithiasis as recently as 1 year ago  · No evidence of obstruction today  · Repeat CMP tomorrow

## 2018-10-27 NOTE — PROGRESS NOTES
Progress Note - Geeta Mcdonald 12/14/1917, 80 y o  female MRN: [id number]    Unit/Bed#: -01 Encounter: [id number]    Primary Care Provider: Grayson Miller DO   Date and time admitted to hospital: 10/26/2018  6:43 PM    * Acute renal failure superimposed on stage 3 chronic kidney disease ([facility] [address] )   Assessment & Plan    · Last documented creatinine was in November 2017 and at that time, baseline appeared to be approximately 1 2  · She is status post gentle IV hydration overnight with slight improvement in creatinine at 1 8 this morning down from 1 99 on presentation  It is possible that she is currently at a higher baseline  Will continue to monitor  · Will hold off on further IV fluids at this time to prevent volume overload in view of CHF history  Continue to hold diuretics  · Monitor renal function closely, avoid hypotension, avoid nephrotoxins     RUQ pain   Assessment & Plan    · Reported acute on chronic right upper quadrant pain on presentation  · Right upper quadrant ultrasound showed cholelithiasis without acute cholecystitis  LFTs are within normal limits  · This morning, patient is without complaint of pain     Rash   Assessment & Plan    · Rash noted to base of neck/upper chest wall anteriorly  · Patient reports this has been on for few weeks  She has had intermittent pruritus  · No discharge noted, rash appears dried vesicular  · Noted is an office visit back in May of 2018 for pruritic rash involving her shoulders, arms, legs and groin  · At this time, continue to monitor  Patient denies any acute symptoms     Calculus of gallbladder without cholecystitis without obstruction   Assessment & Plan    · Chronic issue, no former history of acute cholecystitis    Has had choledocholithiasis as recently as 1 year ago  · No evidence of obstruction today  · Repeat CMP this morning unremarkable  · No further workup at this time     Chronic combined systolic and diastolic heart failure ([facility] )   Assessment & Plan    · Currently examines euvolemic however will hold off on further IV fluids to avoid volume overload  · Left echo in 2017 showed EF 40% with grade 1 diastolic dysfunction  · Continue to hold Lasix for now  · Daily weights, I's and O's, 2 g sodium diet       VTE Pharmacologic Prophylaxis:   Pharmacologic: Heparin  Mechanical VTE Prophylaxis in Place: Yes    Patient Centered Rounds: I have performed bedside rounds with nursing staff today  Discussions with Specialists or Other Care Team Provider: Nursing    Education and Discussions with Family / Patient: Patient/Patient's son Augusto Mckeon updated on phone, all questions answered    Current Length of Stay: 0 day(s)    Current Patient Status: Inpatient   Certification Statement: The patient, admitted on an observation basis, will now require > 2 midnight hospital stay due to Above diagnosis and care plan    Discharge Plan:  Anticipate discharge tomorrow    Code Status: Level 1 - Full Code      Subjective:   Seen and evaluated  Sitting out of bed in chair  Reports feeling a little tired and sleepy this morning  Denies any pain, no nausea vomiting, no fever or chills  Objective:     Vitals:   Temp (24hrs), Av 9 °F (36 6 °C), Min:97 7 °F (36 5 °C), Max:98 1 °F (36 7 °C)    Temp:  [97 7 °F (36 5 °C)-98 1 °F (36 7 °C)] 97 9 °F (36 6 °C)  HR:  [66-88] 82  Resp:  [18] 18  BP: (126-171)/(60-80) 126/60  SpO2:  [96 %-99 %] 99 %  Body mass index is 32 2 kg/m²  Input and Output Summary (last 24 hours):        Intake/Output Summary (Last 24 hours) at 10/27/18 1301  Last data filed at 10/27/18 1041   Gross per 24 hour   Intake             1540 ml   Output              308 ml   Net             1232 ml       Physical Exam:  General Appearance:    Alert, cooperative, no distress, appropriately responsive    Head:    Normocephalic, without obvious abnormality, atraumatic, mucous membranes moist    Eyes:    Conjunctiva/corneas clear, EOM's intact   Neck: Supple, erythematous rash noted base of neck anteriorly   Lungs:     Bibasilar rales, no wheezes    Heart:    Regular rate and rhythm, S1 and S2    Abdomen:     Soft, non-tender, bowel sounds active all four quadrants,     no masses, no organomegaly   Extremities:   Extremities normal, atraumatic, no cyanosis or edema   Neurologic:  nonfocal         Additional Data:     Labs:      Results from last 7 days  Lab Units 10/27/18  0526 10/26/18  1921   WBC Thousand/uL 4 80 5 86   HEMOGLOBIN g/dL 9 2* 10 5*   HEMATOCRIT % 29 1* 33 3*   PLATELETS Thousands/uL 194 220   NEUTROS PCT %  --  58   LYMPHS PCT %  --  30   MONOS PCT %  --  9   EOS PCT %  --  3       Results from last 7 days  Lab Units 10/27/18  0526   SODIUM mmol/L 141   POTASSIUM mmol/L 4 4   CHLORIDE mmol/L 105   CO2 mmol/L 28   BUN mg/dL 58*   CREATININE mg/dL 1 80*   CALCIUM mg/dL 9 3   ALK PHOS U/L 53   ALT U/L 13   AST U/L 15       Results from last 7 days  Lab Units 10/26/18  1921   INR  1 01       * I Have Reviewed All Lab Data Listed Above  * Additional Pertinent Lab Tests Reviewed: Debbie 66 Admission Reviewed    Cultures:   Blood Culture:   Lab Results   Component Value Date    BLOODCX No Growth After 5 Days  11/18/2017    BLOODCX No Growth After 5 Days  11/18/2017    BLOODCX Klebsiella pneumoniae (A) 11/15/2017    BLOODCX Escherichia coli (A) 11/15/2017    BLOODCX Klebsiella pneumoniae (A) 11/15/2017    BLOODCX No Growth After 5 Days  10/29/2017    BLOODCX No Growth After 5 Days   10/29/2017     Urine Culture:   Lab Results   Component Value Date    URINECX >100,000 cfu/ml Proteus mirabilis (A) 11/15/2017     Sputum Culture: No components found for: SPUTUMCX  Wound Culture: No results found for: WOUNDCULT    Last 24 Hours Medication List:     Current Facility-Administered Medications:  acetaminophen 650 mg Oral Q4H PRN Willow Sapp PA-C   ascorbic acid 250 mg Oral Daily Willow Sapp PA-C   aspirin 81 mg Oral Every Other Day Willow Sapp, LV   bisacodyl 10 mg Rectal PRN Willow Sapp, LV   cholecalciferol 2,000 Units Oral Daily Willow Sapp, LV   cyanocobalamin 1,000 mcg Oral Daily Willow Sapp, LV   docusate sodium 100 mg Oral BID Willow Sapp, LV   ferrous sulfate 325 mg Oral BID With Meals Willow Sapp PA-C   gabapentin 300 mg Oral TID Wlilow Sapp, LV   heparin (porcine) 5,000 Units Subcutaneous Q8H [address] Willow Sapp, LV   ipratropium-albuterol 3 mL Nebulization Q6H PRN Willow Sapp, LV   loratadine 10 mg Oral Daily Willow Sapp, LV   magnesium hydroxide 30 mL Oral Daily PRN Willow Sapp PA-C   menthol-methyl salicylate 1 application Apply externally TID Willow Sapp, LV   multivitamin-minerals 1 tablet Oral Daily Willow Sapp PA-C   ondansetron 4 mg Intravenous Q6H PRN Willow Sapp PA-C        Today, Patient Was Seen By: Kathie Paniagua MD    ** Please Note: Dragon 360 Dictation voice to text software may have been used in the creation of this document   **

## 2018-10-27 NOTE — ASSESSMENT & PLAN NOTE
· Rash noted to base of neck/upper chest wall anteriorly  · Patient reports this has been on for few weeks  She has had intermittent pruritus  · No discharge noted, rash appears dried vesicular  · Noted is an office visit back in May of 2018 for pruritic rash involving her shoulders, arms, legs and groin  · At this time, continue to monitor    Patient denies any acute symptoms

## 2018-10-27 NOTE — ASSESSMENT & PLAN NOTE
· Acute on chronic  Once again, a right upper quadrant ultrasound demonstrates cholelithiasis without acute cholecystitis    LFTs are within normal limits  · Pain control

## 2018-10-27 NOTE — PHYSICIAN ADVISOR
Current patient class: Inpatient  The patient is currently on Hospital Day: 2 at [facility]      The patient was admitted to the hospital at [id number] on 10/27/18 for the following diagnosis:  Cholelithiasis [K80 20]  Abdominal pain [R10 9]  Acute renal injury ([address] ) [N17 9]       There is documentation in the medical record of an expected length of stay of at least 2 midnights  The patient is therefore expected to satisfy the 2 midnight benchmark and given the 2 midnight presumption is appropriate for INPATIENT ADMISSION  Given this expectation of a satisfying stay, CMS instructs us that the patient is most often appropriate for inpatient admission under part A provided medical necessity is documented in the chart  After review of the relevant documentation, labs, vital signs and test results, the patient is appropriate for INPATIENT ADMISSION  Admission to the hospital as an inpatient is a complex decision making process which requires the practitioner to consider the patients presenting complaint, history and physical examination and all relevant testing  With this in mind, in this case, the patient was deemed appropriate for INPATIENT ADMISSION  After review of the documentation and testing available at the time of the admission I concur with this clinical determination of medical necessity  Rationale is as follows: The patient is a 80 yrs old Female who presented to the ED at 10/26/2018  6:43 PM with a chief complaint of Abdominal Pain (pt  complains of consistant RUQ that has been ongoing for about a year she feels that since last night it has got worse)     Given the need for further hospitalization, and along with the documentation of medical necessity present in the chart, the patient is appropriate for inpatient admission  The patient is expected to satisfy the 2 midnight benchmark, and will require further acute medical care   The patient does have comorbid conditions which increases the risk for significant adverse outcome  Given this the patient is appropriate for inpatient admission         The patients vitals on arrival were ED Triage Vitals [10/26/18 1849]   Temperature Pulse Respirations Blood Pressure SpO2   97 7 °F (36 5 °C) 88 18 154/68 97 %      Temp Source Heart Rate Source Patient Position - Orthostatic VS BP Location FiO2 (%)   Oral Monitor Lying Right arm --      Pain Score       5           Past Medical History:   Diagnosis Date    Cancer [facility])     "some kind of cancer when I was 80"    Colon cancer ([facility] )     Costochondritis     Last Assessed:3/18/2013    Iron deficiency 10/31/2017    Stage 2 chronic kidney disease 10/31/2017    Syncope     Vitamin B12 deficiency 10/31/2017     Past Surgical History:   Procedure Laterality Date    BACK SURGERY      CATARACT EXTRACTION      COLON SURGERY      ERCP W/ SPHICTEROTOMY N/A 11/22/2017    Procedure: ENDOSCOPIC RETROGRADE CHOLANGIOPANCREATOGRAPHY (ERCP) W/ SPHINCTEROTOMY;  Surgeon: Fartun Anguiano MD;  [facility];  Service: Gastroenterology    HIP FRACTURE SURGERY      WRIST SURGERY             Consults have been placed to:   None    Vitals:    10/26/18 2047 10/26/18 2200 10/26/18 2349 10/27/18 0700   BP: (!) 171/77 166/80 126/61 126/60   BP Location: Right arm Right arm Right arm Right arm   Pulse: 66 82 83 82   Resp: 18 18 18 18   Temp:   98 1 °F (36 7 °C) 97 9 °F (36 6 °C)   TempSrc:   Oral Oral   SpO2: 98% 96% 96% 99%   Weight:       Height:           Most recent labs:    Recent Labs      10/26/18   1921  10/27/18   0526   WBC  5 86  4 80   HGB  10 5*  9 2*   HCT  33 3*  29 1*   PLT  220  194   K  4 7  4 4   NA  141  141   CALCIUM  9 7  9 3   BUN  61*  58*   CREATININE  1 99*  1 80*   LIPASE  392   --    INR  1 01   --    AST  16  15   ALT  20  13   ALKPHOS  62  53       Scheduled Meds:  Current Facility-Administered Medications:  acetaminophen 650 mg Oral Q4H PRN Marianne MARKS Sek[place], LV   ascorbic acid 250 mg Oral Daily Willow Sapp, LV   aspirin 81 mg Oral Every Other Day Willow Sapp PA-C   bisacodyl 10 mg Rectal PRN Willow Sapp PA-C   cholecalciferol 2,000 Units Oral Daily Willow Sapp, LV   cyanocobalamin 1,000 mcg Oral Daily Willow Sapp PA-C   docusate sodium 100 mg Oral BID Willow Sapp PA-C   ferrous sulfate 325 mg Oral BID With Meals Willow Sapp PA-C   gabapentin 300 mg Oral TID Willow Sapp PA-C   heparin (porcine) 5,000 Units Subcutaneous Q8H [address] Willow Sapp PA-C   ipratropium-albuterol 3 mL Nebulization Q6H PRN Willow Sapp PA-C   loratadine 10 mg Oral Daily Willow Sapp PA-C   magnesium hydroxide 30 mL Oral Daily PRN Willow Sapp PA-C   menthol-methyl salicylate 1 application Apply externally TID Willow Sapp PA-C   multivitamin-minerals 1 tablet Oral Daily Willow Sapp PA-C   ondansetron 4 mg Intravenous Q6H PRN Willow Sapp PA-C     Continuous Infusions:   PRN Meds:   acetaminophen    bisacodyl    ipratropium-albuterol    magnesium hydroxide    ondansetron    Surgical procedures (if appropriate):

## 2018-10-27 NOTE — ASSESSMENT & PLAN NOTE
· Chronic issue, no former history of acute cholecystitis    Has had choledocholithiasis as recently as 1 year ago  · No evidence of obstruction today  · Repeat CMP this morning unremarkable  · No further workup at this time

## 2018-10-28 VITALS
RESPIRATION RATE: 18 BRPM | TEMPERATURE: 97.6 F | HEIGHT: 66 IN | HEART RATE: 81 BPM | OXYGEN SATURATION: 94 % | WEIGHT: 199.52 LBS | DIASTOLIC BLOOD PRESSURE: 68 MMHG | SYSTOLIC BLOOD PRESSURE: 130 MMHG | BODY MASS INDEX: 32.06 KG/M2

## 2018-10-28 LAB
ANION GAP SERPL CALCULATED.3IONS-SCNC: 7 MMOL/L (ref 4–13)
BUN SERPL-MCNC: 49 MG/DL (ref 5–25)
CALCIUM SERPL-MCNC: 9.2 MG/DL (ref 8.3–10.1)
CHLORIDE SERPL-SCNC: 106 MMOL/L (ref 100–108)
CO2 SERPL-SCNC: 28 MMOL/L (ref 21–32)
CREAT SERPL-MCNC: 1.72 MG/DL (ref 0.6–1.3)
GFR SERPL CREATININE-BSD FRML MDRD: 24 ML/MIN/1.73SQ M
GLUCOSE SERPL-MCNC: 90 MG/DL (ref 65–140)
MRSA NOSE QL CULT: NORMAL
POTASSIUM SERPL-SCNC: 4.7 MMOL/L (ref 3.5–5.3)
SODIUM SERPL-SCNC: 141 MMOL/L (ref 136–145)

## 2018-10-28 PROCEDURE — 80048 BASIC METABOLIC PNL TOTAL CA: CPT | Performed by: INTERNAL MEDICINE

## 2018-10-28 PROCEDURE — 99239 HOSP IP/OBS DSCHRG MGMT >30: CPT | Performed by: INTERNAL MEDICINE

## 2018-10-28 RX ORDER — TORSEMIDE 10 MG/1
10 TABLET ORAL DAILY
Refills: 0
Start: 2018-10-30

## 2018-10-28 RX ADMIN — CYANOCOBALAMIN TAB 500 MCG 1000 MCG: 500 TAB at 08:26

## 2018-10-28 RX ADMIN — LORATADINE 10 MG: 10 TABLET ORAL at 08:27

## 2018-10-28 RX ADMIN — GABAPENTIN 300 MG: 300 CAPSULE ORAL at 08:26

## 2018-10-28 RX ADMIN — VITAMIN D, TAB 1000IU (100/BT) 2000 UNITS: 25 TAB at 08:27

## 2018-10-28 RX ADMIN — OXYCODONE HYDROCHLORIDE AND ACETAMINOPHEN 250 MG: 500 TABLET ORAL at 08:25

## 2018-10-28 RX ADMIN — Medication 1 TABLET: at 08:26

## 2018-10-28 RX ADMIN — HEPARIN SODIUM 5000 UNITS: 5000 INJECTION, SOLUTION INTRAVENOUS; SUBCUTANEOUS at 14:18

## 2018-10-28 RX ADMIN — FERROUS SULFATE TAB 325 MG (65 MG ELEMENTAL FE) 325 MG: 325 (65 FE) TAB at 08:27

## 2018-10-28 RX ADMIN — DOCUSATE SODIUM 100 MG: 100 CAPSULE, LIQUID FILLED ORAL at 08:27

## 2018-10-28 RX ADMIN — HEPARIN SODIUM 5000 UNITS: 5000 INJECTION, SOLUTION INTRAVENOUS; SUBCUTANEOUS at 05:28

## 2018-10-28 NOTE — PLAN OF CARE
METABOLIC, FLUID AND ELECTROLYTES - ADULT     Electrolytes maintained within normal limits Progressing     Fluid balance maintained Progressing        MUSCULOSKELETAL - ADULT     Maintain or return mobility to safest level of function Progressing     Maintain proper alignment of affected body part Progressing        Potential for Falls     Patient will remain free of falls Progressing        Prexisting or High Potential for Compromised Skin Integrity     Skin integrity is maintained or improved Progressing        SAFETY ADULT     Maintain or return to baseline ADL function Progressing     Maintain or return mobility status to optimal level Progressing     Patient will remain free of falls Progressing

## 2018-10-28 NOTE — SOCIAL WORK
Pt set up for 4pm bls transport back to [facility] today at 4pm via Nannette Dominguez at [facility], [address] charge RN Negra Juan, and Dr Elon Baumgarten aware  Left message for pt's daughter Nisha Sender making her aware

## 2018-10-28 NOTE — DISCHARGE SUMMARY
Discharge Summary - [facility]    Patient Information: Dixie Silva 80 y o  female MRN: [id number]  Unit/Bed#: -01 Encounter: [id number]    Discharging Physician / Practitioner: Rosalie Dillon MD  PCP: Myles Beaulieu DO  Admission Date: 10/26/2018  Discharge Date: 10/28/18    Reason for Admission:  Abdominal pain    Discharge Diagnoses:     Principal Problem:    Acute renal failure superimposed on stage 3 chronic kidney disease ([facility] )  Active Problems:    RUQ pain    Rash    Calculus of gallbladder without cholecystitis without obstruction    Chronic combined systolic and diastolic heart failure ([facility] )      Consultations During Hospital Stay:  · None    Procedures Performed:   · None    Significant findings:  · RUQ ultrasound - "Exam limited secondary to bowel gas  Mobile gallstones are seen within the gallbladder and the sonographic Childers sign is negative  Medical renal disease  Pancreas not seen due to overlying bowel gas"    Hospital Course: Dixie Silva is a 80 y o  female patient who originally presented to the hospital on 10/26/2018 due to right upper quadrant abdominal pain  Patient has a known history of cholelithiasis with intermittent episodes of abdominal pain  Following presentation, workup which included right upper quadrant ultrasound, liver function tests showed no evidence of acute disease  Ultrasound did show gallstones as indicated above without evidence of obstruction  She was noted to be dehydrated on presentation with creatinine elevated from her presumed baseline  She received IV hydration and her diuretic dose was held  Renal function improved with hydration and she has been instructed to resume Bumex at half her prior dose and to follow up with her primary care physician for continued management  She had no further episodes of abdominal pain and was cleared for discharge back to her nursing facility on 10/28/18    She has an appointment with the gastroenterologist next month  Condition at Discharge: stable     Discharge Day Visit / Exam:     Subjective:  No new complaints  She denies any pain    Vitals: Blood Pressure: 130/68 (10/28/18 1045)  Pulse: 81 (10/28/18 1045)  Temperature: 97 6 °F (36 4 °C) (10/28/18 0700)  Temp Source: Oral (10/28/18 0700)  Respirations: 18 (10/28/18 0700)  Height: 5' 6" (167 6 cm) (10/26/18 1849)  Weight - Scale: 90 5 kg (199 lb 8 3 oz) (10/26/18 1849)  SpO2: 94 % (10/28/18 0700)    General Appearance:    Alert, cooperative, no distress, appropriately responsive    Head:    Normocephalic, without obvious abnormality, atraumatic, mucous membranes moist    Eyes:    Conjunctiva/corneas clear, EOM's intact   Neck:   Supple, stable to improved erythematous rash to base of neck anteriorly   Lungs:     Bibasilar rales, otherwise clear    Heart:    Regular rate and rhythm, S1 and S2    Abdomen:     Soft, non-tender, bowel sounds active all four quadrants,     no masses, no organomegaly   Extremities:   Extremities normal, atraumatic, no cyanosis or edema   Neurologic:  nonfocal      Discharge instructions/Information to patient and family:   See after visit summary for information provided to patient and family  Provisions for Follow-Up Care:  See after visit summary for information related to follow-up care and any pertinent home health orders  Disposition: Skilled nursing facility at [facility]     D/w daughter Arun Skaggs on phone  All questions answered      Discharge Statement:  I spent >30 minutes discharging the patient  This time was spent on the day of discharge  I had direct contact with the patient on the day of discharge  Greater than 50% of the total time was spent examining patient, answering all patient questions, arranging and discussing plan of care with patient as well as directly providing post-discharge instructions  Additional time then spent on discharge activities      Discharge Medications:  See after visit summary for reconciled discharge medications provided to patient and family  ** Please Note: Dragon 360 Dictation voice to text software may have been used in the creation of this document   **

## 2018-10-31 ENCOUNTER — TELEPHONE (OUTPATIENT)
Dept: FAMILY MEDICINE CLINIC | Facility: CLINIC | Age: 83
End: 2018-10-31

## 2018-10-31 NOTE — TELEPHONE ENCOUNTER
Patient sent to Acute rehab at [facility]  TCM will be done when released from acute care at facility

## 2019-05-21 ENCOUNTER — NURSING HOME VISIT (OUTPATIENT)
Dept: GERIATRICS | Facility: OTHER | Age: 84
End: 2019-05-21
Payer: MEDICARE

## 2019-05-21 DIAGNOSIS — I50.42 CHRONIC COMBINED SYSTOLIC AND DIASTOLIC HEART FAILURE (HCC): ICD-10-CM

## 2019-05-21 DIAGNOSIS — N18.30 STAGE 3 CHRONIC KIDNEY DISEASE (HCC): ICD-10-CM

## 2019-05-21 DIAGNOSIS — R53.81 PHYSICAL DECONDITIONING: ICD-10-CM

## 2019-05-21 DIAGNOSIS — E03.9 HYPOTHYROIDISM, UNSPECIFIED TYPE: ICD-10-CM

## 2019-05-21 DIAGNOSIS — H54.7 POOR VISION: Primary | ICD-10-CM

## 2019-05-21 DIAGNOSIS — H91.93 BILATERAL HEARING LOSS, UNSPECIFIED HEARING LOSS TYPE: ICD-10-CM

## 2019-05-21 DIAGNOSIS — R26.2 AMBULATORY DYSFUNCTION: ICD-10-CM

## 2019-05-21 DIAGNOSIS — D64.9 ANEMIA, UNSPECIFIED TYPE: ICD-10-CM

## 2019-05-21 PROCEDURE — 99309 SBSQ NF CARE MODERATE MDM 30: CPT | Performed by: FAMILY MEDICINE

## 2019-06-20 ENCOUNTER — NURSING HOME VISIT (OUTPATIENT)
Dept: GERIATRICS | Facility: OTHER | Age: 84
End: 2019-06-20
Payer: MEDICARE

## 2019-06-20 DIAGNOSIS — R26.2 AMBULATORY DYSFUNCTION: ICD-10-CM

## 2019-06-20 DIAGNOSIS — G62.9 NEUROPATHY: ICD-10-CM

## 2019-06-20 DIAGNOSIS — E03.9 HYPOTHYROIDISM, UNSPECIFIED TYPE: ICD-10-CM

## 2019-06-20 DIAGNOSIS — N18.30 STAGE 3 CHRONIC KIDNEY DISEASE (HCC): ICD-10-CM

## 2019-06-20 DIAGNOSIS — I50.42 CHRONIC COMBINED SYSTOLIC AND DIASTOLIC HEART FAILURE (HCC): ICD-10-CM

## 2019-06-20 DIAGNOSIS — H54.7 POOR VISION: Primary | ICD-10-CM

## 2019-06-20 DIAGNOSIS — R53.81 PHYSICAL DECONDITIONING: ICD-10-CM

## 2019-06-20 DIAGNOSIS — H91.93 BILATERAL HEARING LOSS, UNSPECIFIED HEARING LOSS TYPE: ICD-10-CM

## 2019-06-20 PROCEDURE — 99309 SBSQ NF CARE MODERATE MDM 30: CPT | Performed by: NURSE PRACTITIONER

## 2019-07-05 ENCOUNTER — NURSING HOME VISIT (OUTPATIENT)
Dept: GERIATRICS | Facility: OTHER | Age: 84
End: 2019-07-05
Payer: MEDICARE

## 2019-07-05 DIAGNOSIS — R53.81 MALAISE: ICD-10-CM

## 2019-07-05 DIAGNOSIS — I50.42 CHRONIC COMBINED SYSTOLIC AND DIASTOLIC HEART FAILURE (HCC): ICD-10-CM

## 2019-07-05 DIAGNOSIS — R05.9 COUGH: Primary | ICD-10-CM

## 2019-07-05 PROCEDURE — 99308 SBSQ NF CARE LOW MDM 20: CPT | Performed by: NURSE PRACTITIONER

## 2019-07-07 PROBLEM — R05.9 COUGH: Status: ACTIVE | Noted: 2019-07-05

## 2019-07-07 PROBLEM — R53.81 MALAISE: Status: ACTIVE | Noted: 2019-07-05

## 2019-07-08 NOTE — PROGRESS NOTES
[address] [address]  [phone]  [address] [place] [address]   Progress Note  POS [address]      NAME: Linda Delacruz  AGE: 80 y o  SEX: female  :  1917  DATE OF ENCOUNTER: 2019    Chief Complaint   Patient seen and examined for cough, malaise    History of Present Illness     Janeth Greenwood is a 8 year old female resident of [address] [place] [address] seen and examined today per nursing request for cough and general malaise  This cough is a dry non-productive cough that started today  She is examined in her chair  She states that she has a harsh cough worse than she has ever had  She denies chest pain, sob, abdominal pain, headache, dizziness or blurred vision  The following portions of the patient's history were reviewed and updated as appropriate: allergies, current medications, past family history, past medical history, past social history, past surgical history and problem list     Review of Systems     A 12 point review of systems was performed  All negative, except as per HPI      History     Past Medical History:   Diagnosis Date    Cancer [facility])     "some kind of cancer when I was 80"    Colon cancer ([facility] )     Costochondritis     Last Assessed:3/18/2013    Iron deficiency 10/31/2017    Stage 2 chronic kidney disease 10/31/2017    Syncope     Vitamin B12 deficiency 10/31/2017     Past Surgical History:   Procedure Laterality Date    BACK SURGERY      CATARACT EXTRACTION      COLON SURGERY      ERCP W/ SPHICTEROTOMY N/A 2017    Procedure: ENDOSCOPIC RETROGRADE CHOLANGIOPANCREATOGRAPHY (ERCP) W/ SPHINCTEROTOMY;  Surgeon: Chad Cornejo MD;  [facility];  Service: Gastroenterology    HIP FRACTURE SURGERY      WRIST SURGERY       Family History   Problem Relation Age of Onset    Heart attack Mother     Heart attack Father     Cancer Sister     Heart attack Brother     Coronary artery disease Brother     Heart disease Brother     Colon cancer Family      Social History     Socioeconomic History    Marital status:       Spouse name: Not on file    Number of children: Not on file    Years of education: Not on file    Highest education level: Not on file   Occupational History    Occupation: retired   Social Needs    Financial resource strain: Not on file    Food insecurity:     Worry: Not on file     Inability: Not on file   Feast needs:     Medical: Not on file     Non-medical: Not on file   Tobacco Use    Smoking status: Never Smoker    Smokeless tobacco: Never Used   Substance and Sexual Activity    Alcohol use: No    Drug use: No    Sexual activity: Not on file   Lifestyle    Physical activity:     Days per week: Not on file     Minutes per session: Not on file    Stress: Not on file   Relationships    Social connections:     Talks on phone: Not on file     Gets together: Not on file     Attends Religion service: Not on file     Active member of club or organization: Not on file     Attends meetings of clubs or organizations: Not on file     Relationship status: Not on file    Intimate partner violence:     Fear of current or ex partner: Not on file     Emotionally abused: Not on file     Physically abused: Not on file     Forced sexual activity: Not on file   Other Topics Concern    Not on file   Social History Narrative    Caffeine use     No Known Allergies    Objective     Vital Signs  BP:  124/49     HR: 74 T: 97 6    RR:19 O2Sat: 93% W:192 1 lbs  General: NAD, well nourished, well developed  Oral: Oropharynx  clear  Neck: Supple, +ROM  CV: S1, S2, no murmurs  Pulmonary: Coarse lung sounds throughout all lung fields, unlabored respirations  Abdominal:BS + x4 in all quadrants, soft, no mass, no tenderness  Extremities: trace b/l le np edema, +ROM, +Strength  Psych: Alert and oriented times 3, no mood, no affect, good judgement    Pertinent Laboratory/Diagnostic Studies:  Reviewed in [facility] medical chart      Current Medications     Current Medications Reviewed and updated in Nursing Home EMR      Assessment and Plan     Cough  - Will order a stat chest x-ray due to abnormal lung sounds  - Robitussin cough medicine prn every 6  hours  - Stat cbc with diff and bmp  - Monitor vital signs every shift for 3 days  - Encourage PO fluid intake    Malaise  - Patient afebrile, continue to monitor vital signs every shift for 3 days  - Continue Tylenol for pain  - Encourage PO intake  - Continue supportive care  - Will continue to monitor    Chronic combined systolic and diastolic heart failure (HCC)  - Euvolemic on exam  - Weights stable  - Continue to monitor daily weights  - Continue JOSUE diet and torsemide            [place]abi [address] [place] [address]  2019       Name: Yunier Ford  : 1917  MRN: [id number]  DOS: 2019  Billing Code: [id number],  [address] [facility]

## 2019-07-11 ENCOUNTER — NURSING HOME VISIT (OUTPATIENT)
Dept: GERIATRICS | Facility: OTHER | Age: 84
End: 2019-07-11
Payer: MEDICARE

## 2019-07-11 DIAGNOSIS — B02.9 HERPES ZOSTER WITHOUT COMPLICATION: Primary | ICD-10-CM

## 2019-07-11 PROCEDURE — 99307 SBSQ NF CARE SF MDM 10: CPT | Performed by: NURSE PRACTITIONER

## 2019-07-11 NOTE — ASSESSMENT & PLAN NOTE
- Rash highly suspicious for herpes zoster  - Will start patient on Valtrex for 7 days  - Cover lesions with dry dressing  - Place on contact and airborne precautions

## 2019-07-11 NOTE — PROGRESS NOTES
[address] [address]  [phone]  [address] [place] [address]   Progress Note  POS 32        NAME: Josefina Roberson  AGE: 80 y o  SEX: female  :  1917  DATE OF ENCOUNTER: 2019    Chief Complaint   Patient seen and examined for rash right lower back    History of Present Illness     Neo Dunn is a 8 year old female resident of [address] [place] [address] long term care unit seen today per nursing request  She complains of an itchy rash on her right lower back/flank area that started today  She denies pain or tenderness to the rash, fever or cills  She states that she thinks that she has poison ivy  She has a history of herpes zoster a couple of years ago and treated with Valtrex at that time  The following portions of the patient's history were reviewed and updated as appropriate: allergies, current medications, past family history, past medical history, past social history, past surgical history and problem list     Review of Systems     A 12 point review of systems was performed  All negative, except as per HPI      History     Past Medical History:   Diagnosis Date    Cancer [facility])     "some kind of cancer when I was 80"    Colon cancer ([facility] )     Costochondritis     Last Assessed:3/18/2013    Iron deficiency 10/31/2017    Stage 2 chronic kidney disease 10/31/2017    Syncope     Vitamin B12 deficiency 10/31/2017     Past Surgical History:   Procedure Laterality Date    BACK SURGERY      CATARACT EXTRACTION      COLON SURGERY      ERCP W/ SPHICTEROTOMY N/A 2017    Procedure: ENDOSCOPIC RETROGRADE CHOLANGIOPANCREATOGRAPHY (ERCP) W/ SPHINCTEROTOMY;  Surgeon: Kristin Aguilar MD;  [facility];  Service: Gastroenterology    HIP FRACTURE SURGERY      WRIST SURGERY       Family History   Problem Relation Age of Onset    Heart attack Mother     Heart attack Father     Cancer Sister     Heart attack Brother     Coronary artery disease Brother  Heart disease Brother     Colon cancer Family      Social History     Socioeconomic History    Marital status:      Spouse name: Not on file    Number of children: Not on file    Years of education: Not on file    Highest education level: Not on file   Occupational History    Occupation: retired   Social Needs    Financial resource strain: Not on file    Food insecurity:     Worry: Not on file     Inability: Not on file   Remotium needs:     Medical: Not on file     Non-medical: Not on file   Tobacco Use    Smoking status: Never Smoker    Smokeless tobacco: Never Used   Substance and Sexual Activity    Alcohol use: No    Drug use: No    Sexual activity: Not on file   Lifestyle    Physical activity:     Days per week: Not on file     Minutes per session: Not on file    Stress: Not on file   Relationships    Social connections:     Talks on phone: Not on file     Gets together: Not on file     Attends Taoist service: Not on file     Active member of club or organization: Not on file     Attends meetings of clubs or organizations: Not on file     Relationship status: Not on file    Intimate partner violence:     Fear of current or ex partner: Not on file     Emotionally abused: Not on file     Physically abused: Not on file     Forced sexual activity: Not on file   Other Topics Concern    Not on file   Social History Narrative    Caffeine use     No Known Allergies    Objective     Vital Signs  BP: 108/47    HR: 66 T: 96 9    RR: 20 O2Sat: 96% RA   General: NAD, well nourished, well developed  Oral: Oropharynx  clear  Neck: Supple, +ROM  Skin: Erythematous papules, single dermatome, right lower back/right flank area      Pertinent Laboratory/Diagnostic Studies:  Reviewed in [facility] medical chart      Current Medications     Current Medications Reviewed and updated in Nursing Home EMR      Assessment and Plan     Herpes zoster  - Rash highly suspicious for herpes zoster  - Will start patient on Valtrex for 7 days  - Cover lesions with dry dressing  - Place on contact and airborne precautions      [address] [facility] [employer]  2019       Name: Amarilis Damon  : 1917  MRN: [id number]  DOS: 2019  Billing Code: [id number],  [address]

## 2019-07-15 ENCOUNTER — NURSING HOME VISIT (OUTPATIENT)
Dept: GERIATRICS | Facility: OTHER | Age: 84
End: 2019-07-15
Payer: MEDICARE

## 2019-07-15 DIAGNOSIS — R05.9 COUGH: Primary | ICD-10-CM

## 2019-07-15 DIAGNOSIS — R53.81 PHYSICAL DECONDITIONING: ICD-10-CM

## 2019-07-15 DIAGNOSIS — R11.0 NAUSEA: ICD-10-CM

## 2019-07-15 PROCEDURE — 99308 SBSQ NF CARE LOW MDM 20: CPT | Performed by: NURSE PRACTITIONER

## 2019-07-15 NOTE — PROGRESS NOTES
[address] [address]  [phone]  [address] [place] [address]   Progress Note  POS 32        NAME: Joice Goldmann  AGE: 80 y o  SEX: female  :  1917  DATE OF ENCOUNTER: 2019    Chief Complaint   Patient seen and examined for cough, abdominal pain    History of Present Illness     Chrissie Edmond is a 8 year old female seen and examined today per nursing request for cough  She is sitting in her chair, calm, cooperative  She states that she does not feel well  She feels nauseous with abdominal pain  Nursing reports that she has a moist non-productive cough  She denies fever, chills,chest pain, sob, vomiting, headache, dizziness or blurred vision  The following portions of the patient's history were reviewed and updated as appropriate: allergies, current medications, past family history, past medical history, past social history, past surgical history and problem list     Review of Systems     A 12 point review of systems was performed  All negative, except as per HPI      History     Past Medical History:   Diagnosis Date    Cancer [facility])     "some kind of cancer when I was 80"    Colon cancer ([facility] [address] )     Costochondritis     Last Assessed:3/18/2013    Iron deficiency 10/31/2017    Stage 2 chronic kidney disease 10/31/2017    Syncope     Vitamin B12 deficiency 10/31/2017     Past Surgical History:   Procedure Laterality Date    BACK SURGERY      CATARACT EXTRACTION      COLON SURGERY      ERCP W/ SPHICTEROTOMY N/A 2017    Procedure: ENDOSCOPIC RETROGRADE CHOLANGIOPANCREATOGRAPHY (ERCP) W/ SPHINCTEROTOMY;  Surgeon: Loretta Leigh MD;  [facility];  Service: Gastroenterology    HIP FRACTURE SURGERY      WRIST SURGERY       Family History   Problem Relation Age of Onset    Heart attack Mother     Heart attack Father     Cancer Sister     Heart attack Brother     Coronary artery disease Brother     Heart disease Brother     Colon cancer Family      Social History     Socioeconomic History    Marital status:       Spouse name: Not on file    Number of children: Not on file    Years of education: Not on file    Highest education level: Not on file   Occupational History    Occupation: retired   Social Needs    Financial resource strain: Not on file    Food insecurity:     Worry: Not on file     Inability: Not on file   datapine needs:     Medical: Not on file     Non-medical: Not on file   Tobacco Use    Smoking status: Never Smoker    Smokeless tobacco: Never Used   Substance and Sexual Activity    Alcohol use: No    Drug use: No    Sexual activity: Not on file   Lifestyle    Physical activity:     Days per week: Not on file     Minutes per session: Not on file    Stress: Not on file   Relationships    Social connections:     Talks on phone: Not on file     Gets together: Not on file     Attends Cheondoism service: Not on file     Active member of club or organization: Not on file     Attends meetings of clubs or organizations: Not on file     Relationship status: Not on file    Intimate partner violence:     Fear of current or ex partner: Not on file     Emotionally abused: Not on file     Physically abused: Not on file     Forced sexual activity: Not on file   Other Topics Concern    Not on file   Social History Narrative    Caffeine use     No Known Allergies    Objective     Vital Signs  BP: 125/61       HR: 68 T: 97 5    RR: 20 O2Sat: 95% W: 191 3 lbs  General: NAD, well nourished, well developed  Oral: Oropharynx  clear  Neck: Supple, +ROM  CV: S1, S2, no murmurs  Pulmonary: Lung sounds clear to air, no wheezing, rhonchi, rales  Abdominal:BS + x4 in all quadrants, soft, no mass,+ bilateral lower abdominal  tenderness  Extremities: No edema, +ROM, +Strength  Neurological: CN 2-12 intact, PERRL  Psych: Alert and oriented times 2-3, no mood, no affect, forgetful    Pertinent Laboratory/Diagnostic Studies:  Reviewed in [facility] medical chart      Current Medications     Current Medications Reviewed and updated in [address]      Assessment and Plan     Cough  - Will order Mucinex bid for 10 days  - Continue to monitor for s/s of infection  - Encourage PO fluid intake    Nausea  - With abdominal tenderness, may be a common reaction to Valtrex  - Will order Zofran prn  - Encourage small sips of PO until nausea subsides      Physical deconditioning  - Multifactorial related to increased age and multiple co-morbidities  - Monitor skin integrity  - Continue nutritional support  - Give supportive care      [address] [facility]  2019       Name: Chelecalin Solis  : 1917  MRN: [id number]  DOS: 2019  Billing Code: [id number],  [address] [facility]

## 2019-07-16 PROBLEM — R11.0 NAUSEA: Status: ACTIVE | Noted: 2019-07-15

## 2019-07-17 NOTE — ASSESSMENT & PLAN NOTE
- With abdominal tenderness, may be a common reaction to Valtrex  - Will order Zofran prn  - Encourage small sips of PO until nausea subsides

## 2019-07-17 NOTE — ASSESSMENT & PLAN NOTE
- Multifactorial related to increased age and multiple co-morbidities  - Monitor skin integrity  - Continue nutritional support  - Give supportive care

## 2019-07-17 NOTE — ASSESSMENT & PLAN NOTE
- Will order Mucinex bid for 10 days  - Continue to monitor for s/s of infection  - Encourage PO fluid intake

## 2019-08-13 ENCOUNTER — NURSING HOME VISIT (OUTPATIENT)
Dept: GERIATRICS | Facility: OTHER | Age: 84
End: 2019-08-13
Payer: MEDICARE

## 2019-08-13 DIAGNOSIS — N18.30 STAGE 3 CHRONIC KIDNEY DISEASE (HCC): ICD-10-CM

## 2019-08-13 DIAGNOSIS — R26.2 AMBULATORY DYSFUNCTION: Primary | ICD-10-CM

## 2019-08-13 DIAGNOSIS — H91.93 BILATERAL HEARING LOSS, UNSPECIFIED HEARING LOSS TYPE: ICD-10-CM

## 2019-08-13 DIAGNOSIS — R53.81 PHYSICAL DECONDITIONING: ICD-10-CM

## 2019-08-13 DIAGNOSIS — E03.9 HYPOTHYROIDISM, UNSPECIFIED TYPE: ICD-10-CM

## 2019-08-13 DIAGNOSIS — G62.9 NEUROPATHY: ICD-10-CM

## 2019-08-13 DIAGNOSIS — I50.42 CHRONIC COMBINED SYSTOLIC AND DIASTOLIC HEART FAILURE (HCC): ICD-10-CM

## 2019-08-13 DIAGNOSIS — R21 RASH: ICD-10-CM

## 2019-08-13 PROCEDURE — 99308 SBSQ NF CARE LOW MDM 20: CPT | Performed by: FAMILY MEDICINE

## 2019-08-13 NOTE — PROGRESS NOTES
[facility]  [address] [facility]  [address] [facility]  [phone]    [facility]  PROGRESS NOTE        NAME: Hola Piña  AGE: 80 y o  SEX: female  :  1917  DATE OF ENCOUNTER:   2019  POS: 28 ([facility])    Assessment and Plan     Ambulatory dysfunction  · Patient has been nonambulatory for a long time now  · Continue assistance with all transfers and fall precautions  · Continue restorative therapy  Physical deconditioning  · Patient has continued to decline over time  · Continue assistance with all ADLs  · Continue restorative therapy  Rash  · Will start hydrocortisone cream   · Will continue to monitor closely  Chronic combined systolic and diastolic heart failure (HCC)  · Euvolemic on exam   · Continue to monitor daily weights  · Continue torsemide at current dose  · Monitor renal function electrolytes  Hypothyroid  · Last TSH done 2018 stable at 3 51   · Continue levothyroxine at current dose and follow up on yearly TSH this month  Neuropathy  · No complaints of neuropathy today  · Stable on gabapentin 100 mg b i d     Bilateral hearing loss  · Continue supportive care  Stage 3 chronic kidney disease ([address] )  · At baseline  · Continue to avoid hypotension and nephrotoxins as possible  · Continue to monitor  Nishi Lara MD  Geriatric Medicine  2019       Chief Complaint   Patient seen and examined for follow up on chronic conditions  History of Present Illness     Diana Parr was seen and examined today for follow-up on chronic conditions  She is sitting comfortably in recliner, she offers no new complaints today except she insists that she was previously able to walk now has not been  However per staff, and per records reviewed patient has not been able to walk for quite some time now despite therapy interventions      Patient complains of mild itching and rash that she noticed a couple of days ago, more prominent on her neck and anterior chest   States it is pruritic throughout the day and does not change with any interventions  She has tried putting some moisturizer on without relief  Patient denies any chest pain, shortness of breath, abdominal discomfort, nausea/vomiting, or any other pain  She offers no acute complaints today  The following portions of the patient's history were reviewed and updated as appropriate: allergies, current medications, past family history, past medical history, past social history, past surgical history and problem list     Review of Systems     A complete review of systems was performed  All negative, except as per HPI  History     Past Medical History:   Diagnosis Date    Cancer [facility])     "some kind of cancer when I was 80"    Colon cancer ([facility] [address] )     Costochondritis     Last Assessed:3/18/2013    Iron deficiency 10/31/2017    Stage 2 chronic kidney disease 10/31/2017    Syncope     Vitamin B12 deficiency 10/31/2017     No Known Allergies    Objective     Vital Signs  BP:   115/56       HR:  72 T:  97 5°    RR:  18 O2Sat:  96% on RA W:  188 3 lb    Physical Exam   Constitutional: She appears well-developed  No distress  HENT:   Head: Normocephalic and atraumatic  Mouth/Throat: Oropharynx is clear and moist  No oropharyngeal exudate  Hard of hearing   Eyes: Pupils are equal, round, and reactive to light  Conjunctivae and EOM are normal  Right eye exhibits no discharge  Left eye exhibits no discharge  Neck: Neck supple  No JVD present  Cardiovascular: Normal rate, regular rhythm and normal heart sounds  No murmur heard  Pulmonary/Chest: Breath sounds normal  No respiratory distress  Poor effort   Abdominal: Soft  Bowel sounds are normal  She exhibits no distension  There is no tenderness  Musculoskeletal: She exhibits no edema, tenderness or deformity  Neurological: She is alert  She displays normal reflexes  No cranial nerve deficit  Oriented to self and place  She is able to answer questions appropriately and follow single step commands  Skin: Skin is warm and dry  Rash (Noticed erythematous maculopapular rash at the base of the neck and anterior chest   Lesions are isolated, measuring about 0 5 cm in diameter, surrounded by scaly, dry skin ) noted  She is not diaphoretic  No erythema  No pallor  Psychiatric: She has a normal mood and affect  Her behavior is normal    Poor judgment/thought content  Vitals reviewed  Pertinent Laboratory/Diagnostic Studies         Current Medications     Current Medications Reviewed and updated in EMR  Monthly orders reviewed and signed in chart  Please note:  Voice-recognition software may have been used in the preparation of this document  Occasional wrong word or "sound-alike" substitutions may have occurred due to the inherent limitations of voice recognition software  Interpretation should be guided by context

## 2019-08-18 PROBLEM — R11.0 NAUSEA: Status: RESOLVED | Noted: 2019-07-15 | Resolved: 2019-08-18

## 2019-08-18 PROBLEM — R53.81 MALAISE: Status: RESOLVED | Noted: 2019-07-05 | Resolved: 2019-08-18

## 2019-08-18 PROBLEM — B02.9 HERPES ZOSTER: Status: RESOLVED | Noted: 2019-07-11 | Resolved: 2019-08-18

## 2019-08-18 PROBLEM — R05.9 COUGH: Status: RESOLVED | Noted: 2019-07-05 | Resolved: 2019-08-18

## 2019-08-19 NOTE — ASSESSMENT & PLAN NOTE
· Patient has been nonambulatory for a long time now  · Continue assistance with all transfers and fall precautions  · Continue restorative therapy

## 2019-08-19 NOTE — ASSESSMENT & PLAN NOTE
· Patient has continued to decline over time  · Continue assistance with all ADLs  · Continue restorative therapy

## 2019-08-19 NOTE — ASSESSMENT & PLAN NOTE
· Last TSH done 08/21/2018 stable at 3 51   · Continue levothyroxine at current dose and follow up on yearly TSH this month

## 2019-09-12 ENCOUNTER — NURSING HOME VISIT (OUTPATIENT)
Dept: GERIATRICS | Facility: OTHER | Age: 84
End: 2019-09-12
Payer: MEDICARE

## 2019-09-12 DIAGNOSIS — G62.9 NEUROPATHY: ICD-10-CM

## 2019-09-12 DIAGNOSIS — D64.9 ANEMIA, UNSPECIFIED TYPE: ICD-10-CM

## 2019-09-12 DIAGNOSIS — R26.2 AMBULATORY DYSFUNCTION: ICD-10-CM

## 2019-09-12 DIAGNOSIS — H54.7 POOR VISION: ICD-10-CM

## 2019-09-12 DIAGNOSIS — R53.81 PHYSICAL DECONDITIONING: ICD-10-CM

## 2019-09-12 DIAGNOSIS — I50.42 CHRONIC COMBINED SYSTOLIC AND DIASTOLIC HEART FAILURE (HCC): Primary | ICD-10-CM

## 2019-09-12 DIAGNOSIS — N18.30 STAGE 3 CHRONIC KIDNEY DISEASE (HCC): ICD-10-CM

## 2019-09-12 DIAGNOSIS — E03.9 HYPOTHYROIDISM, UNSPECIFIED TYPE: ICD-10-CM

## 2019-09-12 PROCEDURE — 99309 SBSQ NF CARE MODERATE MDM 30: CPT | Performed by: NURSE PRACTITIONER

## 2019-09-15 NOTE — ASSESSMENT & PLAN NOTE
- Euvolemic on exam  - Continue to monitor weights and fluid intake  - Continue torsemide at current dose  - Will monitor electrolytes and renal function

## 2019-09-15 NOTE — ASSESSMENT & PLAN NOTE
- Patient non-ambulatory at baseline  - Continue assistance with transfers and restorative therapy  - Fall precautions advised

## 2019-09-15 NOTE — PROGRESS NOTES
[address] [address]  [phone] [address] [facility]   Progress Note  POS 32      NAME: Mago Taveras  AGE: 80 y o  SEX: female  :  1917  DATE OF ENCOUNTER:     Chief Complaint   Patient seen and examined for follow up on chronic conditions  History of Present Illness     Maria Esther Diaz is a 8 year old female resident seen and examined today to follow-up on chronic medical conditions in long term care at [facility]  She is sitting in her wheelchair calm, cooperative and in no acute distress  She complains of a pruritic rash on her neck that she states she had for a "long time "  She denies using new hygiene products, however she states that it might be from the detergent used on her sheets  She denies chest pain, sob, abdominal pain, nausea, vomiting, diarrhea, headache, dizziness or blurred vision  She is non-ambulatory at baseline  She states that her left leg does not move very well related to previous left hip surgery complications  She has a good appetite, is sleeping well at  and her mood is stable  No acute issues reported by nursing  The following portions of the patient's history were reviewed and updated as appropriate: allergies, current medications, past family history, past medical history, past social history, past surgical history and problem list     Review of Systems     A 12 point review of systems was performed  All negative, except as per HPI      History     Past Medical History:   Diagnosis Date    Cancer [facility])     "some kind of cancer when I was 80"    Colon cancer ([facility] [address] )     Costochondritis     Last Assessed:3/18/2013    Iron deficiency 10/31/2017    Stage 2 chronic kidney disease 10/31/2017    Syncope     Vitamin B12 deficiency 10/31/2017     Past Surgical History:   Procedure Laterality Date    BACK SURGERY      CATARACT EXTRACTION      COLON SURGERY      ERCP W/ SPHICTEROTOMY N/A 2017 Procedure: ENDOSCOPIC RETROGRADE CHOLANGIOPANCREATOGRAPHY (ERCP) W/ SPHINCTEROTOMY;  Surgeon: Herb Prado MD;  [facility];  Service: Gastroenterology    HIP FRACTURE SURGERY      WRIST SURGERY       Family History   Problem Relation Age of Onset    Heart attack Mother     Heart attack Father     Cancer Sister     Heart attack Brother     Coronary artery disease Brother     Heart disease Brother     Colon cancer Family      Social History     Socioeconomic History    Marital status:       Spouse name: Not on file    Number of children: Not on file    Years of education: Not on file    Highest education level: Not on file   Occupational History    Occupation: retired   Social Needs    Financial resource strain: Not on file    Food insecurity:     Worry: Not on file     Inability: Not on file   [employer] needs:     Medical: Not on file     Non-medical: Not on file   Tobacco Use    Smoking status: Never Smoker    Smokeless tobacco: Never Used   Substance and Sexual Activity    Alcohol use: No    Drug use: No    Sexual activity: Not on file   Lifestyle    Physical activity:     Days per week: Not on file     Minutes per session: Not on file    Stress: Not on file   Relationships    Social connections:     Talks on phone: Not on file     Gets together: Not on file     Attends Episcopalian service: Not on file     Active member of club or organization: Not on file     Attends meetings of clubs or organizations: Not on file     Relationship status: Not on file    Intimate partner violence:     Fear of current or ex partner: Not on file     Emotionally abused: Not on file     Physically abused: Not on file     Forced sexual activity: Not on file   Other Topics Concern    Not on file   Social History Narrative    Caffeine use     No Known Allergies    Objective     Vital Signs  BP: 98/55      HR: 92 T:97 8    RR:  O2Sat: 98% RA W: 189 lbs  General: NAD, well nourished, well developed  Oral: Oropharynx  clear  Neck: Supple, +ROM  Ears: [place]  Eyes: Pupils are equal and reactive to light  Conjunctivae and EOM are normal b/l eyes  + chronic erythema bilateral lower eyelids  CV: S1, S2, no murmurs  Pulmonary: Lung sounds clear to air, no wheezing, rhonchi, rales  Abdominal:BS + x4 in all quadrants, soft, no mass, no tenderness  Extremities: No edema, +ROM, +Strength  Neurological: CN 2-12 intact, TITA  Psych: Alert and oriented times 3, no mood, no affect, good judgement    Pertinent Laboratory/Diagnostic Studies:  Reviewed in [facility] medical chart  TSH 3 04      Current Medications     Current Medications Reviewed and updated in Nursing Home EMR      Assessment and Plan     Chronic combined systolic and diastolic heart failure ([address] )  - Euvolemic on exam  - Continue to monitor weights and fluid intake  - Continue torsemide at current dose  - Will monitor electrolytes and renal function          Hypothyroid  - Stable, recent TSH 3 04  - Continue levothyroxine at current dose  - Will monitor TSH level yearly    Neuropathy  - Stable, no complaints of pain  - Continue gabapentin 100 mg bid    Stage 3 chronic kidney disease (HCC)  - Stable, creatinine at baseline  - Continue to avoid hypotension and nephrotoxic medications  - Will monitor BMP    Ambulatory dysfunction  - Patient non-ambulatory at baseline  - Continue assistance with transfers and restorative therapy  - Fall precautions advised    Anemia  - Stable hemoglobin  - Continue ferrous sulfate  - Will monitor CBC    Physical deconditioning  - Multifactorial related to increased age and multiple co-morbidities  - Continue assistance with ADL's  - Monitor skin integrity  - Continue restorative therapy  - Fall precautions    Poor vision  - Stable, no visual deficit noted on exam  - Continue eye drops  - Follow-up with Ophthalmology      [facility]  2019       Name: Boogie Butler  : 1917  MRN: [id number]  DOS: 09/12/2019  Billing Code: [id number],  [address]

## 2019-09-15 NOTE — ASSESSMENT & PLAN NOTE
- Stable, creatinine at baseline  - Continue to avoid hypotension and nephrotoxic medications  - Will monitor BMP

## 2019-09-15 NOTE — ASSESSMENT & PLAN NOTE
- Stable, recent TSH 3 04  - Continue levothyroxine at current dose  - Will monitor TSH level yearly

## 2019-10-17 ENCOUNTER — NURSING HOME VISIT (OUTPATIENT)
Dept: GERIATRICS | Facility: OTHER | Age: 84
End: 2019-10-17
Payer: MEDICARE

## 2019-10-17 VITALS
TEMPERATURE: 96.9 F | HEART RATE: 84 BPM | DIASTOLIC BLOOD PRESSURE: 61 MMHG | WEIGHT: 190.5 LBS | RESPIRATION RATE: 18 BRPM | BODY MASS INDEX: 30.75 KG/M2 | OXYGEN SATURATION: 96 % | SYSTOLIC BLOOD PRESSURE: 137 MMHG

## 2019-10-17 DIAGNOSIS — N18.30 STAGE 3 CHRONIC KIDNEY DISEASE (HCC): ICD-10-CM

## 2019-10-17 DIAGNOSIS — I50.42 CHRONIC COMBINED SYSTOLIC AND DIASTOLIC HEART FAILURE (HCC): ICD-10-CM

## 2019-10-17 DIAGNOSIS — R21 RASH: ICD-10-CM

## 2019-10-17 DIAGNOSIS — E03.9 HYPOTHYROIDISM, UNSPECIFIED TYPE: ICD-10-CM

## 2019-10-17 DIAGNOSIS — R53.81 PHYSICAL DECONDITIONING: Primary | ICD-10-CM

## 2019-10-17 DIAGNOSIS — G62.9 NEUROPATHY: ICD-10-CM

## 2019-10-17 DIAGNOSIS — H02.105 ECTROPION OF LEFT LOWER EYELID, UNSPECIFIED ECTROPION TYPE: ICD-10-CM

## 2019-10-17 DIAGNOSIS — R26.2 AMBULATORY DYSFUNCTION: ICD-10-CM

## 2019-10-17 PROCEDURE — 99309 SBSQ NF CARE MODERATE MDM 30: CPT | Performed by: FAMILY MEDICINE

## 2019-10-17 NOTE — ASSESSMENT & PLAN NOTE
- Most recent TSH from August 2019 was 3 04  - Continue current dose of levothyroxine   - Monitor TSH yearly

## 2019-10-17 NOTE — PROGRESS NOTES
[address]  [address] [facility]  [address] [facility]  [phone]    [facility]   PROGRESS NOTE        NAME: Lv Booker  AGE: 80 y o  SEX: female  :  1917  DATE OF ENCOUNTER: 10/17/19  POS: 28 (LT)    Assessment and Plan     Hypothyroid  - Most recent TSH from 2019 was 3 04  - Continue current dose of levothyroxine   - Monitor TSH yearly     Chronic combined systolic and diastolic heart failure (HCC)  Wt Readings from Last 3 Encounters:   10/17/19 86 4 kg (190 lb 8 oz)   10/26/18 90 5 kg (199 lb 8 3 oz)   10/25/18 88 6 kg (195 lb 6 4 oz)     - Stable; not currently in acute exacerbation   - Continue to monitor fluids and weights  - Continue current dose of torsemide   - Continue to monitor renal function and electrolytes with serial BMPs        Neuropathy  - Stable   - Continue gabapentin 100mg BID     Stage 3 chronic kidney disease (HCC)  - Stable; creatinine at baseline   - Continue to monitor renal function with BMPs  - Avoid nephrotoxic medications     Ambulatory dysfunction  - Non ambulatory at baseline   - Continue supportive care   - Continue to maintain fall precautions     Physical deconditioning  - Multifactorial secondary to chronic medical conditions and increasing age   - Continue supportive care at [facility]    Rash  - Patient complaining of pruritic rash on the anterior neck   - Patient was started on steroid cream when seen in August however unclear if patient is still beng given the cream   - Resume hydrocortisone cream and will continue to monitor     Ectropion of left lower eyelid  - Secondary to recent stye; no vision changes or pain reported  - Will continue to monitor       Mario Ventura MD  [facility] Medicine      Chief Complaint   Patient seen and examined for follow up on chronic conditions       History of Present Illness     HPI     Lv Booker is a 8 year old female with a past medical history of CHF, CKD stage 3, hypothyroidism and neuropathy who is being seen today for a follow up of her chronic conditions at [facility]  Today patient was seen in her room sitting comfortably in her armchair with her niece at bedside  Patient is complaining of a pruritic rash on the anterior neck and chest which becomes painful with persistent scratching as well as intermittent swelling of her legs  Patient also states that she had a stye of the left eye however denies any vision changes or pain in the left eye  She denies any other complaints at this time such as chest pain, shortness of breath, palpitations, abdominal pain, headaches, vision changes, lightheadedness, dizziness, diarrhea, constipation, nausea, vomiting or urinary symptoms at this time  The following portions of the patient's history were reviewed and updated as appropriate: allergies, current medications, past family history, past medical history, past social history, past surgical history and problem list     Review of Systems    A complete review of systems was performed  All negative, except as per HPI  History     Past Medical History:   Diagnosis Date    Cancer [facility])     "some kind of cancer when I was 80"    [facility]     Costochondritis     Last Assessed:3/18/2013    Iron deficiency 10/31/2017    Stage 2 chronic kidney disease 10/31/2017    Syncope     Vitamin B12 deficiency 10/31/2017     No Known Allergies    Objective     Vital Signs    Vitals:    10/17/19 1206   BP: 137/61   Pulse: 84   Resp: 18   Temp: (!) 96 9 °F (36 1 °C)   SpO2: 96%         Physical Exam   Constitutional: She is oriented to person, place, and time  She appears well-developed and well-nourished  HENT:   Head: Normocephalic and atraumatic  Eyes: Pupils are equal, round, and reactive to light  EOM are normal    Ectropion of the left eye   Neck: Normal range of motion  Neck supple  Cardiovascular: Normal rate, normal heart sounds and intact distal pulses     No murmur heard   Pulmonary/Chest: Effort normal and breath sounds normal  No respiratory distress  She has no wheezes  Abdominal: Soft  Bowel sounds are normal  There is no tenderness  Genitourinary:   Genitourinary Comments: Deferred   Musculoskeletal: Normal range of motion  She exhibits no edema or tenderness  Neurological: She is alert and oriented to person, place, and time  Skin: Rash noted  Erythematous maculopapular rash on the anterior neck with surrounding dry flaky skin    Psychiatric: She has a normal mood and affect  Pertinent Laboratory/Diagnostic Studies     - TSH (8/21/19)  - BMP (7/5/19)  - CBC (7/5/19)    Current Medications     Current Medications Reviewed and updated in EMR  Monthly orders reviewed and signed in chart  Please note:  Voice-recognition software may have been used in the preparation of this document  Occasional wrong word or "sound-alike" substitutions may have occurred due to the inherent limitations of voice recognition software  Interpretation should be guided by context

## 2019-10-17 NOTE — ASSESSMENT & PLAN NOTE
Wt Readings from Last 3 Encounters:   10/17/19 86 4 kg (190 lb 8 oz)   10/26/18 90 5 kg (199 lb 8 3 oz)   10/25/18 88 6 kg (195 lb 6 4 oz)     - Stable; not currently in acute exacerbation   - Continue to monitor fluids and weights  - Continue current dose of torsemide   - Continue to monitor renal function and electrolytes with serial BMPs

## 2019-10-17 NOTE — ASSESSMENT & PLAN NOTE
- Multifactorial secondary to chronic medical conditions and increasing age   - Continue supportive care at [facility]

## 2019-10-17 NOTE — ASSESSMENT & PLAN NOTE
- Patient complaining of pruritic rash on the anterior neck   - Patient was started on steroid cream when seen in August however unclear if patient is still beng given the cream   - Resume hydrocortisone cream and will continue to monitor

## 2019-10-17 NOTE — ASSESSMENT & PLAN NOTE
- Stable; creatinine at baseline   - Continue to monitor renal function with BMPs  - Avoid nephrotoxic medications

## 2019-10-18 ENCOUNTER — NURSING HOME VISIT (OUTPATIENT)
Dept: GERIATRICS | Facility: OTHER | Age: 84
End: 2019-10-18
Payer: MEDICARE

## 2019-10-18 DIAGNOSIS — K80.20 CALCULUS OF GALLBLADDER WITHOUT CHOLECYSTITIS WITHOUT OBSTRUCTION: ICD-10-CM

## 2019-10-18 DIAGNOSIS — R53.81 PHYSICAL DECONDITIONING: ICD-10-CM

## 2019-10-18 DIAGNOSIS — K52.9 GASTROENTERITIS: Primary | ICD-10-CM

## 2019-10-18 PROCEDURE — 99309 SBSQ NF CARE MODERATE MDM 30: CPT | Performed by: NURSE PRACTITIONER

## 2019-10-19 ENCOUNTER — TELEPHONE (OUTPATIENT)
Dept: OTHER | Facility: OTHER | Age: 84
End: 2019-10-19

## 2019-10-20 PROBLEM — K52.9 GASTROENTERITIS: Status: ACTIVE | Noted: 2019-10-18

## 2019-10-20 NOTE — TELEPHONE ENCOUNTER
Elvira Cohen from [facility] called to speak with the on call physician  This patient has a change in VS and Respiratory difficulty  A TT page was sent to Dr Balwinder Garcia

## 2019-10-20 NOTE — ASSESSMENT & PLAN NOTE
- Will order bland diet for 24 hours or until symptoms resolve  - Continue Zofran prn for nausea  - Banatrol ordered tid with meals for diarrhea  - Encourage PO fluid intake, water, gatorade or ginger ale  - Monitor vital signs every shift  - Notify for worsening signs and symptoms

## 2019-10-20 NOTE — TELEPHONE ENCOUNTER
Concerns were addressed on 10/19/19 at approximately 22:35, nursing has spoken with family who report they do not wish for invasive testing and patient had previously made arrangements with primary provider that she would not be sent to hospital and would not have invasive treatment including intubation  She prefers comfort, Roxinol 0 25ml Q4H PRN ordered for pain/respiratory distress/anxiety  Consider hospice/palliative care consultation for symptom control and ensure patient comfort  Will inform primary team during sign out

## 2019-10-20 NOTE — ASSESSMENT & PLAN NOTE
- Multifactorial related to increased age and multiple comorbidities   - Patient is a do not hospitalize, will treat acute and chronic medical conditions in the facility with comfort being the goal of care  - Monitor skin integrity  - Encourage PO intake as tolerated

## 2019-10-20 NOTE — PROGRESS NOTES
[address]  [facility]  [phone]  [address]   Acute Visit Note  POS 32      NAME: Kannan Richardson  AGE: [age] y o  SEX: female  :  1917  DATE OF ENCOUNTER: 10/18/2019    Chief Complaint   Patient seen and examined for nausea, vomiting, diarrhea, fever    History of Present Illness     Dewey Ny is a 8 year old female resident of [address] [place] [address] seen and examined today per nursing request for nausea/vomiting, fever and diarrhea that started this morning  She is lying in bed, awake, alert and in no acute distress  She states that she does not feel well today  She complains of a headache, abdominal pain, nausea, vomiting and diarrhea  The abdominal pain is a generalized mild pain throughout her abdomen, worse to touch  She vomited one time this morning, yellow in color  She took zofran at that time, which she states made her feel better  She denies chest pain, sob  The following portions of the patient's history were reviewed and updated as appropriate: allergies, current medications, past family history, past medical history, past social history, past surgical history and problem list     Review of Systems     A review of systems was performed  All negative, except as per HPI      History     Past Medical History:   Diagnosis Date    Cancer [facility])     "some kind of cancer when I was 80"    [facility]     Costochondritis     Last Assessed:3/18/2013    Iron deficiency 10/31/2017    Stage 2 chronic kidney disease 10/31/2017    Syncope     Vitamin B12 deficiency 10/31/2017     Past Surgical History:   Procedure Laterality Date    BACK SURGERY      CATARACT EXTRACTION      COLON SURGERY      ERCP W/ SPHICTEROTOMY N/A 2017    Procedure: ENDOSCOPIC RETROGRADE CHOLANGIOPANCREATOGRAPHY (ERCP) W/ SPHINCTEROTOMY;  Surgeon: Jaren Powell MD;  [facility];  Service: Gastroenterology    HIP FRACTURE SURGERY      WRIST SURGERY       Family History   Problem Relation Age of Onset    Heart attack Mother     Heart attack Father     Cancer Sister     Heart attack Brother     Coronary artery disease Brother     Heart disease Brother     Colon cancer Family      Social History     Socioeconomic History    Marital status:       Spouse name: Not on file    Number of children: Not on file    Years of education: Not on file    Highest education level: Not on file   Occupational History    Occupation: retired   Social Needs    Financial resource strain: Not on file    Food insecurity:     Worry: Not on file     Inability: Not on file   [employer] needs:     Medical: Not on file     Non-medical: Not on file   Tobacco Use    Smoking status: Never Smoker    Smokeless tobacco: Never Used   Substance and Sexual Activity    Alcohol use: No    Drug use: No    Sexual activity: Not on file   Lifestyle    Physical activity:     Days per week: Not on file     Minutes per session: Not on file    Stress: Not on file   Relationships    Social connections:     Talks on phone: Not on file     Gets together: Not on file     Attends Rastafari service: Not on file     Active member of club or organization: Not on file     Attends meetings of clubs or organizations: Not on file     Relationship status: Not on file    Intimate partner violence:     Fear of current or ex partner: Not on file     Emotionally abused: Not on file     Physically abused: Not on file     Forced sexual activity: Not on file   Other Topics Concern    Not on file   Social History Narrative    Caffeine use     No Known Allergies    Objective     Vital Signs  BP: 139/69   HR :96 T: 99 7    RR: 20 O2Sat: 98% RA W: 190 5 lbs  General: NAD, well nourished, well developed  Oral: Oropharynx  clear  Neck: Supple, +ROM  CV: S1, S2, no murmurs  Pulmonary: Lung sounds clear to air, no wheezing, rhonchi, rales  Abdominal: Bowel sounds hyperactive  x4 in all quadrants, soft, no mass, tenderness upon palpation in all four quadrants  Extremities: No edema, +ROM, +Strength  Neurological: CN 2-12 intact, TITA  Psych: Alert and oriented times 3, no mood, no affect    Pertinent Laboratory/Diagnostic Studies:  Reviewed in nursing home medical chart  Current Medications     Current Medications Reviewed and updated in Nursing Home EMR      Assessment and Plan     Gastroenteritis  - Will order bland diet for 24 hours or until symptoms resolve  - Continue Zofran prn for nausea  - Banatrol ordered tid with meals for diarrhea  - Encourage PO fluid intake, water, gatorade or ginger ale  - Monitor vital signs every shift  - Notify for worsening signs and symptoms    Calculus of gallbladder without cholecystitis without obstruction  - Chronic, she had choledocholithiasis two years ago  - Family does not wish for aggressive measures due to increased age and multiple co-morbidities  - Will continue to monitor, if pain increases, will increase pain medication to keep comfortable    Physical deconditioning  - Multifactorial related to increased age and multiple comorbidities   - Patient is a do not hospitalize, will treat acute and chronic medical conditions in the facility with comfort being the goal of care  - Monitor skin integrity  - Encourage PO intake as tolerated        [facility]  10/18/2019

## 2019-10-20 NOTE — ASSESSMENT & PLAN NOTE
- Chronic, she had choledocholithiasis two years ago  - Family does not wish for aggressive measures due to increased age and multiple co-morbidities  - Will continue to monitor, if pain increases, will increase pain medication to keep comfortable

## 2019-11-20 ENCOUNTER — NURSING HOME VISIT (OUTPATIENT)
Dept: GERIATRICS | Facility: OTHER | Age: 84
End: 2019-11-20
Payer: MEDICARE

## 2019-11-20 DIAGNOSIS — R26.2 AMBULATORY DYSFUNCTION: ICD-10-CM

## 2019-11-20 DIAGNOSIS — N18.30 STAGE 3 CHRONIC KIDNEY DISEASE (HCC): ICD-10-CM

## 2019-11-20 DIAGNOSIS — E03.9 HYPOTHYROIDISM, UNSPECIFIED TYPE: ICD-10-CM

## 2019-11-20 DIAGNOSIS — K80.20 CALCULUS OF GALLBLADDER WITHOUT CHOLECYSTITIS WITHOUT OBSTRUCTION: ICD-10-CM

## 2019-11-20 DIAGNOSIS — I50.42 CHRONIC COMBINED SYSTOLIC AND DIASTOLIC HEART FAILURE (HCC): ICD-10-CM

## 2019-11-20 DIAGNOSIS — D64.9 ANEMIA, UNSPECIFIED TYPE: ICD-10-CM

## 2019-11-20 DIAGNOSIS — R21 RASH: Primary | ICD-10-CM

## 2019-11-20 PROCEDURE — 99309 SBSQ NF CARE MODERATE MDM 30: CPT | Performed by: NURSE PRACTITIONER

## 2019-11-21 NOTE — PROGRESS NOTES
[facility]  [address] [address]  [phone]  [address] [place] [address]   Progress Note  POS 32        NAME: Kishor Guerra  AGE: 80 y o  SEX: female  :  1917  DATE OF ENCOUNTER: 2019    Chief Complaint   Patient seen and examined for follow up on chronic conditions  History of Present Illness     Evan Pierre is a 8 year old female resident seen and examined today to follow-up on chronic medical conditions in long term care at [facility]  She is sitting in her recliner calm, cooperative and in no acute distress  She complains of a pruritic rash on her neck that she states she had for a "long time "  The itching she state is improving however now she has a rash on her face  She denies facial itching or pain  She denies using new hygiene products, however she states that it might be from the detergent used on her sheets  She denies chest pain, sob, abdominal pain, nausea, vomiting, diarrhea, headache, dizziness or blurred vision  She is non-ambulatory at baseline  She states that her left leg does not move very well related to previous left hip surgery complications  She has a good appetite, is sleeping well at hs and her mood is stable  No acute issues reported by nursing  The following portions of the patient's history were reviewed and updated as appropriate: allergies, current medications, past family history, past medical history, past social history, past surgical history and problem list     Review of Systems     A  review of systems was performed  All negative, except as per HPI      History     Past Medical History:   Diagnosis Date    Cancer [facility])     "some kind of cancer when I was 80"    [facility] ([facility] )     Costochondritis     Last Assessed:3/18/2013    Iron deficiency 10/31/2017    Stage 2 chronic kidney disease 10/31/2017    Syncope     Vitamin B12 deficiency 10/31/2017     Past Surgical History:   Procedure Laterality Date    BACK SURGERY      CATARACT EXTRACTION      COLON SURGERY      ERCP W/ SPHICTEROTOMY N/A 11/22/2017    Procedure: ENDOSCOPIC RETROGRADE CHOLANGIOPANCREATOGRAPHY (ERCP) W/ SPHINCTEROTOMY;  Surgeon: Corrinne Alderman, MD;  [facility];  Service: Gastroenterology    HIP FRACTURE SURGERY      WRIST SURGERY       Family History   Problem Relation Age of Onset    Heart attack Mother     Heart attack Father     Cancer Sister     Heart attack Brother     Coronary artery disease Brother     Heart disease Brother     Colon cancer Family      Social History     Socioeconomic History    Marital status:       Spouse name: Not on file    Number of children: Not on file    Years of education: Not on file    Highest education level: Not on file   Occupational History    Occupation: retired   Social Needs    Financial resource strain: Not on file    Food insecurity:     Worry: Not on file     Inability: Not on file   [employer] needs:     Medical: Not on file     Non-medical: Not on file   Tobacco Use    Smoking status: Never Smoker    Smokeless tobacco: Never Used   Substance and Sexual Activity    Alcohol use: No    Drug use: No    Sexual activity: Not on file   Lifestyle    Physical activity:     Days per week: Not on file     Minutes per session: Not on file    Stress: Not on file   Relationships    Social connections:     Talks on phone: Not on file     Gets together: Not on file     Attends Moravian service: Not on file     Active member of club or organization: Not on file     Attends meetings of clubs or organizations: Not on file     Relationship status: Not on file    Intimate partner violence:     Fear of current or ex partner: Not on file     Emotionally abused: Not on file     Physically abused: Not on file     Forced sexual activity: Not on file   Other Topics Concern    Not on file   Social History Narrative    Caffeine use     No Known Allergies    Objective Vital Signs  BP: 132/60   HR:80 T:97 4    RR: 17 O2Sat: 97% RA W: 188 5 lbs  General: NAD, well nourished, well developed  Oral: Oropharynx  clear  Neck: Supple, +ROM  Ears: [place]  Eyes: Pupils are equal and reactive to light  Conjunctivae and EOM are normal b/l eyes  + chronic erythema bilateral lower eyelids  Left > Right  CV: S1, S2, no murmurs  Pulmonary: Lung sounds clear to air, no wheezing, rhonchi, rales  Abdominal:BS + x4 in all quadrants, soft, no mass, no tenderness  Extremities: No edema, +ROM, +Strength  Skin: Warm, dry, scattered facial macular red rash, Left side greater than right side, mild erythema mid clavicular area  Neurological: CN 2-12 intact, TITA  Psych: Alert and oriented times , no mood, no affect, good judgement      Pertinent Laboratory/Diagnostic Studies:  Reviewed in nursing home medical chart      Current Medications     Current Medications Reviewed and updated in Nursing Home EMR      Assessment and Plan     Rash  - Patient now has a facial rash, left side greater than right side, with no pruritis or pain present   - Will continue to monitor for now and consider a steroid cream if it does not resolve   - Cleanse face with just water, do not use soap due to risk of irritation    Chronic combined systolic and diastolic heart failure (HCC)  Wt Readings from Last 3 Encounters:   10/17/19 86 4 kg (190 lb 8 oz)   10/26/18 90 5 kg (199 lb 8 3 oz)   10/25/18 88 6 kg (195 lb 6 4 oz)     - Stable, patient euvolemic on exam  - Continue to monitor fluid intake and weights  - Continue torsemide at current dose  - Will monitor renal function and electrolytes        Calculus of gallbladder without cholecystitis without obstruction  - Chronic, she had choledocholithiasis two years ago, no complaints of pain, no nausea, no vomiting  - Family does not wish for aggressive measures due to increased age and multiple co-morbidities  - Will continue to monitor, if pain increases, will increase pain medication to keep comfortable    Hypothyroid  - Most recent TSH 3 04 in August 2019  - Continue current dose of levothyroxine  - Monitor TSH yearly    Stage 3 chronic kidney disease ([facility] )  - Creatinine at baseline 1 80 on 07/05/2019  - Continue to avoid nephrotoxic medications and hypotension  - Will monitor BMP    Ambulatory dysfunction  - Non ambulatory at baseline  - Continue daily exercises and assistance OOB  - Fall precautions are advised    Anemia  - Hemoglobin 10 0 on 07/05/2019, with no signs or symptoms present  - Continue ferrous sulfage  - Will monitor CBC      [facility]  11/20/2019

## 2019-11-21 NOTE — ASSESSMENT & PLAN NOTE
Wt Readings from Last 3 Encounters:   10/17/19 86 4 kg (190 lb 8 oz)   10/26/18 90 5 kg (199 lb 8 3 oz)   10/25/18 88 6 kg (195 lb 6 4 oz)     - Stable, patient euvolemic on exam  - Continue to monitor fluid intake and weights  - Continue torsemide at current dose  - Will monitor renal function and electrolytes

## 2019-11-21 NOTE — ASSESSMENT & PLAN NOTE
- Creatinine at baseline 1 80 on 07/05/2019  - Continue to avoid nephrotoxic medications and hypotension  - Will monitor BMP

## 2019-11-21 NOTE — ASSESSMENT & PLAN NOTE
- Chronic, she had choledocholithiasis two years ago, no complaints of pain, no nausea, no vomiting  - Family does not wish for aggressive measures due to increased age and multiple co-morbidities  - Will continue to monitor, if pain increases, will increase pain medication to keep comfortable

## 2019-11-21 NOTE — ASSESSMENT & PLAN NOTE
- Hemoglobin 10 0 on 07/05/2019, with no signs or symptoms present  - Continue ferrous sulfage  - Will monitor CBC

## 2019-11-21 NOTE — ASSESSMENT & PLAN NOTE
- Patient now has a facial rash, left side greater than right side, with no pruritis or pain present   - Will continue to monitor for now and consider a steroid cream if it does not resolve   - Cleanse face with just water, do not use soap due to risk of irritation

## 2019-11-21 NOTE — ASSESSMENT & PLAN NOTE
- Non ambulatory at baseline  - Continue daily exercises and assistance OOB  - Fall precautions are advised

## 2019-11-21 NOTE — ASSESSMENT & PLAN NOTE
- Most recent TSH 3 04 in August 2019  - Continue current dose of levothyroxine  - Monitor TSH yearly

## 2019-12-19 ENCOUNTER — NURSING HOME VISIT (OUTPATIENT)
Dept: GERIATRICS | Facility: OTHER | Age: 84
End: 2019-12-19
Payer: MEDICARE

## 2019-12-19 DIAGNOSIS — R10.11 RUQ PAIN: Primary | ICD-10-CM

## 2019-12-19 DIAGNOSIS — D64.9 ANEMIA, UNSPECIFIED TYPE: ICD-10-CM

## 2019-12-19 DIAGNOSIS — K59.01 SLOW TRANSIT CONSTIPATION: ICD-10-CM

## 2019-12-19 DIAGNOSIS — K80.20 CALCULUS OF GALLBLADDER WITHOUT CHOLECYSTITIS WITHOUT OBSTRUCTION: ICD-10-CM

## 2019-12-19 PROCEDURE — 99309 SBSQ NF CARE MODERATE MDM 30: CPT | Performed by: NURSE PRACTITIONER

## 2019-12-19 NOTE — ASSESSMENT & PLAN NOTE
- Acute on chronic right upper quadrant pain reported  - RUQ ultrasound from 10/2018 showed cholelithiasis without acute cholecystitis     - LFT's reviewed from 02/2019 and are unremarkable  - Continue tylenol prn for pain  - Will order CMP and CBC with diff in the AM

## 2019-12-19 NOTE — ASSESSMENT & PLAN NOTE
- Worsening constipation reported  - Will increase Senna to 2 tablets daily  - Ferrous Sulfate to be decreased to one tablet daily

## 2019-12-19 NOTE — PROGRESS NOTES
[address] [address]  [phone]  [address] [place] [address]   Progress Note  POS 32         NAME: Yovana Teixeira  AGE: 80 y o  SEX: female  :  1917  DATE OF ENCOUNTER: 2019    Chief Complaint   Patient seen and examined for RUQ abdominal pain and constipation    History of Present Illness     Ca Villalobos is a 8 year old female long term care resident of [address] [place] [address] seen and examined per nursing request today for right upper quadrant abdominal pain  She has a history of calculus of her gallbladder without cholecystitis or obstruction  She states that she had a stone removed from her gallbladder years ago  This pain started today, she states that it is a sharp pain that radiates to her back  Nursing reports that she has had problems with constipation, she has not had a bowel movement in 4 days  She denies chest pain, sob, nausea, vomiting, diarrhea, headache, dizziness or blurred vision  The following portions of the patient's history were reviewed and updated as appropriate: allergies, current medications, past family history, past medical history, past social history, past surgical history and problem list     Review of Systems     A  review of systems was performed  All negative, except as per HPI      History     Past Medical History:   Diagnosis Date    Cancer [facility])     "some kind of cancer when I was 80"    Colon cancer ([facility] [address] )     Costochondritis     Last Assessed:3/18/2013    Iron deficiency 10/31/2017    Stage 2 chronic kidney disease 10/31/2017    Syncope     Vitamin B12 deficiency 10/31/2017     Past Surgical History:   Procedure Laterality Date    BACK SURGERY      CATARACT EXTRACTION      COLON SURGERY      ERCP W/ SPHICTEROTOMY N/A 2017    Procedure: ENDOSCOPIC RETROGRADE CHOLANGIOPANCREATOGRAPHY (ERCP) W/ SPHINCTEROTOMY;  Surgeon: Karla Lopez MD;  [facility];  Service: Gastroenterology    HIP FRACTURE SURGERY      WRIST SURGERY       Family History   Problem Relation Age of Onset    Heart attack Mother     Heart attack Father     Cancer Sister     Heart attack Brother     Coronary artery disease Brother     Heart disease Brother     Colon cancer Family      Social History     Socioeconomic History    Marital status:       Spouse name: Not on file    Number of children: Not on file    Years of education: Not on file    Highest education level: Not on file   Occupational History    Occupation: retired   Social Needs    Financial resource strain: Not on file    Food insecurity:     Worry: Not on file     Inability: Not on file   [employer] needs:     Medical: Not on file     Non-medical: Not on file   Tobacco Use    Smoking status: Never Smoker    Smokeless tobacco: Never Used   Substance and Sexual Activity    Alcohol use: No    Drug use: No    Sexual activity: Not on file   Lifestyle    Physical activity:     Days per week: Not on file     Minutes per session: Not on file    Stress: Not on file   Relationships    Social connections:     Talks on phone: Not on file     Gets together: Not on file     Attends Cheondoism service: Not on file     Active member of club or organization: Not on file     Attends meetings of clubs or organizations: Not on file     Relationship status: Not on file    Intimate partner violence:     Fear of current or ex partner: Not on file     Emotionally abused: Not on file     Physically abused: Not on file     Forced sexual activity: Not on file   Other Topics Concern    Not on file   Social History Narrative    Caffeine use     No Known Allergies    Objective     Vital Signs  BP: 130/62       HR: 86 T: 97 1    RR: 16 O2Sat: 97% RA W: 185 2 lbs  General: NAD, well nourished, well developed  Neck: Supple, +ROM  CV: S1, S2, no murmurs  Pulmonary: Lung sounds clear to air, no wheezing, rhonchi, rales  Abdominal:BS + x4 in all quadrants, soft, no mass, + RUQ  Tenderness upon palpation  Psych: Awake and alert, no mood, no affect    Pertinent Laboratory/Diagnostic Studies:  Reviewed in nursing home medical chart      Current Medications     Current Medications Reviewed and updated in Nursing Home EMR  Assessment and Plan     RUQ pain  - Acute on chronic right upper quadrant pain reported  - RUQ ultrasound from 10/2018 showed cholelithiasis without acute cholecystitis     - LFT's reviewed from 02/2019 and are unremarkable  - Continue tylenol prn for pain  - Will order CMP and CBC with diff in the AM     Slow transit constipation  - Worsening constipation reported  - Will increase Senna to 2 tablets daily  - Ferrous Sulfate to be decreased to one tablet daily     Anemia  - Hemoglobin 10 0 on 07/05/2019 with no signs or symptoms present  - Will decrease ferrous sulfate from twice daily to one time daily related to complaint of abdominal pain and constipation  - CBC with diff ordered for the am    Calculus of gallbladder without cholecystitis without obstruction  - Chronic, she had choledocholithiasis two years ago, no complaints of pain, no nausea, no vomiting  - Family does not wish for aggressive measures due to increased age and multiple co-morbidities  - Will continue to monitor, if pain increases, will increase pain medication to keep comfortable  - Continue tylenol prn      [address] [facility]  12/19/2019

## 2019-12-19 NOTE — ASSESSMENT & PLAN NOTE
- Hemoglobin 10 0 on 07/05/2019 with no signs or symptoms present  - Will decrease ferrous sulfate from twice daily to one time daily related to complaint of abdominal pain and constipation  - CBC with diff ordered for the am

## 2019-12-19 NOTE — ASSESSMENT & PLAN NOTE
- Chronic, she had choledocholithiasis two years ago, no complaints of pain, no nausea, no vomiting  - Family does not wish for aggressive measures due to increased age and multiple co-morbidities  - Will continue to monitor, if pain increases, will increase pain medication to keep comfortable  - Continue tylenol prn

## 2019-12-30 ENCOUNTER — NURSING HOME VISIT (OUTPATIENT)
Dept: GERIATRICS | Facility: OTHER | Age: 84
End: 2019-12-30
Payer: MEDICARE

## 2019-12-30 DIAGNOSIS — D64.9 ANEMIA, UNSPECIFIED TYPE: ICD-10-CM

## 2019-12-30 DIAGNOSIS — R10.11 RUQ PAIN: Primary | ICD-10-CM

## 2019-12-30 DIAGNOSIS — K59.01 SLOW TRANSIT CONSTIPATION: ICD-10-CM

## 2019-12-30 DIAGNOSIS — N18.30 STAGE 3 CHRONIC KIDNEY DISEASE (HCC): ICD-10-CM

## 2019-12-30 DIAGNOSIS — R26.2 AMBULATORY DYSFUNCTION: ICD-10-CM

## 2019-12-30 DIAGNOSIS — E03.9 HYPOTHYROIDISM, UNSPECIFIED TYPE: ICD-10-CM

## 2019-12-30 DIAGNOSIS — K80.20 CALCULUS OF GALLBLADDER WITHOUT CHOLECYSTITIS WITHOUT OBSTRUCTION: ICD-10-CM

## 2019-12-30 DIAGNOSIS — I50.42 CHRONIC COMBINED SYSTOLIC AND DIASTOLIC HEART FAILURE (HCC): ICD-10-CM

## 2019-12-30 PROBLEM — E86.0 DEHYDRATION: Status: RESOLVED | Noted: 2017-11-15 | Resolved: 2019-12-30

## 2019-12-30 PROBLEM — K52.9 GASTROENTERITIS: Status: RESOLVED | Noted: 2019-10-18 | Resolved: 2019-12-30

## 2019-12-30 PROBLEM — R82.71 ASYMPTOMATIC BACTERIURIA: Status: RESOLVED | Noted: 2017-11-18 | Resolved: 2019-12-30

## 2019-12-30 PROBLEM — N17.9 ACUTE RENAL FAILURE SUPERIMPOSED ON STAGE 3 CHRONIC KIDNEY DISEASE (HCC): Status: RESOLVED | Noted: 2017-10-29 | Resolved: 2019-12-30

## 2019-12-30 PROCEDURE — 99309 SBSQ NF CARE MODERATE MDM 30: CPT | Performed by: FAMILY MEDICINE

## 2019-12-30 NOTE — ASSESSMENT & PLAN NOTE
· Patient with chronic RUQ pain  · No other symptoms associated  · Per family's wishes, no aggressive measures  · Recent LFTs wnl

## 2019-12-30 NOTE — PROGRESS NOTES
[facility]  [address] [facility]  [address] [facility]  [phone]    [facility]  PROGRESS NOTE        NAME: Latasha Trinh  AGE: 80 y o  SEX: female  :  1917  DATE OF ENCOUNTER: 2019  POS: 32 (LTC)    Assessment and Plan     Stage 3 chronic kidney disease (HCC)  · Baseline creatinine seems to be around 1 6-1 9   · Most recent Cr stable at 1 47  · Continue to monitor  Avoid nephrotoxins as possible  RUQ pain  · Chronic  · RUQ US back in Oct 2018 showed cholelithiasis without acute cholecystitis  · LFTs done 19 wnl  · Patient tolerating diet well  · There was tenderness to light palpation on exam  There may be a musculoskeletal component  · Will order aspercreme-lidocaine patch  Slow transit constipation  · Currently stable  · Continue senna BID  Calculus of gallbladder without cholecystitis without obstruction  · Patient with chronic RUQ pain  · No other symptoms associated  · Per family's wishes, no aggressive measures  · Recent LFTs wnl  Hypothyroid  · Last TSH stable at 3 04 done in 2019  · Continue current dose of levothyroxine  · Plan to check TSH yearly  Chronic combined systolic and diastolic heart failure (HCC)  · Euvolemic on exam    · Continue Torsemide at current dose  · Will monitor renal function and electrolyes  Anemia  · Stable hemoglobin  · Continue Ferrous sulfate once daily  Ambulatory dysfunction  · Patient is non ambulatory at baseline  · Continue restorative therapy  Bret Ray MD  Geriatric Medicine  2019       Chief Complaint   Patient seen and examined for follow up on chronic conditions  History of Present Illness     8-year-old female with underlying history of ambulatory dysfunction, deconditioning, chronic combined CHF, hypothyroidism, neuropathy, bilateral hearing loss, stage III CKD, chronic right upper quadrant pain    She is a long term care resident at [address] [place] [address] and today, she was seen and examined for follow up on acute and chronic conditions  Patient reports feeling "lousy" states she is not able to walk, but she has not been able to ambulate for several years now  She is also complaining of RUQ/R subcostal pain that is worse with movement  non radiating  not taking anything for pain  she reports good appetite  Offers no other complaints  No nausea/vomiting/constipation at this time  No dysuria  No chest pain/shortness of breath  The following portions of the patient's history were reviewed and updated as appropriate: allergies, current medications, past family history, past medical history, past social history, past surgical history and problem list     Review of Systems     A complete review of systems was performed  All negative, except as per HPI  History     Past Medical History:   Diagnosis Date    Cancer [facility])     "some kind of cancer when I was 80"    Colon cancer ([facility] [address] )     Costochondritis     Last Assessed:3/18/2013    Iron deficiency 10/31/2017    Stage 2 chronic kidney disease 10/31/2017    Syncope     Vitamin B12 deficiency 10/31/2017     No Known Allergies    Objective     Vital Signs  BP:   145/63       HR:  95 T:  97 6°    RR:  20 O2Sat:  97% on RA W:  182 2 lb    Physical Exam  GEN: NAD  Non-toxic appearing  Looks frail and deconditioned  HEENT: NC/AT  TITA  EIOMI + L ectropion  Oropharynx moist and clear  No oral lesions  Very [place]  CV: S1, S2   RRR, regular rate  No murmur appreciated  PULM: Non-labored respirations  CTA bilaterally  ABD: Soft, NT/ND  BSx4  EXT: No peripheral edema  NEURO:  Awake, alert and oriented to self and place  She is pleasant and cooperative  Pertinent Laboratory/Diagnostic Studies     12/20/2019:  , K 4 7, , CO2 29, BUN 32, CR 1 46, GLU 82, EGFR not calculated    12/20/2019:  Alk-phos 71, albumin 3 3, bilirubin total 0 2, total protein 7 2, AST 12, ALT 13  12/20/2019:  WBC 4 8, HB 10 5, HCT 30 8,   08/21/2019:  TSH 3 04    Current Medications     Current Medications Reviewed and updated in EMR  Monthly orders reviewed and signed in chart  Please note:  Voice-recognition software may have been used in the preparation of this document  Occasional wrong word or "sound-alike" substitutions may have occurred due to the inherent limitations of voice recognition software  Interpretation should be guided by context

## 2019-12-30 NOTE — ASSESSMENT & PLAN NOTE
· Baseline creatinine seems to be around 1 6-1 9   · Most recent Cr stable at 1 47  · Continue to monitor  Avoid nephrotoxins as possible

## 2019-12-30 NOTE — ASSESSMENT & PLAN NOTE
· Last TSH stable at 3 04 done in August 2019  · Continue current dose of levothyroxine  · Plan to check TSH yearly

## 2019-12-30 NOTE — ASSESSMENT & PLAN NOTE
· Chronic  · RUQ US back in Oct 2018 showed cholelithiasis without acute cholecystitis  · LFTs done 12/20/19 wnl  · Patient tolerating diet well  · There was tenderness to light palpation on exam  There may be a musculoskeletal component  · Will order aspercreme-lidocaine patch

## 2019-12-31 NOTE — ASSESSMENT & PLAN NOTE
· Euvolemic on exam    · Continue Torsemide at current dose  · Will monitor renal function and electrolyes

## 2020-01-20 ENCOUNTER — NURSING HOME VISIT (OUTPATIENT)
Dept: GERIATRICS | Facility: OTHER | Age: 85
End: 2020-01-20
Payer: MEDICARE

## 2020-01-20 DIAGNOSIS — R53.1 WEAKNESS: ICD-10-CM

## 2020-01-20 DIAGNOSIS — R09.81 NASAL CONGESTION: ICD-10-CM

## 2020-01-20 DIAGNOSIS — H90.3 SENSORINEURAL HEARING LOSS (SNHL) OF BOTH EARS: Primary | ICD-10-CM

## 2020-01-20 DIAGNOSIS — R44.0 AUDITORY HALLUCINATIONS: ICD-10-CM

## 2020-01-20 PROCEDURE — 99309 SBSQ NF CARE MODERATE MDM 30: CPT | Performed by: NURSE PRACTITIONER

## 2020-01-21 PROBLEM — R09.81 NASAL CONGESTION: Status: ACTIVE | Noted: 2020-01-20

## 2020-01-21 PROBLEM — H90.3 SENSORINEURAL HEARING LOSS (SNHL) OF BOTH EARS: Status: ACTIVE | Noted: 2020-01-21

## 2020-01-21 PROBLEM — H90.3 SENSORINEURAL HEARING LOSS (SNHL) OF BOTH EARS: Status: ACTIVE | Noted: 2020-01-20

## 2020-01-21 PROBLEM — R44.0 AUDITORY HALLUCINATIONS: Status: ACTIVE | Noted: 2020-01-20

## 2020-01-21 NOTE — ASSESSMENT & PLAN NOTE
- History of weakness, most likely multi-factorial given advanced age and multiple co-morbidities  - Will monitor closely and check for underlying causes  - Continue restorative care

## 2020-01-21 NOTE — ASSESSMENT & PLAN NOTE
- Increased, possibly related to sinus congestion, no cerumen impaction present on exam  - Will order Flonase nasal spray, 2 sprays daily for 10 days  - Continue supportive care

## 2020-01-21 NOTE — PROGRESS NOTES
[address] [address]  [phone]  [address] [place] [address]   Progress Note  POS 32      NAME: Paulo Gonzalez  AGE: 80 y o  SEX: female  :  1917  DATE OF ENCOUNTER: 2020    Chief Complaint   Patient seen and examined for hearing loss, auditory hallucinations, generalized weakness, nasal congestion    History of Present Illness     Remy Cruz is a 8 year old female long term care resident of [address] [place] [address] seen and examined today per nursing request for hearing loss  She has a history of hearing loss, however, it is reported to becoming worse over the past few days  Upon examination, the resident states that she feels weak and tired  She is hearing auditory hallucinations, she states that she hears a baritone voice singing in her ears  She denies ear pain or ear drainage  She also complains of nasal congestion, states that she is having a hard time breathing out of her nose  She denies chest pain, sob, abdominal pain, nausea, vomiting, diarrhea, headache, dizziness or blurred vision  The following portions of the patient's history were reviewed and updated as appropriate: allergies, current medications, past family history, past medical history, past social history, past surgical history and problem list     Review of Systems     A review of systems was performed  All negative, except as per HPI      History     Past Medical History:   Diagnosis Date    Cancer [facility])     "some kind of cancer when I was 80"    Colon cancer ([facility] [address] )     Costochondritis     Last Assessed:3/18/2013    Iron deficiency 10/31/2017    Stage 2 chronic kidney disease 10/31/2017    Syncope     Vitamin B12 deficiency 10/31/2017     Past Surgical History:   Procedure Laterality Date    BACK SURGERY      CATARACT EXTRACTION      COLON SURGERY      ERCP W/ SPHICTEROTOMY N/A 2017    Procedure: ENDOSCOPIC RETROGRADE CHOLANGIOPANCREATOGRAPHY (ERCP) W/ SPHINCTEROTOMY; Surgeon: Shanelle Griffiths MD;  [facility];  Service: Gastroenterology    HIP FRACTURE SURGERY      WRIST SURGERY       Family History   Problem Relation Age of Onset    Heart attack Mother     Heart attack Father     Cancer Sister     Heart attack Brother     Coronary artery disease Brother     Heart disease Brother     Colon cancer Family      Social History     Socioeconomic History    Marital status:       Spouse name: Not on file    Number of children: Not on file    Years of education: Not on file    Highest education level: Not on file   Occupational History    Occupation: retired   Social Needs    Financial resource strain: Not on file    Food insecurity:     Worry: Not on file     Inability: Not on file   [employer] needs:     Medical: Not on file     Non-medical: Not on file   Tobacco Use    Smoking status: Never Smoker    Smokeless tobacco: Never Used   Substance and Sexual Activity    Alcohol use: No    Drug use: No    Sexual activity: Not on file   Lifestyle    Physical activity:     Days per week: Not on file     Minutes per session: Not on file    Stress: Not on file   Relationships    Social connections:     Talks on phone: Not on file     Gets together: Not on file     Attends Muslim service: Not on file     Active member of club or organization: Not on file     Attends meetings of clubs or organizations: Not on file     Relationship status: Not on file    Intimate partner violence:     Fear of current or ex partner: Not on file     Emotionally abused: Not on file     Physically abused: Not on file     Forced sexual activity: Not on file   Other Topics Concern    Not on file   Social History Narrative    Caffeine use     No Known Allergies    Objective     Vital Signs  Reviewed in nursing home EMR  General: NAD, well nourished, well developed  Oral: Oropharynx  Most and clear  Nose: Dry nasal passageways,   Neck: Supple, +ROM  Ears: Bilateral tympanic membranes intact, no erythema present, Mild cerumen debris in bilateral EAC's, No erythema, drainage or impaction present  CV: S1, S2, normal rate, regular rhythm,  no murmurs  Pulmonary: Lung sounds clear to air, no wheezing, rhonchi, rales  Abdominal:BS + x4 in all quadrants, soft, no mass, no tenderness  Extremities: No edema, +ROM, +Strength  Neurological: CN 2-12 intact, PERRLA  Psych: Alert and oriented times person, place, disoriented to time, no mood, no affect    Pertinent Laboratory/Diagnostic Studies:  Reviewed in nursing home medical chart  Current Medications     Current Medications Reviewed and updated in Nursing Home EMR      Assessment and Plan     Sensorineural hearing loss (SNHL) of both ears  - Increased, possibly related to sinus congestion, no cerumen impaction present on exam  - Will order Flonase nasal spray, 2 sprays daily for 10 days  - Continue supportive care    Auditory hallucinations  - Most likely related to hearing loss  - Continue supportive care    Weakness  - History of weakness, most likely multi-factorial given advanced age and multiple co-morbidities  - Will monitor closely and check for underlying causes  - Continue restorative care    Nasal congestion  - Flonase nasal spray, 2 sprays each nostril daily for 10 days      [address] [facility]  01/20/2020

## 2020-01-29 ENCOUNTER — NURSING HOME VISIT (OUTPATIENT)
Dept: GERIATRICS | Facility: OTHER | Age: 85
End: 2020-01-29
Payer: MEDICARE

## 2020-01-29 DIAGNOSIS — L72.0 EPIDERMOID CYST OF NECK: Primary | ICD-10-CM

## 2020-01-29 DIAGNOSIS — R53.81 PHYSICAL DECONDITIONING: ICD-10-CM

## 2020-01-29 DIAGNOSIS — R21 RASH: ICD-10-CM

## 2020-01-29 DIAGNOSIS — R45.89 DEPRESSED MOOD: ICD-10-CM

## 2020-01-29 PROCEDURE — 99309 SBSQ NF CARE MODERATE MDM 30: CPT | Performed by: NURSE PRACTITIONER

## 2020-01-29 NOTE — ASSESSMENT & PLAN NOTE
- Cyst inflamed with warmth and increased pain to touch of the right neck, mandibular region  - Will order K-Flex 500 mg PO every 12 hours for 7 days  - Will consult dermatology for incision and drainage if no improvement after antibiotic treatment

## 2020-01-29 NOTE — PROGRESS NOTES
[address] [address]  [phone]  [address] [place] [address]   Progress Note  POS 32      NAME: Erika Carver  AGE: 80 y o  SEX: female  :  1917  DATE OF ENCOUNTER: 2020    Chief Complaint   Patient seen and examined for right neck skin  lesion, rash to anterior neck and chest area    History of Present Illness     Fina Jaramillo is a 8 year old female long term care resident seen and examined today per nursing request for complaint of a painful lesion on the right side of her neck and multiple small raised areas on her anterior neck and chest area that are itchy and painful to touch  She is lying in bed, and very tearful, she states that she does not know why she is still here  She states that she had the lump on the side of her neck for years but recently it has become painful especially when it is touched  She also states that she has multiple bumps on her neck and chest area that she has had for a long time  A moisturizing lotion is being applied daily with no relief  She denies chest pain, sob, abdominal pain, nausea, vomiting, diarrhea, headache, dizziness or visual disturbance  The following portions of the patient's history were reviewed and updated as appropriate: allergies, current medications, past family history, past medical history, past social history, past surgical history and problem list     Review of Systems     A  review of systems was performed  All negative, except as per HPI      History     Past Medical History:   Diagnosis Date    Cancer [facility])     "some kind of cancer when I was 80"    Colon cancer ([facility] [address] )     Costochondritis     Last Assessed:3/18/2013    Iron deficiency 10/31/2017    Stage 2 chronic kidney disease 10/31/2017    Syncope     Vitamin B12 deficiency 10/31/2017     Past Surgical History:   Procedure Laterality Date    BACK SURGERY      CATARACT EXTRACTION      COLON SURGERY      ERCP W/ SPHICTEROTOMY N/A 11/22/2017    Procedure: ENDOSCOPIC RETROGRADE CHOLANGIOPANCREATOGRAPHY (ERCP) W/ SPHINCTEROTOMY;  Surgeon: Ni Batista MD;  [facility];  Service: Gastroenterology    HIP FRACTURE SURGERY      WRIST SURGERY       Family History   Problem Relation Age of Onset    Heart attack Mother     Heart attack Father     Cancer Sister     Heart attack Brother     Coronary artery disease Brother     Heart disease Brother     Colon cancer Family      Social History     Socioeconomic History    Marital status:       Spouse name: Not on file    Number of children: Not on file    Years of education: Not on file    Highest education level: Not on file   Occupational History    Occupation: retired   Social Needs    Financial resource strain: Not on file    Food insecurity:     Worry: Not on file     Inability: Not on file   [employer] needs:     Medical: Not on file     Non-medical: Not on file   Tobacco Use    Smoking status: Never Smoker    Smokeless tobacco: Never Used   Substance and Sexual Activity    Alcohol use: No    Drug use: No    Sexual activity: Not on file   Lifestyle    Physical activity:     Days per week: Not on file     Minutes per session: Not on file    Stress: Not on file   Relationships    Social connections:     Talks on phone: Not on file     Gets together: Not on file     Attends Buddhism service: Not on file     Active member of club or organization: Not on file     Attends meetings of clubs or organizations: Not on file     Relationship status: Not on file    Intimate partner violence:     Fear of current or ex partner: Not on file     Emotionally abused: Not on file     Physically abused: Not on file     Forced sexual activity: Not on file   Other Topics Concern    Not on file   Social History Narrative    Caffeine use     No Known Allergies    Objective     Vital Signs  BP: 117/60     HR:57 T: 97 4    RR: 18 O2Sat: 97% RA W: 183 9 lbs  General: NAD, well nourished, well developed  Neck: Supple, +ROM  CV: S1, S2, normal rate, regular rhythm, no murmur appreciated  Pulmonary: Lung sounds clear to air, no wheezing, rhonchi, rales  Skin: Hard cystic lesion right side of neck, mandibular region with erythema, warmth and pain upon palpation  Scattered maculopapular areas on distal anterior neck and chest area that are painful upon palpation  Psych: Awake and alert, tearful mood, no affect    Pertinent Laboratory/Diagnostic Studies:  Reviewed in nursing home medical chart      Current Medications     Current Medications Reviewed and updated in Nursing Home EMR      Assessment and Plan     Epidermoid cyst of neck  - Cyst inflamed with warmth and increased pain to touch of the right neck, mandibular region  - Will order K-Flex 500 mg PO every 12 hours for 7 days  - Will consult dermatology for incision and drainage if no improvement after antibiotic treatment    Rash  - Scattered maculopapular erythematous areas to distal anterior neck and chest area  - Will order hydrocortisone cream bid for 10  Days  - Continue good skin hygiene, keep area clean and moisturized    Physical deconditioning  - Multifactorial related to increased age and multiple co-morbidities  - Continue to encourage PO food and fluid intake  - Monitor skin integrity  - Continue non aggressive management, family wishes patient to remain a do not hospitalize with comfort being the goal of care    Depressed mood  - Patient very tearful on exam today  - Will continue to monitor for worsening signs and symptoms of depression  - Continue supportive care  - Encourage socialization and participation in activities      [address] [facility]  1/29/2020

## 2020-01-29 NOTE — ASSESSMENT & PLAN NOTE
- Patient very tearful on exam today  - Will continue to monitor for worsening signs and symptoms of depression  - Continue supportive care  - Encourage socialization and participation in activities

## 2020-01-29 NOTE — ASSESSMENT & PLAN NOTE
- Multifactorial related to increased age and multiple co-morbidities  - Continue to encourage PO food and fluid intake  - Monitor skin integrity  - Continue non aggressive management, family wishes patient to remain a do not hospitalize with comfort being the goal of care

## 2020-01-29 NOTE — ASSESSMENT & PLAN NOTE
- Scattered maculopapular erythematous areas to distal anterior neck and chest area  - Will order hydrocortisone cream bid for 10  Days  - Continue good skin hygiene, keep area clean and moisturized

## 2020-01-31 ENCOUNTER — NURSING HOME VISIT (OUTPATIENT)
Dept: GERIATRICS | Facility: OTHER | Age: 85
End: 2020-01-31
Payer: MEDICARE

## 2020-01-31 DIAGNOSIS — N18.30 STAGE 3 CHRONIC KIDNEY DISEASE (HCC): ICD-10-CM

## 2020-01-31 DIAGNOSIS — I50.42 CHRONIC COMBINED SYSTOLIC AND DIASTOLIC HEART FAILURE (HCC): ICD-10-CM

## 2020-01-31 DIAGNOSIS — L72.0 EPIDERMOID CYST OF NECK: Primary | ICD-10-CM

## 2020-01-31 DIAGNOSIS — E03.9 HYPOTHYROIDISM, UNSPECIFIED TYPE: ICD-10-CM

## 2020-01-31 DIAGNOSIS — R45.89 DEPRESSED MOOD: ICD-10-CM

## 2020-01-31 DIAGNOSIS — R21 RASH: ICD-10-CM

## 2020-01-31 DIAGNOSIS — R26.2 AMBULATORY DYSFUNCTION: ICD-10-CM

## 2020-01-31 DIAGNOSIS — D64.9 ANEMIA, UNSPECIFIED TYPE: ICD-10-CM

## 2020-01-31 PROCEDURE — 99309 SBSQ NF CARE MODERATE MDM 30: CPT | Performed by: NURSE PRACTITIONER

## 2020-02-02 NOTE — ASSESSMENT & PLAN NOTE
- Anterior neck and chest rash improvement noted with hydrocortisone cream  - Continue to apply hydrocortisone for 10 days  - Keep area clean and dry while providing good skin hygiene

## 2020-02-02 NOTE — ASSESSMENT & PLAN NOTE
- Hemoglobin stable at 10 5, chronic disease,  patient asymptomatic  - Will decrease Ferrous sulfate to be given QOD to decrease pill burden and help relieve constipation  - CBC and iron panel recommended at next visit in one month

## 2020-02-02 NOTE — ASSESSMENT & PLAN NOTE
- Stable, patient euvolemic on exam  - Continue Torsemide at current dose  - Monitor weights and fluid intake   - JOSUE diet

## 2020-02-02 NOTE — PROGRESS NOTES
[address] [facility]  [address] [facility] [address]  [phone]  [address] [place] [address]   Progress Note  POS 32      NAME: Narda Ibrahim  AGE: 80 y o  SEX: female  :  1917  DATE OF ENCOUNTER: 2020    Chief Complaint   Patient seen and examined for follow up on chronic conditions  History of Present Illness     Sheridan Benjamin is a 8 year old female long term care resident of [address] [place] [address] seen and examined today to follow-up on acute and chronic medical conditions  She is on PO antibiotics for a infected lesion on the right side of her neck/mandible  She has had this lesion for years but a few days ago, it started to become red and tender to touch  She states that is only bothers her when she or someone touches it  She is in good spirits today, calm, cooperative and in no acute distress  She denies chest pain, sob, abdominal pain, nausea, vomiting, diarrhea, headache, dizziness or visual disturbance  The following portions of the patient's history were reviewed and updated as appropriate: allergies, current medications, past family history, past medical history, past social history, past surgical history and problem list     Review of Systems     A review of systems was performed  All negative, except as per HPI      History     Past Medical History:   Diagnosis Date    Cancer [facility])     "some kind of cancer when I was 80"    [facility] ([facility] )     Costochondritis     Last Assessed:3/18/2013    Iron deficiency 10/31/2017    Stage 2 chronic kidney disease 10/31/2017    Syncope     Vitamin B12 deficiency 10/31/2017     Past Surgical History:   Procedure Laterality Date    BACK SURGERY      CATARACT EXTRACTION      COLON SURGERY      ERCP W/ SPHICTEROTOMY N/A 2017    Procedure: ENDOSCOPIC RETROGRADE CHOLANGIOPANCREATOGRAPHY (ERCP) W/ SPHINCTEROTOMY;  Surgeon: Donovan Alberts MD;  [facility]: [facility];  Service: Gastroenterology    HIP FRACTURE SURGERY      WRIST SURGERY       Family History   Problem Relation Age of Onset    Heart attack Mother     Heart attack Father     Cancer Sister     Heart attack Brother     Coronary artery disease Brother     Heart disease Brother     Colon cancer Family      Social History     Socioeconomic History    Marital status:       Spouse name: Not on file    Number of children: Not on file    Years of education: Not on file    Highest education level: Not on file   Occupational History    Occupation: retired   Social Needs    Financial resource strain: Not on file    Food insecurity:     Worry: Not on file     Inability: Not on file   [employer] needs:     Medical: Not on file     Non-medical: Not on file   Tobacco Use    Smoking status: Never Smoker    Smokeless tobacco: Never Used   Substance and Sexual Activity    Alcohol use: No    Drug use: No    Sexual activity: Not on file   Lifestyle    Physical activity:     Days per week: Not on file     Minutes per session: Not on file    Stress: Not on file   Relationships    Social connections:     Talks on phone: Not on file     Gets together: Not on file     Attends Orthodoxy service: Not on file     Active member of club or organization: Not on file     Attends meetings of clubs or organizations: Not on file     Relationship status: Not on file    Intimate partner violence:     Fear of current or ex partner: Not on file     Emotionally abused: Not on file     Physically abused: Not on file     Forced sexual activity: Not on file   Other Topics Concern    Not on file   Social History Narrative    Caffeine use     No Known Allergies    Objective     Vital Signs  BP: 145/76     HR: 87 T: 98 2    RR: 20 O2Sat: 98% RA W: 183 9 lbs  General: NAD, well nourished, well developed, non-toxic appearing  Oral: Oropharynx moist and  clear  Neck: Supple, +ROM  CV: S1, S2, normal rate, regular rhythm, no murmur appreciated  Pulmonary: Lung sounds clear to air, no wheezing, rhonchi, rales  Abdominal:BS + x4 in all quadrants, soft, no mass, no tenderness  Skin: Warm, dry  Hard cystic liesion right side of neck, mandibular region with erythema and tenderness upon palpation  Extremities: Mild bilateral lower extremity nonpitting  edema, +ROM, +Strength  Neurological: CN 2-12 intact, TITA  Psych: Alert and oriented times person and place, disoriented to time, no mood, no affect    Pertinent Laboratory/Diagnostic Studies:  Glucose 82, BUN 32, Creatinine 1 46, Sodium 143, Potassium 4 7, Chloride 108, CO2 29, H/H 10 5/30 8, WBC 6 8, Platelet Count 273      Current Medications     Current Medications Reviewed and updated in Nursing Home EMR      Assessment and Plan     Epidermoid cyst of neck  - She is receiving PO K-Flex every 12 hours for 7 days with no adverse effects  - Continue full course of antibiotic  - If no improvement after antibiotic finished, will consult Dermatology for possible incision and drainage  - Will continue to monitor    Rash  - Anterior neck and chest rash improvement noted with hydrocortisone cream  - Continue to apply hydrocortisone for 10 days  - Keep area clean and dry while providing good skin hygiene    Stage 3 chronic kidney disease (HCC)  - Baseline creatinine 1 6-1 9  - Creatinine currently 1 46  - Continue to avoid nephrotoxic medications and hypotension  - Will monitor BMP    Ambulatory dysfunction  - Patient is non ambulatory at baseline  - Continue restorative therapy    Anemia  - Hemoglobin stable at 10 5, chronic disease,  patient asymptomatic  - Will decrease Ferrous sulfate to be given QOD to decrease pill burden and help relieve constipation  - CBC and iron panel recommended at next visit in one month    Depressed mood  - Mood improved today  - Continue supportive care, socialization and participation in activities    Chronic combined systolic and diastolic heart failure ([facility] )  - Stable, patient euvolemic on exam  - Continue Torsemide at current dose  - Monitor weights and fluid intake   - JOSUE diet    Hypothyroid  - No signs or symptoms reported  - TSH 3 04 in August 2019  - Continue current dose of levothyroxine  - TSH level to be monitored yearly      [facility] [facility]  01/31/2020

## 2020-02-02 NOTE — ASSESSMENT & PLAN NOTE
- She is receiving PO K-Flex every 12 hours for 7 days with no adverse effects  - Continue full course of antibiotic  - If no improvement after antibiotic finished, will consult Dermatology for possible incision and drainage  - Will continue to monitor

## 2020-02-02 NOTE — ASSESSMENT & PLAN NOTE
- No signs or symptoms reported  - TSH 3 04 in August 2019  - Continue current dose of levothyroxine  - TSH level to be monitored yearly

## 2020-02-02 NOTE — ASSESSMENT & PLAN NOTE
- Baseline creatinine 1 6-1 9  - Creatinine currently 1 46  - Continue to avoid nephrotoxic medications and hypotension  - Will monitor BMP

## 2020-02-05 ENCOUNTER — NURSING HOME VISIT (OUTPATIENT)
Dept: GERIATRICS | Facility: OTHER | Age: 85
End: 2020-02-05
Payer: MEDICARE

## 2020-02-05 DIAGNOSIS — L72.0 EPIDERMOID CYST OF NECK: Primary | ICD-10-CM

## 2020-02-05 PROCEDURE — 99308 SBSQ NF CARE LOW MDM 20: CPT | Performed by: NURSE PRACTITIONER

## 2020-02-06 NOTE — PROGRESS NOTES
[address] [address]  [phone]  [address] [place] [address]   Progress Note  POS 32      NAME: Boston Francisco  AGE: 80 y o  SEX: female  :  1917  DATE OF ENCOUNTER: 2020    Chief Complaint   Patient seen and examined for bleeding and drainage from right mandible/neck cyst     History of Present Illness     Trinidad Schwab is a 8 year old long term care resident of [address] [place] [address] seen and examined per nursing request for bleeding and drainage from a cyst on her right mandibular/neck area that started this morning  Upon examination, she is in no acute distress, she states that her mother use to get "cheese lumps" on her skin that she had to have removed occasionally  She states that the bleeding stopped and she only has mild tenderness to touch  She is receiving PO antibiotic, last dose to be given today to complete a 7 day course  She denies fever, chills, chest pain, sob  The following portions of the patient's history were reviewed and updated as appropriate: allergies, current medications, past family history, past medical history, past social history, past surgical history and problem list     Review of Systems     A review of systems was performed  All negative, except as per HPI      History     Past Medical History:   Diagnosis Date    Cancer [facility])     "some kind of cancer when I was 80"    [facility]     Costochondritis     Last Assessed:3/18/2013    Iron deficiency 10/31/2017    Stage 2 chronic kidney disease 10/31/2017    Syncope     Vitamin B12 deficiency 10/31/2017     Past Surgical History:   Procedure Laterality Date    BACK SURGERY      CATARACT EXTRACTION      COLON SURGERY      ERCP W/ SPHICTEROTOMY N/A 2017    Procedure: ENDOSCOPIC RETROGRADE CHOLANGIOPANCREATOGRAPHY (ERCP) W/ SPHINCTEROTOMY;  Surgeon: Morro Roche MD;  [facility];  Service: Gastroenterology    HIP FRACTURE SURGERY      WRIST SURGERY       Family History   Problem Relation Age of Onset    Heart attack Mother     Heart attack Father     Cancer Sister     Heart attack Brother     Coronary artery disease Brother     Heart disease Brother     Colon cancer Family      Social History     Socioeconomic History    Marital status:      Spouse name: Not on file    Number of children: Not on file    Years of education: Not on file    Highest education level: Not on file   Occupational History    Occupation: retired   Social Needs    Financial resource strain: Not on file    Food insecurity:     Worry: Not on file     Inability: Not on file   [employer] needs:     Medical: Not on file     Non-medical: Not on file   Tobacco Use    Smoking status: Never Smoker    Smokeless tobacco: Never Used   Substance and Sexual Activity    Alcohol use: No    Drug use: No    Sexual activity: Not on file   Lifestyle    Physical activity:     Days per week: Not on file     Minutes per session: Not on file    Stress: Not on file   Relationships    Social connections:     Talks on phone: Not on file     Gets together: Not on file     Attends Hoahaoism service: Not on file     Active member of club or organization: Not on file     Attends meetings of clubs or organizations: Not on file     Relationship status: Not on file    Intimate partner violence:     Fear of current or ex partner: Not on file     Emotionally abused: Not on file     Physically abused: Not on file     Forced sexual activity: Not on file   Other Topics Concern    Not on file   Social History Narrative    Caffeine use     No Known Allergies    Objective     Vital Signs  BP: 137/65       HR: 76 T: 96 7    RR: 20 O2Sat: 96% RA W: 181 2 lbs  General: NAD, well nourished, well developed  Oral: Oropharynx moist and  clear  Neck: Supple, +ROM  CV: S1, S2, no murmurs  Pulmonary: Lung sounds clear to air, no wheezing, rhonchi, rales  Skin: Warm, dry   Erythematous cyst to right neck/mandible area that  appears softer in texture from previous exam and improving surrounding erythema  Psych: Awake and alert, no mood, no affect    Pertinent Laboratory/Diagnostic Studies:  Reviewed in nursing home medical chart      Current Medications     Current Medications Reviewed and updated in Nursing Home EMR      Assessment and Plan     Epidermoid cyst of neck  - New onset drainage and bleeding from cyst  - Will continue K-Flex for 3 more days to complete a 10 day course  - Consult Dermatology for incision and drainage  - Continue local wound care      [address] [facility]  02/05/2020

## 2020-02-06 NOTE — ASSESSMENT & PLAN NOTE
- New onset drainage and bleeding from cyst  - Will continue K-Flex for 3 more days to complete a 10 day course  - Consult Dermatology for incision and drainage  - Continue local wound care

## 2020-02-28 ENCOUNTER — NURSING HOME VISIT (OUTPATIENT)
Dept: GERIATRICS | Facility: OTHER | Age: 85
End: 2020-02-28
Payer: MEDICARE

## 2020-02-28 DIAGNOSIS — L29.9 PRURITUS OF SKIN: Primary | ICD-10-CM

## 2020-02-28 PROCEDURE — 99308 SBSQ NF CARE LOW MDM 20: CPT | Performed by: NURSE PRACTITIONER

## 2020-02-29 PROBLEM — L29.9 PRURITUS OF SKIN: Status: ACTIVE | Noted: 2020-02-28

## 2020-02-29 PROBLEM — T14.8XXA: Status: ACTIVE | Noted: 2020-02-28

## 2020-02-29 PROBLEM — S51.019A SKIN TEAR OF ELBOW WITHOUT COMPLICATION, INITIAL ENCOUNTER: Status: ACTIVE | Noted: 2020-02-28

## 2020-02-29 PROBLEM — T14.8XXA: Status: RESOLVED | Noted: 2020-02-28 | Resolved: 2020-02-29

## 2020-02-29 NOTE — PROGRESS NOTES
[address] [address]  [phone]  [address] [place] [address]   Progress Note  POS 32      NAME: Chris San  AGE: 80 y o  SEX: female  :  1917  DATE OF ENCOUNTER: 2020    Chief Complaint   Patient seen and examined for pruritis, skin tear right anterior neck    History of Present Illness     Tiffany Bedolla is a 8 year old female long term care  resident of [address] [place] [address] seen and examined today per nursing request for increased pruritis to her right anterior neck area  Nursing states that she frequently feels like there are bugs under her skin and scratches her right neck area frequently  She has been using hydrocortisone ointment with mild relief  Upon examination, she states that she has had itching to her neck area for a long time  She denies pain or tenderness to her neck, headache, dizziness, visual disturbance, fever, chills, chest pain, sob  She denies using any new detergent or hygiene products recently  The following portions of the patient's history were reviewed and updated as appropriate: allergies, current medications, past family history, past medical history, past social history, past surgical history and problem list     Review of Systems     A  review of systems was performed  All negative, except as per HPI      History     Past Medical History:   Diagnosis Date    Cancer [facility])     "some kind of cancer when I was 80"    Colon cancer ([facility] )     Costochondritis     Last Assessed:3/18/2013    Iron deficiency 10/31/2017    Stage 2 chronic kidney disease 10/31/2017    Syncope     Vitamin B12 deficiency 10/31/2017     Past Surgical History:   Procedure Laterality Date    BACK SURGERY      CATARACT EXTRACTION      COLON SURGERY      ERCP W/ SPHICTEROTOMY N/A 2017    Procedure: ENDOSCOPIC RETROGRADE CHOLANGIOPANCREATOGRAPHY (ERCP) W/ SPHINCTEROTOMY;  Surgeon: Del Martel MD;  [facility];  Service: Gastroenterology    HIP FRACTURE SURGERY      WRIST SURGERY       Family History   Problem Relation Age of Onset    Heart attack Mother     Heart attack Father     Cancer Sister     Heart attack Brother     Coronary artery disease Brother     Heart disease Brother     Colon cancer Family      Social History     Socioeconomic History    Marital status:       Spouse name: Not on file    Number of children: Not on file    Years of education: Not on file    Highest education level: Not on file   Occupational History    Occupation: retired   Social Needs    Financial resource strain: Not on file    Food insecurity:     Worry: Not on file     Inability: Not on file   [employer] needs:     Medical: Not on file     Non-medical: Not on file   Tobacco Use    Smoking status: Never Smoker    Smokeless tobacco: Never Used   Substance and Sexual Activity    Alcohol use: No    Drug use: No    Sexual activity: Not on file   Lifestyle    Physical activity:     Days per week: Not on file     Minutes per session: Not on file    Stress: Not on file   Relationships    Social connections:     Talks on phone: Not on file     Gets together: Not on file     Attends Caodaism service: Not on file     Active member of club or organization: Not on file     Attends meetings of clubs or organizations: Not on file     Relationship status: Not on file    Intimate partner violence:     Fear of current or ex partner: Not on file     Emotionally abused: Not on file     Physically abused: Not on file     Forced sexual activity: Not on file   Other Topics Concern    Not on file   Social History Narrative    Caffeine use     No Known Allergies    Objective     Vital Signs  BP: 131/88      HR: 80  T: 97 5    RR: 18 O2Sat: 97% RA W: 179 2 lbs  General: NAD, well nourished, well developed  Oral: Oropharynx moist and  clear  Neck: Supple, +ROM  CV: S1, S2, normal rate, regular rhythm, no murmurs  Pulmonary: Lung sounds clear to air, no wheezing, rhonchi, rales  Skin: Warm and dry, + ecchymosis right anterior cervical area, skin tear draining a scant amount of clear drainage with steri-strips intact  Psych: Awake and alert, no mood, no affect    Pertinent Laboratory/Diagnostic Studies:  Reviewed in nursing home medical chart  Current Medications     Current Medications Reviewed and updated in Nursing Home EMR      Assessment and Plan     Pruritus of skin  - Chronic, now with open skin due to increased scratching  - Will change Zyrtec 5 mg PO daily prn to be given daily atc  - Continue hydrocortisone cream  - Continue local wound care  - Keep skin clean and dry      [address] [facility]  02/28/2020

## 2020-02-29 NOTE — ASSESSMENT & PLAN NOTE
- Chronic, now with open skin due to increased scratching  - Will change Zyrtec 5 mg PO daily prn to be given daily atc  - Continue hydrocortisone cream  - Continue local wound care  - Keep skin clean and dry

## 2020-03-08 NOTE — ASSESSMENT & PLAN NOTE
· Continue supportive care  Patient present to ED came in for loww H/H, weakness to b/l legs and dizziness at time. Patient notes she went to PMD last monday and was called on Thursday to come into ED for transfusion but patient did not come in until today. Denies any pain at present time

## 2020-03-10 ENCOUNTER — NURSING HOME VISIT (OUTPATIENT)
Dept: GERIATRICS | Facility: OTHER | Age: 85
End: 2020-03-10
Payer: MEDICARE

## 2020-03-10 DIAGNOSIS — E61.1 IRON DEFICIENCY: ICD-10-CM

## 2020-03-10 DIAGNOSIS — I50.42 CHRONIC COMBINED SYSTOLIC AND DIASTOLIC HEART FAILURE (HCC): Primary | ICD-10-CM

## 2020-03-10 DIAGNOSIS — R45.89 DEPRESSED MOOD: ICD-10-CM

## 2020-03-10 DIAGNOSIS — G62.9 NEUROPATHY: ICD-10-CM

## 2020-03-10 DIAGNOSIS — R53.81 PHYSICAL DECONDITIONING: ICD-10-CM

## 2020-03-10 DIAGNOSIS — H91.93 BILATERAL HEARING LOSS, UNSPECIFIED HEARING LOSS TYPE: ICD-10-CM

## 2020-03-10 DIAGNOSIS — Z85.038 HISTORY OF COLON CANCER: ICD-10-CM

## 2020-03-10 DIAGNOSIS — R44.0 AUDITORY HALLUCINATIONS: ICD-10-CM

## 2020-03-10 DIAGNOSIS — H54.7 POOR VISION: ICD-10-CM

## 2020-03-10 DIAGNOSIS — K80.20 CALCULUS OF GALLBLADDER WITHOUT CHOLECYSTITIS WITHOUT OBSTRUCTION: ICD-10-CM

## 2020-03-10 DIAGNOSIS — N18.30 STAGE 3 CHRONIC KIDNEY DISEASE (HCC): ICD-10-CM

## 2020-03-10 DIAGNOSIS — R26.2 AMBULATORY DYSFUNCTION: ICD-10-CM

## 2020-03-10 DIAGNOSIS — E03.9 HYPOTHYROIDISM, UNSPECIFIED TYPE: ICD-10-CM

## 2020-03-10 DIAGNOSIS — K59.01 SLOW TRANSIT CONSTIPATION: ICD-10-CM

## 2020-03-10 PROCEDURE — 99309 SBSQ NF CARE MODERATE MDM 30: CPT | Performed by: FAMILY MEDICINE

## 2020-03-10 NOTE — PROGRESS NOTES
[facility]  [address]  [phone]    Critical access hospital  PROGRESS NOTE        NAME: Erika Carver  AGE: 80 y o  SEX: female  :  1917  DATE OF ENCOUNTER: 3/10/2020  POS: 32 ([facility])    Assessment and Plan     Chronic combined systolic and diastolic heart failure (HCC)  · euvolemic on exam   · Continue Torsemide  · Monitor weights  · Monitor renal function and electrolytes  Hypothyroid  · Last TSH in 2019 stable at 3 04   · Continue current dose of levothyroxine  · Plan to recheck TSH in 2019  Neuropathy  · Symptoms currently well controlled  · Continue gabapentin at current dose  Bilateral hearing loss  · Continue supportive care  Stage 3 chronic kidney disease (HCC)  · Baseline creatinine 1 6-1 9   · Most recent creatinine stable at 1 46   · Continue to avoid nephrotoxins and hypotension as possible  · Monitor renal function and electrolytes  Calculus of gallbladder without cholecystitis without obstruction  · Patient with chronic intermittent RUQ pain  · No other symptoms associated  · Per family's wishes, no aggressive measures  · Most recent LFTs wnl  ·     Slow transit constipation  · Currently stable  · Continue bowel regimen  Poor vision  · Continue supportive care, eye drops  · Ophthalmology visits regularly  Physical deconditioning  · Continue supportive care  · Restorative therapy  History of colon cancer  · Patient with hx of colon cancer s/p resection 11 yrs ago  · No chemotherapy or radiation  Depressed mood  · Mood improved  · Continue supportive care  · Encourage engagement in activities  Auditory hallucinations  · Not currently experiencing  · May be related to hearing loss  · Continue supportive care  · Frequent redirection/reorientation  Ambulatory dysfunction  · Non ambulatory at baseline  · Continue supportive care and assistance with all ADLs         [facility] [employer] [place]  Geriatric Medicine  3/10/2020       Chief Complaint   Patient seen and examined for follow up on chronic conditions  History of Present Illness     8-year-old female with underlying history of ambulatory dysfunction, deconditioning, chronic combined CHF, hypothyroidism, neuropathy, bilateral hearing loss, stage III CKD, chronic right upper quadrant pain  She is a long term care resident at [address] [place] [address] and today, she was seen and examined for follow up on acute and chronic conditions  She is sitting comfortably in chair  She is attempting to play a basketball game on TV  Reports feeling well  Denies any fever/chills, chest pain/shortness of breath, abdominal discomfort, nausea/vomiting, diarrhea/constipation or dysuria  She is non ambulatory at baseline  States she has good appetite and is tolerating PO well  The following portions of the patient's history were reviewed and updated as appropriate: allergies, current medications, past family history, past medical history, past social history, past surgical history and problem list     Review of Systems     A complete review of systems was performed  All negative, except as per HPI  History     Past Medical History:   Diagnosis Date    Cancer [facility])     "some kind of cancer when I was 80"    Colon cancer ([facility] [address] )     Costochondritis     Last Assessed:3/18/2013    Iron deficiency 10/31/2017    Stage 2 chronic kidney disease 10/31/2017    Syncope     Vitamin B12 deficiency 10/31/2017     No Known Allergies    Objective     Vital Signs  BP: 131/52       HR:66 T:97 2    RR:18 O2Sat:98% W:178 lb    Physical Exam    GEN: NAD  Non-toxic appearing  Looks frail and deconditioned  She looks chronically ill  HEENT: NC/AT  TITA  EIOMI  Oropharynx moist and clear  No oral lesions  CV: S1, S2   RRR, regular rate  No murmur appreciated  PULM: Non-labored respirations  CTA bilaterally  ABD: Soft, NT/ND  BSx4       EXT: No peripheral edema      NEURO:  Awake, alert and oriented x 3  Current Medications     Current Medications Reviewed and updated in EMR  Monthly orders reviewed and signed in chart  Please note:  Voice-recognition software may have been used in the preparation of this document  Occasional wrong word or "sound-alike" substitutions may have occurred due to the inherent limitations of voice recognition software  Interpretation should be guided by context

## 2020-03-11 PROBLEM — M25.559 HIP PAIN: Status: RESOLVED | Noted: 2017-11-15 | Resolved: 2020-03-11

## 2020-03-11 PROBLEM — L29.9 PRURITUS OF SKIN: Status: RESOLVED | Noted: 2020-02-28 | Resolved: 2020-03-11

## 2020-03-11 PROBLEM — R21 RASH: Status: RESOLVED | Noted: 2018-05-14 | Resolved: 2020-03-11

## 2020-03-11 PROBLEM — I47.2 NON-SUSTAINED VENTRICULAR TACHYCARDIA (HCC): Status: RESOLVED | Noted: 2017-11-15 | Resolved: 2020-03-11

## 2020-03-11 PROBLEM — R10.11 RUQ PAIN: Status: RESOLVED | Noted: 2017-10-04 | Resolved: 2020-03-11

## 2020-03-11 PROBLEM — R09.81 NASAL CONGESTION: Status: RESOLVED | Noted: 2020-01-20 | Resolved: 2020-03-11

## 2020-03-11 PROBLEM — I21.4 NSTEMI (NON-ST ELEVATED MYOCARDIAL INFARCTION) (HCC): Status: RESOLVED | Noted: 2017-10-04 | Resolved: 2020-03-11

## 2020-03-11 PROBLEM — R55 SYNCOPE: Status: RESOLVED | Noted: 2017-10-29 | Resolved: 2020-03-11

## 2020-03-11 NOTE — ASSESSMENT & PLAN NOTE
· euvolemic on exam   · Continue Torsemide  · Monitor weights  · Monitor renal function and electrolytes

## 2020-03-11 NOTE — ASSESSMENT & PLAN NOTE
· Not currently experiencing  · May be related to hearing loss  · Continue supportive care  · Frequent redirection/reorientation

## 2020-03-11 NOTE — ASSESSMENT & PLAN NOTE
· Baseline creatinine 1 6-1 9   · Most recent creatinine stable at 1 46   · Continue to avoid nephrotoxins and hypotension as possible  · Monitor renal function and electrolytes

## 2020-03-11 NOTE — ASSESSMENT & PLAN NOTE
· Patient with chronic intermittent RUQ pain  · No other symptoms associated  · Per family's wishes, no aggressive measures  · Most recent LFTs wnl    ·

## 2020-03-11 NOTE — ASSESSMENT & PLAN NOTE
· Last TSH in August 2019 stable at 3 04   · Continue current dose of levothyroxine  · Plan to recheck TSH in August 2019

## 2020-03-23 ENCOUNTER — NURSING HOME VISIT (OUTPATIENT)
Dept: GERIATRICS | Facility: OTHER | Age: 85
End: 2020-03-23
Payer: MEDICARE

## 2020-03-23 DIAGNOSIS — S01.401S: Primary | ICD-10-CM

## 2020-03-23 PROCEDURE — 99308 SBSQ NF CARE LOW MDM 20: CPT | Performed by: NURSE PRACTITIONER

## 2020-03-23 NOTE — PROGRESS NOTES
[address] [address]  [phone]  [address] [place] [address]   Progress Note  POS 32      NAME: Yovana Teixeira  AGE: 80 y o  SEX: female  :  1917  DATE OF ENCOUNTER: 3/23/2020    Chief Complaint   Patient seen and examined for wound to right cheek    History of Present Illness     Ca Villalobos is a 8 year old female long term care resident seen and examined per nursing request to examine a  wound on her right cheek  She has a history of an epidermoid cyst on her right facial area that popped over the weekend  White discharge was noted to be oozing from the wound  Upon examination, the resident denies pain, pruritis, fever or chills  She states that she had this cyst for a long time and has not bothered her  The following portions of the patient's history were reviewed and updated as appropriate: allergies, current medications, past family history, past medical history, past social history, past surgical history and problem list     Review of Systems     A review of systems was performed  All negative, except as per HPI      History     Past Medical History:   Diagnosis Date    Cancer [facility])     "some kind of cancer when I was 80"    Colon cancer ([facility] [address] )     Costochondritis     Last Assessed:3/18/2013    Iron deficiency 10/31/2017    Stage 2 chronic kidney disease 10/31/2017    Syncope     Vitamin B12 deficiency 10/31/2017     Past Surgical History:   Procedure Laterality Date    BACK SURGERY      CATARACT EXTRACTION      COLON SURGERY      ERCP W/ SPHICTEROTOMY N/A 2017    Procedure: ENDOSCOPIC RETROGRADE CHOLANGIOPANCREATOGRAPHY (ERCP) W/ SPHINCTEROTOMY;  Surgeon: Karla Lopez MD;  [facility];  Service: Gastroenterology    HIP FRACTURE SURGERY      WRIST SURGERY       Family History   Problem Relation Age of Onset    Heart attack Mother     Heart attack Father     Cancer Sister     Heart attack Brother     Coronary artery disease Brother     Heart disease Brother     Colon cancer Family      Social History     Socioeconomic History    Marital status:      Spouse name: Not on file    Number of children: Not on file    Years of education: Not on file    Highest education level: Not on file   Occupational History    Occupation: retired   Social Needs    Financial resource strain: Not on file    Food insecurity:     Worry: Not on file     Inability: Not on file   Bgifty needs:     Medical: Not on file     Non-medical: Not on file   Tobacco Use    Smoking status: Never Smoker    Smokeless tobacco: Never Used   Substance and Sexual Activity    Alcohol use: No    Drug use: No    Sexual activity: Not on file   Lifestyle    Physical activity:     Days per week: Not on file     Minutes per session: Not on file    Stress: Not on file   Relationships    Social connections:     Talks on phone: Not on file     Gets together: Not on file     Attends Baptism service: Not on file     Active member of club or organization: Not on file     Attends meetings of clubs or organizations: Not on file     Relationship status: Not on file    Intimate partner violence:     Fear of current or ex partner: Not on file     Emotionally abused: Not on file     Physically abused: Not on file     Forced sexual activity: Not on file   Other Topics Concern    Not on file   Social History Narrative    Caffeine use     No Known Allergies    Objective     Vital Signs  BP: 80/51     HR:87 T:96 6    RR: 18 W: 179 lbs  General: NAD, Well Nourished, Well Developed  Neck: Supple, +ROM  Skin: Warm, Dry, no lesions, right cheek open wound with white wound base and surrounding erythema  Psych: Awake and alert, no mood, no affect    Pertinent Laboratory/Diagnostic Studies:  Reviewed in nursing home medical chart      Current Medications     Current Medications Reviewed and updated in Nursing Home EMR      Assessment and Plan     Cheek wound, right, sequela  - Wound deep with white wound base and surrounding erythema  - Will order another course of Cephalexin for prophylaxis  - Continue local wound care, cleanse daily with NSS, apply DEACON and cover with dressing  - Will monitor for worsening signs and symptoms of infection      [facility]  3/23/2020

## 2020-03-23 NOTE — ASSESSMENT & PLAN NOTE
- Wound deep with white wound base and surrounding erythema  - Will order another course of Cephalexin for prophylaxis  - Continue local wound care, cleanse daily with NSS, apply DEACON and cover with dressing  - Will monitor for worsening signs and symptoms of infection

## 2020-03-25 ENCOUNTER — NURSING HOME VISIT (OUTPATIENT)
Dept: GERIATRICS | Facility: OTHER | Age: 85
End: 2020-03-25
Payer: MEDICARE

## 2020-03-25 DIAGNOSIS — R05.9 COUGH: Primary | ICD-10-CM

## 2020-03-25 DIAGNOSIS — I50.42 CHRONIC COMBINED SYSTOLIC AND DIASTOLIC HEART FAILURE (HCC): ICD-10-CM

## 2020-03-25 DIAGNOSIS — R53.1 WEAKNESS: ICD-10-CM

## 2020-03-25 PROCEDURE — 99309 SBSQ NF CARE MODERATE MDM 30: CPT | Performed by: NURSE PRACTITIONER

## 2020-03-26 NOTE — ASSESSMENT & PLAN NOTE
- Moist non-productive cough reported, patient afebrile  - Will order Robitussin 10 ml PO every 6 hours prn cough  - CBC with diff and BMP in the am  - Encourage PO fluid inake  - Monitor vital signs every shift while awake for 3 days

## 2020-03-26 NOTE — ASSESSMENT & PLAN NOTE
- Multifactorial related to advanced age and multiple co-morbidities along with possible acute illness  - BMP, CBC with diff ordered for the am to rule out underlying causes  - Continue restorative care

## 2020-03-26 NOTE — ASSESSMENT & PLAN NOTE
- Patient euvolemic on exam, weights stable, no weight gain reported  - Continue torsemide at current dose  - Continue to monitor wieghts and fluid intake  - Continue JOSUE diet  - BMP ordered for the am to monitor electrolytes and renal function

## 2020-03-26 NOTE — PROGRESS NOTES
[address] [address]  [phone]  [address] [place] [address]   Progress Note  POS 32      NAME: Consuelo Camarena  AGE: 80 y o  SEX: female  :  1917  DATE OF ENCOUNTER: 2020    Chief Complaint   Patient seen and examined for moist non-productive cough    History of Present Illness     8 year old patient, with history as listed below, seen and examined today per nursing request for moist non-productive cough that started last night  She states that she frequently coughs at night  She denies fever, chills, chest pain, sob, abdominal pain, nausea, vomiting or diarrhea  The following portions of the patient's history were reviewed and updated as appropriate: allergies, current medications, past family history, past medical history, past social history, past surgical history and problem list     Review of Systems     A review of systems was performed  All negative, except as per HPI  History     Past Medical History:   Diagnosis Date    Cancer [facility])     "some kind of cancer when I was 80"    Colon cancer ([facility] [address] )     Costochondritis     Last Assessed:3/18/2013    Iron deficiency 10/31/2017    Stage 2 chronic kidney disease 10/31/2017    Syncope     Vitamin B12 deficiency 10/31/2017     Past Surgical History:   Procedure Laterality Date    BACK SURGERY      CATARACT EXTRACTION      COLON SURGERY      ERCP W/ SPHICTEROTOMY N/A 2017    Procedure: ENDOSCOPIC RETROGRADE CHOLANGIOPANCREATOGRAPHY (ERCP) W/ SPHINCTEROTOMY;  Surgeon: Porfirio Cruz MD;  [facility];  Service: Gastroenterology    HIP FRACTURE SURGERY      WRIST SURGERY       Family History   Problem Relation Age of Onset    Heart attack Mother     Heart attack Father     Cancer Sister     Heart attack Brother     Coronary artery disease Brother     Heart disease Brother     Colon cancer Family      Social History     Socioeconomic History    Marital status:   Spouse name: Not on file    Number of children: Not on file    Years of education: Not on file    Highest education level: Not on file   Occupational History    Occupation: retired   Social Needs    Financial resource strain: Not on file    Food insecurity:     Worry: Not on file     Inability: Not on file   BriefMe needs:     Medical: Not on file     Non-medical: Not on file   Tobacco Use    Smoking status: Never Smoker    Smokeless tobacco: Never Used   Substance and Sexual Activity    Alcohol use: No    Drug use: No    Sexual activity: Not on file   Lifestyle    Physical activity:     Days per week: Not on file     Minutes per session: Not on file    Stress: Not on file   Relationships    Social connections:     Talks on phone: Not on file     Gets together: Not on file     Attends Evangelical service: Not on file     Active member of club or organization: Not on file     Attends meetings of clubs or organizations: Not on file     Relationship status: Not on file    Intimate partner violence:     Fear of current or ex partner: Not on file     Emotionally abused: Not on file     Physically abused: Not on file     Forced sexual activity: Not on file   Other Topics Concern    Not on file   Social History Narrative    Caffeine use     No Known Allergies    Objective     Vital Signs  BP: 127/63      HR: 56  T 96 0    RR: 18 O2Sat: 95% RA W: 179 0 lbs  General: NAD, Well Nourished, Well Developed  Oral: Oropharynx Moist and Clear  Neck: Supple, +ROM  CV: S1, S2, normal rate, regular rhythm, no murmur appreciated  Pulmonary: Lung sounds clear to air, no wheezing, rhonchi, rales  Abdominal:BS + x4 in all quadrants, soft, no mass, no tenderness  Psych: Awake and alert, no mood, no affect    Pertinent Laboratory/Diagnostic Studies:  Reviewed in nursing home medical chart      Current Medications     Current Medications Reviewed and updated in Nursing Home EMR      Assessment and Plan     Cough  - Moist non-productive cough reported, patient afebrile  - Will order Robitussin 10 ml PO every 6 hours prn cough  - CBC with diff and BMP in the am  - Encourage PO fluid inake  - Monitor vital signs every shift while awake for 3 days    Chronic combined systolic and diastolic heart failure (HCC)  - Patient euvolemic on exam, weights stable, no weight gain reported  - Continue torsemide at current dose  - Continue to monitor wieghts and fluid intake  - Continue JOSUE diet  - BMP ordered for the am to monitor electrolytes and renal function          Weakness  - Multifactorial related to advanced age and multiple co-morbidities along with possible acute illness  - BMP, CBC with diff ordered for the am to rule out underlying causes  - Continue restorative care      [address] [facility]  03/25/2020

## 2020-03-27 ENCOUNTER — NURSING HOME VISIT (OUTPATIENT)
Dept: GERIATRICS | Facility: OTHER | Age: 85
End: 2020-03-27
Payer: MEDICARE

## 2020-03-27 DIAGNOSIS — R09.89 CHEST CONGESTION: Primary | ICD-10-CM

## 2020-03-27 DIAGNOSIS — R53.81 MALAISE AND FATIGUE: ICD-10-CM

## 2020-03-27 DIAGNOSIS — R50.9 FEVER, UNSPECIFIED FEVER CAUSE: ICD-10-CM

## 2020-03-27 DIAGNOSIS — R53.83 MALAISE AND FATIGUE: ICD-10-CM

## 2020-03-27 DIAGNOSIS — S01.401S: ICD-10-CM

## 2020-03-27 DIAGNOSIS — R05.9 COUGH: ICD-10-CM

## 2020-03-27 PROCEDURE — 99309 SBSQ NF CARE MODERATE MDM 30: CPT | Performed by: NURSE PRACTITIONER

## 2020-03-28 PROBLEM — R09.89 CHEST CONGESTION: Status: ACTIVE | Noted: 2020-03-27

## 2020-03-28 PROBLEM — R53.83 MALAISE AND FATIGUE: Status: ACTIVE | Noted: 2019-07-05

## 2020-03-28 PROBLEM — R50.9 FEVER: Status: ACTIVE | Noted: 2020-03-27

## 2020-03-28 NOTE — ASSESSMENT & PLAN NOTE
- Fever of 99 6 reported  - Will order stat chest x-ray to rule out pneumonia  - CBC with diff and CMP reviewed from yesterday 03/26/2020 and is unremarkable  - CBC with diff and BMP ordered for Monday 03/30/2020  - Continue inhaler prn  - Monitor vital signs every shift for 3 days

## 2020-03-28 NOTE — ASSESSMENT & PLAN NOTE
- Worsening cough present  - Will order Mucinex 600 mg PO BID for 10 days  - Continue to limit outside exposure  - Continue supportive care

## 2020-03-28 NOTE — ASSESSMENT & PLAN NOTE
- Wound deep with white wound base and surrounding erythema  - Continue PO  Cefnidir  - Continue local wound care, cleanse daily with NSS, apply DEACON and cover with dressing  - Will monitor for worsening signs and symptoms of infection

## 2020-03-28 NOTE — ASSESSMENT & PLAN NOTE
- Related to acute illness  - Will rule out underlying causes with a chest x-ray, FlU A/B swab and RSV and repeat blood work on Monday 03/30/2020  - Encourage PO fluid intake  - Monitor skin integrity  - Continue supportive care

## 2020-03-28 NOTE — PROGRESS NOTES
[address] [address]  [phone]  [facility]   Progress Note  POS 32      NAME: Rigoberto Lorenzo  AGE: 80 y o  SEX: female  :  1917  DATE OF ENCOUNTER: 2020    Chief Complaint   Patient seen and examined for chest congestion, cough, fever, general malaise    History of Present Illness     8 year old female patient seen and examined today per nursing request for increased chest congestion, cough, fever and general malaise  She has a reported temperature of 99 6  Upon examination at bedside, she is awake, alert and in not acute distress  She states that she does not feel well, her whole body hurts her  She states that she is hot and very thirsty  She is experiencing an increased moist non-productive cough  She denies chest pain, sob, nausea, vomiting, diarrhea  She states that she has some mild tenderness ot her right side of abdomen that is chronic for her  She has a history of mobile gallstones since 10/2018  No surgical intervention recommended at that time related to increased age  The following portions of the patient's history were reviewed and updated as appropriate: allergies, current medications, past family history, past medical history, past social history, past surgical history and problem list     Review of Systems     A review of systems was performed  All negative, except as per HPI      History     Past Medical History:   Diagnosis Date    Cancer [facility])     "some kind of cancer when I was 80"    Colon cancer ([facility] [address] )     Costochondritis     Last Assessed:3/18/2013    Iron deficiency 10/31/2017    Stage 2 chronic kidney disease 10/31/2017    Syncope     Vitamin B12 deficiency 10/31/2017     Past Surgical History:   Procedure Laterality Date    BACK SURGERY      CATARACT EXTRACTION      COLON SURGERY      ERCP W/ SPHICTEROTOMY N/A 2017    Procedure: ENDOSCOPIC RETROGRADE CHOLANGIOPANCREATOGRAPHY (ERCP) W/ SPHINCTEROTOMY;  Surgeon: Charlie Cristobal MD;  [facility];  Service: Gastroenterology    HIP FRACTURE SURGERY      WRIST SURGERY       Family History   Problem Relation Age of Onset    Heart attack Mother     Heart attack Father     Cancer Sister     Heart attack Brother     Coronary artery disease Brother     Heart disease Brother     Colon cancer Family      Social History     Socioeconomic History    Marital status:       Spouse name: Not on file    Number of children: Not on file    Years of education: Not on file    Highest education level: Not on file   Occupational History    Occupation: retired   Social Needs    Financial resource strain: Not on file    Food insecurity:     Worry: Not on file     Inability: Not on file   Research Journalist needs:     Medical: Not on file     Non-medical: Not on file   Tobacco Use    Smoking status: Never Smoker    Smokeless tobacco: Never Used   Substance and Sexual Activity    Alcohol use: No    Drug use: No    Sexual activity: Not on file   Lifestyle    Physical activity:     Days per week: Not on file     Minutes per session: Not on file    Stress: Not on file   Relationships    Social connections:     Talks on phone: Not on file     Gets together: Not on file     Attends Restorationism service: Not on file     Active member of club or organization: Not on file     Attends meetings of clubs or organizations: Not on file     Relationship status: Not on file    Intimate partner violence:     Fear of current or ex partner: Not on file     Emotionally abused: Not on file     Physically abused: Not on file     Forced sexual activity: Not on file   Other Topics Concern    Not on file   Social History Narrative    Caffeine use     No Known Allergies    Objective     Vital Signs  BP: 119/55     HR: 88 T: 99 6    RR:16 O2Sat: 95% RA   General: NAD, Non-toxic appearing, frail, deconditioned  Oral: Oropharynx Moist and Clear, No oral lesions  Neck: Supple, +ROM  Eyes: + Blepharitis b/l eyes  CV: S1, S2, normal rate, regular rhythm, no murmur appreciated  Pulmonary: Wheezing throughout all lung fields  Abdominal:BS + x4 in all quadrants, soft, no mass, no tenderness  Extremities: No edema, +ROM, +Strength  Skin: Warm, Dry, no lesions, no rash, no erythema present, no ecchymosis present  Psych: Awake and alert,  no mood, no affect    Pertinent Laboratory/Diagnostic Studies:  03/26/2020: Glucose 85, BUN 42, Creatinine 1 58, Chloride 107, CO2 26, Sodium 140, Potassium 4 3, H/H 10 2/29 9, Platelet Count 926, WBC 4 0      Current Medications     Current Medications Reviewed and updated in Nursing Home EMR      Assessment and Plan     Chest congestion  - Fever of 99 6 reported  - Will order stat chest x-ray to rule out pneumonia  - CBC with diff and CMP reviewed from yesterday 03/26/2020 and is unremarkable  - CBC with diff and BMP ordered for Monday 03/30/2020  - Continue inhaler prn  - Monitor vital signs every shift for 3 days    Malaise and fatigue  - Related to acute illness  - Will rule out underlying causes with a chest x-ray, FlU A/B swab and RSV and repeat blood work on Monday 03/30/2020  - Encourage PO fluid intake  - Monitor skin integrity  - Continue supportive care    Fever  - Chest x-ray and blood work ordered stat  - Continue tylenol prn  - Monitor temperature and report temperature greater than 100 0  - Encourage PO fluid inake    Cheek wound, right, sequela  - Wound deep with white wound base and surrounding erythema  - Continue PO  Cefnidir  - Continue local wound care, cleanse daily with NSS, apply DEACON and cover with dressing  - Will monitor for worsening signs and symptoms of infection    Cough  - Worsening cough present  - Will order Mucinex 600 mg PO BID for 10 days  - Continue to limit outside exposure  - Continue supportive care      [facility]  03/27/2020

## 2020-03-28 NOTE — ASSESSMENT & PLAN NOTE
- Chest x-ray and blood work ordered stat  - Continue tylenol prn  - Monitor temperature and report temperature greater than 100 0  - Encourage PO fluid inake

## 2020-04-15 ENCOUNTER — NURSING HOME VISIT (OUTPATIENT)
Dept: GERIATRICS | Facility: OTHER | Age: 85
End: 2020-04-15
Payer: MEDICARE

## 2020-04-15 DIAGNOSIS — R09.89 CHEST CONGESTION: ICD-10-CM

## 2020-04-15 DIAGNOSIS — I50.42 CHRONIC COMBINED SYSTOLIC AND DIASTOLIC HEART FAILURE (HCC): ICD-10-CM

## 2020-04-15 DIAGNOSIS — R53.83 LETHARGY: Primary | ICD-10-CM

## 2020-04-15 DIAGNOSIS — R53.81 PHYSICAL DECONDITIONING: ICD-10-CM

## 2020-04-15 DIAGNOSIS — E03.9 HYPOTHYROIDISM, UNSPECIFIED TYPE: ICD-10-CM

## 2020-04-15 DIAGNOSIS — G62.9 NEUROPATHY: ICD-10-CM

## 2020-04-15 DIAGNOSIS — R26.2 AMBULATORY DYSFUNCTION: ICD-10-CM

## 2020-04-15 DIAGNOSIS — N18.30 STAGE 3 CHRONIC KIDNEY DISEASE (HCC): ICD-10-CM

## 2020-04-15 PROCEDURE — 99309 SBSQ NF CARE MODERATE MDM 30: CPT | Performed by: NURSE PRACTITIONER

## 2020-04-16 PROBLEM — R53.83 LETHARGY: Status: ACTIVE | Noted: 2020-04-15

## 2020-06-18 ENCOUNTER — NURSING HOME VISIT (OUTPATIENT)
Dept: GERIATRICS | Facility: OTHER | Age: 85
End: 2020-06-18
Payer: MEDICARE

## 2020-06-18 DIAGNOSIS — R53.81 PHYSICAL DECONDITIONING: ICD-10-CM

## 2020-06-18 DIAGNOSIS — H54.7 POOR VISION: ICD-10-CM

## 2020-06-18 DIAGNOSIS — E44.0 MODERATE PROTEIN-CALORIE MALNUTRITION (HCC): Primary | ICD-10-CM

## 2020-06-18 DIAGNOSIS — K80.20 CALCULUS OF GALLBLADDER WITHOUT CHOLECYSTITIS WITHOUT OBSTRUCTION: ICD-10-CM

## 2020-06-18 DIAGNOSIS — R26.2 AMBULATORY DYSFUNCTION: ICD-10-CM

## 2020-06-18 DIAGNOSIS — I50.42 CHRONIC COMBINED SYSTOLIC AND DIASTOLIC HEART FAILURE (HCC): ICD-10-CM

## 2020-06-18 DIAGNOSIS — G62.9 NEUROPATHY: ICD-10-CM

## 2020-06-18 DIAGNOSIS — E03.9 HYPOTHYROIDISM, UNSPECIFIED TYPE: ICD-10-CM

## 2020-06-18 DIAGNOSIS — D64.9 ANEMIA, UNSPECIFIED TYPE: ICD-10-CM

## 2020-06-18 DIAGNOSIS — R54 FRAILTY SYNDROME IN GERIATRIC PATIENT: ICD-10-CM

## 2020-06-18 DIAGNOSIS — H90.3 SENSORINEURAL HEARING LOSS (SNHL) OF BOTH EARS: ICD-10-CM

## 2020-06-18 DIAGNOSIS — N18.30 STAGE 3 CHRONIC KIDNEY DISEASE (HCC): ICD-10-CM

## 2020-06-18 PROBLEM — R50.9 FEVER: Status: RESOLVED | Noted: 2020-03-27 | Resolved: 2020-06-18

## 2020-06-18 PROBLEM — R53.83 LETHARGY: Status: RESOLVED | Noted: 2020-04-15 | Resolved: 2020-06-18

## 2020-06-18 PROCEDURE — 99309 SBSQ NF CARE MODERATE MDM 30: CPT | Performed by: FAMILY MEDICINE

## 2020-07-17 ENCOUNTER — NURSING HOME VISIT (OUTPATIENT)
Dept: GERIATRICS | Facility: OTHER | Age: 85
End: 2020-07-17
Payer: MEDICARE

## 2020-07-17 DIAGNOSIS — F41.8 DEPRESSION WITH ANXIETY: Primary | ICD-10-CM

## 2020-07-17 DIAGNOSIS — R52 GENERALIZED PAIN: ICD-10-CM

## 2020-07-17 DIAGNOSIS — E44.0 MODERATE PROTEIN-CALORIE MALNUTRITION (HCC): ICD-10-CM

## 2020-07-17 DIAGNOSIS — R26.2 AMBULATORY DYSFUNCTION: ICD-10-CM

## 2020-07-17 DIAGNOSIS — R53.81 PHYSICAL DECONDITIONING: ICD-10-CM

## 2020-07-17 PROCEDURE — 99309 SBSQ NF CARE MODERATE MDM 30: CPT | Performed by: FAMILY MEDICINE

## 2020-07-18 NOTE — ASSESSMENT & PLAN NOTE
· Multifactorial in the setting of advanced age and multiple co-morbidities  · Continue restorative therapy and supportive care

## 2020-07-18 NOTE — ASSESSMENT & PLAN NOTE
· Patient continues to lose weight slowly despite interventions  · Continue to encourage oral intake  · Continue house shakes bid  · Monitor weights

## 2020-07-18 NOTE — ASSESSMENT & PLAN NOTE
· Patient with worsening anxiety and flat affect today  · Will start low dose ativan prn due to tremors that are worse when anxious  · Will consider low dose SSRI to help mood and anxiety

## 2020-07-18 NOTE — ASSESSMENT & PLAN NOTE
· Unable to provide details just complains of generalized pain  · Will start ATC Tylenol q 8hrs and monitor  · Continue gabapentin

## 2020-07-18 NOTE — PROGRESS NOTES
[facility]  [address] [facility]  [address] [facility]  [phone]    [facility]  ACUTE VISIT        NAME: Nestor Perez  AGE: 80 y o  SEX: female  :  1917  DATE OF ENCOUNTER:   2020  POS: 28 (LT)    Assessment and Plan     Depression with anxiety  · Patient with worsening anxiety and flat affect today  · Will start low dose ativan prn due to tremors that are worse when anxious  · Will consider low dose SSRI to help mood and anxiety  Moderate protein-calorie malnutrition ([address] )  · Patient continues to lose weight slowly despite interventions  · Continue to encourage oral intake  · Continue house shakes bid  · Monitor weights  Generalized pain  · Unable to provide details just complains of generalized pain  · Will start ATC Tylenol q 8hrs and monitor  · Continue gabapentin  Physical deconditioning  · Multifactorial in the setting of advanced age and multiple co-morbidities  · Continue restorative therapy and supportive care  Ambulatory dysfunction  · Non ambulatory at baseline  · Continue assistance with all ADLs and supportive care  · Fall precautions  Unable to reach daughter to discuss and update  Kim Jorge MD  Geriatric Medicine  2020       Chief Complaint   Patient seen and examined for follow up on chronic conditions  History of Present Illness     8-year-old female with underlying history of ambulatory dysfunction, deconditioning, chronic combined CHF, hypothyroidism, neuropathy, bilateral hearing loss, stage III CKD, chronic right upper quadrant pain  She is a long term care resident at [address] [place] [address] and today she was evaluated per nurse's request due to complaints of generalized pain and worsening anxiety  Patient reports feeling very anxious and her tremors have worsened  She complains of generalized pain but is unable to provide any details regarding her pain    Denies any fever/chills, chest pain/shortness of breath, abdominal discomfort, nausea/vomiting, diarrhea/constipation or dysuria  The following portions of the patient's history were reviewed and updated as appropriate: allergies, current medications, past family history, past medical history, past social history, past surgical history and problem list     Review of Systems     A complete review of systems was performed  All negative, except as per HPI  History     Past Medical History:   Diagnosis Date    Cancer [facility])     "some kind of cancer when I was 80"    Colon cancer ([facility] [address] )     Costochondritis     Last Assessed:3/18/2013    Iron deficiency 10/31/2017    Stage 2 chronic kidney disease 10/31/2017    Syncope     Vitamin B12 deficiency 10/31/2017     No Known Allergies    Objective     Vital Signs  BP:   124/60       HR:  77 T:  97 5    RR:  19 O2Sat:  93% on RA W:  164 9 lb    Physical Exam  GEN:         NAD  Non-toxic appearing  Looks frail and deconditioned  She looks chronically ill  HEENT:     NC/AT  TITA  EIOMI  Oropharynx moist and clear  No oral lesions  CV:            S1, S2   RRR, regular rate  2/6 KARLA appreciated  PULM:       Non-labored respirations  CTA bilaterally  ABD:          Soft, NT/ND  BSx4  EXT:          No peripheral edema  NEURO:          Awake, alert and oriented x 3  Current Medications     Current Medications Reviewed and updated in EMR  Please note:  Voice-recognition software may have been used in the preparation of this document  Occasional wrong word or "sound-alike" substitutions may have occurred due to the inherent limitations of voice recognition software  Interpretation should be guided by context

## 2020-07-18 NOTE — ASSESSMENT & PLAN NOTE
· Non ambulatory at baseline  · Continue assistance with all ADLs and supportive care  · Fall precautions

## 2020-07-30 ENCOUNTER — NURSING HOME VISIT (OUTPATIENT)
Dept: GERIATRICS | Facility: OTHER | Age: 85
End: 2020-07-30
Payer: MEDICARE

## 2020-07-30 DIAGNOSIS — K59.01 SLOW TRANSIT CONSTIPATION: Primary | ICD-10-CM

## 2020-07-30 DIAGNOSIS — N18.30 STAGE 3 CHRONIC KIDNEY DISEASE (HCC): ICD-10-CM

## 2020-07-30 DIAGNOSIS — I50.42 CHRONIC COMBINED SYSTOLIC AND DIASTOLIC HEART FAILURE (HCC): ICD-10-CM

## 2020-07-30 DIAGNOSIS — G62.9 NEUROPATHY: ICD-10-CM

## 2020-07-30 DIAGNOSIS — R53.81 PHYSICAL DECONDITIONING: ICD-10-CM

## 2020-07-30 DIAGNOSIS — R26.2 AMBULATORY DYSFUNCTION: ICD-10-CM

## 2020-07-30 PROCEDURE — 99309 SBSQ NF CARE MODERATE MDM 30: CPT | Performed by: NURSE PRACTITIONER

## 2020-07-30 NOTE — PROGRESS NOTES
[address] [address]  [phone]  [address] [place] [address]   Progress Note  POS 32      NAME: Carolann Bautista  AGE: 80 y o  SEX: female  :  1917  DATE OF ENCOUNTER: 2020    Chief Complaint   Patient seen and examined for follow up on chronic conditions  History of Present Illness     8  year old female patient of long term care at [facility]  seen and examined today to follow-up on acute and chronic medical conditions  Patient is sitting in the chair, calm, cooperative and in no acute distress  Denies chest pain, sob, abdominal pain, nausea, vomiting, diarrhea, myalgia, arthralgia, fever, chills, headache, dizziness or visual disturbance  She complains today of constipation, she states that she has not had a bowel movement in several days and she feels uncomfortable  She is non-ambulatory at baseline, her appetite is good, she is sleeping well at night and her mood is stable  No acute issues reported by staff  The following portions of the patient's history were reviewed and updated as appropriate: allergies, current medications, past family history, past medical history, past social history, past surgical history and problem list     Review of Systems     A review of systems was performed  All negative, except as per HPI      History     Past Medical History:   Diagnosis Date    Cancer [facility])     "some kind of cancer when I was 80"    Colon cancer ([facility] [address] )     Costochondritis     Last Assessed:3/18/2013    Iron deficiency 10/31/2017    Stage 2 chronic kidney disease 10/31/2017    Syncope     Vitamin B12 deficiency 10/31/2017     Past Surgical History:   Procedure Laterality Date    BACK SURGERY      CATARACT EXTRACTION      COLON SURGERY      ERCP W/ SPHICTEROTOMY N/A 2017    Procedure: ENDOSCOPIC RETROGRADE CHOLANGIOPANCREATOGRAPHY (ERCP) W/ SPHINCTEROTOMY;  Surgeon: Gena Olson MD;  Location: [facility];  Service: Gastroenterology    HIP FRACTURE SURGERY      WRIST SURGERY       Family History   Problem Relation Age of Onset    Heart attack Mother     Heart attack Father     Cancer Sister     Heart attack Brother     Coronary artery disease Brother     Heart disease Brother     Colon cancer Family      Social History     Socioeconomic History    Marital status:       Spouse name: Not on file    Number of children: Not on file    Years of education: Not on file    Highest education level: Not on file   Occupational History    Occupation: retired   Social Needs    Financial resource strain: Not on file    Food insecurity:     Worry: Not on file     Inability: Not on file   NGRAIN needs:     Medical: Not on file     Non-medical: Not on file   Tobacco Use    Smoking status: Never Smoker    Smokeless tobacco: Never Used   Substance and Sexual Activity    Alcohol use: No    Drug use: No    Sexual activity: Not on file   Lifestyle    Physical activity:     Days per week: Not on file     Minutes per session: Not on file    Stress: Not on file   Relationships    Social connections:     Talks on phone: Not on file     Gets together: Not on file     Attends Taoist service: Not on file     Active member of club or organization: Not on file     Attends meetings of clubs or organizations: Not on file     Relationship status: Not on file    Intimate partner violence:     Fear of current or ex partner: Not on file     Emotionally abused: Not on file     Physically abused: Not on file     Forced sexual activity: Not on file   Other Topics Concern    Not on file   Social History Narrative    Caffeine use     No Known Allergies    Objective     Vital Signs  BP: 145/79       HR: 96  T: 97 4     RR: 18  O2Sat: 95% RA  W:167 4 lbs  General: NAD, Non-toxic appearing, frail and deconditioned  Oral: Oropharynx Moist and Clear  Neck: Supple, +ROM  CV: S1, S2, normal rate, regular rhythm, + Murmur Jackson  Pulmonary: Lung sounds clear to air, no wheezing, rhonchi, rales  Abdominal:BS + x4 in all quadrants, soft, no mass, no tenderness  Extremities: Trace bilateral ankle edema, +ROM, +Strength  Skin: Warm, Dry, no lesions, no rash, no erythema present, no ecchymosis present  Neurological: CN 2-12 intact, PERRLA  Psych: Alert and oriented times self and place, disoriented to time, no mood, no affect    Pertinent Laboratory/Diagnostic Studies:  06/30/2020: COVID-19 PCR: Negative      Current Medications     Current Medications Reviewed and updated in Nursing Home EMR      Assessment and Plan     Slow transit constipation  - Constipation reported  - Will increase Sorbitol to 30 ml PO daily  - Continue current bowel medication regimen  - Encourage adequate hydration and fiber intake    Chronic combined systolic and diastolic heart failure ([facility] [address] )  - Patient euvolemic on examination  - Continue Torsemide at current dose  - Monitor for weights   - Monitor for edema and worsening SOB  - Will monitor electrolytes and renal function          Neuropathy  - Stable, no complaints of pain reported on examination  - Tolerated discontinuation of gabapentin  - Monitor for increased pain    Stage 3 chronic kidney disease ([facility] [address] )  - Baseline creatinine 1 0-1 9  - Current creatinine 1 81  - Continue to avoid nephrotoxins and hypotension    Ambulatory dysfunction  - Non ambulatory at baseline  - Continue assistance with all ADLs  - Continue supportive care and fall precautions    Physical deconditioning  - Multifactorial related to advanced age and multiple co-morbidities  - Continue restorative therapy  - Monitor skin integrity  - Encourage adequate PO intake  - Continue supportive care      [address] [facility]  07/30/2020

## 2020-08-01 NOTE — ASSESSMENT & PLAN NOTE
- Baseline creatinine 1 0-1 9  - Current creatinine 1 81  - Continue to avoid nephrotoxins and hypotension

## 2020-08-01 NOTE — ASSESSMENT & PLAN NOTE
- Multifactorial related to advanced age and multiple co-morbidities  - Continue restorative therapy  - Monitor skin integrity  - Encourage adequate PO intake  - Continue supportive care

## 2020-08-01 NOTE — ASSESSMENT & PLAN NOTE
- Stable, no complaints of pain reported on examination  - Tolerated discontinuation of gabapentin  - Monitor for increased pain

## 2020-08-01 NOTE — ASSESSMENT & PLAN NOTE
- Constipation reported  - Will increase Sorbitol to 30 ml PO daily  - Continue current bowel medication regimen  - Encourage adequate hydration and fiber intake

## 2020-08-01 NOTE — ASSESSMENT & PLAN NOTE
- Patient euvolemic on examination  - Continue Torsemide at current dose  - Monitor for weights   - Monitor for edema and worsening SOB  - Will monitor electrolytes and renal function

## 2020-08-01 NOTE — ASSESSMENT & PLAN NOTE
- Non ambulatory at baseline  - Continue assistance with all ADLs  - Continue supportive care and fall precautions

## 2020-08-16 ENCOUNTER — TELEPHONE (OUTPATIENT)
Dept: OTHER | Facility: OTHER | Age: 85
End: 2020-08-16

## 2020-08-17 RX ORDER — NITROGLYCERIN 0.4 MG/1
0.4 TABLET SUBLINGUAL
COMMUNITY

## 2020-08-17 RX ORDER — ONDANSETRON 4 MG/1
4 TABLET, FILM COATED ORAL EVERY 8 HOURS PRN
COMMUNITY

## 2020-08-17 NOTE — TELEPHONE ENCOUNTER
Samir dodge from [place] [phone]  Needs order for devin [facility] via [facility]

## 2020-09-24 ENCOUNTER — NURSING HOME VISIT (OUTPATIENT)
Dept: GERIATRICS | Facility: OTHER | Age: 85
End: 2020-09-24
Payer: MEDICARE

## 2020-09-24 DIAGNOSIS — I50.42 CHRONIC COMBINED SYSTOLIC AND DIASTOLIC HEART FAILURE (HCC): ICD-10-CM

## 2020-09-24 DIAGNOSIS — N18.30 STAGE 3 CHRONIC KIDNEY DISEASE (HCC): ICD-10-CM

## 2020-09-24 DIAGNOSIS — R26.2 AMBULATORY DYSFUNCTION: ICD-10-CM

## 2020-09-24 DIAGNOSIS — R54 FRAILTY SYNDROME IN GERIATRIC PATIENT: ICD-10-CM

## 2020-09-24 DIAGNOSIS — D64.9 ANEMIA, UNSPECIFIED TYPE: ICD-10-CM

## 2020-09-24 DIAGNOSIS — H91.93 BILATERAL HEARING LOSS, UNSPECIFIED HEARING LOSS TYPE: ICD-10-CM

## 2020-09-24 DIAGNOSIS — K59.01 SLOW TRANSIT CONSTIPATION: ICD-10-CM

## 2020-09-24 DIAGNOSIS — K80.20 CALCULUS OF GALLBLADDER WITHOUT CHOLECYSTITIS WITHOUT OBSTRUCTION: ICD-10-CM

## 2020-09-24 DIAGNOSIS — G62.9 NEUROPATHY: ICD-10-CM

## 2020-09-24 DIAGNOSIS — Z85.038 HISTORY OF COLON CANCER: ICD-10-CM

## 2020-09-24 DIAGNOSIS — E53.8 VITAMIN B12 DEFICIENCY: ICD-10-CM

## 2020-09-24 DIAGNOSIS — F41.8 DEPRESSION WITH ANXIETY: ICD-10-CM

## 2020-09-24 DIAGNOSIS — E44.0 MODERATE PROTEIN-CALORIE MALNUTRITION (HCC): Primary | ICD-10-CM

## 2020-09-24 DIAGNOSIS — E03.9 HYPOTHYROIDISM, UNSPECIFIED TYPE: ICD-10-CM

## 2020-09-24 DIAGNOSIS — H54.7 POOR VISION: ICD-10-CM

## 2020-09-24 DIAGNOSIS — R53.81 PHYSICAL DECONDITIONING: ICD-10-CM

## 2020-09-24 PROBLEM — R05.9 COUGH: Status: RESOLVED | Noted: 2019-07-05 | Resolved: 2020-09-24

## 2020-09-24 PROBLEM — R53.83 MALAISE AND FATIGUE: Status: RESOLVED | Noted: 2019-07-05 | Resolved: 2020-09-24

## 2020-09-24 PROBLEM — R09.89 CHEST CONGESTION: Status: RESOLVED | Noted: 2020-03-27 | Resolved: 2020-09-24

## 2020-09-24 PROBLEM — R78.81 BACTEREMIA: Status: RESOLVED | Noted: 2017-11-17 | Resolved: 2020-09-24

## 2020-09-24 PROCEDURE — 99309 SBSQ NF CARE MODERATE MDM 30: CPT | Performed by: FAMILY MEDICINE

## 2020-09-24 NOTE — PROGRESS NOTES
[facility]  [address] [facility]  [address] [facility]  [phone]    Formerly [facility]  PROGRESS NOTE        NAME: Rylie Villagomez  AGE: 80 y o  SEX: female  :  1917  DATE OF ENCOUNTER:  2020  POS: 28 (LTC)    Assessment and Plan     Moderate protein-calorie malnutrition ([address] )  · Patient's weight has remained fairly stable in the past 2 months  · She is currently eating % of all meals  · Continue how tabby b i d  Thaddeus García · Continue to monitor weights  Calculus of gallbladder without cholecystitis without obstruction  · Intermittent episodes of right upper quadrant pain, currently not experiencing  · Per previous discussions with family, no intervention or further workup  · Continue to monitor for pain  Hypothyroid  · Last TSH done 2020 stable at 2 26   · Continue levothyroxine 25 mcg daily  · Recheck TSH in 2020  Slow transit constipation  · Continue current bowel regimen and p r n  Bowel regimen as needed  · Continue to encourage adequate hydration, fiber intake and physical activity as tolerated  Chronic combined systolic and diastolic heart failure ([address] )  · Patient is euvolemic on exam   · Continue torsemide at current dose  · Continue to monitor weights, monitor for edema or worsening shortness of breath  · Continue to monitor renal function and electrolytes periodically  Will check BMP prior to next appointment  Neuropathy  · No complaints of pain on exam   · Gabapentin discontinued at previous visit, patient tolerated well  · Continue to monitor for pain  Bilateral hearing loss  · Continue supportive care  Stage 3 chronic kidney disease (HCC)  · Baseline creatinine between 1 0-1 9  · Last checked stable at 1 81   · Continue to avoid nephrotoxins and hypotension  · Will check BMP prior to next appointment to monitor  Anemia  · Last hemoglobin stable at 9 8  · Continue ferrous sulfate every other day    · Will check CBC prior to next appointment to monitor  Vitamin B12 deficiency  · Not currently on any supplements  · Will check B12/folate levels  History of colon cancer  · History of colon cancer status post resection 11 years ago  · No chemotherapy or radiation reported  Poor vision  · Continue to follow-up with ophthalmology  Ambulatory dysfunction  · Nonambulatory at baseline, continues assistance with all transfers  · Fall precautions and restorative therapy  Physical deconditioning  · Multifactorial related to advanced age and multiple comorbidities with decreased mobility  · Continue restorative therapy  · Encourage frequent repositioning and adequate oral intake  · Monitor skin integrity  Depression with anxiety  · Patient currently on Ativan p r n  Due to tremors and worsening anxiety  · Continue supportive care and frequent redirection/reorientation as needed  Frailty syndrome in geriatric patient  · Continue supportive care  · Frequent repositioning  · Encourage adequate nutrition, monitor skin integrity  · Restorative therapy  Santo Ford MD  Geriatric Medicine  09/24/2020       Chief Complaint   Patient seen and examined for follow up on chronic conditions  History of Present Illness     8-year-old female with underlying history of ambulatory dysfunction, deconditioning, chronic combined CHF, hypothyroidism, neuropathy, bilateral hearing loss, stage III CKD, chronic right upper quadrant pain  She is a long term care resident at [address] [place] [address] and today, she was seen and examined  with nursing staff for follow up on chronic conditions  Per staff, patient has had regular bowel movements and is voiding without difficulty  She is tolerating diet well  No skin concerns reported  Denies any fever/chills, chest pain/shortness of breath, abdominal discomfort, nausea/vomiting, diarrhea/constipation or dysuria           The following portions of the patient's history were reviewed and updated as appropriate: allergies, current medications, past family history, past medical history, past social history, past surgical history and problem list     Review of Systems     A complete review of systems was performed  All negative, except as per HPI  History     Past Medical History:   Diagnosis Date    Cancer [facility])     "some kind of cancer when I was 80"    Colon cancer ([facility] [address] )     Costochondritis     Last Assessed:3/18/2013    Iron deficiency 10/31/2017    Stage 2 chronic kidney disease 10/31/2017    Syncope     Vitamin B12 deficiency 10/31/2017     No Known Allergies    Objective     Vital Signs  BP:  142/68       HR:  90 T:  98 6°    RR:  20 O2Sat:  97% RA W:  167 9 lb    Physical Exam  GEN:         NAD  Non-toxic appearing  Looks frail and deconditioned  She looks chronically ill  HEENT:     NC/AT  TITA  EIOMI  Oropharynx moist and clear  No oral lesions  CV:            S1, S2   RRR, regular rate  2/6 KARLA appreciated  PULM:       Non-labored respirations  CTA bilaterally  ABD:          Soft, NT/ND  BSx4  EXT:          No peripheral edema  NEURO:          Awake, alert and oriented to self and place, knows her age and the year  Pertinent Laboratory/Diagnostic Studies     08/21/2020:  TSH 2 26    Current Medications     Current Medications Reviewed and updated in EMR  Monthly orders reviewed and signed in chart  Please note:  Voice-recognition software may have been used in the preparation of this document  Occasional wrong word or "sound-alike" substitutions may have occurred due to the inherent limitations of voice recognition software  Interpretation should be guided by context

## 2020-09-24 NOTE — ASSESSMENT & PLAN NOTE
· Baseline creatinine between 1 0-1 9  · Last checked stable at 1 81   · Continue to avoid nephrotoxins and hypotension  · Will check BMP prior to next appointment to monitor 
· Continue current bowel regimen and p r n  Bowel regimen as needed  · Continue to encourage adequate hydration, fiber intake and physical activity as tolerated 
· Continue supportive care 
· Continue supportive care  · Frequent repositioning  · Encourage adequate nutrition, monitor skin integrity  · Restorative therapy 
· Continue to follow-up with ophthalmology 
· History of colon cancer status post resection 11 years ago  · No chemotherapy or radiation reported 
· Intermittent episodes of right upper quadrant pain, currently not experiencing  · Per previous discussions with family, no intervention or further workup  · Continue to monitor for pain 
· Last TSH done 08/21/2020 stable at 2 26   · Continue levothyroxine 25 mcg daily  · Recheck TSH in August 2020 
· Last hemoglobin stable at 9 8  · Continue ferrous sulfate every other day  · Will check CBC prior to next appointment to monitor 
· Multifactorial related to advanced age and multiple comorbidities with decreased mobility  · Continue restorative therapy  · Encourage frequent repositioning and adequate oral intake  · Monitor skin integrity 
· No complaints of pain on exam   · Gabapentin discontinued at previous visit, patient tolerated well  · Continue to monitor for pain 
· Nonambulatory at baseline, continues assistance with all transfers  · Fall precautions and restorative therapy 
· Not currently on any supplements  · Will check B12/folate levels 
· Patient currently on Ativan p r n  Due to tremors and worsening anxiety  · Continue supportive care and frequent redirection/reorientation as needed 
· Patient is euvolemic on exam   · Continue torsemide at current dose  · Continue to monitor weights, monitor for edema or worsening shortness of breath  · Continue to monitor renal function and electrolytes periodically  Will check BMP prior to next appointment 
· Patient's weight has remained fairly stable in the past 2 months  · She is currently eating % of all meals  · Continue how tabby Xavier · Continue to monitor weights 
gradual onset

## 2020-11-11 ENCOUNTER — NURSING HOME VISIT (OUTPATIENT)
Dept: GERIATRICS | Facility: OTHER | Age: 85
End: 2020-11-11
Payer: MEDICARE

## 2020-11-11 DIAGNOSIS — N18.30 STAGE 3 CHRONIC KIDNEY DISEASE, UNSPECIFIED WHETHER STAGE 3A OR 3B CKD (HCC): ICD-10-CM

## 2020-11-11 DIAGNOSIS — E03.9 HYPOTHYROIDISM, UNSPECIFIED TYPE: ICD-10-CM

## 2020-11-11 DIAGNOSIS — H91.93 BILATERAL HEARING LOSS, UNSPECIFIED HEARING LOSS TYPE: ICD-10-CM

## 2020-11-11 DIAGNOSIS — K59.01 SLOW TRANSIT CONSTIPATION: ICD-10-CM

## 2020-11-11 DIAGNOSIS — K80.20 CALCULUS OF GALLBLADDER WITHOUT CHOLECYSTITIS WITHOUT OBSTRUCTION: ICD-10-CM

## 2020-11-11 DIAGNOSIS — R07.9 CHEST PAIN, UNSPECIFIED TYPE: Primary | ICD-10-CM

## 2020-11-11 DIAGNOSIS — E44.0 MODERATE PROTEIN-CALORIE MALNUTRITION (HCC): ICD-10-CM

## 2020-11-11 DIAGNOSIS — F41.8 DEPRESSION WITH ANXIETY: ICD-10-CM

## 2020-11-11 DIAGNOSIS — D64.9 ANEMIA, UNSPECIFIED TYPE: ICD-10-CM

## 2020-11-11 DIAGNOSIS — R53.1 WEAKNESS: ICD-10-CM

## 2020-11-11 DIAGNOSIS — R54 FRAILTY SYNDROME IN GERIATRIC PATIENT: ICD-10-CM

## 2020-11-11 DIAGNOSIS — I50.42 CHRONIC COMBINED SYSTOLIC AND DIASTOLIC HEART FAILURE (HCC): ICD-10-CM

## 2020-11-11 DIAGNOSIS — G62.9 NEUROPATHY: ICD-10-CM

## 2020-11-11 PROCEDURE — 99310 SBSQ NF CARE HIGH MDM 45: CPT | Performed by: FAMILY MEDICINE

## 2020-11-12 PROBLEM — R07.9 CHEST PAIN: Status: ACTIVE | Noted: 2020-11-12

## 2020-12-24 ENCOUNTER — NURSING HOME VISIT (OUTPATIENT)
Dept: GERIATRICS | Facility: OTHER | Age: 85
End: 2020-12-24
Payer: MEDICARE

## 2020-12-24 DIAGNOSIS — F41.8 DEPRESSION WITH ANXIETY: ICD-10-CM

## 2020-12-24 DIAGNOSIS — N18.30 STAGE 3 CHRONIC KIDNEY DISEASE, UNSPECIFIED WHETHER STAGE 3A OR 3B CKD (HCC): ICD-10-CM

## 2020-12-24 DIAGNOSIS — R53.81 PHYSICAL DECONDITIONING: ICD-10-CM

## 2020-12-24 DIAGNOSIS — I50.42 CHRONIC COMBINED SYSTOLIC AND DIASTOLIC HEART FAILURE (HCC): Primary | ICD-10-CM

## 2020-12-24 DIAGNOSIS — L30.8 DERMATITIS ASSOCIATED WITH MOISTURE: ICD-10-CM

## 2020-12-24 DIAGNOSIS — K59.01 SLOW TRANSIT CONSTIPATION: ICD-10-CM

## 2020-12-24 DIAGNOSIS — R26.2 AMBULATORY DYSFUNCTION: ICD-10-CM

## 2020-12-24 DIAGNOSIS — G62.9 NEUROPATHY: ICD-10-CM

## 2020-12-24 PROCEDURE — 99309 SBSQ NF CARE MODERATE MDM 30: CPT | Performed by: NURSE PRACTITIONER

## 2020-12-27 PROBLEM — L30.8 DERMATITIS ASSOCIATED WITH MOISTURE: Status: ACTIVE | Noted: 2020-12-24

## 2021-02-03 ENCOUNTER — NURSING HOME VISIT (OUTPATIENT)
Dept: GERIATRICS | Facility: OTHER | Age: 86
End: 2021-02-03
Payer: MEDICARE

## 2021-02-03 DIAGNOSIS — N18.30 STAGE 3 CHRONIC KIDNEY DISEASE, UNSPECIFIED WHETHER STAGE 3A OR 3B CKD (HCC): Primary | ICD-10-CM

## 2021-02-03 DIAGNOSIS — E03.9 HYPOTHYROIDISM, UNSPECIFIED TYPE: ICD-10-CM

## 2021-02-03 DIAGNOSIS — R26.2 AMBULATORY DYSFUNCTION: ICD-10-CM

## 2021-02-03 DIAGNOSIS — H91.93 BILATERAL HEARING LOSS, UNSPECIFIED HEARING LOSS TYPE: ICD-10-CM

## 2021-02-03 DIAGNOSIS — E44.0 MODERATE PROTEIN-CALORIE MALNUTRITION (HCC): ICD-10-CM

## 2021-02-03 DIAGNOSIS — R54 FRAILTY SYNDROME IN GERIATRIC PATIENT: ICD-10-CM

## 2021-02-03 DIAGNOSIS — G62.9 NEUROPATHY: ICD-10-CM

## 2021-02-03 DIAGNOSIS — I50.42 CHRONIC COMBINED SYSTOLIC AND DIASTOLIC HEART FAILURE (HCC): ICD-10-CM

## 2021-02-03 DIAGNOSIS — R53.81 PHYSICAL DECONDITIONING: ICD-10-CM

## 2021-02-03 DIAGNOSIS — F41.8 DEPRESSION WITH ANXIETY: ICD-10-CM

## 2021-02-03 PROBLEM — R07.9 CHEST PAIN: Status: RESOLVED | Noted: 2020-11-12 | Resolved: 2021-02-03

## 2021-02-03 PROBLEM — R52 GENERALIZED PAIN: Status: RESOLVED | Noted: 2020-07-17 | Resolved: 2021-02-03

## 2021-02-03 PROCEDURE — 99309 SBSQ NF CARE MODERATE MDM 30: CPT | Performed by: FAMILY MEDICINE

## 2021-02-04 NOTE — ASSESSMENT & PLAN NOTE
· Multifactorial, patient is 8 years old, with multiple co-morbidities including decreased mobility and recent weight loss  · Continue supportive care and assistance with all ADLs  · Continue to monitor

## 2021-02-04 NOTE — ASSESSMENT & PLAN NOTE
· Multifactorial related to advanced age and multiple comorbidities with decreased mobility, continue restorative therapy  · Encourage frequent repositioning, monitor skin integrity  · Encourage adequate nutrition/hydration

## 2021-02-04 NOTE — PROGRESS NOTES
[facility]  [address] [facility]  [phone]    [facility]  PROGRESS NOTE        NAME: Paulo Gonzalez  AGE: 80 y o  SEX: female  :  1917  DATE OF ENCOUNTER: 2021  POS: 28 (LT)    Assessment and Plan     Stage 3 chronic kidney disease (HCC)  · Baseline creatinine between 1 0-1 9  · Last checked 10/22/2020 stable at 53  · Continue to avoid nephrotoxins and hypotension  · Continue to monitor  Ambulatory dysfunction  · Non ambulatory at baseline  · Requires assistance with all transfers  · Continue fall precautions  Physical deconditioning  · Multifactorial related to advanced age and multiple comorbidities with decreased mobility, continue restorative therapy  · Encourage frequent repositioning, monitor skin integrity  · Encourage adequate nutrition/hydration  Depression with anxiety  · Continue supportive care and frequent redirection  · Continue PRN Ativan  Moderate protein-calorie malnutrition ([address] )  · Weight slowly trending down  · Continue to monitor  · Encourage adequate PO intake  · Continue house shakes bid  Frailty syndrome in geriatric patient  · Multifactorial, patient is 8 years old, with multiple co-morbidities including decreased mobility and recent weight loss  · Continue supportive care and assistance with all ADLs  · Continue to monitor  Neuropathy  · No complaints of pain today  · Gabapentin discontinued  · Monitor  Bilateral hearing loss  · Continue supportive care and allow to wear hearing aids  Hypothyroid  · Last TSH done 2020 stable at 2 26   · Continue levothyroxine 25 mcg daily  · Plan to recheck TSH in 2021  Chronic combined systolic and diastolic heart failure (HCC)  · euvolemic on exam   · Continue torsemide  · Monitor for edema/shortness of breath  · Monitor weights  · Continue to monitor renal function and electrolytes            Margarita Kay MD  Geriatric Medicine  02/03/2021       Chief Complaint   Patient seen and examined for follow up on chronic conditions  History of Present Illness     8-year-old female with underlying history of ambulatory dysfunction, deconditioning, chronic combined CHF, hypothyroidism, neuropathy, bilateral hearing loss, stage III CKD, chronic right upper quadrant pain  She is a long term care resident at [address] [place] [address] and today she was seen and examined today for follow up on chronic conditions      Per chart review, patient has been tolerating diet well and her meal completion ranges between 50-75% of all meals  She has lost about 3 lb in the past 3 months  She reports feeling well and complains of feeling "old"  States she is sad sometimes due to her physical limitations but understands she is 8 years old  States she wishes she was 8 years old again  She complains of L hip pain that is intermittent  She is non ambulatory at baseline  no recent falls reported  The following portions of the patient's history were reviewed and updated as appropriate: allergies, current medications, past family history, past medical history, past social history, past surgical history and problem list     Review of Systems     A complete review of systems was performed  All negative, except as per HPI  History     Past Medical History:   Diagnosis Date    Cancer [facility])     "some kind of cancer when I was 80"    Colon cancer ([facility] [address] )     Costochondritis     Last Assessed:3/18/2013    Iron deficiency 10/31/2017    Stage 2 chronic kidney disease 10/31/2017    Syncope     Vitamin B12 deficiency 10/31/2017     No Known Allergies    Objective     Vital Signs  BP: 134/66       HR:80 T:97 1    RR:20 O2Sat:97% RA W:163 3 lb    Physical Exam    GEN:         NAD  Non-toxic appearing  Looks frail and deconditioned  She looks chronically ill  HEENT:     NC/AT  TITA  EIOMI  Oropharynx moist and clear  No oral lesions      CV: S1, S2   RRR, regular rate  2/6 KARLA appreciated  PULM:       Non-labored respirations  CTA bilaterally  ABD:          Soft, NT/ND  BSx4  EXT:          No peripheral edema  NEURO:          Awake, alert and oriented to self and place, knows her age and the year  Pertinent Laboratory/Diagnostic Studies     10/22/2020:  , K 4 6, , CO2 22, BUN 40, CR 1 53, GLU 75  10/22/2020: Folate 6 6  10/22/2020:  WBC 4 1, HB 10 0, HCT 29 6,     Current Medications     Current Medications Reviewed and updated in EMR  Monthly orders reviewed and signed in chart  Please note:  Voice-recognition software may have been used in the preparation of this document  Occasional wrong word or "sound-alike" substitutions may have occurred due to the inherent limitations of voice recognition software  Interpretation should be guided by context

## 2021-02-04 NOTE — ASSESSMENT & PLAN NOTE
· Non ambulatory at baseline  · Requires assistance with all transfers  · Continue fall precautions

## 2021-02-04 NOTE — ASSESSMENT & PLAN NOTE
· euvolemic on exam   · Continue torsemide  · Monitor for edema/shortness of breath  · Monitor weights  · Continue to monitor renal function and electrolytes

## 2021-02-04 NOTE — ASSESSMENT & PLAN NOTE
· Last TSH done 08/21/2020 stable at 2 26   · Continue levothyroxine 25 mcg daily  · Plan to recheck TSH in August 2021

## 2021-02-04 NOTE — ASSESSMENT & PLAN NOTE
· Baseline creatinine between 1 0-1 9  · Last checked 10/22/2020 stable at 1/53  · Continue to avoid nephrotoxins and hypotension  · Continue to monitor

## 2021-03-04 ENCOUNTER — NURSING HOME VISIT (OUTPATIENT)
Dept: GERIATRICS | Facility: OTHER | Age: 86
End: 2021-03-04
Payer: MEDICARE

## 2021-03-04 DIAGNOSIS — G62.9 NEUROPATHY: ICD-10-CM

## 2021-03-04 DIAGNOSIS — I50.42 CHRONIC COMBINED SYSTOLIC AND DIASTOLIC HEART FAILURE (HCC): ICD-10-CM

## 2021-03-04 DIAGNOSIS — R26.2 AMBULATORY DYSFUNCTION: ICD-10-CM

## 2021-03-04 DIAGNOSIS — F41.8 DEPRESSION WITH ANXIETY: ICD-10-CM

## 2021-03-04 DIAGNOSIS — R53.81 PHYSICAL DECONDITIONING: ICD-10-CM

## 2021-03-04 DIAGNOSIS — N18.30 STAGE 3 CHRONIC KIDNEY DISEASE, UNSPECIFIED WHETHER STAGE 3A OR 3B CKD (HCC): ICD-10-CM

## 2021-03-04 DIAGNOSIS — K59.01 SLOW TRANSIT CONSTIPATION: Primary | ICD-10-CM

## 2021-03-04 PROCEDURE — 99309 SBSQ NF CARE MODERATE MDM 30: CPT | Performed by: NURSE PRACTITIONER

## 2021-03-04 NOTE — PROGRESS NOTES
[place] [address]   [phone]   [address] [place] [address]   Progress Note   POS 32           NAME: Rhona Brown AGE: 80 y o  SEX: female   : 1917   DATE OF ENCOUNTER: 2021       Chief Complaint   Patient seen and examined for follow up on chronic conditions  History of Present Illness      8 year old female patient of long term care at [facility] seen and examined today to follow-up on acute and chronic medical conditions  Patient is sitting in the chair, calm, cooperative and in no acute distress  Denies chest pain, sob, abdominal pain, nausea, vomiting, diarrhea, myalgia, arthralgia, fever, chills, headache, dizziness or visual disturbance  She is non-ambulatory at baseline, her appetite is good, she is eating  % of her meals, she is sleeping well at night, moving her bowels regularly  and her mood is stable  She states that last night she did not sleep well due to her roommate keeping her awake  The following portions of the patient's history were reviewed and updated as appropriate: allergies, current medications, past family history, past medical history, past social history, past surgical history and problem list        Review of Systems       A review of systems was performed  All negative, except as per HPI         History       Medical History   Past Medical History:   Diagnosis Date    Cancer [facility])       "some kind of cancer when I was 80"    [facility]      Costochondritis       Last Assessed:3/18/2013    Iron deficiency 10/31/2017    Stage 2 chronic kidney disease 10/31/2017    Syncope      Vitamin B12 deficiency 10/31/2017   Surgical History   Past Surgical History:   Procedure Laterality Date    BACK SURGERY        CATARACT EXTRACTION        COLON SURGERY        ERCP W/ SPHICTEROTOMY N/A 2017     Procedure: ENDOSCOPIC RETROGRADE CHOLANGIOPANCREATOGRAPHY (ERCP) W/ SPHINCTEROTOMY; Surgeon: Sin Still Michael Cowan MD; Location: [facility]; Service: Gastroenterology    HIP FRACTURE SURGERY        WRIST SURGERY       Family History   Problem Relation Age of Onset    Heart attack Mother      Heart attack Father      Cancer Sister      Heart attack Brother      Coronary artery disease Brother      Heart disease Brother      Colon cancer Family         Social History   Social History       Socioeconomic History    Marital status:         Spouse name: Not on file    Number of children: Not on file    Years of education: Not on file    Highest education level: Not on file   Occupational History    Occupation: retired   Social Needs    Financial resource strain: Not on file    Food insecurity:       Worry: Not on file       Inability: Not on file   [employer] needs:       Medical: Not on file       Non-medical: Not on file   Tobacco Use    Smoking status: Never Smoker    Smokeless tobacco: Never Used   Substance and Sexual Activity    Alcohol use: No    Drug use: No    Sexual activity: Not on file   Lifestyle    Physical activity:       Days per week: Not on file       Minutes per session: Not on file    Stress: Not on file   Relationships    Social connections:       Talks on phone: Not on file       Gets together: Not on file       Attends Jainism service: Not on file       Active member of club or organization: Not on file       Attends meetings of clubs or organizations: Not on file       Relationship status: Not on file    Intimate partner violence:       Fear of current or ex partner: Not on file       Emotionally abused: Not on file       Physically abused: Not on file       Forced sexual activity: Not on file   Other Topics Concern    Not on file   Social History Narrative     Caffeine use   No Known Allergies       Objective       Vital Signs   BP: 126/63 HR: 88 T: 97 7 RR: 19 O2Sat: 96% RA W:166 lbs   General: NAD, Non-toxic appearing, frail and deconditioned   Oral: Oropharynx Moist and Clear   Neck: Supple, +ROM   CV: S1, S2, normal rate, regular rhythm, + Murmur [place]   Pulmonary: Lung sounds clear to air, no wheezing, rhonchi, rales   Abdominal:BS + x4 in all quadrants, soft, no mass, no tenderness   Extremities: Trace bilateral ankle edema, +ROM, +Strength   Skin: Warm, Dry, no lesions, no rash, no erythema present, no ecchymosis present,    Neurological: CN 2-12 intact, PERRLA   Psych: Alert and oriented times self and place, disoriented to time, no mood, no affect       Pertinent Laboratory/Diagnostic Studies:   12/21/2020: COVID-19 PCR: Negative    10/22/2020: , K 4 6, , CO2 22, BUN 40, CR 1 53, GLU 75   10/22/2020: Folate 6 6   10/22/2020: WBC 4 1, HB 10 0, HCT 29 6,            Current Medications       Current Medications Reviewed and updated in Nursing Home EMR         Assessment and Plan       Slow transit constipation   - Stable   - Continue Sorbitol 30 ml PO daily   - Continue current bowel medication regimen   - Encourage adequate hydration and fiber intake       Chronic combined systolic and diastolic heart failure (HCC)   - Patient euvolemic on examination   - Continue Torsemide 10 mg daily   - Monitor weights per facility protocol   - Monitor for edema and worsening SOB   - Will monitor electrolytes and renal function                   Neuropathy   - Stable, no complaints of pain reported on examination   - Tolerated discontinuation of gabapentin   - Monitor for increased pain       Stage 3 chronic kidney disease (HCC)   - Baseline creatinine 1 0-1 9   - Current creatinine 1 53   - Continue to avoid nephrotoxins and hypotension       Ambulatory dysfunction    - Non ambulatory at baseline   - Continue assistance with all ADLs   - Continue supportive care and fall precautions       Physical deconditioning   - Multifactorial related to advanced age and multiple co-morbidities   - Continue restorative therapy   - Monitor skin integrity   - Encourage adequate PO intake   - Continue supportive care       Depression with anxiety   - Mood stable   - Continue Ativan 0 5 mg BID   - Give frequent reassurance    Sissy Childress, [facility]  03/04/2021

## 2021-04-09 ENCOUNTER — NURSING HOME VISIT (OUTPATIENT)
Dept: GERIATRICS | Facility: OTHER | Age: 86
End: 2021-04-09
Payer: MEDICARE

## 2021-04-09 DIAGNOSIS — R26.2 AMBULATORY DYSFUNCTION: ICD-10-CM

## 2021-04-09 DIAGNOSIS — D64.9 ANEMIA, UNSPECIFIED TYPE: ICD-10-CM

## 2021-04-09 DIAGNOSIS — H91.93 BILATERAL HEARING LOSS, UNSPECIFIED HEARING LOSS TYPE: ICD-10-CM

## 2021-04-09 DIAGNOSIS — N18.30 STAGE 3 CHRONIC KIDNEY DISEASE, UNSPECIFIED WHETHER STAGE 3A OR 3B CKD (HCC): ICD-10-CM

## 2021-04-09 DIAGNOSIS — G62.9 NEUROPATHY: ICD-10-CM

## 2021-04-09 DIAGNOSIS — I50.42 CHRONIC COMBINED SYSTOLIC AND DIASTOLIC HEART FAILURE (HCC): Primary | ICD-10-CM

## 2021-04-09 DIAGNOSIS — R54 FRAILTY SYNDROME IN GERIATRIC PATIENT: ICD-10-CM

## 2021-04-09 DIAGNOSIS — F41.8 DEPRESSION WITH ANXIETY: ICD-10-CM

## 2021-04-09 DIAGNOSIS — E03.9 HYPOTHYROIDISM, UNSPECIFIED TYPE: ICD-10-CM

## 2021-04-09 DIAGNOSIS — E44.0 MODERATE PROTEIN-CALORIE MALNUTRITION (HCC): ICD-10-CM

## 2021-04-09 PROBLEM — R44.0 AUDITORY HALLUCINATIONS: Status: RESOLVED | Noted: 2020-01-20 | Resolved: 2021-04-09

## 2021-04-09 PROCEDURE — 99309 SBSQ NF CARE MODERATE MDM 30: CPT | Performed by: FAMILY MEDICINE

## 2021-04-09 NOTE — ASSESSMENT & PLAN NOTE
·   Last TSH stable in August 2020 at 2 26     · Continue levothyroxine 25 mcg daily  · On to recheck TSH in August 2021, sooner if needed

## 2021-04-09 NOTE — ASSESSMENT & PLAN NOTE
·  euvolemic on exam     · Continue torsemide 10 mg daily  · Continue to monitor for shortness of breath or edema  · Continue to monitor weights  ·   Monitor renal function and electrolytes, will check BMP   Prior to next visit

## 2021-04-09 NOTE — ASSESSMENT & PLAN NOTE
·   Last hemoglobin stable at 10 0 in October 2020  · Continue ferrous sulfate every other day  · Monitor H and H, will check CBC prior to next visit

## 2021-04-09 NOTE — ASSESSMENT & PLAN NOTE
·   Multifactorial in the setting of advanced age with multiple comorbidities, decreased mobility  · Continue supportive care and assistance with all ADLs  · Continue to monitor  Skin integrity

## 2021-04-09 NOTE — ASSESSMENT & PLAN NOTE
·   Meal completion improved, now tolerating 100% of all meals  · Her weight has remained stable in the past 2 months  · Continue to monitor

## 2021-04-09 NOTE — ASSESSMENT & PLAN NOTE
·   Baseline creatinine ranges between 1 0-1 9  · Last checked in October 2020, stable at 1 53     · Will check BMP prior to next visit for follow-up and monitoring  · Avoid nephrotoxins and hypotension as possible

## 2021-04-09 NOTE — ASSESSMENT & PLAN NOTE
·   Nonambulatory at baseline  · Require sit to stand lift for transfers  · Continue fall precautions

## 2021-04-09 NOTE — PROGRESS NOTES
[facility]  [address] [facility]  [address] [facility]  [phone]      [facility]  PROGRESS NOTE        NAME: Carlotta Gibson  AGE: 80 y o  SEX: female  :  1917  DATE OF ENCOUNTER:  2021  POS: 32 ([facility])    Assessment and Plan     Chronic combined systolic and diastolic heart failure (HCC)  ·  euvolemic on exam     · Continue torsemide 10 mg daily  · Continue to monitor for shortness of breath or edema  · Continue to monitor weights  ·   Monitor renal function and electrolytes, will check BMP   Prior to next visit  Hypothyroid  ·   Last TSH stable in 2020 at 2 26     · Continue levothyroxine 25 mcg daily  · On to recheck TSH in 2021, sooner if needed  Stage 3 chronic kidney disease (HCC)  ·   Baseline creatinine ranges between 1 0-1 9  · Last checked in 2020, stable at 1 53     · Will check BMP prior to next visit for follow-up and monitoring  · Avoid nephrotoxins and hypotension as possible  Ambulatory dysfunction  ·   Nonambulatory at baseline  · Require sit to stand lift for transfers  · Continue fall precautions  Anemia  ·   Last hemoglobin stable at 10 0 in 2020  · Continue ferrous sulfate every other day  · Monitor H and H, will check CBC prior to next visit  Depression with anxiety  ·   Continue supportive care and frequent redirection  · Anxiety much improved with Ativan b i d     Frailty syndrome in geriatric patient  ·   Multifactorial in the setting of advanced age with multiple comorbidities, decreased mobility  · Continue supportive care and assistance with all ADLs  · Continue to monitor  Skin integrity  Moderate protein-calorie malnutrition ([address] )  ·   Meal completion improved, now tolerating 100% of all meals  · Her weight has remained stable in the past 2 months  · Continue to monitor        Bilateral hearing loss  ·   Continue to wear hearing aids and follow-up with audiology  · Continue supportive care  Neuropathy  ·   No complaints of pain  · Continue to monitor off gabapentin  Bayron Farfan MD  Geriatric Medicine   04/09/2021       Chief Complaint   Patient seen and examined for follow up on chronic conditions  History of Present Illness      8-year-old female with underlying history of ambulatory dysfunction, deconditioning, chronic combined CHF, hypothyroidism, neuropathy, bilateral hearing loss, stage III CKD, chronic right upper quadrant pain  She is a long term care resident at [address] [place] [address] and today she was seen and examined today for follow up on chronic conditions      Per chart review, patient has been tolerating diet well and her meal completion ranges between 100% of all meals  Her weight had been monitored due to recent weight loss  However upon chart review today it was noted, her weight has remained stable in the past 2 months and her meal completion is improved  Per nursing staff, she seems to be declining, she has been having intermittent difficulty feeding herself requiring assistance occasionally       She is non ambulatory at baseline  no recent falls reported  She requires max assist for transfers, currently using sit to stand lift  Seen and examined with nursing staff today  She is sitting comfortably in chair  Pleasant and conversant  Reports she feels well but not as well as when she was only 89  Denies any fever/chills, chest pain/shortness of breath, abdominal discomfort, nausea/vomiting, diarrhea/constipation or dysuria  The following portions of the patient's history were reviewed and updated as appropriate: allergies, current medications, past family history, past medical history, past social history, past surgical history and problem list     Review of Systems     A complete review of systems was performed  All negative, except as per HPI      History     Past Medical History:   Diagnosis Date    Auditory hallucinations 1/20/2020    Cancer [facility])     "some kind of cancer when I was 89"    [facility] cancer ([facility] )     Costochondritis     Last Assessed:3/18/2013    Iron deficiency 10/31/2017    Stage 2 chronic kidney disease 10/31/2017    Syncope     Vitamin B12 deficiency 10/31/2017     No Known Allergies    Objective     Vital Signs  BP:  110/53       HR: 81 T: 98 9°    RR: 21 O2Sat: 96% RA W: 161 lb    Physical Exam   GEN:         NAD  Non-toxic appearing  Looks frail and deconditioned  She looks chronically ill  HEENT:     NC/AT  TITA  EIOMI  Oropharynx moist and clear  No oral lesions  CV:            S1, S2   RRR, regular rate  2/6 KARLA appreciated  PULM:       Non-labored respirations  CTA bilaterally  ABD:          Soft, NT/ND  BSx4  EXT:          No peripheral edema  NEURO:          Awake, alert and oriented to self and place, knows her age and the year  Pertinent Laboratory/Diagnostic Studies     10/22/2020:  , K 4 6, , CO2 22, BUN 40, CR 1 53, GLU 75  10/22/2020: Folate 6 6  10/22/2020:  WBC 4 1, HB 10 0, HCT 29 6,     Current Medications     Current Medications Reviewed and updated in EMR  Monthly orders reviewed and signed in chart  Please note:  Voice-recognition software may have been used in the preparation of this document  Occasional wrong word or "sound-alike" substitutions may have occurred due to the inherent limitations of voice recognition software  Interpretation should be guided by context

## 2021-05-06 ENCOUNTER — TELEPHONE (OUTPATIENT)
Dept: OTHER | Facility: OTHER | Age: 86
End: 2021-05-06

## 2023-03-28 NOTE — ED NOTES
Family left beside went home     Sandra Dominguez  10/04/17 2122
SLIM at bedside     [facility]  10/04/17 2129
Spoke to Denilson about 10 min warning     Ratna De Paz  10/04/17 3503
tele box is transmitting     Enid Copper  10/04/17 9176
no

## 2023-05-29 NOTE — PATIENT INSTRUCTIONS
Check with s if labs have been done in last 3 months- if not, pls get my labs  I don't want her to have any meds that are yellow  Start zyrtec 5 mg at night(sent to [facility])  Atarax 25 mg 1/2 tab every 8 hours for itching(sent to [facility])  Prednisone with food  (start on may 15)(Rx sent to [facility])  Stop gabapentin  Is pateitnt still on allopurinol? Can stop sucralfate on daily basis- can use prn  Hold ferrous sulfate for now  Can family wash her sheets or bring from home?     Please only wash pateitn in hypoallergenic soap(pt has used softsoap at home - please us that)  Please get from home  -only use water to wash back- no soap  Change senna to prn for no stool or hard stool   chagne aspirin 81 mg to qod
Patent

## 2024-01-08 NOTE — ASSESSMENT & PLAN NOTE
- Euvolemic on exam  - Weights stable  - Continue to monitor daily weights  - Continue JOSUE diet and torsemide
- Patient afebrile, continue to monitor vital signs every shift for 3 days  - Continue Tylenol for pain  - Encourage PO intake  - Continue supportive care  - Will continue to monitor
- Will order a stat chest x-ray due to abnormal lung sounds  - Robitussin cough medicine prn every 6  hours  - Stat cbc with diff and bmp  - Monitor vital signs every shift for 3 days  - Encourage PO fluid intake
08-Jan-2024 17:13

## 2024-12-18 NOTE — PROGRESS NOTES
Progress Note - Mago Taveras 80 y o  female MRN: [id number]    Unit/Bed#: -01 Encounter: [id number]        * Sepsis [facility])   Assessment & Plan    Sepsis, POA, as evidenced by fever, tachycardia--clinically improved  · Source felt to be cholangitis based on abnormal MRCP  · ID following  Continue IV cefazolin and IV Flagyl day #7  · Blood cultures: 1/2 positive for klebsiella pneumoniae and 2/2 positive for klebsiella pneumonia and E coli  · Urine culture (preliminary): > 100, 000 cfu/ml proteus mirabilis  Likely asymptomatic bacteruria per ID  · Repeat blood cultures x 2: no growth at 72 hours  Cholangitis   Assessment & Plan    · Await input from GI today regarding ERCP for choledocholithiasis and management for possible cholecystitis, doubtful she is surgical candidate at her advanced age  · Associated transaminitis noted to be improving  · See above regarding sepsis/plan        Stage 3 chronic kidney disease   Assessment & Plan    · Mild RALPH POA resolved and stable for several days          Chronic combined systolic and diastolic heart failure ([address] )   Assessment & Plan    · Appears to be clinically stable at this time   · Last echo 10/30/17: EF 40 %, grade 2 diastolic dysfunction, mild pulmonary hypertension  · Monitor daily weights and intake/output  Anemia   Assessment & Plan    · Iron deficiency and B12 deficiency anemia--already on supplementation  · No overt bleeding noted  · Counts have been consistently low the past month and she is asymptomatic    If she should trended any further down she may require blood transfusion given her age          Fecal impaction [facility])   Assessment & Plan    · S/p enema, now with no further bowel movements for the past 3 days  · Continue/augment bowel regimen with Colace and Miralax, senna          Pharmacologic: Heparin  Mechanical VTE Prophylaxis in Place: Yes    Patient Centered Rounds: I have performed bedside rounds with nursing staff Reported on presentation chest pain radiating to left shoulder  Suspect secondary to pneumoperitoneum  CTA dissection protocol without any acute intrathoracic abnormality  EKG sinus bradycardia, old RBBB, nonspecific ST-T wave changes with baseline artifact.  QTc 508  Troponin-10-10  Denies any chest pain at present  Monitor symptoms   today     Discussions with Specialists or Other Care Team Provider: case mgmt, GI    Education and Discussions with Family / Patient:  Called patient's son    Time Spent for Care: 30 minutes  More than 50% of total time spent on counseling and coordination of care as described above  Current Length of Stay: 6 day(s)    Current Patient Status: Inpatient   Certification Statement: The patient will continue to require additional inpatient hospital stay due to Choledocholithiasis with sepsis    Discharge Plan / Estimated Discharge Date:  Not stable for [facility] to [facility]  Will need to discuss with gastroenterology and ID as far as when she can be discharged      Code Status: Level 1 - Full Code      Subjective:   Patient reports she has had no pain at all  Specifically she denies abdominal pain nausea or vomiting  She reports she had a good bowel movement when she had an enema but now has not had 1 for the last 3 days  She is not reporting any shortness of breath, lightheadedness, or extreme fatigue  There has been no bleeding episodes noted per nursing  She is asking when she can be discharged  Objective:     Vitals:   Temp (24hrs), Av °F (36 7 °C), Min:97 7 °F (36 5 °C), Max:98 2 °F (36 8 °C)    HR:  [69-71] 69  Resp:  [18] 18  BP: (169-186)/(72-79) 186/79  SpO2:  [95 %-98 %] 98 %  Body mass index is 25 69 kg/m²  Input and Output Summary (last 24 hours): Intake/Output Summary (Last 24 hours) at 17 1440  Last data filed at 17 1327   Gross per 24 hour   Intake             1400 ml   Output             1300 ml   Net              100 ml       Physical Exam:     Physical Exam   Constitutional: She appears well-developed and well-nourished  No distress  Appears younger than stated age, she is sitting up comfortably at bedside chair   HENT:   Head: Normocephalic and atraumatic  Eyes: Conjunctivae are normal  Right eye exhibits no discharge  Left eye exhibits no discharge   No scleral icterus  Neck: Neck supple  Cardiovascular: Normal rate, regular rhythm and normal heart sounds  No murmur heard  Pulmonary/Chest: Effort normal  No stridor  No respiratory distress  She has no wheezes  Decreased breath sounds right base   Abdominal: Soft  Bowel sounds are normal  She exhibits no distension  There is no tenderness  There is no rebound  Neurological: She is alert  Awake alert and interactive, she is a good historian   Skin: Skin is warm and dry  No rash noted  She is not diaphoretic  No erythema  No pallor  Psychiatric: She has a normal mood and affect  Her behavior is normal  Thought content normal    Nursing note and vitals reviewed  Additional Data:  Labs and diagnostic studies reviewed    Labs:      Results from last 7 days  Lab Units 11/21/17  0440  11/19/17  0443   WBC Thousand/uL 6 15  < > 4 87   HEMOGLOBIN g/dL 7 9*  < > 7 8*   HEMATOCRIT % 25 0*  < > 24 6*   PLATELETS Thousands/uL 313  < > 293   NEUTROS PCT %  --   --  64   LYMPHS PCT %  --   --  21   LYMPHO PCT % 23  < >  --    MONOS PCT %  --   --  11   MONO PCT MAN % 9  < >  --    EOS PCT %  --   --  3   EOSINO PCT MANUAL % 4  < >  --    < > = values in this interval not displayed  Results from last 7 days  Lab Units 11/21/17  0440   SODIUM mmol/L 142   POTASSIUM mmol/L 3 9   CHLORIDE mmol/L 108   CO2 mmol/L 27   BUN mg/dL 18   CREATININE mg/dL 1 22   CALCIUM mg/dL 8 9   TOTAL PROTEIN g/dL 5 7*   BILIRUBIN TOTAL mg/dL 1 10*   ALK PHOS U/L 294*   ALT U/L 76   AST U/L 49*   GLUCOSE RANDOM mg/dL 100       Results from last 7 days  Lab Units 11/19/17  0443   INR  0 95         Recent Cultures (last 7 days):       Results from last 7 days  Lab Units 11/18/17  0511 11/15/17  1532 11/15/17  1524 11/15/17  1508   BLOOD CULTURE  No Growth at 72 hrs    No Growth at 72 hrs   --   --    Escherichia coli*  Klebsiella pneumoniae*  Klebsiella pneumoniae*   GRAM STAIN RESULT   --   --   --  Gram negative rods  Gram negative rods   URINE CULTURE   --  >100,000 cfu/ml Proteus mirabilis*  --   --    INFLUENZA A PCR   --   --  None Detected  --    INFLUENZA B PCR   --   --  None Detected  --    RSV PCR   --   --  None Detected  --        Last 24 Hours Medication List:     ascorbic acid 250 mg Oral Daily   cefazolin 1,000 mg Intravenous Q12H   cholecalciferol 1,000 Units Oral Daily   cycloSPORINE 1 drop Both Eyes BID   docusate sodium 100 mg Oral BID   ferrous sulfate 325 mg Oral BID With Meals   folic acid-pyridoxine-cyanocobalamin 1 tablet Oral Daily   gabapentin 100 mg Oral HS   heparin (porcine) 5,000 Units Subcutaneous Q8H [address]   metroNIDAZOLE 500 mg Oral Q8H [address]   multivitamin-minerals 1 tablet Oral Daily   pantoprazole 40 mg Oral Daily Before Breakfast   polyethylene glycol 17 g Oral Daily   senna 2 tablet Oral BID   sucralfate 1 g Oral Q12H   travoprost 1 drop Both Eyes HS        Today, Patient Was Seen By: Ayla Bell PA-C    ** Please Note: Dragon 360 Dictation voice to text software may have been used in the creation of this document   **

## (undated) DEVICE — RETRIEVAL BALLOON CATHETER: Brand: EXTRACTOR™ PRO RX

## (undated) DEVICE — LOCKING DEVICE AND BIOPSY CAP FOR USE WITH RX BILIARY SYSTEM: Brand: RX LOCKING DEVICE AND BIOPSY CAP

## (undated) DEVICE — SPHINCTEROTOME: Brand: DREAMTOME™ RX 44